# Patient Record
Sex: FEMALE | Race: WHITE | NOT HISPANIC OR LATINO | Employment: FULL TIME | URBAN - METROPOLITAN AREA
[De-identification: names, ages, dates, MRNs, and addresses within clinical notes are randomized per-mention and may not be internally consistent; named-entity substitution may affect disease eponyms.]

---

## 2018-01-12 ENCOUNTER — OFFICE VISIT (OUTPATIENT)
Dept: FAMILY MEDICINE | Facility: CLINIC | Age: 43
End: 2018-01-12
Payer: COMMERCIAL

## 2018-01-12 VITALS
OXYGEN SATURATION: 98 % | WEIGHT: 198.88 LBS | HEART RATE: 83 BPM | TEMPERATURE: 99 F | DIASTOLIC BLOOD PRESSURE: 96 MMHG | SYSTOLIC BLOOD PRESSURE: 142 MMHG

## 2018-01-12 DIAGNOSIS — N92.0 MENORRHAGIA WITH REGULAR CYCLE: ICD-10-CM

## 2018-01-12 DIAGNOSIS — I10 ESSENTIAL (PRIMARY) HYPERTENSION: ICD-10-CM

## 2018-01-12 DIAGNOSIS — F17.200 TOBACCO DEPENDENCY: ICD-10-CM

## 2018-01-12 DIAGNOSIS — Z00.00 ROUTINE MEDICAL EXAM: Primary | ICD-10-CM

## 2018-01-12 DIAGNOSIS — F43.9 STRESS AT HOME: ICD-10-CM

## 2018-01-12 PROCEDURE — 99999 PR PBB SHADOW E&M-NEW PATIENT-LVL IV: CPT | Mod: PBBFAC,,, | Performed by: INTERNAL MEDICINE

## 2018-01-12 PROCEDURE — 99386 PREV VISIT NEW AGE 40-64: CPT | Mod: S$GLB,,, | Performed by: INTERNAL MEDICINE

## 2018-01-12 RX ORDER — AMLODIPINE BESYLATE 10 MG/1
10 TABLET ORAL DAILY
Qty: 90 TABLET | Refills: 3 | Status: SHIPPED | OUTPATIENT
Start: 2018-01-12 | End: 2019-02-25 | Stop reason: SDUPTHER

## 2018-01-12 NOTE — ASSESSMENT & PLAN NOTE
HCTZ caused too much urination.  Will start with amlodipine 5mg until she can see me back.  Patient is asked to monitor BP at home or work, several times per month and return with written values at next office visit.

## 2018-01-12 NOTE — ASSESSMENT & PLAN NOTE
I counseled the patient on smoking cessation, risks associated with smoking, having a quit date, nicotine replacement, medications that can aid in cessation, and having a plan for future cravings.  The patient stated that she is interested in smoking cessation clinic.

## 2018-01-12 NOTE — PROGRESS NOTES
Assessment & Plan (all problems are new to me)  Problem List Items Addressed This Visit        Cardiac/Vascular    Essential (primary) hypertension (Chronic)    Current Assessment & Plan     HCTZ caused too much urination.  Will start with amlodipine 5mg until she can see me back.  Patient is asked to monitor BP at home or work, several times per month and return with written values at next office visit.             Other    Tobacco dependency (Chronic)    Current Assessment & Plan     I counseled the patient on smoking cessation, risks associated with smoking, having a quit date, nicotine replacement, medications that can aid in cessation, and having a plan for future cravings.  The patient stated that she is interested in smoking cessation clinic.          Relevant Orders    Ambulatory referral to Smoking Cessation Program      Other Visit Diagnoses     Routine medical exam    -  Primary  -    Discussed healthy diet, regular exercise, necessary labs, age appropriate cancer screening, and routine vaccinations.       Relevant Orders    CBC auto differential    Comprehensive metabolic panel    Lipid panel    Hemoglobin A1c    TSH    Menorrhagia with regular cycle    -  Referred to GYN.      Relevant Orders    Ambulatory referral to Gynecology    Stress at home  -    Counseled on coping mechanisms, need to practice them, and recommended choosing a couple to try until she can see me back.                 Health Maintenance reviewed, declined flu vaccine today.    Follow-up: Follow-up in about 1 week (around 1/19/2018) for BP check, elbow and foot pain.  ______________________________________________________________________    Chief Complaint  Chief Complaint   Patient presents with    [facility]       HPI  Sherrill Ennis is a 42 y.o. female with multiple medical diagnoses as listed in the medical history and problem list that presents for establish care.  Pt is new to me and to this clinic.  Last followed by  [facility] Dr. Johnson and last seen about 2 years ago.  She is aware that we may not be able to get through everything in a single visit.     Reports that she was taking ACE-I and HCTZ in the past but this was stopped ~ 2 years ago since her pressure was looking good.  She has been having HA that is typically bilateral and wraps from the front around to the occiput.  No photophobia/phonophobia/auras/N/V.  Started checking BP 2 days ago and mostly 130s-150s/90s.  No CP, SOB, palpitations.  She is a smoker and is interested in quitting.  She has recently been under a lot of stress.  Some work.  Some her own health concerns.  Mostly due to stress at home.  Recently have children and their kids move back home.  Currently 11 people in her house.  No specific coping mechanisms for the stress currently.  She would like to see an OBGYN.  Now having heavier periods with increased cramping.  Passing clots.        PAST MEDICAL HISTORY:  Past Medical History:   Diagnosis Date    Hypertension        PAST SURGICAL HISTORY:  Past Surgical History:   Procedure Laterality Date     SECTION      x4    CHOLECYSTECTOMY  1995    TUBAL LIGATION         SOCIAL HISTORY:  Social History     Social History    Marital status:      Spouse name: N/A    Number of children: N/A    Years of education: N/A     Occupational History    Not on file.     Social History Main Topics    Smoking status: Current Every Day Smoker     Packs/day: 0.50     Years: 17.00    Smokeless tobacco: Never Used    Alcohol use Yes      Comment: once weekly    Drug use: No    Sexual activity: No      Comment:      Other Topics Concern    Not on file     Social History Narrative    No narrative on file       FAMILY HISTORY:  Family History   Problem Relation Age of Onset    Hypertension Mother     Stroke Mother     Aneurysm Mother     Hypertension Father     Hypertension Brother     Hypertension Maternal Grandfather      Heart disease Maternal Grandfather      MI    Cancer Maternal Grandfather     Cancer Paternal Grandmother        ALLERGIES AND MEDICATIONS: updated and reviewed.  Review of patient's allergies indicates:  Allergies not on file  Current Outpatient Prescriptions   Medication Sig Dispense Refill    amLODIPine (NORVASC) 10 MG tablet Take 1 tablet (10 mg total) by mouth once daily. 90 tablet 3     No current facility-administered medications for this visit.          ROS  Review of Systems   Constitutional: Negative for activity change, chills, diaphoresis, fever and unexpected weight change.   HENT: Negative for congestion, dental problem, ear discharge, ear pain, hearing loss, postnasal drip, rhinorrhea, sinus pressure, sore throat and trouble swallowing.    Eyes: Negative for pain, discharge, redness, itching and visual disturbance.   Respiratory: Negative for cough, chest tightness, shortness of breath and wheezing.    Cardiovascular: Negative for chest pain, palpitations and leg swelling.   Gastrointestinal: Negative for abdominal distention, abdominal pain, blood in stool, constipation, diarrhea, nausea and vomiting.        No melena   Endocrine: Negative for cold intolerance, heat intolerance, polydipsia, polyphagia and polyuria.        No nocturia   Genitourinary: Positive for menstrual problem. Negative for decreased urine volume, difficulty urinating, dysuria, flank pain, frequency, genital sores, hematuria, pelvic pain, urgency, vaginal bleeding, vaginal discharge and vaginal pain.   Musculoskeletal: Positive for arthralgias, joint swelling and neck pain. Negative for myalgias and neck stiffness.   Skin: Negative for color change, pallor and rash.   Neurological: Positive for headaches. Negative for dizziness, tremors, seizures, syncope, weakness and light-headedness.   Hematological: Negative for adenopathy. Does not bruise/bleed easily.   Psychiatric/Behavioral: Negative for confusion, decreased  concentration, dysphoric mood, sleep disturbance and suicidal ideas. The patient is not nervous/anxious.         No depression         Physical Exam  Vitals:    01/12/18 1259 01/12/18 1347   BP: (!) 176/110 (!) 142/96   BP Location: Right arm    Patient Position: Sitting    BP Method: Medium (Manual)    Pulse: 83    Temp: 98.8 °F (37.1 °C)    TempSrc: Oral    SpO2: 98%    Weight: 90.2 kg (198 lb 13.7 oz)     There is no height or weight on file to calculate BMI.  Weight: 90.2 kg (198 lb 13.7 oz)       Physical Exam   Constitutional: She is oriented to person, place, and time. She appears well-developed and well-nourished. No distress.   HENT:   Head: Normocephalic and atraumatic.   Right Ear: Tympanic membrane, external ear and ear canal normal.   Left Ear: Tympanic membrane, external ear and ear canal normal.   Nose: Nose normal. Right sinus exhibits no maxillary sinus tenderness and no frontal sinus tenderness. Left sinus exhibits no maxillary sinus tenderness and no frontal sinus tenderness.   Mouth/Throat: Uvula is midline, oropharynx is clear and moist and mucous membranes are normal. No tonsillar exudate.   Eyes: Conjunctivae, EOM and lids are normal. Pupils are equal, round, and reactive to light. No scleral icterus.   Neck: Full passive range of motion without pain. Neck supple. No JVD present. No spinous process tenderness and no muscular tenderness present. Carotid bruit is not present. No thyromegaly present.   Cardiovascular: Normal rate, regular rhythm, S1 normal, S2 normal and intact distal pulses.  Exam reveals no S3, no S4 and no friction rub.    No murmur heard.  Pulmonary/Chest: Effort normal and breath sounds normal. She has no wheezes. She has no rhonchi. She has no rales.   Abdominal: Soft. Bowel sounds are normal. She exhibits no distension. There is no hepatosplenomegaly. There is no tenderness. There is no rebound and no CVA tenderness.   Musculoskeletal: Normal range of motion. She exhibits  no edema.   Lymphadenopathy:        Head (right side): No submental and no submandibular adenopathy present.        Head (left side): No submental and no submandibular adenopathy present.     She has no cervical adenopathy.   Neurological: She is alert and oriented to person, place, and time. Coordination normal.   Motor grossly intact.  Sensory grossly intact.  Symmetric facial movements palate elevated symmetrically tongue midline     Skin: Skin is warm and dry. No rash noted. No cyanosis. Nails show no clubbing.   Psychiatric: She has a normal mood and affect. Her speech is normal and behavior is normal. Thought content normal. Cognition and memory are normal.           Health Maintenance    Patient has no pending health maintenance at this time

## 2018-01-16 ENCOUNTER — LAB VISIT (OUTPATIENT)
Dept: LAB | Facility: HOSPITAL | Age: 43
End: 2018-01-16
Attending: INTERNAL MEDICINE
Payer: COMMERCIAL

## 2018-01-16 DIAGNOSIS — Z00.00 ROUTINE MEDICAL EXAM: ICD-10-CM

## 2018-01-16 LAB
ALBUMIN SERPL BCP-MCNC: 3.7 G/DL
ALP SERPL-CCNC: 80 U/L
ALT SERPL W/O P-5'-P-CCNC: 27 U/L
ANION GAP SERPL CALC-SCNC: 7 MMOL/L
AST SERPL-CCNC: 18 U/L
BASOPHILS # BLD AUTO: 0.05 K/UL
BASOPHILS NFR BLD: 0.6 %
BILIRUB SERPL-MCNC: 0.3 MG/DL
BUN SERPL-MCNC: 16 MG/DL
CALCIUM SERPL-MCNC: 8.9 MG/DL
CHLORIDE SERPL-SCNC: 109 MMOL/L
CHOLEST SERPL-MCNC: 225 MG/DL
CHOLEST/HDLC SERPL: 6.4 {RATIO}
CO2 SERPL-SCNC: 22 MMOL/L
CREAT SERPL-MCNC: 0.7 MG/DL
DIFFERENTIAL METHOD: ABNORMAL
EOSINOPHIL # BLD AUTO: 0.3 K/UL
EOSINOPHIL NFR BLD: 3.4 %
ERYTHROCYTE [DISTWIDTH] IN BLOOD BY AUTOMATED COUNT: 16.3 %
EST. GFR  (AFRICAN AMERICAN): >60 ML/MIN/1.73 M^2
EST. GFR  (NON AFRICAN AMERICAN): >60 ML/MIN/1.73 M^2
ESTIMATED AVG GLUCOSE: 114 MG/DL
GLUCOSE SERPL-MCNC: 110 MG/DL
HBA1C MFR BLD HPLC: 5.6 %
HCT VFR BLD AUTO: 34.1 %
HDLC SERPL-MCNC: 35 MG/DL
HDLC SERPL: 15.6 %
HGB BLD-MCNC: 10.6 G/DL
IMM GRANULOCYTES # BLD AUTO: 0.03 K/UL
IMM GRANULOCYTES NFR BLD AUTO: 0.4 %
LDLC SERPL CALC-MCNC: 155.4 MG/DL
LYMPHOCYTES # BLD AUTO: 2.3 K/UL
LYMPHOCYTES NFR BLD: 27.9 %
MCH RBC QN AUTO: 22.6 PG
MCHC RBC AUTO-ENTMCNC: 31.1 G/DL
MCV RBC AUTO: 73 FL
MONOCYTES # BLD AUTO: 0.7 K/UL
MONOCYTES NFR BLD: 8.8 %
NEUTROPHILS # BLD AUTO: 4.9 K/UL
NEUTROPHILS NFR BLD: 58.9 %
NONHDLC SERPL-MCNC: 190 MG/DL
NRBC BLD-RTO: 0 /100 WBC
PLATELET # BLD AUTO: 352 K/UL
PMV BLD AUTO: 10.5 FL
POTASSIUM SERPL-SCNC: 3.8 MMOL/L
PROT SERPL-MCNC: 7.3 G/DL
RBC # BLD AUTO: 4.7 M/UL
SODIUM SERPL-SCNC: 138 MMOL/L
TRIGL SERPL-MCNC: 173 MG/DL
TSH SERPL DL<=0.005 MIU/L-ACNC: 1.86 UIU/ML
WBC # BLD AUTO: 8.29 K/UL

## 2018-01-16 PROCEDURE — 36415 COLL VENOUS BLD VENIPUNCTURE: CPT | Mod: PO

## 2018-01-16 PROCEDURE — 85025 COMPLETE CBC W/AUTO DIFF WBC: CPT

## 2018-01-16 PROCEDURE — 80053 COMPREHEN METABOLIC PANEL: CPT

## 2018-01-16 PROCEDURE — 84443 ASSAY THYROID STIM HORMONE: CPT

## 2018-01-16 PROCEDURE — 80061 LIPID PANEL: CPT

## 2018-01-16 PROCEDURE — 83036 HEMOGLOBIN GLYCOSYLATED A1C: CPT

## 2018-01-19 ENCOUNTER — LAB VISIT (OUTPATIENT)
Dept: LAB | Facility: HOSPITAL | Age: 43
End: 2018-01-19
Attending: INTERNAL MEDICINE
Payer: COMMERCIAL

## 2018-01-19 ENCOUNTER — OFFICE VISIT (OUTPATIENT)
Dept: FAMILY MEDICINE | Facility: CLINIC | Age: 43
End: 2018-01-19
Payer: COMMERCIAL

## 2018-01-19 VITALS
OXYGEN SATURATION: 98 % | HEIGHT: 64 IN | DIASTOLIC BLOOD PRESSURE: 84 MMHG | WEIGHT: 199.19 LBS | BODY MASS INDEX: 34.01 KG/M2 | HEART RATE: 96 BPM | TEMPERATURE: 98 F | SYSTOLIC BLOOD PRESSURE: 130 MMHG

## 2018-01-19 DIAGNOSIS — B96.89 BV (BACTERIAL VAGINOSIS): ICD-10-CM

## 2018-01-19 DIAGNOSIS — A60.04 HERPES SIMPLEX VULVOVAGINITIS: ICD-10-CM

## 2018-01-19 DIAGNOSIS — G25.81 RLS (RESTLESS LEGS SYNDROME): ICD-10-CM

## 2018-01-19 DIAGNOSIS — D50.9 MICROCYTIC ANEMIA: ICD-10-CM

## 2018-01-19 DIAGNOSIS — Z12.31 ENCOUNTER FOR SCREENING MAMMOGRAM FOR BREAST CANCER: ICD-10-CM

## 2018-01-19 DIAGNOSIS — F17.200 TOBACCO DEPENDENCY: Chronic | ICD-10-CM

## 2018-01-19 DIAGNOSIS — M77.11 LATERAL EPICONDYLITIS OF RIGHT ELBOW: ICD-10-CM

## 2018-01-19 DIAGNOSIS — N76.0 BV (BACTERIAL VAGINOSIS): ICD-10-CM

## 2018-01-19 DIAGNOSIS — I10 ESSENTIAL (PRIMARY) HYPERTENSION: Primary | Chronic | ICD-10-CM

## 2018-01-19 LAB
FERRITIN SERPL-MCNC: 14 NG/ML
IRON SERPL-MCNC: 30 UG/DL
SATURATED IRON: 5 %
TOTAL IRON BINDING CAPACITY: 549 UG/DL
TRANSFERRIN SERPL-MCNC: 371 MG/DL
VIT B12 SERPL-MCNC: 628 PG/ML

## 2018-01-19 PROCEDURE — 36415 COLL VENOUS BLD VENIPUNCTURE: CPT | Mod: PO

## 2018-01-19 PROCEDURE — 82728 ASSAY OF FERRITIN: CPT

## 2018-01-19 PROCEDURE — 82607 VITAMIN B-12: CPT

## 2018-01-19 PROCEDURE — 99214 OFFICE O/P EST MOD 30 MIN: CPT | Mod: S$GLB,,, | Performed by: INTERNAL MEDICINE

## 2018-01-19 PROCEDURE — 83540 ASSAY OF IRON: CPT

## 2018-01-19 PROCEDURE — 99999 PR PBB SHADOW E&M-EST. PATIENT-LVL III: CPT | Mod: PBBFAC,,, | Performed by: INTERNAL MEDICINE

## 2018-01-19 RX ORDER — METRONIDAZOLE 500 MG/1
500 TABLET ORAL EVERY 12 HOURS
Qty: 14 TABLET | Refills: 0 | Status: SHIPPED | OUTPATIENT
Start: 2018-01-19 | End: 2018-01-26

## 2018-01-19 RX ORDER — NAPROXEN 500 MG/1
500 TABLET ORAL 2 TIMES DAILY PRN
Qty: 60 TABLET | Refills: 1 | Status: SHIPPED | OUTPATIENT
Start: 2018-01-19 | End: 2018-05-02 | Stop reason: SDUPTHER

## 2018-01-19 RX ORDER — VALACYCLOVIR HYDROCHLORIDE 1 G/1
1000 TABLET, FILM COATED ORAL DAILY PRN
Qty: 30 TABLET | Refills: 1 | Status: SHIPPED | OUTPATIENT
Start: 2018-01-19 | End: 2020-07-07 | Stop reason: SDUPTHER

## 2018-01-19 NOTE — ASSESSMENT & PLAN NOTE
Taking amlodipine 5mg.  The current medical regimen is effective;  continue present plan and medications.

## 2018-01-19 NOTE — PROGRESS NOTES
Assessment & Plan  Problem List Items Addressed This Visit        Cardiac/Vascular    Essential (primary) hypertension - Primary (Chronic)    Current Assessment & Plan     Taking amlodipine 5mg.  The current medical regimen is effective;  continue present plan and medications.             ID    Herpes simplex vulvovaginitis    Current Assessment & Plan     Valtrex provided for PRN use.  Typically only 1 outbreak a year.          Relevant Medications    valACYclovir (VALTREX) 1000 MG tablet       Other    Tobacco dependency (Chronic)    Current Assessment & Plan     Has been 4 days without cigarette!  Keep up the great work!            Other Visit Diagnoses     Lateral epicondylitis of right elbow    -  RICE therapy, NSAIDs.  Counseled on physician directed home PT.  May use tennis elbow strap as well    Relevant Medications    naproxen (EC NAPROSYN) 500 MG EC tablet    BV (bacterial vaginosis)    -  Start flagyl.      Relevant Medications    metroNIDAZOLE (FLAGYL) 500 MG tablet    Encounter for screening mammogram for breast cancer    -  Screening MMG ordered    Relevant Orders    Mammo Digital Screening Bilat with CAD    RLS (restless legs syndrome)    -  Check iron and B12.  Suspect this is due to iron def given microcytic anemia and heavy vaginal bleeding.     Relevant Orders    Vitamin B12    Ferritin    Microcytic anemia    -  As above.  Likely from menstrual bleedings.  Referred to OBGYN    Relevant Orders    Iron and TIBC    Ferritin            Health Maintenance reviewed, declined flu shot.    Follow-up: Follow-up in about 6 months (around 7/19/2018) for follow up for chronic conditions.  ______________________________________________________________________    Chief Complaint  Chief Complaint   Patient presents with    Hypertension    Joint Pain    Follow-up       HPI  Sherrill Ennis is a 42 y.o. female with medical diagnoses as listed in the medical history and problem list that presents for HTN  follow up, heel pain and foot pain for weeks.  Pt is known to me with her last appointment 2018.  She had labs after her last visit that showed microcytic anemia and elevated cholesterol.      Her Bp has been doing better.  No complications from the amlodipine.  She has hasn't smoked a cigarette in 4 days!    She has been having bilateral foot pain for about a year.  It can be intermittent.  Feels like her feet are swollen and having some painful paresthesia.  Occurs mostly at night but can also be when she is sitting for a prolonged period of time. Gets better when she stands up and walks.  At night it doesn't wake her up but she noticed it up in the AM.  Feet aren't actually swollen.  The left foot is TTP over the lateral foot.  When trying to fall asleep she feels like she can't get her legs comfortable and will  her legs until she is able to fall asleep.      Right elbow pain is just over the lateral epicondyle for a couple of months.  10 days ago was really bad.  She is RHD.  She tried ibuprofen without any relief. No hand weakness or numbness.  Pain also notable with writing repetitively and taking things down from an elevated shelf such as her laundry detergent.        PAST MEDICAL HISTORY:  Past Medical History:   Diagnosis Date    Hypertension        PAST SURGICAL HISTORY:  Past Surgical History:   Procedure Laterality Date     SECTION      x4    CHOLECYSTECTOMY      TUBAL LIGATION         SOCIAL HISTORY:  Social History     Social History    Marital status:      Spouse name: N/A    Number of children: N/A    Years of education: N/A     Occupational History    Not on file.     Social History Main Topics    Smoking status: Current Every Day Smoker     Packs/day: 0.50     Years: 17.00    Smokeless tobacco: Never Used    Alcohol use Yes      Comment: once weekly    Drug use: No    Sexual activity: No      Comment:      Other Topics Concern    Not on file      Social History Narrative    No narrative on file       FAMILY HISTORY:  Family History   Problem Relation Age of Onset    Hypertension Mother     Stroke Mother     Aneurysm Mother     Hypertension Father     Hypertension Brother     Hypertension Maternal Grandfather     Heart disease Maternal Grandfather      MI    Cancer Maternal Grandfather     Cancer Paternal Grandmother        ALLERGIES AND MEDICATIONS: updated and reviewed.  Review of patient's allergies indicates:   Allergen Reactions    Ace inhibitors Other (See Comments)     cough     Current Outpatient Prescriptions   Medication Sig Dispense Refill    amLODIPine (NORVASC) 10 MG tablet Take 1 tablet (10 mg total) by mouth once daily. 90 tablet 3    metroNIDAZOLE (FLAGYL) 500 MG tablet Take 1 tablet (500 mg total) by mouth every 12 (twelve) hours. 14 tablet 0    naproxen (EC NAPROSYN) 500 MG EC tablet Take 1 tablet (500 mg total) by mouth 2 (two) times daily as needed (elbow pain). 60 tablet 1    valACYclovir (VALTREX) 1000 MG tablet Take 1 tablet (1,000 mg total) by mouth daily as needed (herpes outbreak). For 5 days 30 tablet 1     No current facility-administered medications for this visit.          ROS  Review of Systems   Constitutional: Negative for activity change and unexpected weight change.   HENT: Negative for hearing loss, rhinorrhea and trouble swallowing.    Eyes: Negative for discharge and visual disturbance.   Respiratory: Negative for chest tightness and wheezing.    Cardiovascular: Negative for chest pain and palpitations.   Gastrointestinal: Negative for blood in stool, constipation, diarrhea and vomiting.   Endocrine: Negative for polyuria.   Genitourinary: Positive for genital sores (none currently but has h/o HSV), vaginal bleeding, vaginal discharge (malodorous) and vaginal pain (with intercourse). Negative for difficulty urinating, dysuria and hematuria.   Musculoskeletal: Positive for arthralgias and neck pain.  "Negative for joint swelling.   Neurological: Negative for weakness and headaches.   Psychiatric/Behavioral: Negative for confusion and dysphoric mood.           Physical Exam  Vitals:    01/19/18 1124   BP: 130/84   Pulse: 96   Temp: 98.4 °F (36.9 °C)   SpO2: 98%   Weight: 90.4 kg (199 lb 3 oz)   Height: 5' 4" (1.626 m)    Body mass index is 34.19 kg/m².  Weight: 90.4 kg (199 lb 3 oz)   Height: 5' 4" (162.6 cm)   Physical Exam   Constitutional: She is oriented to person, place, and time. She appears well-developed and well-nourished. No distress.   HENT:   Head: Normocephalic and atraumatic.   Eyes: Conjunctivae, EOM and lids are normal. Pupils are equal, round, and reactive to light. No scleral icterus.   Neck: Full passive range of motion without pain. Neck supple. No JVD present. Carotid bruit is not present. No thyromegaly present.   Cardiovascular: Normal rate, regular rhythm, normal heart sounds, intact distal pulses and normal pulses.  Exam reveals no S3, no S4 and no friction rub.    No murmur heard.  Pulmonary/Chest: Effort normal and breath sounds normal. She has no wheezes. She has no rhonchi. She has no rales.   Abdominal: Soft. Bowel sounds are normal. There is no tenderness.   Musculoskeletal: She exhibits no edema.        Right elbow: She exhibits normal range of motion, no swelling, no effusion and no deformity. Tenderness found. Lateral epicondyle (TTP and pain with resistance to suppination) tenderness noted. No radial head, no medial epicondyle and no olecranon process tenderness noted.        Left elbow: Normal.        Right ankle: Normal.        Left ankle: Normal.        Right foot: Normal.        Left foot: Normal.   Lymphadenopathy:        Head (right side): No submental and no submandibular adenopathy present.        Head (left side): No submental and no submandibular adenopathy present.     She has no cervical adenopathy.   Neurological: She is alert and oriented to person, place, and time. "   Motor grossly intact.  Sensory grossly intact.  Symmetric facial movements palate elevated symmetrically tongue midline   Skin: Skin is warm and dry. No rash noted.   Psychiatric: She has a normal mood and affect. Her speech is normal and behavior is normal. Thought content normal.           Health Maintenance       Date Due Completion Date    TETANUS VACCINE 04/20/1995 ---    Pneumococcal PPSV23 (Medium Risk) (1) 04/20/1995 ---    Pap Smear with HPV Cotest 04/20/1998 ---    Mammogram 04/20/2017 ---    Influenza Vaccine 08/01/2017 ---

## 2018-01-19 NOTE — PATIENT INSTRUCTIONS
Below is a list of free Apps that you may find helpful (some of them may not apply to you since this is a general list of helpful Apps):    Android & Apple users:  Fooducate - Helps with healthy diet and weight loss  Shop Well - Scan barcodes to foods to see if it is healthy or not.  It will also suggest healthier alternatives  Lose It - Calorie tracking and goal setting for weight loss.  Peer support is also available  Calorie Counter and Diet Tracker by MyFitnessPal - Helps count calories for food intake and calories burned during exercise  Popsugar Active - has pictures and videos of preloaded workout routines  [facility] Diabetes Pal - log for home sugars, diet, weight, and blood pressure  Headspace - Guides your through meditation.  The first 10 programs are free.

## 2018-01-20 ENCOUNTER — PATIENT MESSAGE (OUTPATIENT)
Dept: FAMILY MEDICINE | Facility: CLINIC | Age: 43
End: 2018-01-20

## 2018-01-20 DIAGNOSIS — G25.81 RLS (RESTLESS LEGS SYNDROME): ICD-10-CM

## 2018-01-20 DIAGNOSIS — D50.0 IRON DEFICIENCY ANEMIA DUE TO CHRONIC BLOOD LOSS: ICD-10-CM

## 2018-01-20 RX ORDER — DOCUSATE SODIUM 100 MG/1
100 CAPSULE, LIQUID FILLED ORAL 2 TIMES DAILY PRN
Qty: 60 CAPSULE | Refills: 2 | Status: SHIPPED | OUTPATIENT
Start: 2018-01-20 | End: 2018-08-27

## 2018-01-20 RX ORDER — FERROUS SULFATE 325(65) MG
325 TABLET, DELAYED RELEASE (ENTERIC COATED) ORAL
Qty: 90 TABLET | Refills: 2 | Status: SHIPPED | OUTPATIENT
Start: 2018-01-20 | End: 2018-04-20

## 2018-01-22 ENCOUNTER — PATIENT MESSAGE (OUTPATIENT)
Dept: FAMILY MEDICINE | Facility: CLINIC | Age: 43
End: 2018-01-22

## 2018-03-08 ENCOUNTER — HOSPITAL ENCOUNTER (OUTPATIENT)
Dept: RADIOLOGY | Facility: HOSPITAL | Age: 43
Discharge: HOME | End: 2018-03-08
Attending: INTERNAL MEDICINE
Payer: COMMERCIAL

## 2018-03-08 DIAGNOSIS — Z12.31 ENCOUNTER FOR SCREENING MAMMOGRAM FOR BREAST CANCER: ICD-10-CM

## 2018-03-08 PROCEDURE — 77067 SCR MAMMO BI INCL CAD: CPT | Mod: 26,,, | Performed by: RADIOLOGY

## 2018-03-08 PROCEDURE — 77063 BREAST TOMOSYNTHESIS BI: CPT | Mod: 26,,, | Performed by: RADIOLOGY

## 2018-03-08 PROCEDURE — 77067 SCR MAMMO BI INCL CAD: CPT | Mod: TC,PO

## 2018-03-21 ENCOUNTER — OFFICE VISIT (OUTPATIENT)
Dept: OBSTETRICS AND GYNECOLOGY | Facility: CLINIC | Age: 43
End: 2018-03-21
Payer: COMMERCIAL

## 2018-03-21 VITALS
DIASTOLIC BLOOD PRESSURE: 78 MMHG | WEIGHT: 200.81 LBS | HEIGHT: 64 IN | BODY MASS INDEX: 34.28 KG/M2 | SYSTOLIC BLOOD PRESSURE: 125 MMHG

## 2018-03-21 DIAGNOSIS — N94.6 DYSMENORRHEA: ICD-10-CM

## 2018-03-21 DIAGNOSIS — Z00.00 ANNUAL PHYSICAL EXAM: Primary | ICD-10-CM

## 2018-03-21 DIAGNOSIS — Z01.419 ENCOUNTER FOR GYNECOLOGICAL EXAMINATION WITHOUT ABNORMAL FINDING: ICD-10-CM

## 2018-03-21 DIAGNOSIS — N80.03 ADENOMYOSIS: ICD-10-CM

## 2018-03-21 DIAGNOSIS — N92.0 MENORRHAGIA WITH REGULAR CYCLE: ICD-10-CM

## 2018-03-21 PROCEDURE — 3074F SYST BP LT 130 MM HG: CPT | Mod: CPTII,S$GLB,, | Performed by: OBSTETRICS & GYNECOLOGY

## 2018-03-21 PROCEDURE — 99999 PR PBB SHADOW E&M-EST. PATIENT-LVL III: CPT | Mod: PBBFAC,,, | Performed by: OBSTETRICS & GYNECOLOGY

## 2018-03-21 PROCEDURE — 99386 PREV VISIT NEW AGE 40-64: CPT | Mod: S$GLB,,, | Performed by: OBSTETRICS & GYNECOLOGY

## 2018-03-21 PROCEDURE — 3078F DIAST BP <80 MM HG: CPT | Mod: CPTII,S$GLB,, | Performed by: OBSTETRICS & GYNECOLOGY

## 2018-03-21 PROCEDURE — 88175 CYTOPATH C/V AUTO FLUID REDO: CPT

## 2018-03-21 RX ORDER — DIAZEPAM 10 MG/1
TABLET ORAL
COMMUNITY
Start: 2018-01-26 | End: 2018-04-30

## 2018-03-21 NOTE — PATIENT INSTRUCTIONS

## 2018-03-21 NOTE — PROGRESS NOTES
Subjective:      Chief Complaint:    Chief Complaint   Patient presents with    Gynecologic Exam       Menstrual History:    OB History      Para Term  AB Living    4 4 4          SAB TAB Ectopic Multiple Live Births                       Menarche age: 15     Patient's last menstrual period was 2018.                Objective:        History of Present Illness AND  Examination detailed DICTATE:    PRESENT ILLNESS AND  NOTE:  The patient is 42 years old.  She is for annual   yearly exam, new patient to [facility], new patient to me.  The patient's   mammogram earlier this year was negative.  The patient's last menstrual cycle,   2018.  The patient's medical problems, increased blood pressure and   elevated cholesterol.  Surgery, , cholecystectomy, tubal ligation.  The   patient's complaint is abnormal uterine bleeding, heavy bleeding, cycling but   heavy, passing clots, severe dysmenorrhea, pain and cramping.    PHYSICAL EXAMINATION:  VITAL SIGNS:  Blood pressure is 125/78, weight 200.  BREASTS:  No lumps, masses, discharge, skin changes, retraction, nipple changes.    Axilla negative.  PELVIC:  External normal.  Vulva normal.  [place]olin, urethral and [place] glands   are negative.  Vagina is clear.  Cervix is very high since the patient had four    sections.  Uterus is slightly enlarged, tender, painful.  Good pelvic   support.  Good motility noted.  RECTAL:  Negative.    PLAN:  Pap smear.  We will do a pelvic ultrasound.  Reviewing the patient's   records, mild anemia noted and a mild increase in cholesterol.  Recommendations,   multivitamins one a day for Women and start calcium, vitamin D, Citracal plus.    We will evaluate the patient's ultrasound, however, my diagnosis is most likely   adenomyosis.  Discussed with the patient the supracervical laparoscopic   hysterectomy with salpingectomy to alleviate her problem and she is agreeable.    So we will decide on  followup on treatment after we receive the report on   ultrasound.      JIV/HN  dd: 03/21/2018 09:21:49 (CDT)  td: 03/22/2018 05:40:12 (CDT)  Doc ID   #[id number]  Job ID #[id number]    CC:         Physical Exam   Constitutional: She is oriented to person, place, and time. She appears well-developed and well-nourished. No distress.   HENT:   Head: Normocephalic.   Mouth/Throat: Oropharynx is clear.  Eyes: Pupils are equal, round, and reactive to light.   Neck: Neck supple. No tracheal deviation present.   Cardiovascular: Normal rate, regular rhythm and normal heart sounds. No murmur heard.  Pulmonary/Chest: Effort normal and breath sounds normal. No respiratory distress. She has no wheezes. She has no r  Abdominal: Bowel sounds are normal. She exhibits no distension and no mass. There is no tenderness. There is no rebound and no guarding.   Musculoskeletal: Normal range of motion.   Lymphadenopathy:        Right: No inguinal adenopathy present.        Left: No inguinal adenopathy present.   Neurological: She is alert and oriented.  Skin: Skin is warm. No rash noted.        Review of Systems  Review of Systems   Normal ROS:   Constitutional: Negative for fever, chills, activity change fatigue and unexpected weight change.   HENT: Negative for nosebleeds, congestion.  Eyes: Negative for visual disturbance.   Respiratory: Negative for shortness of breath and wheezing.    Cardiovascular: Negative for chest pain, palpitations.   Gastrointestinal: Negative for abdominal pain, diarrhea, constipation, blood in stool and abdominal distention.   Musculoskeletal: Negative for back pain.   Allergic/Immunologic: Negative    Neurological: Negative f  Hematological: Negative for adenopathy.   Psychiatric/Behavioral: Negative     Assessment:      Diagnosis: GYN   EXAM    DYSMENORRHEA    MENORRHAGIA    ADENOMYOSIS       Plan:      Return in 2  weeks

## 2018-04-03 ENCOUNTER — HOSPITAL ENCOUNTER (OUTPATIENT)
Dept: RADIOLOGY | Facility: HOSPITAL | Age: 43
Discharge: HOME | End: 2018-04-03
Attending: OBSTETRICS & GYNECOLOGY
Payer: COMMERCIAL

## 2018-04-03 DIAGNOSIS — N80.03 ADENOMYOSIS: ICD-10-CM

## 2018-04-03 DIAGNOSIS — N92.0 MENORRHAGIA WITH REGULAR CYCLE: ICD-10-CM

## 2018-04-03 DIAGNOSIS — N94.6 DYSMENORRHEA: ICD-10-CM

## 2018-04-03 PROCEDURE — 76830 TRANSVAGINAL US NON-OB: CPT | Mod: 26,,, | Performed by: RADIOLOGY

## 2018-04-03 PROCEDURE — 76830 TRANSVAGINAL US NON-OB: CPT | Mod: TC

## 2018-04-03 PROCEDURE — 76856 US EXAM PELVIC COMPLETE: CPT | Mod: 26,,, | Performed by: RADIOLOGY

## 2018-04-04 ENCOUNTER — TELEPHONE (OUTPATIENT)
Dept: OBSTETRICS AND GYNECOLOGY | Facility: CLINIC | Age: 43
End: 2018-04-04

## 2018-04-04 ENCOUNTER — OFFICE VISIT (OUTPATIENT)
Dept: OBSTETRICS AND GYNECOLOGY | Facility: CLINIC | Age: 43
End: 2018-04-04
Payer: COMMERCIAL

## 2018-04-04 VITALS
WEIGHT: 200.19 LBS | SYSTOLIC BLOOD PRESSURE: 120 MMHG | BODY MASS INDEX: 34.18 KG/M2 | HEIGHT: 64 IN | DIASTOLIC BLOOD PRESSURE: 78 MMHG

## 2018-04-04 DIAGNOSIS — D25.9 UTERINE LEIOMYOMA, UNSPECIFIED LOCATION: ICD-10-CM

## 2018-04-04 DIAGNOSIS — N93.9 ABNORMAL UTERINE BLEEDING: Primary | ICD-10-CM

## 2018-04-04 DIAGNOSIS — N80.03 ADENOMYOSIS: ICD-10-CM

## 2018-04-04 DIAGNOSIS — N94.6 DYSMENORRHEA: ICD-10-CM

## 2018-04-04 PROCEDURE — 3074F SYST BP LT 130 MM HG: CPT | Mod: CPTII,S$GLB,, | Performed by: OBSTETRICS & GYNECOLOGY

## 2018-04-04 PROCEDURE — 99212 OFFICE O/P EST SF 10 MIN: CPT | Mod: S$GLB,,, | Performed by: OBSTETRICS & GYNECOLOGY

## 2018-04-04 PROCEDURE — 99999 PR PBB SHADOW E&M-EST. PATIENT-LVL III: CPT | Mod: PBBFAC,,, | Performed by: OBSTETRICS & GYNECOLOGY

## 2018-04-04 PROCEDURE — 3078F DIAST BP <80 MM HG: CPT | Mod: CPTII,S$GLB,, | Performed by: OBSTETRICS & GYNECOLOGY

## 2018-04-04 NOTE — PROGRESS NOTES
Subjective:      Chief Complaint:    Chief Complaint   Patient presents with    Results       Menstrual History:    OB History      Para Term  AB Living    4 4 4          SAB TAB Ectopic Multiple Live Births                       Menarche age: 15     Patient's last menstrual period was 2018.            Objective:        History of Present Illness AND  Examination detailed DICTATE:     The patient is 42 years of age,  4, para 4.  The patient was seen   recently because of abnormal uterine bleeding, menorrhagia, dysmenorrhea,   passing clots, and painful periods.  The patient received the benefit of pelvic   ultrasonography, which indicated anterior fibroid_, diagnosed most likely   fibroid and adenomyosis.  The patient's CBC indicated mild anemia.  The patient   ANEMIA iron deficiency.  The patient had in the past tubal ligation, .  I   discussed the problem with the patient.    Recommendation, laparoscopic supracervical hysterectomy, removal of the   fallopian tubes.  We will refer the patient to Dr. Vince Garcia for the procedure.          / [id number] blank(s)        LARISA  dd: 2018 08:38:41 (CDT)  td: 2018 16:03:31 (CDT)  Doc ID   #[id number]  Job ID #[id number]    CC:             Assessment:      Diagnosis: abn   Bleeding   Fibroids    adenomyosis       Plan:      Return in 4  weeks

## 2018-04-04 NOTE — TELEPHONE ENCOUNTER
----- Message from Wilber Houston sent at 4/4/2018  9:41 AM CDT -----  Contact: CAROLINA [phone]  Pt is requesting a call back in regards to results. Please call at your earliest convenience.

## 2018-04-30 ENCOUNTER — OFFICE VISIT (OUTPATIENT)
Dept: OBSTETRICS AND GYNECOLOGY | Facility: CLINIC | Age: 43
End: 2018-04-30
Payer: COMMERCIAL

## 2018-04-30 VITALS
SYSTOLIC BLOOD PRESSURE: 130 MMHG | HEIGHT: 64 IN | BODY MASS INDEX: 34.33 KG/M2 | WEIGHT: 201.06 LBS | DIASTOLIC BLOOD PRESSURE: 82 MMHG

## 2018-04-30 DIAGNOSIS — N92.0 MENORRHAGIA WITH REGULAR CYCLE: ICD-10-CM

## 2018-04-30 DIAGNOSIS — D25.9 UTERINE LEIOMYOMA, UNSPECIFIED LOCATION: Primary | ICD-10-CM

## 2018-04-30 DIAGNOSIS — N94.6 DYSMENORRHEA: ICD-10-CM

## 2018-04-30 DIAGNOSIS — R87.615 ENCOUNTER FOR REPEAT PAP SMEAR DUE TO PREVIOUS INSUFF CERVICAL CELLS: ICD-10-CM

## 2018-04-30 PROCEDURE — 3079F DIAST BP 80-89 MM HG: CPT | Mod: CPTII,S$GLB,, | Performed by: OBSTETRICS & GYNECOLOGY

## 2018-04-30 PROCEDURE — 99213 OFFICE O/P EST LOW 20 MIN: CPT | Mod: S$GLB,,, | Performed by: OBSTETRICS & GYNECOLOGY

## 2018-04-30 PROCEDURE — 99999 PR PBB SHADOW E&M-EST. PATIENT-LVL IV: CPT | Mod: PBBFAC,,, | Performed by: OBSTETRICS & GYNECOLOGY

## 2018-04-30 PROCEDURE — 3075F SYST BP GE 130 - 139MM HG: CPT | Mod: CPTII,S$GLB,, | Performed by: OBSTETRICS & GYNECOLOGY

## 2018-04-30 PROCEDURE — 88175 CYTOPATH C/V AUTO FLUID REDO: CPT

## 2018-04-30 NOTE — PATIENT INSTRUCTIONS
You are scheduled for laparoscopic supracervical hysterectomy with bilateral salpingectomy on 6/20/2018  Please come back on 6/13/2018 for preop

## 2018-04-30 NOTE — PROGRESS NOTES
Subjective:       Patient ID: Sherrill Ennis is a 43 y.o. female.    Chief Complaint:  Consult (Surgery consult)      History of Present Illness  HPI  Patient sent by Dr Sutherland  History of uterine fibroids with menorrhagia, dysmenorrhea and pelvic pain  Would like to get surgery soon.    Pap on 3/27/2018 without any endocervical cells.    GYN & OB History  Patient's last menstrual period was 2018 (exact date).   Date of Last Pap: 3/27/2018    OB History    Para Term  AB Living   4 4 4     4   SAB TAB Ectopic Multiple Live Births           4      # Outcome Date GA Lbr Wei/2nd Weight Sex Delivery Anes PTL Lv   4 Term 2001 38w0d  3.118 kg (6 lb 14 oz) F CS-LTranv  N DESTINI   3 Term 1997 39w0d  3.997 kg (8 lb 13 oz) F CS-LTranv  N DESTINI   2 Term 1994 39w0d  3.742 kg (8 lb 4 oz) F CS-LTranv  N DESTINI   1 Term  40w0d  3.402 kg (7 lb 8 oz) F CS-LTranv  N DESTINI      Complications: Fetal Intolerance,Cephalopelvic Disproportion        Past Medical History:   Diagnosis Date    Hypertension        Past Surgical History:   Procedure Laterality Date     SECTION      x4    CHOLECYSTECTOMY      TUBAL LIGATION         Family History   Problem Relation Age of Onset    Hypertension Mother     Stroke Mother     Aneurysm Mother     Hypertension Father     Hypertension Brother     Hypertension Maternal Grandfather     Heart disease Maternal Grandfather      MI    Cancer Maternal Grandfather     Cancer Paternal Grandmother        Social History     Social History    Marital status:      Spouse name: N/A    Number of children: N/A    Years of education: N/A     Social History Main Topics    Smoking status: Current Every Day Smoker     Packs/day: 0.50     Years: 17.00    Smokeless tobacco: Never Used    Alcohol use Yes      Comment: Occasional    Drug use: No    Sexual activity: Yes     Partners: Male      Comment:      Other Topics Concern    None     Social  History Narrative     since 2016    Together since 2013    He drives 18-wheelers    She is the  for a dental office    Previously  and     Lives with her  and youngest daughter.     with three daughters from previous marriage also       Current Outpatient Prescriptions   Medication Sig Dispense Refill    amLODIPine (NORVASC) 10 MG tablet Take 1 tablet (10 mg total) by mouth once daily. (Patient taking differently: Take 5 mg by mouth once daily. ) 90 tablet 3    docusate sodium (COLACE) 100 MG capsule Take 1 capsule (100 mg total) by mouth 2 (two) times daily as needed for Constipation. 60 capsule 2    multivit,calc,mins/iron/folic (ONE-A-DAY WOMENS FORMULA ORAL) Take by mouth.      naproxen (EC NAPROSYN) 500 MG EC tablet Take 1 tablet (500 mg total) by mouth 2 (two) times daily as needed (elbow pain). 60 tablet 1    valACYclovir (VALTREX) 1000 MG tablet Take 1 tablet (1,000 mg total) by mouth daily as needed (herpes outbreak). For 5 days 30 tablet 1     No current facility-administered medications for this visit.        Review of patient's allergies indicates:   Allergen Reactions    Ace inhibitors Other (See Comments)     cough       Review of Systems  Review of Systems   Constitutional: Negative for activity change, appetite change, chills, fatigue, fever and unexpected weight change.   HENT: Negative for mouth sores.    Respiratory: Negative for cough, shortness of breath and wheezing.    Cardiovascular: Negative for chest pain and palpitations.   Gastrointestinal: Positive for abdominal pain. Negative for bloating, blood in stool, constipation, nausea and vomiting.   Endocrine: Negative for diabetes and hot flashes.   Genitourinary: Positive for dyspareunia, menorrhagia, menstrual problem and dysmenorrhea. Negative for dysuria, frequency, hematuria, pelvic pain, urgency, vaginal bleeding, vaginal discharge, vaginal pain, urinary incontinence, postcoital  bleeding and vaginal odor.   Musculoskeletal: Negative for back pain and myalgias.   Skin:  Negative for rash.   Neurological: Negative for seizures and headaches.   Psychiatric/Behavioral: Negative for depression and sleep disturbance. The patient is not nervous/anxious.    Breast: Negative for breast mass, breast pain and nipple discharge          Objective:    Physical Exam:   Constitutional: She appears well-developed and well-nourished. No distress.   Obese      HENT:   Head: Normocephalic and atraumatic.    Eyes: EOM are normal.    Neck: Normal range of motion.     Pulmonary/Chest: Effort normal. No respiratory distress.        Abdominal: Soft. She exhibits no distension. There is no tenderness. There is no rebound and no guarding.     Genitourinary: Vagina normal. No vaginal discharge found.   Genitourinary Comments: Vulva without any obvious lesions.  Vaginal vault with good support.  Minimal white discharge noted.  No obvious lesion.  Cervix is without any cervical motion tenderness.  No obvious lesion.  Uterus is about 10 weeks, tender, normal contour.  Adnexa is without any masses or tenderness.    ?Suprapubic tenderness?           Musculoskeletal: Normal range of motion.       Neurological: She is alert.    Skin: Skin is warm and dry.    Psychiatric: She has a normal mood and affect.          Assessment:        1. Uterine leiomyoma, unspecified location    2. Encounter for repeat Pap smear due to previous insuff cervical cells    3. Dysmenorrhea    4. Menorrhagia with regular cycle    5.   Status post  sections x 4        Plan:      I have extensively discussed with the patient regarding her condition  We agreed with Dr Sutherland regarding choice of procedure, laparoscopic supracervical hysterectomy with bilateral salpingectomy  She is status post  sections x 4; she could potentially has much adhesions intra-abdominally.  We discussed possible laparotomy, total vs supracervical abdominal  hysterectomy secondary to pelvic adhesions.  She is anxious but eager to proceed    LSHBSx with morcellation scheduled for 6/20/2018 at 0730 per request   She will be back on 6/13/2018 for preop    Repeat Pap done.

## 2018-05-02 DIAGNOSIS — M77.11 LATERAL EPICONDYLITIS OF RIGHT ELBOW: ICD-10-CM

## 2018-05-03 ENCOUNTER — TELEPHONE (OUTPATIENT)
Dept: OBSTETRICS AND GYNECOLOGY | Facility: CLINIC | Age: 43
End: 2018-05-03

## 2018-05-03 RX ORDER — NAPROXEN 500 MG/1
500 TABLET ORAL 2 TIMES DAILY PRN
Qty: 60 TABLET | Refills: 3 | Status: SHIPPED | OUTPATIENT
Start: 2018-05-03 | End: 2018-06-13 | Stop reason: RX

## 2018-05-03 NOTE — TELEPHONE ENCOUNTER
----- Message from [place] Baig sent at 5/3/2018  3:43 PM CDT -----  Contact: self  Pt is requesting additional advice on upcoming procedure regarding  pain she's experiencing. Contact [phone].      Attempted to contact patient in regards to surgery questions and pain. LM/ sal

## 2018-05-04 DIAGNOSIS — M77.11 LATERAL EPICONDYLITIS OF RIGHT ELBOW: ICD-10-CM

## 2018-05-04 RX ORDER — NAPROXEN 500 MG/1
500 TABLET ORAL 2 TIMES DAILY PRN
Qty: 60 TABLET | Refills: 3 | Status: CANCELLED | OUTPATIENT
Start: 2018-05-04

## 2018-05-07 ENCOUNTER — PATIENT MESSAGE (OUTPATIENT)
Dept: FAMILY MEDICINE | Facility: CLINIC | Age: 43
End: 2018-05-07

## 2018-05-10 ENCOUNTER — OFFICE VISIT (OUTPATIENT)
Dept: FAMILY MEDICINE | Facility: CLINIC | Age: 43
End: 2018-05-10
Payer: COMMERCIAL

## 2018-05-10 ENCOUNTER — LAB VISIT (OUTPATIENT)
Dept: LAB | Facility: HOSPITAL | Age: 43
End: 2018-05-10
Attending: INTERNAL MEDICINE
Payer: COMMERCIAL

## 2018-05-10 VITALS
DIASTOLIC BLOOD PRESSURE: 88 MMHG | HEIGHT: 64 IN | SYSTOLIC BLOOD PRESSURE: 134 MMHG | TEMPERATURE: 98 F | BODY MASS INDEX: 33.97 KG/M2 | WEIGHT: 199 LBS | HEART RATE: 79 BPM | OXYGEN SATURATION: 99 %

## 2018-05-10 DIAGNOSIS — I10 ESSENTIAL (PRIMARY) HYPERTENSION: Primary | Chronic | ICD-10-CM

## 2018-05-10 DIAGNOSIS — E66.9 OBESITY, CLASS I, BMI 30-34.9: ICD-10-CM

## 2018-05-10 DIAGNOSIS — M79.89 BILATERAL SWELLING OF FEET: ICD-10-CM

## 2018-05-10 PROBLEM — E66.811 OBESITY, CLASS I, BMI 30-34.9: Status: ACTIVE | Noted: 2018-05-10

## 2018-05-10 PROBLEM — E66.811 OBESITY, CLASS I, BMI 30-34.9: Chronic | Status: ACTIVE | Noted: 2018-05-10

## 2018-05-10 PROBLEM — Z01.419 ENCOUNTER FOR GYNECOLOGICAL EXAMINATION WITHOUT ABNORMAL FINDING: Status: RESOLVED | Noted: 2018-03-21 | Resolved: 2018-05-10

## 2018-05-10 LAB
BACTERIA #/AREA URNS AUTO: ABNORMAL /HPF
BILIRUB UR QL STRIP: NEGATIVE
CLARITY UR REFRACT.AUTO: ABNORMAL
COLOR UR AUTO: YELLOW
CREAT UR-MCNC: 121 MG/DL
GLUCOSE UR QL STRIP: NEGATIVE
HGB UR QL STRIP: ABNORMAL
KETONES UR QL STRIP: NEGATIVE
LEUKOCYTE ESTERASE UR QL STRIP: NEGATIVE
MICROSCOPIC COMMENT: ABNORMAL
NITRITE UR QL STRIP: NEGATIVE
PH UR STRIP: 5 [PH] (ref 5–8)
PROT UR QL STRIP: NEGATIVE
PROT UR-MCNC: 9 MG/DL
PROT/CREAT RATIO, UR: 0.07
RBC #/AREA URNS AUTO: 5 /HPF (ref 0–4)
SP GR UR STRIP: 1.02 (ref 1–1.03)
SQUAMOUS #/AREA URNS AUTO: 3 /HPF
URN SPEC COLLECT METH UR: ABNORMAL
UROBILINOGEN UR STRIP-ACNC: NEGATIVE EU/DL
WBC #/AREA URNS AUTO: 3 /HPF (ref 0–5)

## 2018-05-10 PROCEDURE — 99999 PR PBB SHADOW E&M-EST. PATIENT-LVL III: CPT | Mod: PBBFAC,,, | Performed by: INTERNAL MEDICINE

## 2018-05-10 PROCEDURE — 81001 URINALYSIS AUTO W/SCOPE: CPT

## 2018-05-10 PROCEDURE — 3075F SYST BP GE 130 - 139MM HG: CPT | Mod: CPTII,S$GLB,, | Performed by: INTERNAL MEDICINE

## 2018-05-10 PROCEDURE — 82570 ASSAY OF URINE CREATININE: CPT

## 2018-05-10 PROCEDURE — 3079F DIAST BP 80-89 MM HG: CPT | Mod: CPTII,S$GLB,, | Performed by: INTERNAL MEDICINE

## 2018-05-10 PROCEDURE — 99214 OFFICE O/P EST MOD 30 MIN: CPT | Mod: S$GLB,,, | Performed by: INTERNAL MEDICINE

## 2018-05-10 PROCEDURE — 3008F BODY MASS INDEX DOCD: CPT | Mod: CPTII,S$GLB,, | Performed by: INTERNAL MEDICINE

## 2018-05-10 RX ORDER — HYDROCHLOROTHIAZIDE 12.5 MG/1
12.5 TABLET ORAL DAILY PRN
Qty: 90 TABLET | Refills: 0 | Status: SHIPPED | OUTPATIENT
Start: 2018-05-10 | End: 2018-09-12 | Stop reason: SDUPTHER

## 2018-05-10 NOTE — ASSESSMENT & PLAN NOTE
The patient is asked to make an attempt to improve diet and exercise patterns to aid in medical management of this problem. We specifically discussed cutting calorie intake by 500-1000 calories per day for a goal of a 1-2 pound weight loss and recommendations for a mostly plant based diet with limited red meats/refined grains/processed foods/added sugars.

## 2018-05-10 NOTE — PROGRESS NOTES
"Assessment & Plan  Problem List Items Addressed This Visit        Cardiac/Vascular    Essential (primary) hypertension - Primary (Chronic)    Current Assessment & Plan     The current medical regimen is effective;  continue present plan and medications. Monitor closely with starting low dose HCTZ            Endocrine    Obesity, Class I, BMI 30-34.9 (Chronic)    Current Assessment & Plan     The patient is asked to make an attempt to improve diet and exercise patterns to aid in medical management of this problem. We specifically discussed cutting calorie intake by 500-1000 calories per day for a goal of a 1-2 pound weight loss and recommendations for a mostly plant based diet with limited red meats/refined grains/processed foods/added sugars.           Other Visit Diagnoses     Bilateral swelling of feet   -  Check for proteinuria.  Start PRN HCTZ.  If needing this daily, will need to recheck BMP and monitor BP for need to adjust amlodipine.      Relevant Medications    hydroCHLOROthiazide (HYDRODIURIL) 12.5 MG Tab    Other Relevant Orders    Urinalysis    Protein / creatinine ratio, urine            Health Maintenance reviewed, deferred.    Follow-up: Follow-up in about 3 months (around 8/10/2018) for BP check, cholesterol check, weight loss.    ______________________________________________________________________    Chief Complaint  Chief Complaint   Patient presents with    Hypertension    Follow-up       HPI  Sherrill Ennis is a 43 y.o. female with multiple medical diagnoses as listed in the medical history and problem list that presents for HTN follow up.  Pt is known to me with his last appointment 1/19/2018.      She reports that her BP at home is running 120-130/80s at her recent OV.  She feels that she has been having some swelling of her feet and hands.  Toes will look swollen and her ring will get stuck on her finger.  Symptoms of her feet hurting feel like they are "full of fluid like after " "you give birth."        PAST MEDICAL HISTORY:  Past Medical History:   Diagnosis Date    Hypertension        PAST SURGICAL HISTORY:  Past Surgical History:   Procedure Laterality Date     SECTION      x4    CHOLECYSTECTOMY  1995    TUBAL LIGATION         SOCIAL HISTORY:  Social History     Social History    Marital status:      Spouse name: N/A    Number of children: N/A    Years of education: N/A     Occupational History    Not on file.     Social History Main Topics    Smoking status: Current Every Day Smoker     Packs/day: 0.50     Years: 17.00    Smokeless tobacco: Never Used    Alcohol use Yes      Comment: Occasional    Drug use: No    Sexual activity: Yes     Partners: Male      Comment:      Other Topics Concern    Not on file     Social History Narrative     since     Together since     He drives 18-wheelers    She is the  for a dental office    Previously  and     Lives with her  and youngest daughter.     with three daughters from previous marriage also       FAMILY HISTORY:  Family History   Problem Relation Age of Onset    Hypertension Mother     Stroke Mother     Aneurysm Mother     Hypertension Father     Hypertension Brother     Hypertension Maternal Grandfather     Heart disease Maternal Grandfather         MI    Cancer Maternal Grandfather     Cancer Paternal Grandmother        ALLERGIES AND MEDICATIONS: updated and reviewed.  Review of patient's allergies indicates:   Allergen Reactions    Ace inhibitors Other (See Comments)     cough     Current Outpatient Prescriptions   Medication Sig Dispense Refill    amLODIPine (NORVASC) 10 MG tablet Take 1 tablet (10 mg total) by mouth once daily. (Patient taking differently: Take 5 mg by mouth once daily. ) 90 tablet 3    multivit,calc,mins/iron/folic (ONE-A-DAY WOMENS FORMULA ORAL) Take by mouth.      naproxen (EC NAPROSYN) 500 MG EC tablet Take 1 " "tablet (500 mg total) by mouth 2 (two) times daily as needed (elbow pain). 60 tablet 3    valACYclovir (VALTREX) 1000 MG tablet Take 1 tablet (1,000 mg total) by mouth daily as needed (herpes outbreak). For 5 days 30 tablet 1    docusate sodium (COLACE) 100 MG capsule Take 1 capsule (100 mg total) by mouth 2 (two) times daily as needed for Constipation. 60 capsule 2    hydroCHLOROthiazide (HYDRODIURIL) 12.5 MG Tab Take 1 tablet (12.5 mg total) by mouth daily as needed. 90 tablet 0     No current facility-administered medications for this visit.          ROS  Review of Systems   Constitutional: Negative for activity change and unexpected weight change.   HENT: Negative for hearing loss, rhinorrhea and trouble swallowing.    Eyes: Negative for discharge and visual disturbance.   Respiratory: Negative for chest tightness and wheezing.    Cardiovascular: Negative for chest pain, palpitations and leg swelling.   Gastrointestinal: Negative for blood in stool, constipation, diarrhea and vomiting.   Endocrine: Negative for polydipsia and polyuria.   Genitourinary: Positive for menstrual problem. Negative for difficulty urinating, dysuria and hematuria.   Musculoskeletal: Positive for arthralgias. Negative for joint swelling and neck pain.   Neurological: Positive for headaches. Negative for dizziness, weakness and light-headedness.   Psychiatric/Behavioral: Negative for confusion and dysphoric mood.           Physical Exam  Vitals:    05/10/18 1253 05/10/18 1339   BP: (!) 160/100 134/88   Pulse: 79    Temp: 98.3 °F (36.8 °C)    SpO2: 99%    Weight: 90.3 kg (199 lb)    Height: 5' 4" (1.626 m)     Body mass index is 34.16 kg/m².  Weight: 90.3 kg (199 lb)   Height: 5' 4" (162.6 cm)   Physical Exam   Constitutional: She is oriented to person, place, and time. She appears well-developed and well-nourished. No distress.   Cardiovascular: Normal rate and regular rhythm.    No murmur heard.  Pulmonary/Chest: Effort normal and " breath sounds normal. No respiratory distress.   Abdominal: Soft. Bowel sounds are normal. She exhibits no distension.   Musculoskeletal: Normal range of motion. She exhibits no edema or deformity.   Neurological: She is alert and oriented to person, place, and time.   Symmetric facial movements palate elevated symmetrically tongue midline    Skin: Skin is warm and dry.         Health Maintenance       Date Due Completion Date    TETANUS VACCINE 04/20/1995 ---    Pneumococcal PPSV23 (Medium Risk) (1) 04/20/1995 ---    Influenza Vaccine 08/01/2018 1/19/2018 (Declined)    Override on 1/19/2018: Declined    Mammogram 03/08/2020 3/8/2018    Pap Smear with HPV Cotest 04/30/2021 4/30/2018

## 2018-05-10 NOTE — ASSESSMENT & PLAN NOTE
The current medical regimen is effective;  continue present plan and medications. Monitor closely with starting low dose HCTZ

## 2018-05-11 NOTE — PROGRESS NOTES
Your lab results are showing that the urine sample appeared to be a contaminated sample with regard to the bacteria that was seen.  This is ok though because I needed it to check the protein level, which was normal.  Please continue your current medications and doses.  Please feel free to contact me with any questions or concerns.    Sincerely,  Reagan Holland  [email or web address]

## 2018-05-15 ENCOUNTER — PATIENT MESSAGE (OUTPATIENT)
Dept: FAMILY MEDICINE | Facility: CLINIC | Age: 43
End: 2018-05-15

## 2018-05-15 DIAGNOSIS — M25.529 ELBOW PAIN, UNSPECIFIED LATERALITY: Primary | ICD-10-CM

## 2018-05-15 RX ORDER — DICLOFENAC SODIUM 50 MG/1
50 TABLET, DELAYED RELEASE ORAL 2 TIMES DAILY
Qty: 60 TABLET | Refills: 1 | Status: SHIPPED | OUTPATIENT
Start: 2018-05-15 | End: 2018-06-13

## 2018-05-17 ENCOUNTER — PATIENT MESSAGE (OUTPATIENT)
Dept: FAMILY MEDICINE | Facility: CLINIC | Age: 43
End: 2018-05-17

## 2018-05-17 NOTE — TELEPHONE ENCOUNTER
Spoke with patient to inform to take med discussed on yesterday as ordered. Provider will not be ordering flagyl but she can speak with her obgyn. Verbalized understanding.

## 2018-05-17 NOTE — TELEPHONE ENCOUNTER
I sent a new one in yesterday.  Saw a request about flagyl.  Would recommend that she discuss this with her OBGYn.     Thank you,  Johan

## 2018-06-13 ENCOUNTER — HOSPITAL ENCOUNTER (OUTPATIENT)
Dept: RADIOLOGY | Facility: HOSPITAL | Age: 43
Discharge: HOME | End: 2018-06-13
Attending: OBSTETRICS & GYNECOLOGY
Payer: COMMERCIAL

## 2018-06-13 ENCOUNTER — HOSPITAL ENCOUNTER (OUTPATIENT)
Dept: PREADMISSION TESTING | Facility: HOSPITAL | Age: 43
Discharge: HOME | End: 2018-06-13
Attending: OBSTETRICS & GYNECOLOGY
Payer: COMMERCIAL

## 2018-06-13 ENCOUNTER — OFFICE VISIT (OUTPATIENT)
Dept: OBSTETRICS AND GYNECOLOGY | Facility: CLINIC | Age: 43
End: 2018-06-13
Payer: COMMERCIAL

## 2018-06-13 VITALS
HEIGHT: 64 IN | BODY MASS INDEX: 33.63 KG/M2 | RESPIRATION RATE: 18 BRPM | WEIGHT: 197 LBS | OXYGEN SATURATION: 97 % | HEART RATE: 78 BPM

## 2018-06-13 VITALS
DIASTOLIC BLOOD PRESSURE: 78 MMHG | BODY MASS INDEX: 33.64 KG/M2 | TEMPERATURE: 99 F | HEIGHT: 64 IN | WEIGHT: 197.06 LBS | SYSTOLIC BLOOD PRESSURE: 130 MMHG

## 2018-06-13 DIAGNOSIS — N94.6 DYSMENORRHEA: ICD-10-CM

## 2018-06-13 DIAGNOSIS — N92.0 MENORRHAGIA WITH REGULAR CYCLE: ICD-10-CM

## 2018-06-13 DIAGNOSIS — D25.9 UTERINE LEIOMYOMA, UNSPECIFIED LOCATION: ICD-10-CM

## 2018-06-13 DIAGNOSIS — Z01.818 PRE-OP TESTING: Primary | ICD-10-CM

## 2018-06-13 DIAGNOSIS — D25.9 UTERINE LEIOMYOMA, UNSPECIFIED LOCATION: Primary | ICD-10-CM

## 2018-06-13 LAB
ALBUMIN SERPL BCP-MCNC: 4 G/DL
ALP SERPL-CCNC: 75 U/L
ALT SERPL W/O P-5'-P-CCNC: 44 U/L
ANION GAP SERPL CALC-SCNC: 11 MMOL/L
AST SERPL-CCNC: 26 U/L
BACTERIA #/AREA URNS HPF: ABNORMAL /HPF
BASOPHILS # BLD AUTO: 0.03 K/UL
BASOPHILS NFR BLD: 0.3 %
BILIRUB SERPL-MCNC: 0.2 MG/DL
BILIRUB UR QL STRIP: NEGATIVE
BUN SERPL-MCNC: 18 MG/DL
CALCIUM SERPL-MCNC: 9.7 MG/DL
CAOX CRY URNS QL MICRO: ABNORMAL
CHLORIDE SERPL-SCNC: 109 MMOL/L
CLARITY UR: ABNORMAL
CO2 SERPL-SCNC: 22 MMOL/L
COLOR UR: YELLOW
CREAT SERPL-MCNC: 0.8 MG/DL
DIFFERENTIAL METHOD: ABNORMAL
EOSINOPHIL # BLD AUTO: 0.4 K/UL
EOSINOPHIL NFR BLD: 4 %
ERYTHROCYTE [DISTWIDTH] IN BLOOD BY AUTOMATED COUNT: 13.6 %
EST. GFR  (AFRICAN AMERICAN): >60 ML/MIN/1.73 M^2
EST. GFR  (NON AFRICAN AMERICAN): >60 ML/MIN/1.73 M^2
GLUCOSE SERPL-MCNC: 112 MG/DL
GLUCOSE UR QL STRIP: NEGATIVE
HCT VFR BLD AUTO: 40.3 %
HGB BLD-MCNC: 13.8 G/DL
HGB UR QL STRIP: ABNORMAL
HYALINE CASTS #/AREA URNS LPF: 0 /LPF
KETONES UR QL STRIP: NEGATIVE
LEUKOCYTE ESTERASE UR QL STRIP: ABNORMAL
LYMPHOCYTES # BLD AUTO: 2.4 K/UL
LYMPHOCYTES NFR BLD: 27.4 %
MCH RBC QN AUTO: 27.7 PG
MCHC RBC AUTO-ENTMCNC: 34.2 G/DL
MCV RBC AUTO: 81 FL
MICROSCOPIC COMMENT: ABNORMAL
MONOCYTES # BLD AUTO: 0.7 K/UL
MONOCYTES NFR BLD: 8.1 %
NEUTROPHILS # BLD AUTO: 5.2 K/UL
NEUTROPHILS NFR BLD: 60.1 %
NITRITE UR QL STRIP: NEGATIVE
PH UR STRIP: 5 [PH] (ref 5–8)
PLATELET # BLD AUTO: 321 K/UL
PMV BLD AUTO: 9.4 FL
POTASSIUM SERPL-SCNC: 3.8 MMOL/L
PROT SERPL-MCNC: 7.5 G/DL
PROT UR QL STRIP: ABNORMAL
RBC # BLD AUTO: 4.98 M/UL
RBC #/AREA URNS HPF: 50 /HPF (ref 0–4)
SODIUM SERPL-SCNC: 142 MMOL/L
SP GR UR STRIP: 1.03 (ref 1–1.03)
URN SPEC COLLECT METH UR: ABNORMAL
UROBILINOGEN UR STRIP-ACNC: NEGATIVE EU/DL
WBC # BLD AUTO: 8.69 K/UL
WBC #/AREA URNS HPF: 1 /HPF (ref 0–5)

## 2018-06-13 PROCEDURE — 93010 ELECTROCARDIOGRAM REPORT: CPT | Mod: ,,, | Performed by: INTERNAL MEDICINE

## 2018-06-13 PROCEDURE — 93005 ELECTROCARDIOGRAM TRACING: CPT

## 2018-06-13 PROCEDURE — 80053 COMPREHEN METABOLIC PANEL: CPT

## 2018-06-13 PROCEDURE — 99499 UNLISTED E&M SERVICE: CPT | Mod: S$GLB,,, | Performed by: OBSTETRICS & GYNECOLOGY

## 2018-06-13 PROCEDURE — 71046 X-RAY EXAM CHEST 2 VIEWS: CPT | Mod: TC,FY

## 2018-06-13 PROCEDURE — 71046 X-RAY EXAM CHEST 2 VIEWS: CPT | Mod: 26,,, | Performed by: RADIOLOGY

## 2018-06-13 PROCEDURE — 36415 COLL VENOUS BLD VENIPUNCTURE: CPT

## 2018-06-13 PROCEDURE — 81000 URINALYSIS NONAUTO W/SCOPE: CPT

## 2018-06-13 PROCEDURE — 99999 PR PBB SHADOW E&M-EST. PATIENT-LVL III: CPT | Mod: PBBFAC,,, | Performed by: OBSTETRICS & GYNECOLOGY

## 2018-06-13 PROCEDURE — 85025 COMPLETE CBC W/AUTO DIFF WBC: CPT

## 2018-06-13 RX ORDER — SODIUM CHLORIDE, SODIUM LACTATE, POTASSIUM CHLORIDE, CALCIUM CHLORIDE 600; 310; 30; 20 MG/100ML; MG/100ML; MG/100ML; MG/100ML
INJECTION, SOLUTION INTRAVENOUS CONTINUOUS
Status: CANCELLED | OUTPATIENT
Start: 2018-06-13

## 2018-06-13 NOTE — DISCHARGE INSTRUCTIONS
Your surgery is scheduled for____6/20/2018_____________.    Call [phone] between 2 pm and 5 pm ___6/19/2018_________ to find out your arrival time for the day of surgery.    Report to SAME DAY SURGERY UNIT at _______am on the 2nd floor of the hospital.  Use the front entrance of the hospital before 6 am.  If you need wheelchair assistance, call [phone] from your cell phone,  or call 0 from the courtesy phone in the hospital lobby.    Important instructions:   Do not eat or drink after 12 midnight, including water.  It is okay to brush your teeth.  Do not have gum, candy or mints.     Take only these medications with a small swallow of water on the morning of your surgery.___amlodipine__________    Do not take any diabetic medication on the morning of surgery unless instructed to do so by your doctor or pre op nurse.    Stop taking Aspirin, Ibuprofen, Motrin and Aleve , Fish oil, and Vitamin E for at least 7 days before your surgery. You may use Tylenol unless otherwise instructed by your doctor.          Return to the hospital lab on __6/18/2018 or 6/19/2018________for additional blood test.       Please shower the night before and the morning of your surgery.        Use Hibiclens soap to your surgery site if instructed by your pre op nurse.   If your surgery is on your abdomen, be sure to wash your naval.  Be sure to rinse off Hibiclens after it is on your skin for several minutes.  Do not use Hibiclens on your face or genitals. Please place clean linens on your bed the night before surgery. Please wear fresh clean clothing after each shower.     No shaving of procedural area at least 4-5 days before surgery due to increased risk of skin irritation and/or possible infection.      You may be asked to take a third shower on arrival to Same Day Surgery depending on the type of surgery you are having.     Do not wear make- up, including mascara.     You may wear deodorant only.      Do not wear powder,  body lotion or cologne.     Do not wear any jewelry or have any metal on your body.     Please bring any documents given to you by your doctor.     If you are going home on the same day of surgery, you must have arrangements for a ride home.  You will not be able to drive home if you were given anesthesia or sedation.     Wear loose fitting clothes allowing for bandages.     Please leave money and valuables home.       You may bring your cell phone.     Call the doctor if fever or illness should occur before your surgery.    Call [phone] to contact us here at [facility] if needed.

## 2018-06-13 NOTE — PROGRESS NOTES
Subjective:       Patient ID: Sherrill Ennis is a 43 y.o. female.    Chief Complaint:  Pre-op Exam (Pre-op for LSH scheduled on 2018)      History of Present Illness  HPI  Patient sent by Dr Sutherland  History of uterine fibroids with menorrhagia, dysmenorrhea and pelvic pain  Pelvic ultrasound performed on 4/3/2018 shows  Uterus measures 10 x 5 x 5 cm.  Endometrium measures 16 mm.  There is anterior fibroid 2.1 cm.  Right ovary measures 2.9 by 2.7 by 2.5 cm.  Left ovary measures 2.2 x 1.7 x 1.9 cm.  There is normal flow to both ovaries.  There is no significant free fluid.  There are incidental nabothian cysts of the cervix.  There are 2 follicles right ovary.  Would like to get surgery soon.     Pap on 3/27/2018 without any endocervical cells.       GYN & OB History  Patient's last menstrual period was 2018 (exact date).   Date of Last Pap: 2018    OB History    Para Term  AB Living   4 4 4     4   SAB TAB Ectopic Multiple Live Births           4      # Outcome Date GA Lbr Wei/2nd Weight Sex Delivery Anes PTL Lv   4 Term 2001 38w0d  3.118 kg (6 lb 14 oz) F CS-LTranv  N DESTINI   3 Term 1997 39w0d  3.997 kg (8 lb 13 oz) F CS-LTranv  N DESTINI   2 Term 1994 39w0d  3.742 kg (8 lb 4 oz) F CS-LTranv  N DESTINI   1 Term  40w0d  3.402 kg (7 lb 8 oz) F CS-LTranv  N DESTINI      Complications: Fetal Intolerance,Cephalopelvic Disproportion         Past Medical History:   Diagnosis Date    Hypertension        Past Surgical History:   Procedure Laterality Date     SECTION      x4    CHOLECYSTECTOMY      TUBAL LIGATION         Family History   Problem Relation Age of Onset    Hypertension Mother     Stroke Mother     Aneurysm Mother     Hypertension Father     Hypertension Brother     Hypertension Maternal Grandfather     Heart disease Maternal Grandfather         MI    Cancer Maternal Grandfather     Cancer Paternal Grandmother        Social History     Social  History    Marital status:      Spouse name: N/A    Number of children: N/A    Years of education: N/A     Social History Main Topics    Smoking status: Current Every Day Smoker     Packs/day: 0.50     Years: 17.00    Smokeless tobacco: Never Used    Alcohol use Yes      Comment: Occasional    Drug use: No    Sexual activity: Yes     Partners: Male      Comment:      Other Topics Concern    None     Social History Narrative     since 2016    Together since 2013    He drives 18-wheelers    She is the  for a dental office    Previously  and     Lives with her  and youngest daughter.     with three daughters from previous marriage also       Current Outpatient Prescriptions   Medication Sig Dispense Refill    amLODIPine (NORVASC) 10 MG tablet Take 1 tablet (10 mg total) by mouth once daily. (Patient taking differently: Take 5 mg by mouth once daily. ) 90 tablet 3    docusate sodium (COLACE) 100 MG capsule Take 1 capsule (100 mg total) by mouth 2 (two) times daily as needed for Constipation. 60 capsule 2    hydroCHLOROthiazide (HYDRODIURIL) 12.5 MG Tab Take 1 tablet (12.5 mg total) by mouth daily as needed. 90 tablet 0    multivit,calc,mins/iron/folic (ONE-A-DAY WOMENS FORMULA ORAL) Take by mouth.      valACYclovir (VALTREX) 1000 MG tablet Take 1 tablet (1,000 mg total) by mouth daily as needed (herpes outbreak). For 5 days 30 tablet 1     No current facility-administered medications for this visit.        Review of patient's allergies indicates:   Allergen Reactions    Ace inhibitors Other (See Comments)     cough       Review of Systems  Review of Systems   Constitutional: Negative for activity change, appetite change, chills, fatigue, fever and unexpected weight change.   HENT: Negative for mouth sores.    Respiratory: Negative for cough, shortness of breath and wheezing.    Cardiovascular: Negative for chest pain and palpitations.    Gastrointestinal: Positive for abdominal pain. Negative for bloating, blood in stool, constipation, nausea and vomiting.   Endocrine: Negative for diabetes and hot flashes.   Genitourinary: Positive for dyspareunia, menorrhagia, menstrual problem and dysmenorrhea. Negative for dysuria, frequency, hematuria, pelvic pain, urgency, vaginal bleeding, vaginal discharge, vaginal pain, urinary incontinence, postcoital bleeding and vaginal odor.   Musculoskeletal: Negative for back pain and myalgias.   Skin:  Negative for rash.   Neurological: Negative for seizures and headaches.   Psychiatric/Behavioral: Negative for depression and sleep disturbance. The patient is not nervous/anxious.    Breast: Negative for breast mass, breast pain and nipple discharge          Objective:    Physical Exam:   Constitutional: She appears well-developed and well-nourished. No distress.   Obese      HENT:   Head: Normocephalic and atraumatic.    Eyes: EOM are normal.    Neck: Normal range of motion.    Cardiovascular: Normal rate, regular rhythm and normal heart sounds.     Pulmonary/Chest: Effort normal and breath sounds normal. No respiratory distress.        Abdominal: Soft. She exhibits no distension. There is no tenderness. There is no rebound and no guarding.     Genitourinary: Vagina normal. No vaginal discharge found.   Genitourinary Comments: Vulva without any obvious lesions.  Vaginal vault with good support.  Minimal white discharge noted.  No obvious lesion.  Cervix is without any cervical motion tenderness.  No obvious lesion.  Uterus is about 10 weeks, tender, normal contour.  Adnexa is without any masses or tenderness.    ?Suprapubic tenderness?           Musculoskeletal: Normal range of motion.       Neurological: She is alert.    Skin: Skin is warm and dry.    Psychiatric: She has a normal mood and affect.          Assessment:        1. Uterine leiomyoma, unspecified location    2. Dysmenorrhea    3. Menorrhagia with  regular cycle             Plan:      I have again extensively discussed with the patient her condition.  The proposed procedures of laparoscopic supracervical hysterectomy with bilateral salpingectomy, possible laparotomy, abdominal hysterectomy explained to and again extensively discussed with the patient.    Risks and benefits discussed including risks of bleeding, infection, pain, risks of bladder and bowel injuries...  Questions answered.  Consents signed.  Orders written.   Patient will go over to the hospital for preoperative care prior to the day of admission.  She will undergo surgery on 6/20/2018 at 0730.

## 2018-06-13 NOTE — PATIENT INSTRUCTIONS
Stop all non-steroidal anti-inflammatory medications.  Light dinner the night before surgery.  Nothing by mouth the morning of surgery.  Take a shower, washing the lower abdomen the morning of surgery.  No shaving around surgical site several days prior to surgery.

## 2018-06-14 ENCOUNTER — TELEPHONE (OUTPATIENT)
Dept: OBSTETRICS AND GYNECOLOGY | Facility: CLINIC | Age: 43
End: 2018-06-14

## 2018-06-14 DIAGNOSIS — N30.01 ACUTE CYSTITIS WITH HEMATURIA: Primary | ICD-10-CM

## 2018-06-14 RX ORDER — SULFAMETHOXAZOLE AND TRIMETHOPRIM 800; 160 MG/1; MG/1
1 TABLET ORAL 2 TIMES DAILY
Qty: 14 TABLET | Refills: 0 | Status: ON HOLD | OUTPATIENT
Start: 2018-06-14 | End: 2018-06-20 | Stop reason: HOSPADM

## 2018-06-14 NOTE — TELEPHONE ENCOUNTER
Called pt to inform her that a prescription for Bactrim for uti has been submitted to [facility]. Pt acknowledged. CW

## 2018-06-19 ENCOUNTER — ANESTHESIA EVENT (OUTPATIENT)
Dept: SURGERY | Facility: HOSPITAL | Age: 43
End: 2018-06-19
Payer: COMMERCIAL

## 2018-06-19 ENCOUNTER — LAB VISIT (OUTPATIENT)
Dept: LAB | Facility: HOSPITAL | Age: 43
End: 2018-06-19
Attending: OBSTETRICS & GYNECOLOGY
Payer: COMMERCIAL

## 2018-06-19 DIAGNOSIS — Z01.818 PRE-OP TESTING: ICD-10-CM

## 2018-06-19 LAB
ABO + RH BLD: NORMAL
BLD GP AB SCN CELLS X3 SERPL QL: NORMAL
RH BLD: NORMAL

## 2018-06-19 PROCEDURE — 86901 BLOOD TYPING SEROLOGIC RH(D): CPT | Mod: 91

## 2018-06-19 PROCEDURE — 36415 COLL VENOUS BLD VENIPUNCTURE: CPT

## 2018-06-19 PROCEDURE — 86901 BLOOD TYPING SEROLOGIC RH(D): CPT

## 2018-06-20 ENCOUNTER — HOSPITAL ENCOUNTER (OUTPATIENT)
Facility: HOSPITAL | Age: 43
Discharge: HOME | End: 2018-06-20
Attending: OBSTETRICS & GYNECOLOGY | Admitting: OBSTETRICS & GYNECOLOGY
Payer: COMMERCIAL

## 2018-06-20 ENCOUNTER — TELEPHONE (OUTPATIENT)
Dept: OBSTETRICS AND GYNECOLOGY | Facility: CLINIC | Age: 43
End: 2018-06-20

## 2018-06-20 ENCOUNTER — SURGERY (OUTPATIENT)
Age: 43
End: 2018-06-20

## 2018-06-20 ENCOUNTER — ANESTHESIA (OUTPATIENT)
Dept: SURGERY | Facility: HOSPITAL | Age: 43
End: 2018-06-20
Payer: COMMERCIAL

## 2018-06-20 VITALS
HEART RATE: 83 BPM | TEMPERATURE: 97 F | RESPIRATION RATE: 16 BRPM | DIASTOLIC BLOOD PRESSURE: 76 MMHG | OXYGEN SATURATION: 96 % | HEIGHT: 64 IN | WEIGHT: 196.88 LBS | BODY MASS INDEX: 33.61 KG/M2 | SYSTOLIC BLOOD PRESSURE: 156 MMHG

## 2018-06-20 DIAGNOSIS — D25.9 UTERINE LEIOMYOMA, UNSPECIFIED LOCATION: ICD-10-CM

## 2018-06-20 DIAGNOSIS — Z90.711 STATUS POST LAPAROSCOPIC SUPRACERVICAL HYSTERECTOMY: Primary | ICD-10-CM

## 2018-06-20 DIAGNOSIS — N94.6 DYSMENORRHEA: ICD-10-CM

## 2018-06-20 DIAGNOSIS — N92.0 MENORRHAGIA WITH REGULAR CYCLE: ICD-10-CM

## 2018-06-20 PROCEDURE — 71000015 HC POSTOP RECOV 1ST HR: Performed by: OBSTETRICS & GYNECOLOGY

## 2018-06-20 PROCEDURE — 27201423 OPTIME MED/SURG SUP & DEVICES STERILE SUPPLY: Performed by: OBSTETRICS & GYNECOLOGY

## 2018-06-20 PROCEDURE — 37000008 HC ANESTHESIA 1ST 15 MINUTES: Performed by: OBSTETRICS & GYNECOLOGY

## 2018-06-20 PROCEDURE — 25000003 PHARM REV CODE 250: Performed by: NURSE ANESTHETIST, CERTIFIED REGISTERED

## 2018-06-20 PROCEDURE — D9220A PRA ANESTHESIA: Mod: CRNA,,, | Performed by: NURSE ANESTHETIST, CERTIFIED REGISTERED

## 2018-06-20 PROCEDURE — 63600175 PHARM REV CODE 636 W HCPCS: Performed by: NURSE ANESTHETIST, CERTIFIED REGISTERED

## 2018-06-20 PROCEDURE — 71000033 HC RECOVERY, INTIAL HOUR: Performed by: OBSTETRICS & GYNECOLOGY

## 2018-06-20 PROCEDURE — D9220A PRA ANESTHESIA: Mod: ANES,,, | Performed by: ANESTHESIOLOGY

## 2018-06-20 PROCEDURE — 36000710: Performed by: OBSTETRICS & GYNECOLOGY

## 2018-06-20 PROCEDURE — 58542 LSH W/T/O UT 250 G OR LESS: CPT | Mod: 80,,, | Performed by: OBSTETRICS & GYNECOLOGY

## 2018-06-20 PROCEDURE — 71000016 HC POSTOP RECOV ADDL HR: Performed by: OBSTETRICS & GYNECOLOGY

## 2018-06-20 PROCEDURE — 25000003 PHARM REV CODE 250: Performed by: ANESTHESIOLOGY

## 2018-06-20 PROCEDURE — 88309 TISSUE EXAM BY PATHOLOGIST: CPT | Performed by: PATHOLOGY

## 2018-06-20 PROCEDURE — 88309 TISSUE EXAM BY PATHOLOGIST: CPT | Mod: 26,,, | Performed by: PATHOLOGY

## 2018-06-20 PROCEDURE — 71000039 HC RECOVERY, EACH ADD'L HOUR: Performed by: OBSTETRICS & GYNECOLOGY

## 2018-06-20 PROCEDURE — 58542 LSH W/T/O UT 250 G OR LESS: CPT | Mod: ,,, | Performed by: OBSTETRICS & GYNECOLOGY

## 2018-06-20 PROCEDURE — 63600175 PHARM REV CODE 636 W HCPCS: Performed by: ANESTHESIOLOGY

## 2018-06-20 PROCEDURE — 82962 GLUCOSE BLOOD TEST: CPT | Performed by: OBSTETRICS & GYNECOLOGY

## 2018-06-20 PROCEDURE — 37000009 HC ANESTHESIA EA ADD 15 MINS: Performed by: OBSTETRICS & GYNECOLOGY

## 2018-06-20 PROCEDURE — 88304 TISSUE EXAM BY PATHOLOGIST: CPT | Mod: 26,,, | Performed by: PATHOLOGY

## 2018-06-20 PROCEDURE — 27200708 HC INTUBATION/EXCHANGE WAND: Performed by: NURSE ANESTHETIST, CERTIFIED REGISTERED

## 2018-06-20 PROCEDURE — 36000711: Performed by: OBSTETRICS & GYNECOLOGY

## 2018-06-20 PROCEDURE — C1782 MORCELLATOR: HCPCS | Performed by: OBSTETRICS & GYNECOLOGY

## 2018-06-20 RX ORDER — SODIUM CHLORIDE, SODIUM LACTATE, POTASSIUM CHLORIDE, CALCIUM CHLORIDE 600; 310; 30; 20 MG/100ML; MG/100ML; MG/100ML; MG/100ML
INJECTION, SOLUTION INTRAVENOUS CONTINUOUS
Status: DISCONTINUED | OUTPATIENT
Start: 2018-06-20 | End: 2018-06-20 | Stop reason: HOSPADM

## 2018-06-20 RX ORDER — METOCLOPRAMIDE HYDROCHLORIDE 5 MG/ML
INJECTION INTRAMUSCULAR; INTRAVENOUS
Status: DISCONTINUED | OUTPATIENT
Start: 2018-06-20 | End: 2018-06-20

## 2018-06-20 RX ORDER — ACETAMINOPHEN 10 MG/ML
1000 INJECTION, SOLUTION INTRAVENOUS ONCE
Status: COMPLETED | OUTPATIENT
Start: 2018-06-20 | End: 2018-06-20

## 2018-06-20 RX ORDER — GLYCOPYRROLATE 0.2 MG/ML
INJECTION INTRAMUSCULAR; INTRAVENOUS
Status: DISCONTINUED | OUTPATIENT
Start: 2018-06-20 | End: 2018-06-20

## 2018-06-20 RX ORDER — OXYCODONE AND ACETAMINOPHEN 5; 325 MG/1; MG/1
1 TABLET ORAL EVERY 4 HOURS PRN
Qty: 15 TABLET | Refills: 0 | Status: SHIPPED | OUTPATIENT
Start: 2018-06-20 | End: 2018-06-20 | Stop reason: SDUPTHER

## 2018-06-20 RX ORDER — SODIUM CHLORIDE, SODIUM LACTATE, POTASSIUM CHLORIDE, CALCIUM CHLORIDE 600; 310; 30; 20 MG/100ML; MG/100ML; MG/100ML; MG/100ML
1000 INJECTION, SOLUTION INTRAVENOUS ONCE
Status: DISCONTINUED | OUTPATIENT
Start: 2018-06-20 | End: 2018-06-20 | Stop reason: HOSPADM

## 2018-06-20 RX ORDER — FENTANYL CITRATE 50 UG/ML
25 INJECTION, SOLUTION INTRAMUSCULAR; INTRAVENOUS EVERY 5 MIN PRN
Status: DISCONTINUED | OUTPATIENT
Start: 2018-06-20 | End: 2018-06-20 | Stop reason: HOSPADM

## 2018-06-20 RX ORDER — IBUPROFEN 600 MG/1
600 TABLET ORAL EVERY 6 HOURS PRN
Qty: 40 TABLET | Refills: 1 | Status: SHIPPED | OUTPATIENT
Start: 2018-06-20 | End: 2018-08-10

## 2018-06-20 RX ORDER — CEFAZOLIN SODIUM 2 G/50ML
2 SOLUTION INTRAVENOUS
Status: DISCONTINUED | OUTPATIENT
Start: 2018-06-20 | End: 2018-06-20 | Stop reason: HOSPADM

## 2018-06-20 RX ORDER — SODIUM CHLORIDE 0.9 % (FLUSH) 0.9 %
3 SYRINGE (ML) INJECTION
Status: DISCONTINUED | OUTPATIENT
Start: 2018-06-20 | End: 2018-06-20 | Stop reason: HOSPADM

## 2018-06-20 RX ORDER — FENTANYL CITRATE 50 UG/ML
INJECTION, SOLUTION INTRAMUSCULAR; INTRAVENOUS
Status: DISCONTINUED | OUTPATIENT
Start: 2018-06-20 | End: 2018-06-20

## 2018-06-20 RX ORDER — HYDROMORPHONE HYDROCHLORIDE 1 MG/ML
0.4 INJECTION, SOLUTION INTRAMUSCULAR; INTRAVENOUS; SUBCUTANEOUS EVERY 5 MIN PRN
Status: DISCONTINUED | OUTPATIENT
Start: 2018-06-20 | End: 2018-06-20 | Stop reason: HOSPADM

## 2018-06-20 RX ORDER — LIDOCAINE HCL/PF 100 MG/5ML
SYRINGE (ML) INTRAVENOUS
Status: DISCONTINUED | OUTPATIENT
Start: 2018-06-20 | End: 2018-06-20

## 2018-06-20 RX ORDER — IBUPROFEN 600 MG/1
600 TABLET ORAL EVERY 6 HOURS PRN
Qty: 40 TABLET | Refills: 1 | Status: SHIPPED | OUTPATIENT
Start: 2018-06-20 | End: 2018-06-20 | Stop reason: SDUPTHER

## 2018-06-20 RX ORDER — PROPOFOL 10 MG/ML
VIAL (ML) INTRAVENOUS
Status: DISCONTINUED | OUTPATIENT
Start: 2018-06-20 | End: 2018-06-20

## 2018-06-20 RX ORDER — MIDAZOLAM HYDROCHLORIDE 1 MG/ML
INJECTION, SOLUTION INTRAMUSCULAR; INTRAVENOUS
Status: DISCONTINUED | OUTPATIENT
Start: 2018-06-20 | End: 2018-06-20

## 2018-06-20 RX ORDER — PHENYLEPHRINE HYDROCHLORIDE 10 MG/ML
INJECTION INTRAVENOUS
Status: DISCONTINUED | OUTPATIENT
Start: 2018-06-20 | End: 2018-06-20

## 2018-06-20 RX ORDER — ROCURONIUM BROMIDE 10 MG/ML
INJECTION, SOLUTION INTRAVENOUS
Status: DISCONTINUED | OUTPATIENT
Start: 2018-06-20 | End: 2018-06-20

## 2018-06-20 RX ORDER — ONDANSETRON 2 MG/ML
INJECTION INTRAMUSCULAR; INTRAVENOUS
Status: DISCONTINUED | OUTPATIENT
Start: 2018-06-20 | End: 2018-06-20

## 2018-06-20 RX ORDER — OXYCODONE AND ACETAMINOPHEN 5; 325 MG/1; MG/1
1 TABLET ORAL EVERY 4 HOURS PRN
Qty: 15 TABLET | Refills: 0 | Status: ON HOLD | OUTPATIENT
Start: 2018-06-20 | End: 2018-07-11

## 2018-06-20 RX ORDER — NEOSTIGMINE METHYLSULFATE 1 MG/ML
INJECTION, SOLUTION INTRAVENOUS
Status: DISCONTINUED | OUTPATIENT
Start: 2018-06-20 | End: 2018-06-20

## 2018-06-20 RX ORDER — LIDOCAINE HYDROCHLORIDE 10 MG/ML
1 INJECTION, SOLUTION EPIDURAL; INFILTRATION; INTRACAUDAL; PERINEURAL ONCE
Status: DISCONTINUED | OUTPATIENT
Start: 2018-06-20 | End: 2018-06-20 | Stop reason: HOSPADM

## 2018-06-20 RX ADMIN — PROPOFOL 200 MG: 10 INJECTION, EMULSION INTRAVENOUS at 07:06

## 2018-06-20 RX ADMIN — ONDANSETRON 4 MG: 2 INJECTION, SOLUTION INTRAMUSCULAR; INTRAVENOUS at 07:06

## 2018-06-20 RX ADMIN — LIDOCAINE HYDROCHLORIDE 100 MG: 20 INJECTION, SOLUTION INTRAVENOUS at 08:06

## 2018-06-20 RX ADMIN — PROPOFOL 30 MG: 10 INJECTION, EMULSION INTRAVENOUS at 07:06

## 2018-06-20 RX ADMIN — ROCURONIUM BROMIDE 15 MG: 10 INJECTION, SOLUTION INTRAVENOUS at 07:06

## 2018-06-20 RX ADMIN — METOCLOPRAMIDE 10 MG: 5 INJECTION, SOLUTION INTRAMUSCULAR; INTRAVENOUS at 07:06

## 2018-06-20 RX ADMIN — PROPOFOL 20 MG: 10 INJECTION, EMULSION INTRAVENOUS at 08:06

## 2018-06-20 RX ADMIN — Medication 0.4 MG: at 09:06

## 2018-06-20 RX ADMIN — PROPOFOL 30 MG: 10 INJECTION, EMULSION INTRAVENOUS at 08:06

## 2018-06-20 RX ADMIN — GLYCOPYRROLATE 0.2 MG: 0.2 INJECTION, SOLUTION INTRAMUSCULAR; INTRAVENOUS at 07:06

## 2018-06-20 RX ADMIN — MIDAZOLAM HYDROCHLORIDE 2 MG: 1 INJECTION, SOLUTION INTRAMUSCULAR; INTRAVENOUS at 07:06

## 2018-06-20 RX ADMIN — LIDOCAINE HYDROCHLORIDE 100 MG: 20 INJECTION, SOLUTION INTRAVENOUS at 07:06

## 2018-06-20 RX ADMIN — PROPOFOL 80 MG: 10 INJECTION, EMULSION INTRAVENOUS at 07:06

## 2018-06-20 RX ADMIN — PHENYLEPHRINE HYDROCHLORIDE 100 MCG: 10 INJECTION INTRAVENOUS at 07:06

## 2018-06-20 RX ADMIN — ACETAMINOPHEN 1000 MG: 10 INJECTION, SOLUTION INTRAVENOUS at 10:06

## 2018-06-20 RX ADMIN — NEOSTIGMINE METHYLSULFATE 4 MG: 1 INJECTION INTRAVENOUS at 08:06

## 2018-06-20 RX ADMIN — GLYCOPYRROLATE 0.6 MG: 0.2 INJECTION, SOLUTION INTRAMUSCULAR; INTRAVENOUS at 08:06

## 2018-06-20 RX ADMIN — ROCURONIUM BROMIDE 30 MG: 10 INJECTION, SOLUTION INTRAVENOUS at 07:06

## 2018-06-20 RX ADMIN — FENTANYL CITRATE 100 MCG: 50 INJECTION INTRAMUSCULAR; INTRAVENOUS at 07:06

## 2018-06-20 RX ADMIN — ROCURONIUM BROMIDE 5 MG: 10 INJECTION, SOLUTION INTRAVENOUS at 07:06

## 2018-06-20 RX ADMIN — PROPOFOL 10 MG: 10 INJECTION, EMULSION INTRAVENOUS at 08:06

## 2018-06-20 RX ADMIN — SODIUM CHLORIDE, SODIUM LACTATE, POTASSIUM CHLORIDE, AND CALCIUM CHLORIDE: .6; .31; .03; .02 INJECTION, SOLUTION INTRAVENOUS at 06:06

## 2018-06-20 NOTE — DISCHARGE INSTRUCTIONS
***  BATHING:                   You may shower after your dressing is removed, but no tub baths, hot tubs, saunas or swimming until you see the doctor.    DRESSING:  ? Remove your bandage ________________. If there are skin tapes over the incision, leave them in place. They will start to come off in 5-7 days.  ACTIVITY LEVEL: If you have received sedation or an anesthetic, you may feel sleepy for   several hours. Rest until you are more awake. Gradually resume your normal activities  ? No heavy lifting or straining, nothing over 10 lbs., like a gallon of milk.  ? Pelvic rest- no sex, tampons or douching until follow up or instructed by doctor.  DIET:  You may resume your home diet. If nausea is present, increase your diet gradually with fluids and bland foods.    Medications:  Pain medication should be taken only if needed and as directed. If antibiotics are prescribed, the medication should be taken until completed. You will be given an updated list of you medications.  ? No driving, alcoholic beverages or signing legal documents for next 24 hours or while taking pain medication    CALL THE DOCTOR:    For any obvious bleeding (some dried blood over the incision is normal).      Redness, swelling, foul smell around incision or fever over 101.   Shortness of breath, Coughing up Bloody Sputum or Pains or Swelling in your calves.   Persistent pain or nausea not relieved by medication.   If vaginal bleeding is in excess of a normal period.   Problems urinating    If any unusual problems or difficulties occur contact your doctor. If you cannot contact your doctor but feel your signs and symptoms warrant a physicians attention return to the emergency room.  Fall Prevention  Millions of people fall every year and injure themselves. You may have had anesthesia or sedation which may increase your risk of falling. You may have health issues that put you at an increased risk of falling.     Here are ways to reduce your  risk of falling.  ·   · Make your home safe by keeping walkways clear of objects you may trip over.  · Use non-slip pads under rugs. Do not use area rugs or small throw rugs.  · Use non-slip mats in bathtubs and showers.  · Install handrails and lights on staircases.  · Do not walk in poorly lit areas.  · Do not stand on chairs or wobbly ladders.  · Use caution when reaching overhead or looking upward. This position can cause a loss of balance.  · Be sure your shoes fit properly, have non-slip bottoms and are in good condition.   · Wear shoes both inside and out. Avoid going barefoot or wearing slippers.  · Be cautious when going up and down stairs, curbs, and when walking on uneven sidewalks.  · If your balance is poor, consider using a cane or walker.  · If your fall was related to alcohol use, stop or limit alcohol intake.   · If your fall was related to use of sleeping medicines, talk to your doctor about this. You may need to reduce your dosage at bedtime if you awaken during the night to go to the bathroom.    · To reduce the need for nighttime bathroom trips:  ¨ Avoid drinking fluids for several hours before going to bed  ¨ Empty your bladder before going to bed  ¨ Men can keep a urinal at the bedside  · Stay as active as you can. Balance, flexibility, strength, and endurance all come from exercise. They all play a role in preventing falls. Ask your healthcare provider which types of activity are right for you.  · Get your vision checked on a regular basis.  · If you have pets, know where they are before you stand up or walk so you don't trip over them.  · Use night lights.

## 2018-06-20 NOTE — ANESTHESIA PREPROCEDURE EVALUATION
06/20/2018  Sherrill Ennis is a 43 y.o., female.    Anesthesia Evaluation    I have reviewed the Patient Summary Reports.    I have reviewed the Nursing Notes.   I have reviewed the Medications.     Review of Systems  Anesthesia Hx:  No problems with previous Anesthesia Denies Hx of Anesthetic complications  Neg history of prior surgery. Denies Family Hx of Anesthesia complications.   Denies Personal Hx of Anesthesia complications.   Social:  No Alcohol Use, Smoker    Hematology/Oncology:  Hematology Normal   Oncology Normal     EENT/Dental:EENT/Dental Normal   Cardiovascular:   Exercise tolerance: good Hypertension    Pulmonary:  Pulmonary Normal    Renal/:  Renal/ Normal     Hepatic/GI:  Hepatic/GI Normal    Musculoskeletal:  Musculoskeletal Normal    Neurological:  Neurology Normal    Endocrine:  Endocrine Normal    Dermatological:  Skin Normal    Psych:  Psychiatric Normal           Physical Exam  General:  Well nourished    Airway/Jaw/Neck:  Airway Findings: Mouth Opening: Normal General Airway Assessment: Adult  Mallampati: II  TM Distance: Normal, at least 6 cm         Dental:  DENTAL FINDINGS: Normal   Chest/Lungs:  Chest/Lungs Clear    Heart/Vascular:  Heart Findings: Normal Heart murmur: negative       Mental Status:  Mental Status Findings:  Cooperative, Alert and Oriented         Anesthesia Plan  Type of Anesthesia, risks & benefits discussed:  Anesthesia Type:  general  Patient's Preference:   Intra-op Monitoring Plan:   Intra-op Monitoring Plan Comments:   Post Op Pain Control Plan:   Post Op Pain Control Plan Comments:   Induction:   IV  Beta Blocker:  Patient is not currently on a Beta-Blocker (No further documentation required).       Informed Consent: Patient understands risks and agrees with Anesthesia plan.  Questions answered. Anesthesia consent signed with patient.  ASA  Score: 2     Day of Surgery Review of History & Physical:            Ready For Surgery From Anesthesia Perspective.

## 2018-06-20 NOTE — TELEPHONE ENCOUNTER
----- Message from Hoa Bear sent at 6/20/2018  1:28 PM CDT -----  Contact: Daughter/ [phone]  Pt's daughter calling to notify office that [facility] on [address] does not have pain medications in stock. Says office needs to call and cancel RX then call into new pharmacy- [address] [place] [address] #[phone]. Thank you.

## 2018-06-20 NOTE — TELEPHONE ENCOUNTER
----- Message from Faraz Esquivel sent at 6/20/2018  2:29 PM CDT -----  Contact: [phone] or [phone]/daughter Padmini  Calling TO speak with nurse regarding pain med,that need to be transferred to [facility]

## 2018-06-20 NOTE — TRANSFER OF CARE
"Anesthesia Transfer of Care Note    Patient: Sherrill Ennis    Procedure(s) Performed: Procedure(s) (LRB):  [id number] (N/A)    Patient location: PACU    Anesthesia Type: general    Transport from OR: Transported from OR on room air with adequate spontaneous ventilation    Post pain: adequate analgesia    Post assessment: no apparent anesthetic complications and tolerated procedure well    Post vital signs: stable    Level of consciousness: awake and responds to stimulation    Nausea/Vomiting: no nausea/vomiting    Complications: none    Transfer of care protocol was followed      Last vitals:   Visit Vitals  /83 (BP Location: Right arm, Patient Position: Lying)   Pulse 81   Temp 36.2 °C (97.1 °F) (Oral)   Resp 20   Ht 5' 4" (1.626 m)   Wt 89.3 kg (196 lb 13.9 oz)   LMP 06/12/2018 (Exact Date)   SpO2 98%   Breastfeeding? No   BMI 33.79 kg/m²     "

## 2018-06-20 NOTE — DISCHARGE SUMMARY
DISCHARGE SUMMARY    Date of Admission: 2018  Date of Discharge: 2018    Diagnoses on Discharge:   Status post laparoscopic supracervical hysterectomy with bilateral salpingectomy and right ovarian removal with lysis of adhesions    History:   42 yo  with symptomatic uterine fibroids  Referred by Dr Sutherland  History of uterine fibroids with menorrhagia, dysmenorrhea and pelvic pain  Pelvic ultrasound performed on 4/3/2018 shows  Uterus measures 10 x 5 x 5 cm.  Endometrium measures 16 mm.  There is anterior fibroid 2.1 cm.  Right ovary measures 2.9 by 2.7 by 2.5 cm.  Left ovary measures 2.2 x 1.7 x 1.9 cm.  There is normal flow to both ovaries.  There is no significant free fluid.  There are incidental nabothian cysts of the cervix.  There are 2 follicles right ovary.  Would like to get surgery soon.  Previous  sections x 4  Scheduled for laparoscopic supracervical hysterectomy with bilateral salpingectomy, possible laparotomy and abdominal hysterectomy.  Procedures explained  Risks and benefits discussed including risks of bowel and bladder injuries, hemorrhage, and infection.  Questions answered   Consents signed  She is eager to proceed      OPERATIVE NOTES      Date of Procedure: 2018     Preoperative Diagnoses:  1.  Symptomatic uterine fibroids  2.  Pelvic pain.  3.  Status post  sections x 4 with tubal ligation     Postoperative Diagnoses:  1.  Symptomatic uterine fibroids  2.  Pelvic pain.  3.  Status post  sections x 4 with tubal ligation  4.  Pelvic adhesions  5.  Right ovarian cysts     Procedures:  1.  Laparoscopic Supracervical Hysterectomy  2.  Bilateral salpingectomy  3.  Removal of right ovarian cysts  4.  Lysis of adhesions     Surgeon: MD Jose  Assistant: MD Tara     Anesthesia: GETA   EBL: 50 cc     Findings:  1.  Extensive adhesions from the omentum to anterior abdominal wall, from left adnexa to bowel, from anterior uterus to bladder.  2.  Fibroid  uterus  3.  Status post tubal ligation  4.  Right ovarian cysts     Complications:  None     Specimen:  1.  Morcellated uterus  2.  Bilateral tubes  3.  Ovarian cysts x 2       Postoperative course was benign.  She is tolerating oral intake well.  Exam was benign with patient afebrile, vitals stable, and minimal bleeding.  Normal activities.   Regular diet  Patient discharged home on day of surgery, 6/20/2018  Discharge medications include Percocet, Motrin.  Follow-up with me in 2 weeks.    Vince Garcia MD.

## 2018-06-20 NOTE — BRIEF OP NOTE
OPERATIVE NOTES     Date of Procedure: 2018    Preoperative Diagnoses:  1.  Symptomatic uterine fibroids  2.  Pelvic pain.  3.  Status post  sections x 4 with tubal ligation    Postoperative Diagnoses:  1.  Symptomatic uterine fibroids  2.  Pelvic pain.  3.  Status post  sections x 4 with tubal ligation  4.  Pelvic adhesions  5.  Right ovarian cysts    Procedures:  1.  Laparoscopic Supracervical Hysterectomy  2.  Bilateral salpingectomy  3.  Removal of right ovarian cysts  4.  Lysis of adhesions    Surgeon: MD Jose  Assistant: MD Tara    Anesthesia: GETA   EBL: 50 cc    Findings:  1.  Extensive adhesions from the omentum to anterior abdominal wall, from left adnexa to bowel, from anterior uterus to bladder.  2.  Fibroid uterus  3.  Status post tubal ligation  4.  Right ovarian cysts    Complications:  None    Specimen:  1.  Morcellated uterus  2.  Bilateral tubes  3.  Ovarian cysts x 2    History:   44 yo  with symptomatic uterine fibroids  Referred by Dr Sutherland  History of uterine fibroids with menorrhagia, dysmenorrhea and pelvic pain  Pelvic ultrasound performed on 4/3/2018 shows  Uterus measures 10 x 5 x 5 cm.  Endometrium measures 16 mm.  There is anterior fibroid 2.1 cm.  Right ovary measures 2.9 by 2.7 by 2.5 cm.  Left ovary measures 2.2 x 1.7 x 1.9 cm.  There is normal flow to both ovaries.  There is no significant free fluid.  There are incidental nabothian cysts of the cervix.  There are 2 follicles right ovary.  Would like to get surgery soon.  Previous  sections x 4  Scheduled for laparoscopic supracervical hysterectomy with bilateral salpingectomy, possible laparotomy and abdominal hysterectomy.  Procedures explained  Risks and benefits discussed including risks of bowel and bladder injuries, hemorrhage, and infection.  Questions answered   Consents signed  She is eager to proceed    Vince Garcia MD

## 2018-06-20 NOTE — ANESTHESIA POSTPROCEDURE EVALUATION
"Anesthesia Post Evaluation    Patient: Sherrill Ennis    Procedure(s) Performed: Procedure(s) (LRB):  [id number] (N/A)    Final Anesthesia Type: general  Patient location during evaluation: GI PACU  Patient participation: Yes- Able to Participate  Level of consciousness: awake and alert, oriented and awake  Post-procedure vital signs: reviewed and stable  Airway patency: patent  PONV status at discharge: No PONV  Anesthetic complications: no      Cardiovascular status: blood pressure returned to baseline  Respiratory status: unassisted, spontaneous ventilation and room air  Hydration status: euvolemic  Follow-up not needed.        Visit Vitals  /74   Pulse 79   Temp 36.1 °C (97 °F) (Oral)   Resp 16   Ht 5' 4" (1.626 m)   Wt 89.3 kg (196 lb 13.9 oz)   LMP 06/12/2018 (Exact Date)   SpO2 96%   Breastfeeding? No   BMI 33.79 kg/m²       Pain/Franki Score: Pain Assessment Performed: Yes (6/20/2018  5:41 AM)  Presence of Pain: complains of pain/discomfort (6/20/2018 10:01 AM)  Pain Rating Prior to Med Admin: 4 (6/20/2018 10:01 AM)  Franki Score: 10 (6/20/2018 10:01 AM)      "

## 2018-06-20 NOTE — INTERVAL H&P NOTE
The patient has been examined and the H&P has been reviewed:    I concur with the findings and no changes have occurred since H&P was written.    Anesthesia/Surgery risks, benefits and alternative options discussed and understood by patient/family.          Active Hospital Problems    Diagnosis  POA    Uterine leiomyoma [D25.9]  Yes      Resolved Hospital Problems    Diagnosis Date Resolved POA   No resolved problems to display.     44 yo  with symptomatic uterine fibroids  Referred by Dr Sutherland  History of uterine fibroids with menorrhagia, dysmenorrhea and pelvic pain  Pelvic ultrasound performed on 4/3/2018 shows  Uterus measures 10 x 5 x 5 cm.  Endometrium measures 16 mm.  There is anterior fibroid 2.1 cm.  Right ovary measures 2.9 by 2.7 by 2.5 cm.  Left ovary measures 2.2 x 1.7 x 1.9 cm.  There is normal flow to both ovaries.  There is no significant free fluid.  There are incidental nabothian cysts of the cervix.  There are 2 follicles right ovary.  Would like to get surgery soon.  Previous  sections x 4  Scheduled for laparoscopic supracervical hysterectomy with bilateral salpingectomy, possible laparotomy and abdominal hysterectomy.  Procedures explained  Risks and benefits discussed including risks of bowel and bladder injuries, hemorrhage, and infection.  Questions answered   Consents signed  She is eager to proceed

## 2018-06-20 NOTE — OP NOTE
OPERATIVE NOTES      Date of Procedure: 2018     Preoperative Diagnoses:  1.  Symptomatic uterine fibroids  2.  Pelvic pain.  3.  Status post  sections x 4 with tubal ligation     Postoperative Diagnoses:  1.  Symptomatic uterine fibroids  2.  Pelvic pain.  3.  Status post  sections x 4 with tubal ligation  4.  Pelvic adhesions  5.  Right ovarian cysts     Procedures:  1.  Laparoscopic Supracervical Hysterectomy  2.  Bilateral salpingectomy  3.  Removal of right ovarian cysts  4.  Lysis of adhesions     Surgeon: MD Jose  Assistant: MD Tara     Anesthesia: GETA   EBL: 50 cc     Findings:  1.  Extensive adhesions from the omentum to anterior abdominal wall, from left adnexa to bowel, from anterior uterus to bladder.  2.  Fibroid uterus  3.  Status post tubal ligation  4.  Right ovarian cysts     Complications:  None     Specimen:  1.  Morcellated uterus  2.  Bilateral tubes  3.  Ovarian cysts x 2     History:   42 yo  with symptomatic uterine fibroids  Referred by Dr Sutherland  History of uterine fibroids with menorrhagia, dysmenorrhea and pelvic pain  Pelvic ultrasound performed on 4/3/2018 shows  Uterus measures 10 x 5 x 5 cm.  Endometrium measures 16 mm.  There is anterior fibroid 2.1 cm.  Right ovary measures 2.9 by 2.7 by 2.5 cm.  Left ovary measures 2.2 x 1.7 x 1.9 cm.  There is normal flow to both ovaries.  There is no significant free fluid.  There are incidental nabothian cysts of the cervix.  There are 2 follicles right ovary.  Would like to get surgery soon.  Previous  sections x 4  Scheduled for laparoscopic supracervical hysterectomy with bilateral salpingectomy, possible laparotomy and abdominal hysterectomy.  Procedures explained  Risks and benefits discussed including risks of bowel and bladder injuries, hemorrhage, and infection.  Questions answered   Consents signed  She is eager to proceed    PROCEDURES IN DETAILS    After confirming appropriate consents were  signed and in chart, patient was taking to the operating room where general anesthesia was administered and found to be adequate. She was prepped and draped in the dorsal lithotomy position.   A 5 mm supraumbilical skin incision was made with the scalpel.  A Veress needle was inserted into the umbilicus under tenting of the anterior abdominal wall. Placement into the peritoneal cavity was confirmed via saline drop test. The abdomen was insufflated to about 12-15mm Hg using Carbon dioxide. . A 5 mm trocar was advanced through this incision. Excellent hemostasis was noted. The patient was placed in deep Trendelenburg.     Extensive adhesions noted around umbilicus and anterior abdominal wall.  However, we were able to maneuver the camera around to the left lower quadrant.  A 10 mm left lateral skin incision was made with a scalpel and a 10 mm trocar was advanced through this incision under visualization of the camera.     A 5 mm left lateral skin incision was made with the scalpel. A 5 mm trocar was advanced through this incision under visualization of the camera. Excellent hemostasis was noted.      We then proceeded with lysis of adhesions since the umbilical trochar site did not permit visualization of the deep pelvis and both lower trochar.  The camera was then placed through the right lower quadrant.  The EnSeal device was then used to perform lysis of the adhesions around the umbilicus between the omentum and the anterior abdominal wall.  Better visualization noted after lysis of adhesions.    Attention was turned to the left round ligament. This was cauterized and transected and continued anteriorly to create the bladder flap to the midline using the EnSeal device. Attention was turned to the left utero-ovarian ligament. This was cauterized and transected and carried down to the level of the uterine vessels. The vessels were cauterized and transected. Attention was turned to the right round ligament. It was  cauterized and transected and continued anteriorly to finish creating the bladder flap. Attention was turned to the right utero-ovarian ligament. This was cauterized and transected and carried down to the level of the uterine vessels. The vessels were cauterized and transected. Attention was turned to the anterior portion of the cervix. The bladder was dissected off the cervix.     Once the uterine vessels were secured, Tracie loop was placed around the upper cervix.  The uterus was then amputated from the cervix without any difficulty.    The 10 mm trochar was then removed.  The morcellator was placed through this trochar after enlarging to permit 12 mm morcellator.   The uterus was morcellated without any difficulty. Care was taken to remove all morcellated tissue visible.    The pelvis was copiously irrigated and suctioned. Excellent hemostasis was noted.     Attention was then turned to the tubes.  Left tube was picked up then excised using the EnSeal device.  The same procedure repeated on the right tube.  Both tubes removed through the 10 mm trochar and sent to Pathology.     The right ovary noted to have two clear and simple cysts.  They were excised off of the ovary using the EnSeal device, then removed through the 10 mm trochar site.    Good hemostasis noted throughout.    The cervical stump was then electro-cauterized along with the endocervical canal.    Attention was turned to the anterior abdominal wall. Suture-Ease was used to close the 10 mm trochar without any difficulty.    The abdomen was deflated and all trocars were removed. The skin was closed with 3.0 Vicryl in a subcuticular fashion. The sponge and glove were removed. Sponge, lap, and needle counts were correct x 2. The patient was taken to the recovery room in stable condition with the hernandez draining urine.       Vince Garcia MD

## 2018-06-21 ENCOUNTER — TELEPHONE (OUTPATIENT)
Dept: OBSTETRICS AND GYNECOLOGY | Facility: CLINIC | Age: 43
End: 2018-06-21

## 2018-06-21 ENCOUNTER — HOSPITAL ENCOUNTER (EMERGENCY)
Facility: HOSPITAL | Age: 43
Discharge: HOME | End: 2018-06-21
Attending: EMERGENCY MEDICINE
Payer: COMMERCIAL

## 2018-06-21 VITALS
HEART RATE: 88 BPM | TEMPERATURE: 98 F | RESPIRATION RATE: 20 BRPM | OXYGEN SATURATION: 95 % | DIASTOLIC BLOOD PRESSURE: 86 MMHG | SYSTOLIC BLOOD PRESSURE: 151 MMHG

## 2018-06-21 DIAGNOSIS — N32.89 BLADDER SPASM: Primary | ICD-10-CM

## 2018-06-21 LAB
BILIRUB UR QL STRIP: NEGATIVE
CLARITY UR: CLEAR
COLOR UR: ABNORMAL
GLUCOSE UR QL STRIP: NEGATIVE
HGB UR QL STRIP: ABNORMAL
KETONES UR QL STRIP: NEGATIVE
LEUKOCYTE ESTERASE UR QL STRIP: NEGATIVE
MICROSCOPIC COMMENT: NORMAL
NITRITE UR QL STRIP: NEGATIVE
PH UR STRIP: 5 [PH] (ref 5–8)
POCT GLUCOSE: 94 MG/DL (ref 70–110)
PROT UR QL STRIP: NEGATIVE
RBC #/AREA URNS HPF: 1 /HPF (ref 0–4)
SP GR UR STRIP: 1.01 (ref 1–1.03)
URN SPEC COLLECT METH UR: ABNORMAL
UROBILINOGEN UR STRIP-ACNC: NEGATIVE EU/DL
WBC #/AREA URNS HPF: 1 /HPF (ref 0–5)

## 2018-06-21 PROCEDURE — 81000 URINALYSIS NONAUTO W/SCOPE: CPT

## 2018-06-21 PROCEDURE — 99284 EMERGENCY DEPT VISIT MOD MDM: CPT | Mod: 25

## 2018-06-21 PROCEDURE — 25000003 PHARM REV CODE 250: Performed by: EMERGENCY MEDICINE

## 2018-06-21 PROCEDURE — 51702 INSERT TEMP BLADDER CATH: CPT

## 2018-06-21 RX ORDER — ONDANSETRON 8 MG/1
8 TABLET, ORALLY DISINTEGRATING ORAL
Status: COMPLETED | OUTPATIENT
Start: 2018-06-21 | End: 2018-06-21

## 2018-06-21 RX ADMIN — ONDANSETRON 8 MG: 8 TABLET, ORALLY DISINTEGRATING ORAL at 11:06

## 2018-06-21 NOTE — ED TRIAGE NOTES
Pt arrived via personal transport with c/o abdominal pain and pressure as well as pressure in the rectum since yesterday; pt states she has also not been able to urinate since yesterday at 3; pt states she just had a hysterectomy

## 2018-06-21 NOTE — TELEPHONE ENCOUNTER
----- Message from Brenda Hogan sent at 6/21/2018  9:00 AM CDT -----  Contact: Self   Patient says she had partial Hysterotomy on yesterday and she has not been able to pass urine since 3 pm yesterday. She has stomach pain and a lot of pressure in her rectum area. Please call to advise [phone].   -------------------------------------------------------------------  6/21/18 @ 0973 ([facility])   SPOKE WITH MS CRONIN, SHE STATED SHE HAS A PARTIAL HYSTERECTOMY YESTERDAY AND URINATED BEFORE THEY LET HER GO HOME, BUT NOW SHE HAS NOT URINATED SINCE YESTERDAY AFTERNOON AT 3PM AND SHE HAS RECTAL PRESSURE, INFORMED HER THAT SEE NEEDS TO GO TO THE E.R, DR WOODS IS NOT HERE RIGHT NOW, SHE ASKED IF SHE CAN USE AN ENEMA FOR THE RECTAL PRESSURE , TOLD PT NO, SHE NEEDS TO GO TO E.R., SHE STATED SHE IS GOING NOW

## 2018-06-21 NOTE — ED PROVIDER NOTES
"Encounter Date: 2018  This is an initial triage evaluation of Sherrill Ennis, a 43 y.o., female  that presents to the Emergency Department with c/o difficulty urinating s/p partial hysterectomy yesterday.    Pertinent exam findings: none    Orders Pending : bladder scan     Destination: ac    I have evaluated and provided a medical screening exam with initial orders placed, if indicated, to expedite care. The patient is stable to return to the waiting area and will be placed in a treatment area when one is available. Care will be transferred to an alternate provider when patient is roomed from the lobby for full assessment including: history, physical exam, additional orders, and final disposition.      FRANK Garcia     SCRIBE #1 NOTE: I, Mony Sy, am scribing for, and in the presence of,  Mo Alfonso MD. I have scribed the following portions of the note - Other sections scribed: HPI, ROS, PE.       History     Chief Complaint   Patient presents with    Post-op Problem     Had a partial hysterectomy yesterday and has not been able to urinate.     CC: Urinary Retention    44 y/o female with HTN, x4 , cholecystectomy, and tubal ligation presents to the ED for emergent evaluation of urinary retention after having a partial hysterectomy yesterday by OBGYNDr. Garcia. The patient reports a "pressure" sensation to her rectum whenever she tries to urinate. The patient is also c/o SOB. The patient is allergic to ACE inhibitors. The patient smokes cigarettes but denies drinking EtOH or illicit drug abuse. The patient denies fever, chills, or cough. No other symptoms reported.      The history is provided by the patient. No  was used.     Review of patient's allergies indicates:   Allergen Reactions    Ace inhibitors Other (See Comments)     cough     Past Medical History:   Diagnosis Date    Hypertension      Past Surgical History:   Procedure Laterality Date "     SECTION      x4    CHOLECYSTECTOMY      LAPAROSCOPIC SALPINGECTOMY Bilateral 2018    Procedure: SALPINGECTOMY, LAPAROSCOPIC;  Surgeon: Vince Garcia MD;  Location: [facility] OR;  Service: OB/GYN;  Laterality: Bilateral;    LAPAROSCOPIC SUPRACERVICAL HYSTERECTOMY N/A 2018    Procedure: [id number];  Surgeon: Vince Garcia MD;  Location: [facility] OR;  Service: OB/GYN;  Laterality: N/A;  1ST CASE  RN PRE OP 2018    LAPAROSCOPIC SURGICAL REMOVAL OF CYST OF OVARY Right 2018    Procedure: EXCISION, CYST, OVARY, LAPAROSCOPIC;  Surgeon: Vince Garcia MD;  Location: [facility] OR;  Service: OB/GYN;  Laterality: Right;    TUBAL LIGATION       Family History   Problem Relation Age of Onset    Hypertension Mother     Stroke Mother     Aneurysm Mother     Hypertension Father     Hypertension Brother     Hypertension Maternal Grandfather     Heart disease Maternal Grandfather         MI    Cancer Maternal Grandfather     Cancer Paternal Grandmother      Social History   Substance Use Topics    Smoking status: Current Every Day Smoker     Packs/day: 0.25     Years: 17.00     Types: Cigarettes    Smokeless tobacco: Never Used    Alcohol use Yes      Comment: Occasional     Review of Systems   Constitutional: Negative for chills and fever.   HENT: Negative for congestion, ear pain, rhinorrhea and sore throat.    Eyes: Negative for redness.   Respiratory: Positive for shortness of breath. Negative for cough.    Cardiovascular: Negative for chest pain.   Gastrointestinal: Negative for abdominal pain, diarrhea, nausea and vomiting.   Genitourinary: Negative for decreased urine volume, difficulty urinating, dysuria, frequency, hematuria and urgency.        (+) urinary retention   Musculoskeletal: Negative for back pain and neck pain.   Skin: Negative for rash.   Neurological: Negative for headaches.       Physical Exam     Initial Vitals [18 1003]   BP Pulse Resp Temp  SpO2   (!) 147/95 100 (!) 22 97.8 °F (36.6 °C) 95 %      MAP       --         Physical Exam    Nursing note and vitals reviewed.  Constitutional: She appears well-developed and well-nourished.   HENT:   Right Ear: External ear normal.   Left Ear: External ear normal.   Nose: Nose normal.   Mouth/Throat: Oropharynx is clear and moist.   Eyes: Conjunctivae and EOM are normal. Pupils are equal, round, and reactive to light.   Neck: Normal range of motion. Neck supple.   Cardiovascular: Normal rate, regular rhythm and normal heart sounds.   Pulmonary/Chest: Breath sounds normal.   Abdominal: Soft. Bowel sounds are normal. There is tenderness (slightly) in the suprapubic area.   Musculoskeletal: Normal range of motion.   Neurological: She is alert and oriented to person, place, and time. She has normal strength.   Skin: Skin is warm and dry.   Psychiatric: She has a normal mood and affect. Her behavior is normal. Judgment and thought content normal.         ED Course   Procedures  Labs Reviewed   URINALYSIS - Abnormal; Notable for the following:        Result Value    Occult Blood UA 2+ (*)     All other components within normal limits   URINALYSIS MICROSCOPIC   URINALYSIS, REFLEX TO URINE CULTURE          Imaging Results    None          Medical Decision Making:   History:   Old Medical Records: I decided to obtain old medical records.  Clinical Tests:   Lab Tests: Ordered and Reviewed       <> Summary of Lab: Results for CAROLINA CRONIN (MRN [id number]) as of 6/21/2018 11:28    6/19/2018 13:09    6/21/2018 10:58  Specimen UA: Urine, Clean Catch  Color, UA: Straw  pH, UA: 5.0  Specific Gravity, UA: 1.010  Appearance, UA: Clear  Protein, UA: Negative  Glucose, UA: Negative  Ketones, UA: Negative  Occult Blood UA: 2+ (A)  Nitrite, UA: Negative  Urobilinogen, UA: Negative  Bilirubin (UA): Negative  Leukocytes, UA: Negative  RBC, UA: 1  WBC, UA: 1  Microscopic Comment: SEE COMMENT    ED Management:  1130H Dr. Garcia  he had to dissect off the bladder and likely has spasms. Have her force fluids and try to pee, 150 mL of urine dose not warrant in indwelling hernandez. She has an appt in 2 weeks but she can come sooner if she needs to. She has percocet for pain.  Patient apprised of catheter output interpretation and plan and agrees. She will return for worsening pain or inability to urinate            Scribe Attestation:   Scribe #1: I performed the above scribed service and the documentation accurately describes the services I performed. I attest to the accuracy of the note.    Attending Attestation:           Physician Attestation for Scribe:  Physician Attestation Statement for Scribe #1: I, Mo Alfonso MD, reviewed documentation, as scribed by Mony Sy in my presence, and it is both accurate and complete.                    Clinical Impression:   The encounter diagnosis was Bladder spasm.      Disposition:   Disposition: Discharged  Condition: Stable                        Mo Ramirez MD  06/21/18 0809

## 2018-06-22 ENCOUNTER — TELEPHONE (OUTPATIENT)
Dept: OBSTETRICS AND GYNECOLOGY | Facility: CLINIC | Age: 43
End: 2018-06-22

## 2018-06-22 DIAGNOSIS — R11.0 NAUSEA AFTER ANESTHESIA, INITIAL ENCOUNTER: Primary | ICD-10-CM

## 2018-06-22 DIAGNOSIS — T88.59XA NAUSEA AFTER ANESTHESIA, INITIAL ENCOUNTER: Primary | ICD-10-CM

## 2018-06-22 RX ORDER — PROMETHAZINE HYDROCHLORIDE 25 MG/1
25 TABLET ORAL EVERY 4 HOURS
Qty: 30 TABLET | Refills: 1 | Status: SHIPPED | OUTPATIENT
Start: 2018-06-22 | End: 2019-02-11 | Stop reason: SDUPTHER

## 2018-06-22 NOTE — TELEPHONE ENCOUNTER
Pt recently had surgery 2 days ago. Experiencing real bad nausea. Request medication. Will send message to Dr. Garcia. Will also walk over and hand deliver message. CW

## 2018-06-22 NOTE — TELEPHONE ENCOUNTER
Called pt to inform her that Dr. Garcia has sent a prescription to her pharmacy for nausea as she requested. No answer. Lm for call back to office if any further questions. CW

## 2018-06-22 NOTE — TELEPHONE ENCOUNTER
----- Message from Faraz Esquivel sent at 6/22/2018 11:49 AM CDT -----  Contact: daughter/Dahlia [phone]  Pt had partial hysto surg two days ago and been experiencing nausea real bad.Calling to get medication called in to expedite situation [facility] [place] /Tien Oropeza

## 2018-06-25 ENCOUNTER — HOSPITAL ENCOUNTER (INPATIENT)
Facility: HOSPITAL | Age: 43
LOS: 4 days | Discharge: HOME | DRG: 908 | End: 2018-06-29
Attending: EMERGENCY MEDICINE | Admitting: OBSTETRICS & GYNECOLOGY
Payer: COMMERCIAL

## 2018-06-25 ENCOUNTER — TELEPHONE (OUTPATIENT)
Dept: OBSTETRICS AND GYNECOLOGY | Facility: CLINIC | Age: 43
End: 2018-06-25

## 2018-06-25 ENCOUNTER — OFFICE VISIT (OUTPATIENT)
Dept: OBSTETRICS AND GYNECOLOGY | Facility: CLINIC | Age: 43
DRG: 908 | End: 2018-06-25
Payer: COMMERCIAL

## 2018-06-25 VITALS
SYSTOLIC BLOOD PRESSURE: 122 MMHG | HEIGHT: 64 IN | BODY MASS INDEX: 35.71 KG/M2 | WEIGHT: 209.19 LBS | DIASTOLIC BLOOD PRESSURE: 84 MMHG

## 2018-06-25 DIAGNOSIS — N19 RENAL FAILURE: ICD-10-CM

## 2018-06-25 DIAGNOSIS — N17.9 ACUTE RENAL FAILURE, UNSPECIFIED ACUTE RENAL FAILURE TYPE: ICD-10-CM

## 2018-06-25 DIAGNOSIS — R10.9 ABDOMINAL PAIN: ICD-10-CM

## 2018-06-25 DIAGNOSIS — K59.00 CONSTIPATION, UNSPECIFIED CONSTIPATION TYPE: ICD-10-CM

## 2018-06-25 DIAGNOSIS — Z98.890 STATUS POST EXPLORATORY LAPAROTOMY: Primary | ICD-10-CM

## 2018-06-25 DIAGNOSIS — R33.9 URINARY RETENTION: ICD-10-CM

## 2018-06-25 DIAGNOSIS — R14.0 ABDOMINAL DISTENSION: ICD-10-CM

## 2018-06-25 DIAGNOSIS — S37.20XA INJURY OF BLADDER, INITIAL ENCOUNTER: ICD-10-CM

## 2018-06-25 DIAGNOSIS — Z90.711 STATUS POST LAPAROSCOPIC SUPRACERVICAL HYSTERECTOMY: ICD-10-CM

## 2018-06-25 DIAGNOSIS — Z90.711 STATUS POST LAPAROSCOPIC SUPRACERVICAL HYSTERECTOMY: Primary | ICD-10-CM

## 2018-06-25 PROBLEM — D25.9 UTERINE LEIOMYOMA: Status: RESOLVED | Noted: 2018-04-04 | Resolved: 2018-06-25

## 2018-06-25 LAB
ALBUMIN SERPL BCP-MCNC: 3.4 G/DL
ALLENS TEST: ABNORMAL
ALP SERPL-CCNC: 76 U/L
ALT SERPL W/O P-5'-P-CCNC: 17 U/L
ANION GAP SERPL CALC-SCNC: 15 MMOL/L
ANION GAP SERPL CALC-SCNC: 20 MMOL/L
AST SERPL-CCNC: 14 U/L
BACTERIA #/AREA URNS HPF: ABNORMAL /HPF
BASOPHILS # BLD AUTO: 0.02 K/UL
BASOPHILS # BLD AUTO: 0.03 K/UL
BASOPHILS NFR BLD: 0.2 %
BASOPHILS NFR BLD: 0.2 %
BILIRUB SERPL-MCNC: 0.3 MG/DL
BILIRUB UR QL STRIP: NEGATIVE
BUN SERPL-MCNC: 50 MG/DL
BUN SERPL-MCNC: 65 MG/DL
CALCIUM SERPL-MCNC: 10.1 MG/DL
CALCIUM SERPL-MCNC: 9.3 MG/DL
CHLORIDE SERPL-SCNC: 100 MMOL/L
CHLORIDE SERPL-SCNC: 93 MMOL/L
CLARITY UR: CLEAR
CO2 SERPL-SCNC: 16 MMOL/L
CO2 SERPL-SCNC: 18 MMOL/L
COLOR UR: YELLOW
CREAT SERPL-MCNC: 10.6 MG/DL
CREAT SERPL-MCNC: 6.5 MG/DL
CREAT UR-MCNC: 147.5 MG/DL
DELSYS: ABNORMAL
DIFFERENTIAL METHOD: ABNORMAL
DIFFERENTIAL METHOD: ABNORMAL
EOSINOPHIL # BLD AUTO: 0.4 K/UL
EOSINOPHIL # BLD AUTO: 0.4 K/UL
EOSINOPHIL NFR BLD: 2.4 %
EOSINOPHIL NFR BLD: 3 %
ERYTHROCYTE [DISTWIDTH] IN BLOOD BY AUTOMATED COUNT: 13.6 %
ERYTHROCYTE [DISTWIDTH] IN BLOOD BY AUTOMATED COUNT: 13.7 %
EST. GFR  (AFRICAN AMERICAN): 5 ML/MIN/1.73 M^2
EST. GFR  (AFRICAN AMERICAN): 8 ML/MIN/1.73 M^2
EST. GFR  (NON AFRICAN AMERICAN): 4 ML/MIN/1.73 M^2
EST. GFR  (NON AFRICAN AMERICAN): 7 ML/MIN/1.73 M^2
GLUCOSE SERPL-MCNC: 113 MG/DL
GLUCOSE SERPL-MCNC: 117 MG/DL
GLUCOSE UR QL STRIP: NEGATIVE
HCO3 UR-SCNC: 17 MMOL/L (ref 24–28)
HCT VFR BLD AUTO: 34.6 %
HCT VFR BLD AUTO: 36.4 %
HGB BLD-MCNC: 11.9 G/DL
HGB BLD-MCNC: 12.6 G/DL
HGB UR QL STRIP: ABNORMAL
HYALINE CASTS #/AREA URNS LPF: 0 /LPF
KETONES UR QL STRIP: NEGATIVE
LACTATE SERPL-SCNC: 1.1 MMOL/L
LEUKOCYTE ESTERASE UR QL STRIP: NEGATIVE
LYMPHOCYTES # BLD AUTO: 0.9 K/UL
LYMPHOCYTES # BLD AUTO: 1.1 K/UL
LYMPHOCYTES NFR BLD: 6.3 %
LYMPHOCYTES NFR BLD: 6.6 %
MCH RBC QN AUTO: 27.5 PG
MCH RBC QN AUTO: 27.7 PG
MCHC RBC AUTO-ENTMCNC: 34.4 G/DL
MCHC RBC AUTO-ENTMCNC: 34.6 G/DL
MCV RBC AUTO: 80 FL
MCV RBC AUTO: 80 FL
MICROSCOPIC COMMENT: ABNORMAL
MODE: ABNORMAL
MONOCYTES # BLD AUTO: 0.6 K/UL
MONOCYTES # BLD AUTO: 0.9 K/UL
MONOCYTES NFR BLD: 4.8 %
MONOCYTES NFR BLD: 5.1 %
NEUTROPHILS # BLD AUTO: 11.1 K/UL
NEUTROPHILS # BLD AUTO: 14.5 K/UL
NEUTROPHILS NFR BLD: 85.2 %
NEUTROPHILS NFR BLD: 85.8 %
NITRITE UR QL STRIP: NEGATIVE
PCO2 BLDA: 28.2 MMHG (ref 35–45)
PH SMN: 7.39 [PH] (ref 7.35–7.45)
PH UR STRIP: 5 [PH] (ref 5–8)
PLATELET # BLD AUTO: 397 K/UL
PLATELET # BLD AUTO: 462 K/UL
PMV BLD AUTO: 8.7 FL
PMV BLD AUTO: 8.8 FL
PO2 BLDA: 73 MMHG (ref 80–100)
POC BE: -7 MMOL/L
POC SATURATED O2: 95 % (ref 95–100)
POC TCO2: 18 MMOL/L (ref 23–27)
POTASSIUM SERPL-SCNC: 4.2 MMOL/L
POTASSIUM SERPL-SCNC: 4.6 MMOL/L
PROT SERPL-MCNC: 8.1 G/DL
PROT UR QL STRIP: ABNORMAL
RBC # BLD AUTO: 4.32 M/UL
RBC # BLD AUTO: 4.55 M/UL
RBC #/AREA URNS HPF: 2 /HPF (ref 0–4)
SAMPLE: ABNORMAL
SITE: ABNORMAL
SODIUM SERPL-SCNC: 129 MMOL/L
SODIUM SERPL-SCNC: 133 MMOL/L
SODIUM UR-SCNC: 61 MMOL/L
SP GR UR STRIP: 1.02 (ref 1–1.03)
SQUAMOUS #/AREA URNS HPF: 15 /HPF
URN SPEC COLLECT METH UR: ABNORMAL
UROBILINOGEN UR STRIP-ACNC: NEGATIVE EU/DL
WBC # BLD AUTO: 13.07 K/UL
WBC # BLD AUTO: 16.92 K/UL
WBC #/AREA URNS HPF: 3 /HPF (ref 0–5)

## 2018-06-25 PROCEDURE — 84300 ASSAY OF URINE SODIUM: CPT

## 2018-06-25 PROCEDURE — 82570 ASSAY OF URINE CREATININE: CPT

## 2018-06-25 PROCEDURE — 25000003 PHARM REV CODE 250: Performed by: NURSE PRACTITIONER

## 2018-06-25 PROCEDURE — 96375 TX/PRO/DX INJ NEW DRUG ADDON: CPT

## 2018-06-25 PROCEDURE — 99024 POSTOP FOLLOW-UP VISIT: CPT | Mod: S$GLB,,, | Performed by: OBSTETRICS & GYNECOLOGY

## 2018-06-25 PROCEDURE — 81000 URINALYSIS NONAUTO W/SCOPE: CPT

## 2018-06-25 PROCEDURE — 87040 BLOOD CULTURE FOR BACTERIA: CPT | Mod: 59

## 2018-06-25 PROCEDURE — 80048 BASIC METABOLIC PNL TOTAL CA: CPT

## 2018-06-25 PROCEDURE — 99222 1ST HOSP IP/OBS MODERATE 55: CPT | Mod: 24,,, | Performed by: OBSTETRICS & GYNECOLOGY

## 2018-06-25 PROCEDURE — 25000003 PHARM REV CODE 250: Performed by: EMERGENCY MEDICINE

## 2018-06-25 PROCEDURE — 83605 ASSAY OF LACTIC ACID: CPT

## 2018-06-25 PROCEDURE — 63600175 PHARM REV CODE 636 W HCPCS: Performed by: NURSE PRACTITIONER

## 2018-06-25 PROCEDURE — 96374 THER/PROPH/DIAG INJ IV PUSH: CPT

## 2018-06-25 PROCEDURE — 99285 EMERGENCY DEPT VISIT HI MDM: CPT | Mod: 25

## 2018-06-25 PROCEDURE — 99999 PR PBB SHADOW E&M-EST. PATIENT-LVL III: CPT | Mod: PBBFAC,,, | Performed by: OBSTETRICS & GYNECOLOGY

## 2018-06-25 PROCEDURE — 25000003 PHARM REV CODE 250: Performed by: OBSTETRICS & GYNECOLOGY

## 2018-06-25 PROCEDURE — 12000002 HC ACUTE/MED SURGE SEMI-PRIVATE ROOM

## 2018-06-25 PROCEDURE — 36600 WITHDRAWAL OF ARTERIAL BLOOD: CPT

## 2018-06-25 PROCEDURE — 99900035 HC TECH TIME PER 15 MIN (STAT)

## 2018-06-25 PROCEDURE — 96361 HYDRATE IV INFUSION ADD-ON: CPT

## 2018-06-25 PROCEDURE — 63600175 PHARM REV CODE 636 W HCPCS: Performed by: EMERGENCY MEDICINE

## 2018-06-25 PROCEDURE — 36415 COLL VENOUS BLD VENIPUNCTURE: CPT

## 2018-06-25 PROCEDURE — 85025 COMPLETE CBC W/AUTO DIFF WBC: CPT | Mod: 91

## 2018-06-25 PROCEDURE — 80053 COMPREHEN METABOLIC PANEL: CPT

## 2018-06-25 PROCEDURE — 82803 BLOOD GASES ANY COMBINATION: CPT

## 2018-06-25 RX ORDER — ONDANSETRON 2 MG/ML
4 INJECTION INTRAMUSCULAR; INTRAVENOUS
Status: COMPLETED | OUTPATIENT
Start: 2018-06-25 | End: 2018-06-25

## 2018-06-25 RX ORDER — POLYETHYLENE GLYCOL 3350 17 G/17G
17 POWDER, FOR SOLUTION ORAL DAILY
Qty: 289 G | Refills: 0 | Status: SHIPPED | OUTPATIENT
Start: 2018-06-25 | End: 2018-07-06

## 2018-06-25 RX ORDER — BISACODYL 10 MG
10 SUPPOSITORY, RECTAL RECTAL DAILY PRN
Status: DISCONTINUED | OUTPATIENT
Start: 2018-06-25 | End: 2018-06-29 | Stop reason: HOSPADM

## 2018-06-25 RX ORDER — MORPHINE SULFATE 4 MG/ML
4 INJECTION, SOLUTION INTRAMUSCULAR; INTRAVENOUS EVERY 4 HOURS PRN
Status: DISCONTINUED | OUTPATIENT
Start: 2018-06-25 | End: 2018-06-29 | Stop reason: HOSPADM

## 2018-06-25 RX ORDER — MORPHINE SULFATE 4 MG/ML
6 INJECTION, SOLUTION INTRAMUSCULAR; INTRAVENOUS EVERY 4 HOURS PRN
Status: DISCONTINUED | OUTPATIENT
Start: 2018-06-25 | End: 2018-06-29 | Stop reason: HOSPADM

## 2018-06-25 RX ORDER — AMOXICILLIN 250 MG
1 CAPSULE ORAL 2 TIMES DAILY
Status: DISCONTINUED | OUTPATIENT
Start: 2018-06-25 | End: 2018-06-29 | Stop reason: HOSPADM

## 2018-06-25 RX ORDER — ACETAMINOPHEN 325 MG/1
650 TABLET ORAL EVERY 8 HOURS PRN
Status: DISCONTINUED | OUTPATIENT
Start: 2018-06-25 | End: 2018-06-29 | Stop reason: HOSPADM

## 2018-06-25 RX ORDER — SODIUM CHLORIDE 9 MG/ML
INJECTION, SOLUTION INTRAVENOUS CONTINUOUS
Status: DISCONTINUED | OUTPATIENT
Start: 2018-06-25 | End: 2018-06-26

## 2018-06-25 RX ORDER — DOCUSATE SODIUM 100 MG/1
200 CAPSULE, LIQUID FILLED ORAL 2 TIMES DAILY
Status: DISCONTINUED | OUTPATIENT
Start: 2018-06-25 | End: 2018-06-29 | Stop reason: HOSPADM

## 2018-06-25 RX ORDER — ADHESIVE BANDAGE
30 BANDAGE TOPICAL 2 TIMES DAILY PRN
Status: DISCONTINUED | OUTPATIENT
Start: 2018-06-25 | End: 2018-06-29 | Stop reason: HOSPADM

## 2018-06-25 RX ORDER — ONDANSETRON 2 MG/ML
4 INJECTION INTRAMUSCULAR; INTRAVENOUS EVERY 8 HOURS PRN
Status: DISCONTINUED | OUTPATIENT
Start: 2018-06-25 | End: 2018-06-29 | Stop reason: HOSPADM

## 2018-06-25 RX ORDER — PANTOPRAZOLE SODIUM 40 MG/10ML
40 INJECTION, POWDER, LYOPHILIZED, FOR SOLUTION INTRAVENOUS DAILY
Status: DISCONTINUED | OUTPATIENT
Start: 2018-06-26 | End: 2018-06-27 | Stop reason: CLARIF

## 2018-06-25 RX ORDER — MORPHINE SULFATE 4 MG/ML
4 INJECTION, SOLUTION INTRAMUSCULAR; INTRAVENOUS
Status: COMPLETED | OUTPATIENT
Start: 2018-06-25 | End: 2018-06-25

## 2018-06-25 RX ADMIN — SODIUM CHLORIDE: 0.9 INJECTION, SOLUTION INTRAVENOUS at 08:06

## 2018-06-25 RX ADMIN — DOCUSATE SODIUM 200 MG: 100 CAPSULE, LIQUID FILLED ORAL at 09:06

## 2018-06-25 RX ADMIN — SODIUM CHLORIDE 1000 ML: 0.9 INJECTION, SOLUTION INTRAVENOUS at 06:06

## 2018-06-25 RX ADMIN — VANCOMYCIN HYDROCHLORIDE 2000 MG: 1 INJECTION, POWDER, LYOPHILIZED, FOR SOLUTION INTRAVENOUS at 11:06

## 2018-06-25 RX ADMIN — LIDOCAINE HYDROCHLORIDE: 20 SOLUTION ORAL; TOPICAL at 05:06

## 2018-06-25 RX ADMIN — MORPHINE SULFATE 4 MG: 4 INJECTION INTRAVENOUS at 06:06

## 2018-06-25 RX ADMIN — ONDANSETRON 4 MG: 2 INJECTION INTRAMUSCULAR; INTRAVENOUS at 06:06

## 2018-06-25 NOTE — SUBJECTIVE & OBJECTIVE
Obstetric History       T4      L4     SAB0   TAB0   Ectopic0   Multiple0   Live Births4       # Outcome Date GA Lbr Wei/2nd Weight Sex Delivery Anes PTL Lv   4 Term 2001 38w0d  3.118 kg (6 lb 14 oz) F CS-LTranv  N DESTINI   3 Term 1997 39w0d  3.997 kg (8 lb 13 oz) F CS-LTranv  N DESTINI   2 Term 1994 39w0d  3.742 kg (8 lb 4 oz) F CS-LTranv  N DESTINI   1 Term  40w0d  3.402 kg (7 lb 8 oz) F CS-LTranv  N DESTINI      Complications: Fetal Intolerance,Cephalopelvic Disproportion        Past Medical History:   Diagnosis Date    Hypertension      Past Surgical History:   Procedure Laterality Date     SECTION      x4    CHOLECYSTECTOMY      LAPAROSCOPIC SALPINGECTOMY Bilateral 2018    Procedure: SALPINGECTOMY, LAPAROSCOPIC;  Surgeon: Vince Garcia MD;  Location: [facility] OR;  Service: OB/GYN;  Laterality: Bilateral;    LAPAROSCOPIC SUPRACERVICAL HYSTERECTOMY N/A 2018    Procedure: [id number];  Surgeon: Vince Garcia MD;  Location: [facility] OR;  Service: OB/GYN;  Laterality: N/A;  1ST CASE  RN PRE OP 2018    LAPAROSCOPIC SURGICAL REMOVAL OF CYST OF OVARY Right 2018    Procedure: EXCISION, CYST, OVARY, LAPAROSCOPIC;  Surgeon: Vince Garcia MD;  Location: [facility] OR;  Service: OB/GYN;  Laterality: Right;    TUBAL LIGATION           (Not in a hospital admission)    Review of patient's allergies indicates:   Allergen Reactions    Ace inhibitors Other (See Comments)     cough        Family History     Problem Relation (Age of Onset)    Aneurysm Mother    Cancer Maternal Grandfather, Paternal Grandmother    Heart disease Maternal Grandfather    Hypertension Mother, Father, Brother, Maternal Grandfather    Stroke Mother        Social History Main Topics    Smoking status: Current Every Day Smoker     Packs/day: 0.25     Years: 17.00     Types: Cigarettes    Smokeless tobacco: Never Used    Alcohol use No      Comment: Occasional    Drug use: No    Sexual  activity: Not Currently     Partners: Male      Comment:      Review of Systems   Constitutional: Positive for fatigue. Negative for activity change, appetite change, chills, fever and unexpected weight change.   HENT: Negative for mouth sores.    Respiratory: Negative for cough, shortness of breath and wheezing.    Cardiovascular: Negative for chest pain and palpitations.   Gastrointestinal: Positive for abdominal pain, bloating, constipation and nausea. Negative for blood in stool and vomiting.   Endocrine: Negative for diabetes and hot flashes.   Genitourinary: Positive for dysuria. Negative for dyspareunia, frequency, hematuria, menorrhagia, menstrual problem, pelvic pain, urgency, vaginal bleeding, vaginal discharge, vaginal pain, dysmenorrhea, urinary incontinence, postcoital bleeding and vaginal odor.   Musculoskeletal: Negative for back pain and myalgias.   Skin:  Negative for rash.   Neurological: Negative for seizures and headaches.   Psychiatric/Behavioral: Negative for depression and sleep disturbance. The patient is not nervous/anxious.    Breast: Negative for breast mass, breast pain and nipple discharge     Objective:     Vital Signs (Most Recent):  Temp: 98.2 °F (36.8 °C) (06/25/18 1831)  Pulse: 94 (06/25/18 1831)  Resp: 20 (06/25/18 1831)  BP: (!) 151/90 (06/25/18 1831)  SpO2: 96 % (06/25/18 1831) Vital Signs (24h Range):  Temp:  [98.2 °F (36.8 °C)-98.5 °F (36.9 °C)] 98.2 °F (36.8 °C)  Pulse:  [93-94] 94  Resp:  [20] 20  SpO2:  [96 %] 96 %  BP: (121-151)/(71-90) 151/90     Weight: 93.9 kg (207 lb)  Body mass index is 35.53 kg/m².    Patient's last menstrual period was 06/12/2018 (lmp unknown).    Physical Exam:   Constitutional: She appears well-developed and well-nourished. No distress.    HENT:   Head: Normocephalic and atraumatic.    Eyes: EOM are normal.    Neck: Normal range of motion.    Cardiovascular: Normal rate.     Pulmonary/Chest: Effort normal. No respiratory distress.         Abdominal: Soft. She exhibits distension. There is tenderness. There is guarding. There is no rebound.   Minimal tenderness  Trochar sites healed.  Ecchymotic areas in lower abdomen.             Musculoskeletal: Normal range of motion.       Neurological: She is alert.    Skin: Skin is warm and dry.    Psychiatric: She has a normal mood and affect.   Uncomfortable       Laboratory:  CBC:   Recent Labs  Lab 06/25/18  1708   WBC 16.92*   RBC 4.55   HGB 12.6   HCT 36.4*   *   MCV 80*   MCH 27.7   MCHC 34.6     CMP:   Recent Labs  Lab 06/25/18  1708   *   CALCIUM 10.1   ALBUMIN 3.4*   PROT 8.1   *   K 4.6   CO2 16*   CL 93*   BUN 65*   CREATININE 10.6*   ALKPHOS 76   ALT 17   AST 14   BILITOT 0.3     I have personallly reviewed all pertinent lab results from the last 24 hours.    Diagnostic Results:

## 2018-06-25 NOTE — TELEPHONE ENCOUNTER
----- Message from Blessing Gale sent at 6/25/2018 10:31 AM CDT -----  Contact: Daughter-Gretchen  States pt has been constipated since having surgery and not feeling well. States pt needs to be seen ASAP. Daughter can be reached @ [phone].

## 2018-06-25 NOTE — ASSESSMENT & PLAN NOTE
Admit to OBG  IVF  Robbins to gravity  KUB  CT scan   Consult Hospital Medicine and Nephrology  Accurate I&O

## 2018-06-25 NOTE — PROGRESS NOTES
"  Subjective:       Patient ID: Sherrill Ennis is a 43 y.o. female.    Chief Complaint:  Post-op Evaluation (haven't urinated since 3:15pm on Monday, Constipated since surgery)      History of Present Illness  HPI  Patient comes in today for postoperative evaluation  Status post laparoscopic supracervical hysterectomy with bilateral salpingectomy and lysis of omental adhesions on 2018.  Discharged home the same day.  Has had problems with urinary retention.  Went to our Emergency Department on 2018 with feeling of incomplete emptying.  Postvoid residual volume was 150 cc.  However, she states that she has significant pain with constipation.  She had not been voiding for the past three days!!!  Denies fever or chills.  Some "sweating at time".  Minimal nausea.  Has been trying to eat.  Tolerating oral intake without vomiting.        GYN & OB History  Patient's last menstrual period was 2018 (lmp unknown).   Date of Last Pap: 2018    OB History    Para Term  AB Living   4 4 4     4   SAB TAB Ectopic Multiple Live Births           4      # Outcome Date GA Lbr Wei/2nd Weight Sex Delivery Anes PTL Lv   4 Term 2001 38w0d  3.118 kg (6 lb 14 oz) F CS-LTranv  N DESTINI   3 Term 1997 39w0d  3.997 kg (8 lb 13 oz) F CS-LTranv  N DESTINI   2 Term 1994 39w0d  3.742 kg (8 lb 4 oz) F CS-LTranv  N DESTINI   1 Term  40w0d  3.402 kg (7 lb 8 oz) F CS-LTranv  N DESTINI      Complications: Fetal Intolerance,Cephalopelvic Disproportion        Past Medical History:   Diagnosis Date    Hypertension        Past Surgical History:   Procedure Laterality Date     SECTION      x4    CHOLECYSTECTOMY      LAPAROSCOPIC SALPINGECTOMY Bilateral 2018    Procedure: SALPINGECTOMY, LAPAROSCOPIC;  Surgeon: Vince Garcai MD;  Location: [facility];  Service: OB/GYN;  Laterality: Bilateral;    LAPAROSCOPIC SUPRACERVICAL HYSTERECTOMY N/A 2018    Procedure: [id number]; "  Surgeon: Vince Garcia MD;  Location: [facility] OR;  Service: OB/GYN;  Laterality: N/A;  1ST CASE  RN PRE OP 6/13/2018    LAPAROSCOPIC SURGICAL REMOVAL OF CYST OF OVARY Right 6/20/2018    Procedure: EXCISION, CYST, OVARY, LAPAROSCOPIC;  Surgeon: Vince Garcia MD;  Location: [facility] OR;  Service: OB/GYN;  Laterality: Right;    TUBAL LIGATION         Family History   Problem Relation Age of Onset    Hypertension Mother     Stroke Mother     Aneurysm Mother     Hypertension Father     Hypertension Brother     Hypertension Maternal Grandfather     Heart disease Maternal Grandfather         MI    Cancer Maternal Grandfather     Cancer Paternal Grandmother        Social History     Social History    Marital status:      Spouse name: N/A    Number of children: N/A    Years of education: N/A     Social History Main Topics    Smoking status: Current Every Day Smoker     Packs/day: 0.25     Years: 17.00     Types: Cigarettes    Smokeless tobacco: Never Used    Alcohol use No      Comment: Occasional    Drug use: No    Sexual activity: Not Currently     Partners: Male      Comment:      Other Topics Concern    None     Social History Narrative     since 2016    Together since 2013    He drives 18-wheelers    She is the  for a dental office    Previously  and     Lives with her  and youngest daughter.     with three daughters from previous marriage also       No current facility-administered medications for this visit.      Current Outpatient Prescriptions   Medication Sig Dispense Refill    amLODIPine (NORVASC) 10 MG tablet Take 1 tablet (10 mg total) by mouth once daily. (Patient taking differently: Take 5 mg by mouth once daily. ) 90 tablet 3    docusate sodium (COLACE) 100 MG capsule Take 1 capsule (100 mg total) by mouth 2 (two) times daily as needed for Constipation. 60 capsule 2    oxyCODONE-acetaminophen (PERCOCET) 5-325 mg per tablet Take 1  tablet by mouth every 4 (four) hours as needed for Pain. 15 tablet 0    promethazine (PHENERGAN) 25 MG tablet Take 1 tablet (25 mg total) by mouth every 4 (four) hours. 30 tablet 1    hydroCHLOROthiazide (HYDRODIURIL) 12.5 MG Tab Take 1 tablet (12.5 mg total) by mouth daily as needed. 90 tablet 0    ibuprofen (ADVIL,MOTRIN) 600 MG tablet Take 1 tablet (600 mg total) by mouth every 6 (six) hours as needed. 40 tablet 1    multivit,calc,mins/iron/folic (ONE-A-DAY WOMENS FORMULA ORAL) Take by mouth.      valACYclovir (VALTREX) 1000 MG tablet Take 1 tablet (1,000 mg total) by mouth daily as needed (herpes outbreak). For 5 days 30 tablet 1       Review of patient's allergies indicates:   Allergen Reactions    Ace inhibitors Other (See Comments)     cough       Review of Systems  Review of Systems   Constitutional: Positive for fatigue. Negative for activity change, appetite change, chills, fever and unexpected weight change.   HENT: Negative for mouth sores.    Respiratory: Negative for cough, shortness of breath and wheezing.    Cardiovascular: Negative for chest pain and palpitations.   Gastrointestinal: Positive for abdominal pain, bloating, constipation and nausea. Negative for blood in stool and vomiting.   Endocrine: Negative for diabetes and hot flashes.   Genitourinary: Negative for dyspareunia, dysuria, frequency, hematuria, menorrhagia, menstrual problem, pelvic pain, urgency, vaginal bleeding, vaginal discharge, vaginal pain, dysmenorrhea, urinary incontinence, postcoital bleeding and vaginal odor.        Problems voiding   Musculoskeletal: Negative for back pain and myalgias.   Skin:  Negative for rash.   Neurological: Negative for seizures and headaches.   Psychiatric/Behavioral: Negative for depression and sleep disturbance. The patient is not nervous/anxious.    Breast: Negative for breast mass, breast pain and nipple discharge          Objective:    Physical Exam:   Constitutional: She appears  well-developed and well-nourished. No distress.    HENT:   Head: Normocephalic and atraumatic.    Eyes: EOM are normal.    Neck: Normal range of motion.    Cardiovascular: Normal rate.     Pulmonary/Chest: Effort normal. No respiratory distress.        Abdominal: Soft. She exhibits distension. She exhibits no abdominal incision. There is no tenderness. There is guarding. There is no rebound.   Trochar sites healed.  Significant distention.             Musculoskeletal: Normal range of motion.       Neurological: She is alert.    Skin: Skin is warm and dry.    Psychiatric: She has a normal mood and affect.          Assessment:        1. Status post laparoscopic supracervical hysterectomy    2. Abdominal distension             Plan:      I have discussed with the patient regarding her condition  I think, most likely, her symptoms are secondary to postoperative ileus and constipation  We will send her over to the Emergency Department for possible IV fluid and work-up  Further plan pending.

## 2018-06-25 NOTE — ED TRIAGE NOTES
Constipation also had a hysterectomy and problem urinating for several days and had to come to ED for in and out catheter still having problem urinating

## 2018-06-25 NOTE — HOSPITAL COURSE
In our Emergency Department, work-up shows BUN/Creat at 65/10 with WBC of 17 and Hct 36  Patient was able to have several bowel movements and feeling a little better.  Also voided several times.  Still distended.  Admitted 6/25/2018  CT and KUB.  Possible bladder injury  No obvious abscess or bowel obstruction  Patient had several bowel movements on Colace. Then developed loose stool.  Robbins putting out clear, light yellow urine. Total urine output since admission 2150 cc in about 12 hours  BUN/Creat down to 31/2.7 (vs 65/10 on admission)    Cystoscopy, retrograde pyelogram and cystogram, exploratory laparotomy, repair of bladder injury secondary to cautery by Dr Le on 6/26/2018  Feeling better on the morning of 6/27/2018.  Wants to eat.  On clear liquid    Tolerating oral intake well on 6/28/2018.  Ambulating.  Just not eating much.  Still with occasional distension and with postoperative pain  Abdominal erythema improved on Vancomycin, which was started with better BUN/Creat at 30/0.7 on 6/27/2018.  Minimal drainage from YONNY with serosanguinous fluid.  Oxybutynin prescribed by Dr Le.    On day of discharge, 6/29/2018, patient has minimal pain.  Requesting discharge.  She is tolerating oral intake well.    Exam was benign with patient afebrile, vitals stable, and no bleeding.  Minimal drainage from YONNY  Robbins catheter draining light yellow urine  Normal activities.  Patient discharged home on 6/29/2018   Discharge medications include Percocet, Motrin, oxybutynin, and iron supplement.  Follow-up with Dr Le in 2 weeks.  Follow-up with me in 2 weeks.    Vince Garcia MD.

## 2018-06-25 NOTE — ED PROVIDER NOTES
Encounter Date: 2018  43 y.o. female, 5 days status post hysterectomy, complains of unable to urinate, generalized lower abdominal pain, and constipation.  Orders placed.  Patient will be seen by another provider for further evaluation when an exam room is available. Dayron TEJADA, 3:59 PM    SCRIBE #1 NOTE: Les PEREZ, am scribing for, and in the presence of,  Yaima Landry NP. I have scribed the following portions of the note - Other sections scribed: HPI, ROS.       History     Chief Complaint   Patient presents with    Dysuria     hysterectomy Wednesday; came Thursday due to anuria and got a catheterized in and out; anuria since Friday or Saturday; last BM last Monday    Constipation     CC: Dysuria; Constipation;    HPI: This 43 y.o. Female with HTN presents to the ED c/o dysuria and constipation. Pt reports having constipation x1 week and dysuria after having a hysterectomy x5 days ago. Her most recent BM before today was x1 week ago. Pt's most recent urination was x3 days ago before today but did not urinate much. She admits to making a large BM today in the ED.  Patient was seen in this emergency department 4 days ago for urinary retention had a urinary catheter placed with approximately 150 cc urine output.  Was then removed and patient was discharged.  Patient followed up with Dr. Garcia today in clinic and was sent to the ER for further evaluation.  Pt attempted percocet, ibuprofen, stool softener and milk of magnesium with slight relief. She denies fever, chills, abdominal pain or back pain.    OB: Dr. Garcia      The history is provided by the patient. No  was used.     Review of patient's allergies indicates:   Allergen Reactions    Ace inhibitors Other (See Comments)     cough     Past Medical History:   Diagnosis Date    Hypertension      Past Surgical History:   Procedure Laterality Date     SECTION      x4    CHOLECYSTECTOMY      HYSTERECTOMY   06/20/2018    LAPAROSCOPIC SALPINGECTOMY Bilateral 6/20/2018    Procedure: SALPINGECTOMY, LAPAROSCOPIC;  Surgeon: Vince Garcia MD;  Location: [facility] OR;  Service: OB/GYN;  Laterality: Bilateral;    LAPAROSCOPIC SUPRACERVICAL HYSTERECTOMY N/A 6/20/2018    Procedure: [id number];  Surgeon: Vince Garcia MD;  Location: [facility] OR;  Service: OB/GYN;  Laterality: N/A;  1ST CASE  RN PRE OP 6/13/2018    LAPAROSCOPIC SURGICAL REMOVAL OF CYST OF OVARY Right 6/20/2018    Procedure: EXCISION, CYST, OVARY, LAPAROSCOPIC;  Surgeon: Vince Garcia MD;  Location: [facility] OR;  Service: OB/GYN;  Laterality: Right;    TUBAL LIGATION       Family History   Problem Relation Age of Onset    Hypertension Mother     Stroke Mother     Aneurysm Mother     Hypertension Father     Hypertension Brother     Hypertension Maternal Grandfather     Heart disease Maternal Grandfather         MI    Cancer Maternal Grandfather     Cancer Paternal Grandmother      Social History   Substance Use Topics    Smoking status: Current Every Day Smoker     Packs/day: 0.25     Years: 17.00     Types: Cigarettes    Smokeless tobacco: Never Used    Alcohol use Yes      Comment: Occasional     Review of Systems   Constitutional: Negative for chills, diaphoresis, fatigue and fever.   HENT: Negative for ear pain, rhinorrhea and sore throat.    Eyes: Negative for redness.   Respiratory: Negative for chest tightness and shortness of breath.    Cardiovascular: Negative for chest pain.   Gastrointestinal: Positive for abdominal distention and constipation. Negative for abdominal pain, diarrhea, nausea and vomiting.   Genitourinary: Positive for decreased urine volume, difficulty urinating and dysuria. Negative for dyspareunia, flank pain, frequency, hematuria, pelvic pain, urgency, vaginal bleeding, vaginal discharge and vaginal pain.   Musculoskeletal: Negative for arthralgias, back pain, myalgias, neck pain and neck stiffness.   Skin:  Negative for rash.   Neurological: Negative for dizziness, weakness, light-headedness, numbness and headaches.   Hematological: Does not bruise/bleed easily.   Psychiatric/Behavioral: The patient is not nervous/anxious.        Physical Exam     Initial Vitals [06/25/18 1556]   BP Pulse Resp Temp SpO2   121/71 93 20 98.5 °F (36.9 °C) 96 %      MAP       --         Physical Exam    Nursing note and vitals reviewed.  Constitutional: Vital signs are normal. She appears well-developed and well-nourished. She is cooperative.  Non-toxic appearance. She does not have a sickly appearance. She appears ill. No distress.   HENT:   Head: Normocephalic and atraumatic.   Mouth/Throat: Mucous membranes are normal.   Eyes: Conjunctivae and EOM are normal. Pupils are equal, round, and reactive to light.   Neck: Normal range of motion and full passive range of motion without pain. Neck supple.   Cardiovascular: Normal rate, regular rhythm, normal heart sounds, intact distal pulses and normal pulses. Exam reveals no decreased pulses.    No murmur heard.  Pulmonary/Chest: Effort normal and breath sounds normal. No respiratory distress. She has no decreased breath sounds. She has no wheezes. She exhibits no tenderness.   Abdominal: Soft. She exhibits no distension and no mass. Bowel sounds are increased. There is no hepatosplenomegaly. There is generalized tenderness. There is no rigidity, no rebound, no guarding, no CVA tenderness, no tenderness at McBurney's point and negative Jalloh's sign. No hernia.       Abdomen appears distended, tympanic, bowel sounds are present all quadrants, diffusely tender in all quadrants, abdomen is soft, surgical incisions are without signs of infection, no drainage, no CVA tenderness. No masses, guarding, rebound or rigidity.  There is areas of ecchymosis surrounding the incision sites.   Musculoskeletal: Normal range of motion.   Neurological: She is alert and oriented to person, place, and time. She  has normal strength and normal reflexes. No cranial nerve deficit or sensory deficit. Coordination and gait normal.   Skin: Skin is warm and dry. Capillary refill takes less than 2 seconds. No rash noted. No pallor.   Surgical incisions abdomen closed with skin glue   Psychiatric: She has a normal mood and affect. Her behavior is normal. Judgment and thought content normal.         ED Course   Procedures  Labs Reviewed   URINALYSIS, REFLEX TO URINE CULTURE - Abnormal; Notable for the following:        Result Value    Protein, UA 1+ (*)     Occult Blood UA 1+ (*)     All other components within normal limits    Narrative:     Preferred Collection Type->Urine, Clean Catch   CBC W/ AUTO DIFFERENTIAL - Abnormal; Notable for the following:     WBC 16.92 (*)     Hematocrit 36.4 (*)     MCV 80 (*)     Platelets 462 (*)     MPV 8.8 (*)     Gran # (ANC) 14.5 (*)     Gran% 85.8 (*)     Lymph% 6.3 (*)     All other components within normal limits   COMPREHENSIVE METABOLIC PANEL - Abnormal; Notable for the following:     Sodium 129 (*)     Chloride 93 (*)     CO2 16 (*)     Glucose 117 (*)     BUN, Bld 65 (*)     Creatinine 10.6 (*)     Albumin 3.4 (*)     Anion Gap 20 (*)     eGFR if  5 (*)     eGFR if non  4 (*)     All other components within normal limits   URINALYSIS MICROSCOPIC - Abnormal; Notable for the following:     Bacteria, UA Moderate (*)     All other components within normal limits    Narrative:     Preferred Collection Type->Urine, Clean Catch   CREATININE, URINE, RANDOM   SODIUM, URINE, RANDOM          Imaging Results          CT Abdomen Pelvis  Without Contrast (Final result)  Result time 06/25/18 19:39:59    Final result by Reji Hannon MD (06/25/18 19:39:59)                 Impression:      1. Moderate volume disorganized intraperitoneal free fluid of uncertain etiology.  No definite focal fluid collections or abscess allowing for lack of intravenous contrast.  No free  air identified.  2. Postsurgical changes of recent supracervical hysterectomy.  3. Cholecystectomy.  4. Small left pleural effusion with bibasilar atelectatic change.  5. Colonic diverticulosis without convincing evidence of acute diverticulitis.  6. Additional findings as detailed above.      Electronically signed by: Reji Hannon MD  Date:    06/25/2018  Time:    19:39             Narrative:    EXAMINATION:  CT ABDOMEN PELVIS WITHOUT CONTRAST    CLINICAL HISTORY:  abdominal pain;    TECHNIQUE:  Low dose axial images, sagittal and coronal reformations were obtained from the lung bases to the pubic symphysis.  Oral contrast was not administered.    COMPARISON:  None    FINDINGS:  The visualized portion of the heart is unremarkable.  There is small left pleural effusion resulting in mild volume loss and compressive atelectasis within the left lower lobe.  Additional subsegmental atelectasis is seen within the right lower lobe.    Liver is diffusely hypodense in appearance suggestive for diffuse fatty infiltration.  There is no intra-or extrahepatic biliary ductal dilatation.  Gallbladder has been removed.  The stomach, pancreas, spleen, and adrenal glands are unremarkable.    Kidneys show no evidence of stones or hydronephrosis. Ureters are unremarkable along their courses.  Urinary bladder is unremarkable.  Postsurgical changes visualized consistent with recent supracervical hysterectomy.    Appendix is not definitely identified.  No evidence of bowel obstruction.  Moderate retained stool is visualized within the right colon and transverse colon.  There is diverticulosis of the descending and sigmoid colon.  There is moderate volume disorganized intraperitoneal fluid of uncertain etiology.  No free air identified.  Additional mild mesenteric edema.    Aorta tapers normally with mild atherosclerosis.    No acute osseous abnormality identified.    There is diffuse subcutaneous soft tissue stranding and edema  visualized involving the lower anterior abdominal wall.  No evidence of focal fluid collections or abscess.                               X-Ray Abdomen Flat And Erect (Final result)  Result time 06/25/18 19:04:14    Final result by Sandeep Kumar MD (06/25/18 19:04:14)                 Impression:      Suspected colonic air-fluid levels at the upper abdomen suggestive of a nonspecific diarrheal illness.  No convincing evidence of bowel obstruction at this time.      Electronically signed by: Sandeep Kumar MD  Date:    06/25/2018  Time:    19:04             Narrative:    EXAMINATION:  XR ABDOMEN FLAT AND ERECT    CLINICAL HISTORY:  Unspecified abdominal pain    TECHNIQUE:  Flat and erect AP views of the abdomen were performed.    COMPARISON:  Chest radiograph 06/13/2018    FINDINGS:  Large body habitus.  Suspected air-fluid levels within the colonic hepatic flexure and also transverse colon at the midline and right upper abdomen suggesting a nonspecific diarrheal illness.  No dilated small bowel loops or definite small bowel air-fluid levels to suggest obstruction.  No organomegaly or significant mass effect.  No large amount of free air.  Surgical clip adjacent to the right aspect of L5 vertebral body.  Pelvic phleboliths noted.  Linear opacities at the bilateral lung bases suggesting subsegmental scarring versus atelectasis.  Included osseous structures show mild degenerative change without acute or destructive process seen.                              X-Rays:   Independently Interpreted Readings:   Other Readings:  X-ray of the abdomen shows  Suspected colonic air-fluid levels at the upper abdomen suggestive of a nonspecific diarrheal illness.  No convincing evidence of bowel obstruction at this time, CT the abdomen pelvis shows moderate volume disorganized intraperitoneal free fluid of uncertain etiology.  No definite focal fluid collections or abscess allowing for lack of intravenous contrast.  No free air  identified.Colonic diverticulosis without convincing evidence of acute diverticulitis.Small left pleural effusion with bibasilar atelectatic change.          APC / Resident Notes:   Sherrill Ennis is a 43 y.o. female who presents to the Emergency Department for evaluation of  urinary retention and constipation x4 days.  Postsurgical abdomen from hysterectomy 5 days ago.    Physical Exam shows a non-toxic, afebrile, and well appearing female who l ooks as if she is uncomfortable. Pertinent exam findings include breath sounds are clear and equal in all fields, oxygen saturations 96%, the heart tones are normal, rate of 97, Abdomen appears distended, tympanic, bowel sounds are present all quadrants, diffusely tender in all quadrants, abdomen is soft, surgical incisions are without signs of infection, no drainage, no CVA tenderness. No masses, guarding, rebound or rigidity.  There are areas of ecchymosis surrounding the incision sites.     Vital Signs Are Reassuring. If available, previous records reviewed.     RESULTS:  CBC shows a white blood cell count of 16.92, H&H is slightly decreased from previous lab value, CMP shows sodium 129, chloride 93, CO2 16, BUN 65, creatinine 10.6, anion gap 20, GFR 4, urine shows 1+ occult blood otherwise unremarkable, x-ray of the abdomen shows  Suspected colonic air-fluid levels at the upper abdomen suggestive of a nonspecific diarrheal illness.  No convincing evidence of bowel obstruction at this time, CT the abdomen pelvis shows moderate volume disorganized intraperitoneal free fluid of uncertain etiology.  No definite focal fluid collections or abscess allowing for lack of intravenous contrast.  No free air identified.Colonic diverticulosis without convincing evidence of acute diverticulitis.Small left pleural effusion with bibasilar atelectatic change.    My overall impression is acute renal failure postoperative hysterectomy, constipation, abdominal pain. I considered  but do not suspect at this time bowel obstruction, colitis, abscess, UTI, pyelonephritis, diverticulitis, renal obstruction.       ED Course:  Patient had 3 large bowel movements in the emergency department and able to urinate normally after having a bowel movement.  She has urinated several times during her stay.  She has 245 mils residual after urination.  She is also complaining of having heartburn at this time which GI cocktail was ordered.  The patient states she feels much better after GI cocktail.  As patient was waiting for her lab results, patient started to have increase in abdominal pain.  She had 3 more large bowel movements which she states were a mixture of hard stool with watery diarrhea.  She also reports that she was able to urinate however did not get much return.  Patient was then placed in the room and IV fluids were initiated.  Morphine and Zofran were given for pain control nausea.  Dr.Vu Mauro was contacted in this case was discussed with him.  He saw the patient in the emergency department for acute kidney failure.  Orders were placed for admission.  Will beAdmitted to his service with a consult to Hospitalists and Nephrology.  Patient and family are amenable to this plan.  Robbins catheter was ordered and placed without difficulty per the nursing staff.  Patient seen is much more comfortable after receiving morphine.  Upon transfer to the for patient at 350 mils of urine output in the Robbins catheter.  Hospitalist Dr. Araiza this consult on this case made aware patient's situation.  Dr. Mauro was made aware of patient's CT scan result.   with Nephrology was made aware of patient's situation and has requested several orders be placed.  He will see patient 1st thing in the morning. SHIRLEY mauro was made aware of the situation.       This case was discussed with and was evaluated by Dr. Serrano  who is in agreement with my assessment and plan.          Scribe Attestation:   Scribe #1: I  performed the above scribed service and the documentation accurately describes the services I performed. I attest to the accuracy of the note.    Attending Attestation:           Physician Attestation for Scribe:  Physician Attestation Statement for Scribe #1: I, Yaima Landry NP, reviewed documentation, as scribed by Les Cortes in my presence, and it is both accurate and complete.                    Clinical Impression:   The primary encounter diagnosis was Constipation, unspecified constipation type. Diagnoses of Urinary retention, Abdominal pain, and Renal failure were also pertinent to this visit.      Disposition:   Disposition: Admitted  Condition: Stable                        Yaima Landry NP  06/25/18 9516       Yaima Landry NP  06/25/18 4889

## 2018-06-25 NOTE — H&P
"[facility]  Obstetrics & Gynecology  History & Physical    Patient Name: Sherrill Ennis  MRN: [id number]  Admission Date: 2018  Primary Care Provider: Reagan Holland MD    Subjective:     Chief Complaint/Reason for Admission: Urinary retention with constipation postoperatively    History of Present Illness:  44 yo   Status post laparoscopic supracervical hysterectomy with bilateral salpingectomy and lysis of omental adhesions on 2018.  Discharged home the same day.  Had problems with urinary retention.  Went to our Emergency Department on 2018 with feeling of incomplete emptying.  Postvoid residual volume was 150 cc.  Came to my office today, 2018 for care.  She states that she has significant pain with constipation and problems voiding.    She had not been voiding for the past three days!!!  Denies fever or chills.  Some "sweating at time".  Minimal nausea.  Has been trying to eat.  Tolerating oral intake without vomiting.             Obstetric History       T4      L4     SAB0   TAB0   Ectopic0   Multiple0   Live Births4       # Outcome Date GA Lbr Wei/2nd Weight Sex Delivery Anes PTL Lv   4 Term 2001 38w0d  3.118 kg (6 lb 14 oz) F CS-LTranv  N DESITNI   3 Term 1997 39w0d  3.997 kg (8 lb 13 oz) F CS-LTranv  N DESTINI   2 Term 1994 39w0d  3.742 kg (8 lb 4 oz) F CS-LTranv  N DESTINI   1 Term  40w0d  3.402 kg (7 lb 8 oz) F CS-LTranv  N DESTINI      Complications: Fetal Intolerance,Cephalopelvic Disproportion        Past Medical History:   Diagnosis Date    Hypertension      Past Surgical History:   Procedure Laterality Date     SECTION      x4    CHOLECYSTECTOMY      LAPAROSCOPIC SALPINGECTOMY Bilateral 2018    Procedure: SALPINGECTOMY, LAPAROSCOPIC;  Surgeon: Vince Garcia MD;  [facility]: [facility];  Service: OB/GYN;  Laterality: Bilateral;    LAPAROSCOPIC SUPRACERVICAL HYSTERECTOMY N/A 2018    Procedure: " [id number];  Surgeon: Vince Garcia MD;  Location: [facility] OR;  Service: OB/GYN;  Laterality: N/A;  1ST CASE  RN PRE OP 6/13/2018    LAPAROSCOPIC SURGICAL REMOVAL OF CYST OF OVARY Right 6/20/2018    Procedure: EXCISION, CYST, OVARY, LAPAROSCOPIC;  Surgeon: Vince Garcia MD;  Location: [facility] OR;  Service: OB/GYN;  Laterality: Right;    TUBAL LIGATION           (Not in a hospital admission)    Review of patient's allergies indicates:   Allergen Reactions    Ace inhibitors Other (See Comments)     cough        Family History     Problem Relation (Age of Onset)    Aneurysm Mother    Cancer Maternal Grandfather, Paternal Grandmother    Heart disease Maternal Grandfather    Hypertension Mother, Father, Brother, Maternal Grandfather    Stroke Mother        Social History Main Topics    Smoking status: Current Every Day Smoker     Packs/day: 0.25     Years: 17.00     Types: Cigarettes    Smokeless tobacco: Never Used    Alcohol use No      Comment: Occasional    Drug use: No    Sexual activity: Not Currently     Partners: Male      Comment:      Review of Systems   Constitutional: Positive for fatigue. Negative for activity change, appetite change, chills, fever and unexpected weight change.   HENT: Negative for mouth sores.    Respiratory: Negative for cough, shortness of breath and wheezing.    Cardiovascular: Negative for chest pain and palpitations.   Gastrointestinal: Positive for abdominal pain, bloating, constipation and nausea. Negative for blood in stool and vomiting.   Endocrine: Negative for diabetes and hot flashes.   Genitourinary: Positive for dysuria. Negative for dyspareunia, frequency, hematuria, menorrhagia, menstrual problem, pelvic pain, urgency, vaginal bleeding, vaginal discharge, vaginal pain, dysmenorrhea, urinary incontinence, postcoital bleeding and vaginal odor.   Musculoskeletal: Negative for back pain and myalgias.   Skin:  Negative for rash.   Neurological:  Negative for seizures and headaches.   Psychiatric/Behavioral: Negative for depression and sleep disturbance. The patient is not nervous/anxious.    Breast: Negative for breast mass, breast pain and nipple discharge     Objective:     Vital Signs (Most Recent):  Temp: 98.2 °F (36.8 °C) (06/25/18 1831)  Pulse: 94 (06/25/18 1831)  Resp: 20 (06/25/18 1831)  BP: (!) 151/90 (06/25/18 1831)  SpO2: 96 % (06/25/18 1831) Vital Signs (24h Range):  Temp:  [98.2 °F (36.8 °C)-98.5 °F (36.9 °C)] 98.2 °F (36.8 °C)  Pulse:  [93-94] 94  Resp:  [20] 20  SpO2:  [96 %] 96 %  BP: (121-151)/(71-90) 151/90     Weight: 93.9 kg (207 lb)  Body mass index is 35.53 kg/m².    Patient's last menstrual period was 06/12/2018 (lmp unknown).    Physical Exam:   Constitutional: She appears well-developed and well-nourished. No distress.    HENT:   Head: Normocephalic and atraumatic.    Eyes: EOM are normal.    Neck: Normal range of motion.    Cardiovascular: Normal rate.     Pulmonary/Chest: Effort normal. No respiratory distress.        Abdominal: Soft. She exhibits distension. There is tenderness. There is guarding. There is no rebound.   Minimal tenderness  Trochar sites healed.  Ecchymotic areas in lower abdomen.             Musculoskeletal: Normal range of motion.       Neurological: She is alert.    Skin: Skin is warm and dry.    Psychiatric: She has a normal mood and affect.   Uncomfortable       Laboratory:  CBC:   Recent Labs  Lab 06/25/18  1708   WBC 16.92*   RBC 4.55   HGB 12.6   HCT 36.4*   *   MCV 80*   MCH 27.7   MCHC 34.6     CMP:   Recent Labs  Lab 06/25/18  1708   *   CALCIUM 10.1   ALBUMIN 3.4*   PROT 8.1   *   K 4.6   CO2 16*   CL 93*   BUN 65*   CREATININE 10.6*   ALKPHOS 76   ALT 17   AST 14   BILITOT 0.3     I have personallly reviewed all pertinent lab results from the last 24 hours.    Diagnostic Results:      Assessment/Plan:     Constipation    Stool softener        Acute renal failure    Admit to  OBG  IVF  Robbins to gravity  KUB  CT scan   Consult Hospital Medicine and Nephrology  Accurate I&O        Urinary retention    Robbins to gravity        Status post laparoscopic supracervical hysterectomy    Pain control            Vu Zahida Garcia MD  Obstetrics & Gynecology  [facility]

## 2018-06-25 NOTE — TELEPHONE ENCOUNTER
Pt requesting to be seen today by Dr. Garcia. Says that she has been having a lot of problems since she has had the Hysterectomy. Now she has been constipated every since she has had the surgery. Pt just wants to come in and  see Dr. Garcia today. Appt scheduled for pt to be seen today 6/25/18 @ 3pm. Pt acknowledged. CW

## 2018-06-26 ENCOUNTER — SURGERY (OUTPATIENT)
Age: 43
End: 2018-06-26

## 2018-06-26 ENCOUNTER — ANESTHESIA EVENT (OUTPATIENT)
Dept: SURGERY | Facility: HOSPITAL | Age: 43
DRG: 908 | End: 2018-06-26
Payer: COMMERCIAL

## 2018-06-26 ENCOUNTER — ANESTHESIA (OUTPATIENT)
Dept: SURGERY | Facility: HOSPITAL | Age: 43
DRG: 908 | End: 2018-06-26
Payer: COMMERCIAL

## 2018-06-26 PROBLEM — L03.311 CELLULITIS OF ABDOMINAL WALL: Status: ACTIVE | Noted: 2018-06-26

## 2018-06-26 PROBLEM — N13.9 ACUTE URINARY OBSTRUCTION: Status: ACTIVE | Noted: 2018-06-26

## 2018-06-26 LAB
ANION GAP SERPL CALC-SCNC: 10 MMOL/L
BASOPHILS # BLD AUTO: 0.02 K/UL
BASOPHILS NFR BLD: 0.2 %
BUN SERPL-MCNC: 31 MG/DL
CALCIUM SERPL-MCNC: 8.7 MG/DL
CHLORIDE SERPL-SCNC: 104 MMOL/L
CO2 SERPL-SCNC: 21 MMOL/L
CREAT SERPL-MCNC: 2.7 MG/DL
DIFFERENTIAL METHOD: ABNORMAL
EOSINOPHIL # BLD AUTO: 0.6 K/UL
EOSINOPHIL NFR BLD: 4.9 %
ERYTHROCYTE [DISTWIDTH] IN BLOOD BY AUTOMATED COUNT: 13.6 %
EST. GFR  (AFRICAN AMERICAN): 24 ML/MIN/1.73 M^2
EST. GFR  (NON AFRICAN AMERICAN): 21 ML/MIN/1.73 M^2
GLUCOSE SERPL-MCNC: 106 MG/DL
HCT VFR BLD AUTO: 30.5 %
HGB BLD-MCNC: 10.5 G/DL
LYMPHOCYTES # BLD AUTO: 0.7 K/UL
LYMPHOCYTES NFR BLD: 6.2 %
MCH RBC QN AUTO: 27.8 PG
MCHC RBC AUTO-ENTMCNC: 34.4 G/DL
MCV RBC AUTO: 81 FL
MONOCYTES # BLD AUTO: 0.6 K/UL
MONOCYTES NFR BLD: 5.3 %
NEUTROPHILS # BLD AUTO: 9.4 K/UL
NEUTROPHILS NFR BLD: 83.2 %
PLATELET # BLD AUTO: 347 K/UL
PMV BLD AUTO: 8.4 FL
POTASSIUM SERPL-SCNC: 3.9 MMOL/L
RBC # BLD AUTO: 3.78 M/UL
SODIUM SERPL-SCNC: 135 MMOL/L
WBC # BLD AUTO: 11.28 K/UL

## 2018-06-26 PROCEDURE — 25000003 PHARM REV CODE 250: Performed by: OBSTETRICS & GYNECOLOGY

## 2018-06-26 PROCEDURE — 63600175 PHARM REV CODE 636 W HCPCS: Performed by: ANESTHESIOLOGY

## 2018-06-26 PROCEDURE — 71000033 HC RECOVERY, INTIAL HOUR: Performed by: UROLOGY

## 2018-06-26 PROCEDURE — 0TQB8ZZ REPAIR BLADDER, VIA NATURAL OR ARTIFICIAL OPENING ENDOSCOPIC: ICD-10-PCS | Performed by: UROLOGY

## 2018-06-26 PROCEDURE — 37000009 HC ANESTHESIA EA ADD 15 MINS: Performed by: UROLOGY

## 2018-06-26 PROCEDURE — 25000003 PHARM REV CODE 250: Performed by: UROLOGY

## 2018-06-26 PROCEDURE — 51860 REPAIR OF BLADDER WOUND: CPT | Mod: 80,,, | Performed by: UROLOGY

## 2018-06-26 PROCEDURE — 94761 N-INVAS EAR/PLS OXIMETRY MLT: CPT

## 2018-06-26 PROCEDURE — 25500020 PHARM REV CODE 255: Performed by: UROLOGY

## 2018-06-26 PROCEDURE — 99223 1ST HOSP IP/OBS HIGH 75: CPT | Mod: 57,,, | Performed by: UROLOGY

## 2018-06-26 PROCEDURE — 52005 CYSTO W/URTRL CATHJ: CPT | Mod: 51,,, | Performed by: UROLOGY

## 2018-06-26 PROCEDURE — 63600175 PHARM REV CODE 636 W HCPCS: Performed by: UROLOGY

## 2018-06-26 PROCEDURE — 25000003 PHARM REV CODE 250: Performed by: NURSE ANESTHETIST, CERTIFIED REGISTERED

## 2018-06-26 PROCEDURE — D9220A PRA ANESTHESIA: Mod: ANES,,, | Performed by: ANESTHESIOLOGY

## 2018-06-26 PROCEDURE — D9220A PRA ANESTHESIA: Mod: CRNA,,, | Performed by: NURSE ANESTHETIST, CERTIFIED REGISTERED

## 2018-06-26 PROCEDURE — C1758 CATHETER, URETERAL: HCPCS | Performed by: UROLOGY

## 2018-06-26 PROCEDURE — 51860 REPAIR OF BLADDER WOUND: CPT | Mod: ,,, | Performed by: UROLOGY

## 2018-06-26 PROCEDURE — 71000039 HC RECOVERY, EACH ADD'L HOUR: Performed by: UROLOGY

## 2018-06-26 PROCEDURE — S0028 INJECTION, FAMOTIDINE, 20 MG: HCPCS | Performed by: NURSE ANESTHETIST, CERTIFIED REGISTERED

## 2018-06-26 PROCEDURE — 63600175 PHARM REV CODE 636 W HCPCS: Performed by: NURSE PRACTITIONER

## 2018-06-26 PROCEDURE — C9290 INJ, BUPIVACAINE LIPOSOME: HCPCS | Performed by: UROLOGY

## 2018-06-26 PROCEDURE — 63600175 PHARM REV CODE 636 W HCPCS: Performed by: NURSE ANESTHETIST, CERTIFIED REGISTERED

## 2018-06-26 PROCEDURE — V2790 AMNIOTIC MEMBRANE: HCPCS | Performed by: UROLOGY

## 2018-06-26 PROCEDURE — 80048 BASIC METABOLIC PNL TOTAL CA: CPT

## 2018-06-26 PROCEDURE — 25000003 PHARM REV CODE 250: Performed by: NURSE PRACTITIONER

## 2018-06-26 PROCEDURE — 37000008 HC ANESTHESIA 1ST 15 MINUTES: Performed by: UROLOGY

## 2018-06-26 PROCEDURE — 12000002 HC ACUTE/MED SURGE SEMI-PRIVATE ROOM

## 2018-06-26 PROCEDURE — 85025 COMPLETE CBC W/AUTO DIFF WBC: CPT

## 2018-06-26 PROCEDURE — C1769 GUIDE WIRE: HCPCS | Performed by: UROLOGY

## 2018-06-26 PROCEDURE — 63600175 PHARM REV CODE 636 W HCPCS: Performed by: EMERGENCY MEDICINE

## 2018-06-26 PROCEDURE — 36415 COLL VENOUS BLD VENIPUNCTURE: CPT

## 2018-06-26 PROCEDURE — 36000707: Performed by: UROLOGY

## 2018-06-26 PROCEDURE — 74420 UROGRAPHY RTRGR +-KUB: CPT | Mod: 26,,, | Performed by: UROLOGY

## 2018-06-26 PROCEDURE — 25000003 PHARM REV CODE 250: Performed by: EMERGENCY MEDICINE

## 2018-06-26 PROCEDURE — 99232 SBSQ HOSP IP/OBS MODERATE 35: CPT | Mod: 24,,, | Performed by: OBSTETRICS & GYNECOLOGY

## 2018-06-26 PROCEDURE — 36000706: Performed by: UROLOGY

## 2018-06-26 PROCEDURE — 76000 FLUOROSCOPY <1 HR PHYS/QHP: CPT | Mod: 26,59,, | Performed by: UROLOGY

## 2018-06-26 DEVICE — MEMBRANE AMNIOFIX 4X4CM: Type: IMPLANTABLE DEVICE | Site: ABDOMEN | Status: FUNCTIONAL

## 2018-06-26 RX ORDER — HYDROMORPHONE HYDROCHLORIDE 1 MG/ML
0.2 INJECTION, SOLUTION INTRAMUSCULAR; INTRAVENOUS; SUBCUTANEOUS EVERY 5 MIN PRN
Status: DISCONTINUED | OUTPATIENT
Start: 2018-06-26 | End: 2018-06-26 | Stop reason: HOSPADM

## 2018-06-26 RX ORDER — FAMOTIDINE 10 MG/ML
INJECTION INTRAVENOUS
Status: DISCONTINUED | OUTPATIENT
Start: 2018-06-26 | End: 2018-06-26

## 2018-06-26 RX ORDER — VANCOMYCIN HCL IN 5 % DEXTROSE 1G/250ML
1000 PLASTIC BAG, INJECTION (ML) INTRAVENOUS EVERY 8 HOURS
Status: DISCONTINUED | OUTPATIENT
Start: 2018-06-27 | End: 2018-06-27

## 2018-06-26 RX ORDER — VANCOMYCIN HCL IN 5 % DEXTROSE 1G/250ML
1000 PLASTIC BAG, INJECTION (ML) INTRAVENOUS ONCE
Status: COMPLETED | OUTPATIENT
Start: 2018-06-27 | End: 2018-06-27

## 2018-06-26 RX ORDER — OXYCODONE AND ACETAMINOPHEN 5; 325 MG/1; MG/1
1 TABLET ORAL
Status: DISCONTINUED | OUTPATIENT
Start: 2018-06-26 | End: 2018-06-26 | Stop reason: HOSPADM

## 2018-06-26 RX ORDER — FENTANYL CITRATE 50 UG/ML
INJECTION, SOLUTION INTRAMUSCULAR; INTRAVENOUS
Status: DISCONTINUED | OUTPATIENT
Start: 2018-06-26 | End: 2018-06-26

## 2018-06-26 RX ORDER — SUCCINYLCHOLINE CHLORIDE 20 MG/ML
INJECTION INTRAMUSCULAR; INTRAVENOUS
Status: DISCONTINUED | OUTPATIENT
Start: 2018-06-26 | End: 2018-06-26

## 2018-06-26 RX ORDER — NEOSTIGMINE METHYLSULFATE 1 MG/ML
INJECTION, SOLUTION INTRAVENOUS
Status: DISCONTINUED | OUTPATIENT
Start: 2018-06-26 | End: 2018-06-26

## 2018-06-26 RX ORDER — GLYCOPYRROLATE 0.2 MG/ML
INJECTION INTRAMUSCULAR; INTRAVENOUS
Status: DISCONTINUED | OUTPATIENT
Start: 2018-06-26 | End: 2018-06-26

## 2018-06-26 RX ORDER — SODIUM CHLORIDE, SODIUM LACTATE, POTASSIUM CHLORIDE, CALCIUM CHLORIDE 600; 310; 30; 20 MG/100ML; MG/100ML; MG/100ML; MG/100ML
INJECTION, SOLUTION INTRAVENOUS CONTINUOUS
Status: DISCONTINUED | OUTPATIENT
Start: 2018-06-26 | End: 2018-06-29 | Stop reason: HOSPADM

## 2018-06-26 RX ORDER — MIDAZOLAM HYDROCHLORIDE 1 MG/ML
INJECTION, SOLUTION INTRAMUSCULAR; INTRAVENOUS
Status: DISCONTINUED | OUTPATIENT
Start: 2018-06-26 | End: 2018-06-26

## 2018-06-26 RX ORDER — HYDROMORPHONE HYDROCHLORIDE 1 MG/ML
1 INJECTION, SOLUTION INTRAMUSCULAR; INTRAVENOUS; SUBCUTANEOUS EVERY 4 HOURS PRN
Status: DISCONTINUED | OUTPATIENT
Start: 2018-06-26 | End: 2018-06-26 | Stop reason: HOSPADM

## 2018-06-26 RX ORDER — SODIUM CHLORIDE 0.9 G/100ML
IRRIGANT IRRIGATION
Status: DISCONTINUED | OUTPATIENT
Start: 2018-06-26 | End: 2018-06-26 | Stop reason: HOSPADM

## 2018-06-26 RX ORDER — PROPOFOL 10 MG/ML
VIAL (ML) INTRAVENOUS
Status: DISCONTINUED | OUTPATIENT
Start: 2018-06-26 | End: 2018-06-26

## 2018-06-26 RX ORDER — LIDOCAINE HCL/PF 100 MG/5ML
SYRINGE (ML) INTRAVENOUS
Status: DISCONTINUED | OUTPATIENT
Start: 2018-06-26 | End: 2018-06-26

## 2018-06-26 RX ORDER — SODIUM CHLORIDE, SODIUM LACTATE, POTASSIUM CHLORIDE, CALCIUM CHLORIDE 600; 310; 30; 20 MG/100ML; MG/100ML; MG/100ML; MG/100ML
INJECTION, SOLUTION INTRAVENOUS CONTINUOUS PRN
Status: DISCONTINUED | OUTPATIENT
Start: 2018-06-26 | End: 2018-06-26

## 2018-06-26 RX ORDER — MORPHINE SULFATE 4 MG/ML
2 INJECTION, SOLUTION INTRAMUSCULAR; INTRAVENOUS EVERY 5 MIN PRN
Status: DISCONTINUED | OUTPATIENT
Start: 2018-06-26 | End: 2018-06-26 | Stop reason: HOSPADM

## 2018-06-26 RX ORDER — ATROPA BELLADONNA AND OPIUM 16.2; 6 MG/1; MG/1
60 SUPPOSITORY RECTAL DAILY PRN
Status: DISCONTINUED | OUTPATIENT
Start: 2018-06-26 | End: 2018-06-29 | Stop reason: HOSPADM

## 2018-06-26 RX ORDER — PHENYLEPHRINE HYDROCHLORIDE 10 MG/ML
INJECTION INTRAVENOUS
Status: DISCONTINUED | OUTPATIENT
Start: 2018-06-26 | End: 2018-06-26

## 2018-06-26 RX ORDER — MEPERIDINE HYDROCHLORIDE 50 MG/ML
12.5 INJECTION INTRAMUSCULAR; INTRAVENOUS; SUBCUTANEOUS ONCE AS NEEDED
Status: DISCONTINUED | OUTPATIENT
Start: 2018-06-26 | End: 2018-06-26 | Stop reason: HOSPADM

## 2018-06-26 RX ORDER — METOCLOPRAMIDE HYDROCHLORIDE 5 MG/ML
10 INJECTION INTRAMUSCULAR; INTRAVENOUS EVERY 10 MIN PRN
Status: COMPLETED | OUTPATIENT
Start: 2018-06-26 | End: 2018-06-26

## 2018-06-26 RX ORDER — HYDROCODONE BITARTRATE AND ACETAMINOPHEN 5; 325 MG/1; MG/1
1 TABLET ORAL EVERY 4 HOURS PRN
Status: DISCONTINUED | OUTPATIENT
Start: 2018-06-26 | End: 2018-06-29 | Stop reason: HOSPADM

## 2018-06-26 RX ORDER — OXYBUTYNIN CHLORIDE 5 MG/1
5 TABLET ORAL 3 TIMES DAILY
Status: DISCONTINUED | OUTPATIENT
Start: 2018-06-26 | End: 2018-06-29 | Stop reason: HOSPADM

## 2018-06-26 RX ORDER — SODIUM CHLORIDE 0.9 % (FLUSH) 0.9 %
3 SYRINGE (ML) INJECTION
Status: DISCONTINUED | OUTPATIENT
Start: 2018-06-26 | End: 2018-06-29 | Stop reason: HOSPADM

## 2018-06-26 RX ORDER — ONDANSETRON 8 MG/1
8 TABLET, ORALLY DISINTEGRATING ORAL EVERY 8 HOURS PRN
Status: DISCONTINUED | OUTPATIENT
Start: 2018-06-26 | End: 2018-06-29 | Stop reason: HOSPADM

## 2018-06-26 RX ORDER — ROCURONIUM BROMIDE 10 MG/ML
INJECTION, SOLUTION INTRAVENOUS
Status: DISCONTINUED | OUTPATIENT
Start: 2018-06-26 | End: 2018-06-26

## 2018-06-26 RX ADMIN — ROCURONIUM BROMIDE 20 MG: 10 INJECTION, SOLUTION INTRAVENOUS at 12:06

## 2018-06-26 RX ADMIN — PHENYLEPHRINE HYDROCHLORIDE 100 MCG: 10 INJECTION INTRAVENOUS at 12:06

## 2018-06-26 RX ADMIN — MORPHINE SULFATE 6 MG: 4 INJECTION INTRAVENOUS at 10:06

## 2018-06-26 RX ADMIN — ROCURONIUM BROMIDE 15 MG: 10 INJECTION, SOLUTION INTRAVENOUS at 01:06

## 2018-06-26 RX ADMIN — PHENYLEPHRINE HYDROCHLORIDE 200 MCG: 10 INJECTION INTRAVENOUS at 12:06

## 2018-06-26 RX ADMIN — HYDROCODONE BITARTRATE AND ACETAMINOPHEN 1 TABLET: 5; 325 TABLET ORAL at 11:06

## 2018-06-26 RX ADMIN — FENTANYL CITRATE 50 MCG: 50 INJECTION INTRAMUSCULAR; INTRAVENOUS at 12:06

## 2018-06-26 RX ADMIN — Medication 0.2 MG: at 03:06

## 2018-06-26 RX ADMIN — GLYCOPYRROLATE 0.2 MG: 0.2 INJECTION, SOLUTION INTRAMUSCULAR; INTRAVENOUS at 12:06

## 2018-06-26 RX ADMIN — ROCURONIUM BROMIDE 5 MG: 10 INJECTION, SOLUTION INTRAVENOUS at 01:06

## 2018-06-26 RX ADMIN — ROCURONIUM BROMIDE 5 MG: 10 INJECTION, SOLUTION INTRAVENOUS at 12:06

## 2018-06-26 RX ADMIN — MORPHINE SULFATE 6 MG: 4 INJECTION INTRAVENOUS at 12:06

## 2018-06-26 RX ADMIN — ROCURONIUM BROMIDE 10 MG: 10 INJECTION, SOLUTION INTRAVENOUS at 01:06

## 2018-06-26 RX ADMIN — MORPHINE SULFATE 6 MG: 4 INJECTION INTRAVENOUS at 08:06

## 2018-06-26 RX ADMIN — FENTANYL CITRATE 50 MCG: 50 INJECTION INTRAMUSCULAR; INTRAVENOUS at 02:06

## 2018-06-26 RX ADMIN — SODIUM CHLORIDE, SODIUM LACTATE, POTASSIUM CHLORIDE, AND CALCIUM CHLORIDE: .6; .31; .03; .02 INJECTION, SOLUTION INTRAVENOUS at 01:06

## 2018-06-26 RX ADMIN — SODIUM CHLORIDE, SODIUM LACTATE, POTASSIUM CHLORIDE, AND CALCIUM CHLORIDE: .6; .31; .03; .02 INJECTION, SOLUTION INTRAVENOUS at 03:06

## 2018-06-26 RX ADMIN — BUPIVACAINE 20 ML: 13.3 INJECTION, SUSPENSION, LIPOSOMAL INFILTRATION at 02:06

## 2018-06-26 RX ADMIN — PIPERACILLIN AND TAZOBACTAM 4.5 G: 4; .5 INJECTION, POWDER, LYOPHILIZED, FOR SOLUTION INTRAVENOUS; PARENTERAL at 01:06

## 2018-06-26 RX ADMIN — SODIUM CHLORIDE: 0.9 INJECTION, SOLUTION INTRAVENOUS at 09:06

## 2018-06-26 RX ADMIN — PIPERACILLIN AND TAZOBACTAM 4.5 G: 4; .5 INJECTION, POWDER, LYOPHILIZED, FOR SOLUTION INTRAVENOUS; PARENTERAL at 09:06

## 2018-06-26 RX ADMIN — PROPOFOL 160 MG: 10 INJECTION, EMULSION INTRAVENOUS at 12:06

## 2018-06-26 RX ADMIN — SUCCINYLCHOLINE CHLORIDE 120 MG: 20 INJECTION, SOLUTION INTRAMUSCULAR; INTRAVENOUS at 12:06

## 2018-06-26 RX ADMIN — MIDAZOLAM HYDROCHLORIDE 1 MG: 1 INJECTION, SOLUTION INTRAMUSCULAR; INTRAVENOUS at 12:06

## 2018-06-26 RX ADMIN — DOCUSATE SODIUM 200 MG: 100 CAPSULE, LIQUID FILLED ORAL at 08:06

## 2018-06-26 RX ADMIN — IOHEXOL 200 ML: 300 INJECTION, SOLUTION INTRAVENOUS at 12:06

## 2018-06-26 RX ADMIN — OXYBUTYNIN CHLORIDE 5 MG: 5 TABLET ORAL at 08:06

## 2018-06-26 RX ADMIN — ROCURONIUM BROMIDE 5 MG: 10 INJECTION, SOLUTION INTRAVENOUS at 02:06

## 2018-06-26 RX ADMIN — GLYCOPYRROLATE 0.6 MG: 0.2 INJECTION, SOLUTION INTRAMUSCULAR; INTRAVENOUS at 02:06

## 2018-06-26 RX ADMIN — LIDOCAINE HYDROCHLORIDE 100 MG: 20 INJECTION, SOLUTION INTRAVENOUS at 12:06

## 2018-06-26 RX ADMIN — NEOSTIGMINE METHYLSULFATE 3 MG: 1 INJECTION INTRAVENOUS at 02:06

## 2018-06-26 RX ADMIN — MORPHINE SULFATE 4 MG: 4 INJECTION INTRAVENOUS at 06:06

## 2018-06-26 RX ADMIN — PIPERACILLIN AND TAZOBACTAM 4.5 G: 4; .5 INJECTION, POWDER, LYOPHILIZED, FOR SOLUTION INTRAVENOUS; PARENTERAL at 05:06

## 2018-06-26 RX ADMIN — ONDANSETRON 4 MG: 2 INJECTION INTRAMUSCULAR; INTRAVENOUS at 11:06

## 2018-06-26 RX ADMIN — FAMOTIDINE 20 MG: 10 INJECTION, SOLUTION INTRAVENOUS at 12:06

## 2018-06-26 RX ADMIN — ROCURONIUM BROMIDE 25 MG: 10 INJECTION, SOLUTION INTRAVENOUS at 12:06

## 2018-06-26 RX ADMIN — ONDANSETRON 4 MG: 2 INJECTION INTRAMUSCULAR; INTRAVENOUS at 03:06

## 2018-06-26 RX ADMIN — SODIUM CHLORIDE 3000 ML: 0.9 IRRIGANT IRRIGATION at 12:06

## 2018-06-26 RX ADMIN — METOCLOPRAMIDE 10 MG: 5 INJECTION, SOLUTION INTRAMUSCULAR; INTRAVENOUS at 04:06

## 2018-06-26 NOTE — ASSESSMENT & PLAN NOTE
Significant concern for injury to the urinary system.  Continued Azotemia precludes a CT urogram.    Plan to go to the OR today for a Cystoscopy, bilateral retrograde pyelogram, possible ureteral stent.  Possible exploratory laparotomy with cystotomy repair.  She may need percutaneous nephrostomy with plan for delayed ureteral repair.  She may need to go to IR for percutaneous drain if I do not need to perform Exlap today.  Consented and questions answered.    NPO  Stay on Zosyn

## 2018-06-26 NOTE — PLAN OF CARE
Problem: Patient Care Overview  Goal: Individualization & Mutuality  Outcome: Ongoing (interventions implemented as appropriate)  VSS. Afebrile. LAC IV patent and infusing with ordered continuous fluids. 2L nasal cannula. BL breath sounds clear and equal. BS faint x 4. Robbins catheter noted and draining copious amount of clear yellow fluid. Patient with loose, liquid light brown stools x 4 this shift. Ambulates to bathroom without issue. Red blanchable area noted around umbilicus and to left of umbilicus on abdomen extending to left flank and gluteal. Warm to touch, blanchable and dimpled near gluteal. Red area marked and noted. Red, bruised area across lower abdomen. No drainage at previous TLH incision sites. Intermittent pain relieved with PRN Morphine IVP. NPO. Blood cultures obtained. Labs obtained as ordered. POC discussed with patient and understanding voiced.

## 2018-06-26 NOTE — OP NOTE
DATE OF PROCEDURE:  06/26/2018.    PREOPERATIVE DIAGNOSES:  Urinary retention, abdominal pain, acute renal failure,   status post laparoscopic hysterectomy.    POSTOPERATIVE DIAGNOSES:  Urinary retention, abdominal pain, acute renal   failure, status post laparoscopic hysterectomy and bladder injury.    PROCEDURES PERFORMED:  Cystoscopy with bilateral retrograde pyelogram,   cystogram, fluoroscopy, exploratory laparotomy, repair of bladder injury.    PRIMARY SURGEON:  Silver Le M.D.    ASSISTANT SURGEON:  Balbina Ross M.D.    ANESTHESIA:  General.    ESTIMATED BLOOD LOSS:  50 mL.    DRAINS:  A 20-Filipino Robbins catheter and #19 round Mickey drain.    SPECIMENS REMOVED:  None.    COMPLICATIONS:  None.    INDICATIONS:  Elke Ennis is a 43-year-old woman who underwent a   laparoscopic hysterectomy six days ago for benign disease.  She had issues   postop with retention and anuria/retention.  She was sent to the Emergency   Department last night, was found to be azotemic with creatinine of 10.  CT scan   showed fluid in the belly.  Urology consult was requested for evaluation for   possible injury to the urinary system.    Elke Ennis was taken to the Operating Room where she was positively   identified by armband.  She was placed supine on the operating room table.    Following induction of adequate general anesthesia, she was placed in the dorsal   lithotomy position and her external genitalia were prepped and draped in the   usual sterile fashion.    A preoperative timeout was performed as well as confirmation of preoperative   antibiotics.    A  film was taken using fluoroscopy.    A 22-Filipino rigid cystoscope was then passed per urethra into the bladder under   direct vision.  In the midline posterior, there was an area of cauterized tissue   in the bladder.  It appeared that this was likely the location of the bladder   injury.  The bladder was then inspected with 30 and 70 degree  lenses.  The   remainder of the bladder appeared to be free of injury.  Both ureteral orifices   were identified.  They were seen to be in their orthotopic position and seen to   be well away from the area of injury.    I then used a 5-Tunisian open-ended catheter to intubate the left ureteral   orifice.  Retrograde pyelogram was performed.  Contrast was seen traveling up   the ureter, remained within the ureter and up into the pyelocalyceal system.    The pyelocalyceal system was without hydronephrosis and there was no   hydroureter, no filling defects.    The open-ended catheter was then used to intubate the right ureteral orifice.    Retrograde pyelogram was performed.  Contrast was seen traveling up the ureter,   it remained within the ureter.  There was no evidence of any hydronephrosis or   hydroureter.  There was no evidence of any filling defects.    The bladder was then drained.  Contrast drained promptly from both ureters.    There was no evidence of any injury to the ureter.    I then passed a 16-Tunisian Robbins catheter, inflated the balloon without   difficulty and I then filled the bladder with mixture of contrast and saline up   to 300 mL.  At approximately 180 mL of contrast, there was evidence of contrast   leaking out of the bladder and into the peritoneal cavity.  The bladder was then   drained.  Contrast was seen still within the peritoneal cavity consistent with   an intraperitoneal bladder injury.    The catheter was removed.      She was then transferred to an open Operating Room.    She was then placed in the frog-leg position and pressure points were padded.    She was secured to the bed.    Anesthesia was maintained throughout the entire transfer.    Her abdomen and external genitalia were then prepped and draped in the usual   sterile fashion.    A 20-Tunisian Robbins catheter was then placed under sterile conditions on the   field.    I then made a vertical suprapubic incision using a #10 blade  scalpel.    Dissection was then carried down to the subcutaneous fat, which was very   edematous.    The rectus fascia was then identified.    The rectus fascia was then opened with the Wasserman scissors.    I then identified the rectus bodies.  I divided the rectus bodies.  I was able   to identify the transversalis fascia.  This was carefully opened and the   peritoneum was also opened sharply with Metzenbaum scissors, taking care to not   injure any bowel.    Using my finger as a guide, I then opened the peritoneal cavity in the midline   with electrocautery superiorly and also inferiorly down to the pubic bone.  Once   the peritoneal cavity had been opened, copious amount of cloudy yellow fluid   consistent with urine was drained.  Approximately 400 mL was drained.    I then placed a Bookwalter retractor.    I then was able to pack the small intestines superiorly away from the bladder.    The bladder was easily identified.    A large cautery defect externally was identified.  There were some adhesions,   which were carefully taken down sharply.    I then filled the bladder with sterile water and contrast was seen exiting the   posterior bladder wall consistent with what was seen on cystoscopy.  Once I had   dissected the sigmoid colon away from the bladder bluntly, I then placed 2 stay   sutures of 2-0 chromic suture at the dome of the bladder.  I then used   electrocautery to open the dome of the bladder and I was able to identify the   cautery injury/bladder injury in the posterior bladder wall.    I then used a 2-0 Vicryl suture in a running fashion to close the anterior   portion of the injury.  I closed the detrusor muscle as well as the urothelium.    I then placed another 2-0 Vicryl suture through the detrusor muscle externally.    Watching the needle go through to ensure no further injury to the bladder.    I did place an EEA vaginal dilator in the vagina gently to ensure that there was   no evidence of any  injury to the vagina today.  That was then withdrawn.    I then closed the cystotomy at the dome of the bladder in 2 layers, closing the   urothelium and then closed the detrusor muscle in a running locking fashion with   2-0 Vicryl suture.    I then filled the bladder with 300 mL of sterile water.  There was no evidence   of any leak from the original cystotomy or from my cystotomy at the dome.  The   catheter was left to gravity drainage.    I then placed a 4 x 4 cm piece of AmnioFix over the repair.  I also removed the   packing and brought the omentum down between the bladder and vagina.    I then used electrocautery to open her most inferior port site from her previous   procedure and then I passed tonsil clamp through this incision and carefully   with my hand through the wound guided the tonsil clamp into the abdomen.  I then   brought a #19 round Mickey drain and grasped it with the tonsil clamp and   brought this out through the port site.  The drain was then secured to the skin   with 2-0 nylon suture.    The drain was then placed just anterior to the bladder.    I then reapproximated the rectus fascia with a looped #1 PDS suture in a running   fashion.  I then applied 20 mg of Exparel diluted with an additional 40 mL of   saline, a total of 60 mL injected into the rectus fascia.    The subcutaneous fat was then closed in a running fashion with 2-0 Vicryl   suture.    The skin was then finally closed with surgical simran.    Sponge counts, needle counts and instrument counts were reported as correct at   the end of the case.  Her anesthesia was then reversed.  She was taken to   Recovery Room in stable condition.      MARCIAL/IN  dd: 06/26/2018 15:15:33 (ANGELA)  td: 06/26/2018 18:39:56 (ANGELA)  Doc ID   #[id number]  Job ID #[id number]    CC:

## 2018-06-26 NOTE — PROGRESS NOTES
Vancomycin up and infusing to LAC. Patient denies any pain or needs at this time. NADN. Daughter at bedside.

## 2018-06-26 NOTE — CONSULTS
[facility]  Urology  Consult Note    Patient Name: Sherrill Ennis  MRN: [id number]  Admission Date: 2018  Hospital Length of Stay: 1   Code Status: Full Code   Attending Provider: Christiano Garcia MD   Consulting Provider: KEENA Le MD  Primary Care Physician: Reagan Holland MD  Principal Problem:Acute renal failure    Inpatient consult to Urology  Consult performed by: NELIA LE  Consult ordered by: CHRISTIANO GARCIA          Subjective:     HPI:  Urinary Retention  This is a 43 year old woman who is POD #6 from 1.  Laparoscopic Supracervical Hysterectomy 2.  Bilateral salpingectomy 3.  Removal of right ovarian cysts  4.  Lysis of adhesions by Dr. Pickard.  She was discharged home that day.  She reports voiding prior to discharge.  She did not void for almost 24 hours and presented to the hospital. A catheter was placed for 150 mL in bladder.  She represented to Dr. Pickard with pelvic pain, constipation, and anuria/retention for 3 days.  Dr. Pickard sent her to the ED for CT and further evaluation.  She currently has a catheter but still with abdominal pain.  Mild nausea.  Constipation resolved.      Past Medical History:   Diagnosis Date    Hypertension        Past Surgical History:   Procedure Laterality Date     SECTION      x4    CHOLECYSTECTOMY      HYSTERECTOMY  2018    LAPAROSCOPIC SALPINGECTOMY Bilateral 2018    Procedure: SALPINGECTOMY, LAPAROSCOPIC;  Surgeon: Christiano Garcia MD;  Location: [facility] OR;  Service: OB/GYN;  Laterality: Bilateral;    LAPAROSCOPIC SUPRACERVICAL HYSTERECTOMY N/A 2018    Procedure: [id number];  Surgeon: Christiano Garcia MD;  Location: [facility] OR;  Service: OB/GYN;  Laterality: N/A;  1ST CASE  RN PRE OP 2018    LAPAROSCOPIC SURGICAL REMOVAL OF CYST OF OVARY Right 2018    Procedure: EXCISION, CYST, OVARY, LAPAROSCOPIC;  Surgeon: Christiano Garcia MD;  Location: [facility] OR;  Service: OB/GYN;   Laterality: Right;    TUBAL LIGATION         Review of patient's allergies indicates:   Allergen Reactions    Ace inhibitors Other (See Comments)     cough     No current facility-administered medications on file prior to encounter.      Current Outpatient Prescriptions on File Prior to Encounter   Medication Sig Dispense Refill    amLODIPine (NORVASC) 10 MG tablet Take 1 tablet (10 mg total) by mouth once daily. (Patient taking differently: Take 5 mg by mouth once daily. ) 90 tablet 3    docusate sodium (COLACE) 100 MG capsule Take 1 capsule (100 mg total) by mouth 2 (two) times daily as needed for Constipation. 60 capsule 2    hydroCHLOROthiazide (HYDRODIURIL) 12.5 MG Tab Take 1 tablet (12.5 mg total) by mouth daily as needed. 90 tablet 0    ibuprofen (ADVIL,MOTRIN) 600 MG tablet Take 1 tablet (600 mg total) by mouth every 6 (six) hours as needed. 40 tablet 1    multivit,calc,mins/iron/folic (ONE-A-DAY WOMENS FORMULA ORAL) Take by mouth.      oxyCODONE-acetaminophen (PERCOCET) 5-325 mg per tablet Take 1 tablet by mouth every 4 (four) hours as needed for Pain. 15 tablet 0    promethazine (PHENERGAN) 25 MG tablet Take 1 tablet (25 mg total) by mouth every 4 (four) hours. 30 tablet 1    valACYclovir (VALTREX) 1000 MG tablet Take 1 tablet (1,000 mg total) by mouth daily as needed (herpes outbreak). For 5 days 30 tablet 1      Family History     Problem Relation (Age of Onset)    Aneurysm Mother    Cancer Maternal Grandfather, Paternal Grandmother    Heart disease Maternal Grandfather    Hypertension Mother, Father, Brother, Maternal Grandfather    Stroke Mother          Social History Main Topics    Smoking status: Current Every Day Smoker     Packs/day: 0.25     Years: 17.00     Types: Cigarettes    Smokeless tobacco: Never Used    Alcohol use Yes      Comment: Occasional    Drug use: No    Sexual activity: Not Currently     Partners: Male      Comment:        Review of Systems   Constitutional:  Negative.  Negative for fever.   HENT: Negative.    Eyes: Negative.    Respiratory: Negative for cough, chest tightness and shortness of breath.    Cardiovascular: Negative for chest pain.   Gastrointestinal: Positive for abdominal distention and abdominal pain. Negative for constipation, diarrhea and nausea.   Genitourinary: Positive for decreased urine volume, difficulty urinating, dysuria and pelvic pain. Negative for hematuria.   Musculoskeletal: Negative.    Neurological: Negative.    Psychiatric/Behavioral: Negative.        Objective:     Temp:  [97.6 °F (36.4 °C)-98.5 °F (36.9 °C)] 98.2 °F (36.8 °C)  Pulse:  [83-97] 83  Resp:  [20] 20  SpO2:  [96 %-98 %] 98 %  BP: (116-161)/(68-90) 116/68     Body mass index is 35.53 kg/m².       Post Void Cath Residual (mL): 350 mL (urine returnd with cath) (06/25/18 1940)    Drains     Drain                 Urethral Catheter 06/25/18 2032 Double-lumen less than 1 day                Physical Exam   Nursing note and vitals reviewed.  Constitutional: She is oriented to person, place, and time. She appears well-developed.   HENT:   Head: Normocephalic.   Eyes: Conjunctivae are normal.   Neck: Normal range of motion. No tracheal deviation present. No thyromegaly present.   Cardiovascular: Normal rate, normal heart sounds and normal pulses.    Pulmonary/Chest: Effort normal and breath sounds normal. No respiratory distress. She has no wheezes.   Abdominal: Soft. She exhibits no distension and no mass. There is no hepatosplenomegaly. There is no tenderness. There is no rebound, no guarding and no CVA tenderness. No hernia.   Laparoscopic incisions healing.  Abdomen with diffuse mild erythema.  Soft but tender   Musculoskeletal: Normal range of motion. She exhibits no edema or tenderness.   Lymphadenopathy:     She has no cervical adenopathy.   Neurological: She is alert and oriented to person, place, and time.   Skin: Skin is warm and dry. No rash noted. No erythema.      Psychiatric: She has a normal mood and affect. Her behavior is normal. Judgment and thought content normal.       Significant Labs:    BMP:    Recent Labs  Lab 06/25/18 1708 06/25/18 2248 06/26/18  0500   * 133* 135*   K 4.6 4.2 3.9   CL 93* 100 104   CO2 16* 18* 21*   BUN 65* 50* 31*   CREATININE 10.6* 6.5* 2.7*   CALCIUM 10.1 9.3 8.7       CBC:    Recent Labs  Lab 06/25/18 1708 06/25/18 2248 06/26/18  0500   WBC 16.92* 13.07* 11.28   HGB 12.6 11.9* 10.5*   HCT 36.4* 34.6* 30.5*   * 397* 347       Blood Culture:   Recent Labs  Lab 06/25/18 2240 06/25/18 2248   LABBLOO No Growth to date No Growth to date     Urine Culture: No results for input(s): LABURIN in the last 168 hours.    Significant Imaging:  CT: I have reviewed all results within the past 24 hours and my personal findings are:    CT Abdomen Pelvis  Without Contrast   Status: Final result   MyChart Results Release     MyChart Status: Active Results Release   PACS Images     Show images for CT Abdomen Pelvis Without Contrast   External Result Report     External Result Report   Narrative     EXAMINATION:  CT ABDOMEN PELVIS WITHOUT CONTRAST    CLINICAL HISTORY:  abdominal pain;    TECHNIQUE:  Low dose axial images, sagittal and coronal reformations were obtained from the lung bases to the pubic symphysis.  Oral contrast was not administered.    COMPARISON:  None    FINDINGS:  The visualized portion of the heart is unremarkable.  There is small left pleural effusion resulting in mild volume loss and compressive atelectasis within the left lower lobe.  Additional subsegmental atelectasis is seen within the right lower lobe.    Liver is diffusely hypodense in appearance suggestive for diffuse fatty infiltration.  There is no intra-or extrahepatic biliary ductal dilatation.  Gallbladder has been removed.  The stomach, pancreas, spleen, and adrenal glands are unremarkable.    Kidneys show no evidence of stones or hydronephrosis. Ureters are  unremarkable along their courses.  Urinary bladder is unremarkable.  Postsurgical changes visualized consistent with recent supracervical hysterectomy.    Appendix is not definitely identified.  No evidence of bowel obstruction.  Moderate retained stool is visualized within the right colon and transverse colon.  There is diverticulosis of the descending and sigmoid colon.  There is moderate volume disorganized intraperitoneal fluid of uncertain etiology.  No free air identified.  Additional mild mesenteric edema.    Aorta tapers normally with mild atherosclerosis.    No acute osseous abnormality identified.    There is diffuse subcutaneous soft tissue stranding and edema visualized involving the lower anterior abdominal wall.  No evidence of focal fluid collections or abscess.   Impression       1. Moderate volume disorganized intraperitoneal free fluid of uncertain etiology.  No definite focal fluid collections or abscess allowing for lack of intravenous contrast.  No free air identified.  2. Postsurgical changes of recent supracervical hysterectomy.  3. Cholecystectomy.  4. Small left pleural effusion with bibasilar atelectatic change.  5. Colonic diverticulosis without convincing evidence of acute diverticulitis.  6. Additional findings as detailed above.      Electronically signed by: Reji Hannon MD  Date: 06/25/2018  Time: 19:39    Encounter     View Encounter                               Assessment and Plan:     Urinary retention    Significant concern for injury to the urinary system.  Continued Azotemia precludes a CT urogram.    Plan to go to the OR today for a Cystoscopy, bilateral retrograde pyelogram, possible ureteral stent.  Possible exploratory laparotomy with cystotomy repair.  She may need percutaneous nephrostomy with plan for delayed ureteral repair.  She may need to go to IR for percutaneous drain if I do not need to perform Exlap today.  Consented and questions answered.    NPO  Stay on  Zosyn              VTE Risk Mitigation         Ordered     IP VTE HIGH RISK PATIENT  Once      06/25/18 2037     Place MARY hose  Until discontinued      06/25/18 2037     Place sequential compression device  Until discontinued      06/25/18 2037          Thank you for your consult. I will follow-up with patient. Please contact us if you have any additional questions.    KEENA Le MD  Urology  [facility]

## 2018-06-26 NOTE — BRIEF OP NOTE
[facility]  Brief Operative Note    SUMMARY     Surgery Date: 6/26/2018     Surgeon(s) and Role:  Panel 1:     * NELIA Le MD - Primary    Panel 2:     * NELIA Le MD - Primary     * Balbina Ross MD - Assisting        Pre-op Diagnosis:  S/P laparoscopic supracervical hysterectomy [Z90.711]  Acute urinary retention [R33.8]    Post-op Diagnosis:  Post-Op Diagnosis Codes:     * S/P laparoscopic supracervical hysterectomy [Z90.711]     * Acute urinary retention [R33.8]    Procedure(s) (LRB):  CYSTOSCOPY, WITH RETROGRADE pyelLOGRAM (Bilateral)  CYSTOGRAM  LAPAROTOMY,EXPLORATORY  REPAIR, BLADDER (N/A)    Anesthesia: General    Description of Procedure: Cautery injury with small cystotomy posterior bladder.  No injury to ureter.    Description of the findings of the procedure: Cystotomy repaired in two layers.  Amniofix applied exterior to bladder and omentum tucked behind bladder    Estimated Blood Loss: * No values recorded between 6/26/2018 12:27 PM and 6/26/2018  2:55 PM *         Specimens:   Specimen (12h ago through future)    None

## 2018-06-26 NOTE — TRANSFER OF CARE
"Anesthesia Transfer of Care Note    Patient: Sherrill Ennis    Procedure(s) Performed: Procedure(s) (LRB):  CYSTOSCOPY, WITH RETROGRADE pyelLOGRAM (Bilateral)  CYSTOGRAM  LAPAROTOMY,EXPLORATORY  REPAIR, BLADDER (N/A)    Patient location: PACU    Anesthesia Type: general    Transport from OR: Transported from OR on room air with adequate spontaneous ventilation    Post pain: adequate analgesia    Post assessment: no apparent anesthetic complications    Post vital signs: stable    Level of consciousness: awake, alert and agitated    Nausea/Vomiting: no nausea/vomiting    Complications: none    Transfer of care protocol was followed      Last vitals:   Visit Vitals  BP (!) 147/87 (BP Location: Right arm, Patient Position: Lying)   Pulse 90   Temp 36.7 °C (98.1 °F) (Oral)   Resp 15   Ht 5' 4" (1.626 m)   Wt 93.9 kg (207 lb)   LMP 06/12/2018 (LMP Unknown)   SpO2 95%   Breastfeeding? No   BMI 35.53 kg/m²     "

## 2018-06-26 NOTE — ANESTHESIA PREPROCEDURE EVALUATION
06/26/2018  Sherrill Ennis is a 43 y.o., female.    Anesthesia Evaluation    I have reviewed the Patient Summary Reports.    I have reviewed the Nursing Notes.   I have reviewed the Medications.     Review of Systems  Anesthesia Hx:  No problems with previous Anesthesia Denies Hx of Anesthetic complications  Neg history of prior surgery. Denies Family Hx of Anesthesia complications.   Denies Personal Hx of Anesthesia complications.   Social:  No Alcohol Use, Smoker    Hematology/Oncology:  Hematology Normal   Oncology Normal     EENT/Dental:EENT/Dental Normal   Cardiovascular:   Exercise tolerance: good Hypertension    Pulmonary:  Pulmonary Normal    Renal/:   Chronic Renal Disease, ARF    Hepatic/GI:  Hepatic/GI Normal    Musculoskeletal:  Musculoskeletal Normal    Neurological:  Neurology Normal    Endocrine:  Endocrine Normal    Dermatological:  Skin Normal    Psych:  Psychiatric Normal           Physical Exam  General:  Well nourished    Airway/Jaw/Neck:  Airway Findings: Mouth Opening: Normal General Airway Assessment: Adult  Mallampati: II  TM Distance: Normal, at least 6 cm         Dental:  DENTAL FINDINGS: Normal   Chest/Lungs:  Chest/Lungs Clear    Heart/Vascular:  Heart Findings: Normal Heart murmur: negative       Mental Status:  Mental Status Findings:  Cooperative, Alert and Oriented         Anesthesia Plan  Type of Anesthesia, risks & benefits discussed:  Anesthesia Type:  general  Patient's Preference:   Intra-op Monitoring Plan: standard ASA monitors  Intra-op Monitoring Plan Comments:   Post Op Pain Control Plan: per primary service following discharge from PACU, IV/PO Opioids PRN and multimodal analgesia  Post Op Pain Control Plan Comments:   Induction:   IV  Beta Blocker:  Patient is not currently on a Beta-Blocker (No further documentation required).       Informed Consent:  Patient understands risks and agrees with Anesthesia plan.  Questions answered. Anesthesia consent signed with patient.  ASA Score: 2     Day of Surgery Review of History & Physical:            Ready For Surgery From Anesthesia Perspective.

## 2018-06-26 NOTE — ANESTHESIA POSTPROCEDURE EVALUATION
"Anesthesia Post Evaluation    Patient: Sherrill Ennis    Procedure(s) Performed: Procedure(s) (LRB):  CYSTOSCOPY, WITH RETROGRADE pyelLOGRAM (Bilateral)  CYSTOGRAM  LAPAROTOMY,EXPLORATORY  REPAIR, BLADDER (N/A)    Final Anesthesia Type: general  Patient location during evaluation: PACU  Patient participation: Yes- Able to Participate  Level of consciousness: awake and alert and oriented  Post-procedure vital signs: reviewed and stable  Pain management: adequate  Airway patency: patent  PONV status at discharge: No PONV  Anesthetic complications: no      Cardiovascular status: blood pressure returned to baseline, hemodynamically stable and hypertensive  Respiratory status: unassisted, spontaneous ventilation and room air  Hydration status: euvolemic  Follow-up not needed.        Visit Vitals  BP (!) 168/97   Pulse 80   Temp 36.7 °C (98.1 °F) (Oral)   Resp 19   Ht 5' 4" (1.626 m)   Wt 93.9 kg (207 lb)   LMP 06/12/2018 (LMP Unknown)   SpO2 95%   Breastfeeding? No   BMI 35.53 kg/m²       Pain/Franki Score: Pain Assessment Performed: Yes (6/26/2018  3:02 PM)  Presence of Pain: denies (6/26/2018  3:30 PM)  Pain Rating Prior to Med Admin: 5 (6/26/2018  3:57 PM)  Pain Rating Post Med Admin: 1 (6/26/2018 10:49 AM)  Franki Score: 9 (6/26/2018  3:30 PM)      "

## 2018-06-26 NOTE — SUBJECTIVE & OBJECTIVE
Past Medical History:   Diagnosis Date    Hypertension        Past Surgical History:   Procedure Laterality Date     SECTION      x4    CHOLECYSTECTOMY      HYSTERECTOMY  2018    LAPAROSCOPIC SALPINGECTOMY Bilateral 2018    Procedure: SALPINGECTOMY, LAPAROSCOPIC;  Surgeon: Vince Garcia MD;  Location: [facility] OR;  Service: OB/GYN;  Laterality: Bilateral;    LAPAROSCOPIC SUPRACERVICAL HYSTERECTOMY N/A 2018    Procedure: [id number];  Surgeon: Vince Garcia MD;  Location: [facility] OR;  Service: OB/GYN;  Laterality: N/A;  1ST CASE  RN PRE OP 2018    LAPAROSCOPIC SURGICAL REMOVAL OF CYST OF OVARY Right 2018    Procedure: EXCISION, CYST, OVARY, LAPAROSCOPIC;  Surgeon: Vince Garcia MD;  Location: [facility] OR;  Service: OB/GYN;  Laterality: Right;    TUBAL LIGATION         Review of patient's allergies indicates:   Allergen Reactions    Ace inhibitors Other (See Comments)     cough     No current facility-administered medications on file prior to encounter.      Current Outpatient Prescriptions on File Prior to Encounter   Medication Sig Dispense Refill    amLODIPine (NORVASC) 10 MG tablet Take 1 tablet (10 mg total) by mouth once daily. (Patient taking differently: Take 5 mg by mouth once daily. ) 90 tablet 3    docusate sodium (COLACE) 100 MG capsule Take 1 capsule (100 mg total) by mouth 2 (two) times daily as needed for Constipation. 60 capsule 2    hydroCHLOROthiazide (HYDRODIURIL) 12.5 MG Tab Take 1 tablet (12.5 mg total) by mouth daily as needed. 90 tablet 0    ibuprofen (ADVIL,MOTRIN) 600 MG tablet Take 1 tablet (600 mg total) by mouth every 6 (six) hours as needed. 40 tablet 1    multivit,calc,mins/iron/folic (ONE-A-DAY WOMENS FORMULA ORAL) Take by mouth.      oxyCODONE-acetaminophen (PERCOCET) 5-325 mg per tablet Take 1 tablet by mouth every 4 (four) hours as needed for Pain. 15 tablet 0    promethazine (PHENERGAN) 25 MG tablet Take 1 tablet (25  mg total) by mouth every 4 (four) hours. 30 tablet 1    valACYclovir (VALTREX) 1000 MG tablet Take 1 tablet (1,000 mg total) by mouth daily as needed (herpes outbreak). For 5 days 30 tablet 1      Family History     Problem Relation (Age of Onset)    Aneurysm Mother    Cancer Maternal Grandfather, Paternal Grandmother    Heart disease Maternal Grandfather    Hypertension Mother, Father, Brother, Maternal Grandfather    Stroke Mother          Social History Main Topics    Smoking status: Current Every Day Smoker     Packs/day: 0.25     Years: 17.00     Types: Cigarettes    Smokeless tobacco: Never Used    Alcohol use Yes      Comment: Occasional    Drug use: No    Sexual activity: Not Currently     Partners: Male      Comment:        Review of Systems   Constitutional: Negative.  Negative for fever.   HENT: Negative.    Eyes: Negative.    Respiratory: Negative for cough, chest tightness and shortness of breath.    Cardiovascular: Negative for chest pain.   Gastrointestinal: Positive for abdominal distention and abdominal pain. Negative for constipation, diarrhea and nausea.   Genitourinary: Positive for decreased urine volume, difficulty urinating, dysuria and pelvic pain. Negative for hematuria.   Musculoskeletal: Negative.    Neurological: Negative.    Psychiatric/Behavioral: Negative.        Objective:     Temp:  [97.6 °F (36.4 °C)-98.5 °F (36.9 °C)] 98.2 °F (36.8 °C)  Pulse:  [83-97] 83  Resp:  [20] 20  SpO2:  [96 %-98 %] 98 %  BP: (116-161)/(68-90) 116/68     Body mass index is 35.53 kg/m².       Post Void Cath Residual (mL): 350 mL (urine returnd with cath) (06/25/18 1940)    Drains     Drain                 Urethral Catheter 06/25/18 2032 Double-lumen less than 1 day                Physical Exam   Nursing note and vitals reviewed.  Constitutional: She is oriented to person, place, and time. She appears well-developed.   HENT:   Head: Normocephalic.   Eyes: Conjunctivae are normal.   Neck: Normal  range of motion. No tracheal deviation present. No thyromegaly present.   Cardiovascular: Normal rate, normal heart sounds and normal pulses.    Pulmonary/Chest: Effort normal and breath sounds normal. No respiratory distress. She has no wheezes.   Abdominal: Soft. She exhibits no distension and no mass. There is no hepatosplenomegaly. There is no tenderness. There is no rebound, no guarding and no CVA tenderness. No hernia.   Laparoscopic incisions healing.  Abdomen with diffuse mild erythema.  Soft but tender   Musculoskeletal: Normal range of motion. She exhibits no edema or tenderness.   Lymphadenopathy:     She has no cervical adenopathy.   Neurological: She is alert and oriented to person, place, and time.   Skin: Skin is warm and dry. No rash noted. No erythema.     Psychiatric: She has a normal mood and affect. Her behavior is normal. Judgment and thought content normal.       Significant Labs:    BMP:    Recent Labs  Lab 06/25/18 1708 06/25/18 2248 06/26/18  0500   * 133* 135*   K 4.6 4.2 3.9   CL 93* 100 104   CO2 16* 18* 21*   BUN 65* 50* 31*   CREATININE 10.6* 6.5* 2.7*   CALCIUM 10.1 9.3 8.7       CBC:    Recent Labs  Lab 06/25/18 1708 06/25/18 2248 06/26/18  0500   WBC 16.92* 13.07* 11.28   HGB 12.6 11.9* 10.5*   HCT 36.4* 34.6* 30.5*   * 397* 347       Blood Culture:   Recent Labs  Lab 06/25/18 2240 06/25/18 2248   LABBLOO No Growth to date No Growth to date     Urine Culture: No results for input(s): LABURIN in the last 168 hours.    Significant Imaging:  CT: I have reviewed all results within the past 24 hours and my personal findings are:    CT Abdomen Pelvis  Without Contrast   Status: Final result   [facility] Results Release     Weave Status: Active Results Release   PACS Images     Show images for CT Abdomen Pelvis Without Contrast   External Result Report     External Result Report   Narrative     EXAMINATION:  CT ABDOMEN PELVIS WITHOUT CONTRAST    CLINICAL  HISTORY:  abdominal pain;    TECHNIQUE:  Low dose axial images, sagittal and coronal reformations were obtained from the lung bases to the pubic symphysis.  Oral contrast was not administered.    COMPARISON:  None    FINDINGS:  The visualized portion of the heart is unremarkable.  There is small left pleural effusion resulting in mild volume loss and compressive atelectasis within the left lower lobe.  Additional subsegmental atelectasis is seen within the right lower lobe.    Liver is diffusely hypodense in appearance suggestive for diffuse fatty infiltration.  There is no intra-or extrahepatic biliary ductal dilatation.  Gallbladder has been removed.  The stomach, pancreas, spleen, and adrenal glands are unremarkable.    Kidneys show no evidence of stones or hydronephrosis. Ureters are unremarkable along their courses.  Urinary bladder is unremarkable.  Postsurgical changes visualized consistent with recent supracervical hysterectomy.    Appendix is not definitely identified.  No evidence of bowel obstruction.  Moderate retained stool is visualized within the right colon and transverse colon.  There is diverticulosis of the descending and sigmoid colon.  There is moderate volume disorganized intraperitoneal fluid of uncertain etiology.  No free air identified.  Additional mild mesenteric edema.    Aorta tapers normally with mild atherosclerosis.    No acute osseous abnormality identified.    There is diffuse subcutaneous soft tissue stranding and edema visualized involving the lower anterior abdominal wall.  No evidence of focal fluid collections or abscess.   Impression       1. Moderate volume disorganized intraperitoneal free fluid of uncertain etiology.  No definite focal fluid collections or abscess allowing for lack of intravenous contrast.  No free air identified.  2. Postsurgical changes of recent supracervical hysterectomy.  3. Cholecystectomy.  4. Small left pleural effusion with bibasilar atelectatic  change.  5. Colonic diverticulosis without convincing evidence of acute diverticulitis.  6. Additional findings as detailed above.      Electronically signed by: Reji Hannon MD  Date: 06/25/2018  Time: 19:39    Encounter     View Encounter

## 2018-06-26 NOTE — CONSULTS
Awake alert oriented NAD    Denies CNS ENT CP GI  RHEUM OR DERM SX  Past Medical History:   Diagnosis Date    Hypertension      Review of patient's allergies indicates:   Allergen Reactions    Ace inhibitors Other (See Comments)     cough       Current Facility-Administered Medications   Medication    0.9%  NaCl infusion    acetaminophen tablet 650 mg    acetaminophen tablet 650 mg    bisacodyl suppository 10 mg    docusate sodium capsule 200 mg    magnesium hydroxide 400 mg/5 ml suspension 2,400 mg    morphine injection 4 mg    morphine injection 6 mg    ondansetron injection 4 mg    pantoprazole injection 40 mg    piperacillin-tazobactam 4.5 g in sodium chloride 0.9% 100 mL IVPB (ready to mix system)    promethazine (PHENERGAN) 6.25 mg in dextrose 5 % 50 mL IVPB    senna-docusate 8.6-50 mg per tablet 1 tablet    sodium chloride 0.9% bolus 1,000 mL    sodium phosphates 19-7 gram/118 mL enema 1 enema     Family History   Problem Relation Age of Onset    Hypertension Mother     Stroke Mother     Aneurysm Mother     Hypertension Father     Hypertension Brother     Hypertension Maternal Grandfather     Heart disease Maternal Grandfather         MI    Cancer Maternal Grandfather     Cancer Paternal Grandmother      Social History     Social History    Marital status:      Spouse name: N/A    Number of children: N/A    Years of education: N/A     Occupational History    Not on file.     Social History Main Topics    Smoking status: Current Every Day Smoker     Packs/day: 0.25     Years: 17.00     Types: Cigarettes    Smokeless tobacco: Never Used    Alcohol use Yes      Comment: Occasional    Drug use: No    Sexual activity: Not Currently     Partners: Male      Comment:      Other Topics Concern    Not on file     Social History Narrative     since 2016    Together since 2013    He drives 18-wheelers    She is the  for a dental office    Previously   and     Lives with her  and youngest daughter.     with three daughters from previous marriage also       LABS    Recent Results (from the past 24 hour(s))   Urinalysis, Reflex to Urine Culture Urine, Clean Catch    Collection Time: 06/25/18  4:14 PM   Result Value Ref Range    Specimen UA Urine, Clean Catch     Color, UA Yellow Yellow, Straw, Michelle    Appearance, UA Clear Clear    pH, UA 5.0 5.0 - 8.0    Specific Gravity, UA 1.020 1.005 - 1.030    Protein, UA 1+ (A) Negative    Glucose, UA Negative Negative    Ketones, UA Negative Negative    Bilirubin (UA) Negative Negative    Occult Blood UA 1+ (A) Negative    Nitrite, UA Negative Negative    Urobilinogen, UA Negative <2.0 EU/dL    Leukocytes, UA Negative Negative   Urinalysis Microscopic    Collection Time: 06/25/18  4:14 PM   Result Value Ref Range    RBC, UA 2 0 - 4 /hpf    WBC, UA 3 0 - 5 /hpf    Bacteria, UA Moderate (A) None-Occ /hpf    Squam Epithel, UA 15 /hpf    Hyaline Casts, UA 0 0-1/lpf /lpf    Microscopic Comment SEE COMMENT    CBC auto differential    Collection Time: 06/25/18  5:08 PM   Result Value Ref Range    WBC 16.92 (H) 3.90 - 12.70 K/uL    RBC 4.55 4.00 - 5.40 M/uL    Hemoglobin 12.6 12.0 - 16.0 g/dL    Hematocrit 36.4 (L) 37.0 - 48.5 %    MCV 80 (L) 82 - 98 fL    MCH 27.7 27.0 - 31.0 pg    MCHC 34.6 32.0 - 36.0 g/dL    RDW 13.7 11.5 - 14.5 %    Platelets 462 (H) 150 - 350 K/uL    MPV 8.8 (L) 9.2 - 12.9 fL    Gran # (ANC) 14.5 (H) 1.8 - 7.7 K/uL    Lymph # 1.1 1.0 - 4.8 K/uL    Mono # 0.9 0.3 - 1.0 K/uL    Eos # 0.4 0.0 - 0.5 K/uL    Baso # 0.03 0.00 - 0.20 K/uL    Gran% 85.8 (H) 38.0 - 73.0 %    Lymph% 6.3 (L) 18.0 - 48.0 %    Mono% 5.1 4.0 - 15.0 %    Eosinophil% 2.4 0.0 - 8.0 %    Basophil% 0.2 0.0 - 1.9 %    Differential Method Automated    Comprehensive metabolic panel    Collection Time: 06/25/18  5:08 PM   Result Value Ref Range    Sodium 129 (L) 136 - 145 mmol/L    Potassium 4.6 3.5 - 5.1 mmol/L     Chloride 93 (L) 95 - 110 mmol/L    CO2 16 (L) 23 - 29 mmol/L    Glucose 117 (H) 70 - 110 mg/dL    BUN, Bld 65 (H) 6 - 20 mg/dL    Creatinine 10.6 (H) 0.5 - 1.4 mg/dL    Calcium 10.1 8.7 - 10.5 mg/dL    Total Protein 8.1 6.0 - 8.4 g/dL    Albumin 3.4 (L) 3.5 - 5.2 g/dL    Total Bilirubin 0.3 0.1 - 1.0 mg/dL    Alkaline Phosphatase 76 55 - 135 U/L    AST 14 10 - 40 U/L    ALT 17 10 - 44 U/L    Anion Gap 20 (H) 8 - 16 mmol/L    eGFR if African American 5 (A) >60 mL/min/1.73 m^2    eGFR if non African American 4 (A) >60 mL/min/1.73 m^2   Creatinine, urine, random    Collection Time: 06/25/18  7:59 PM   Result Value Ref Range    Creatinine, Random Ur 147.5 15.0 - 325.0 mg/dL   Sodium, urine, random    Collection Time: 06/25/18  7:59 PM   Result Value Ref Range    Sodium Urine Random 61 20 - 250 mmol/L   Blood Culture #1 **CANNOT BE ORDERED STAT**    Collection Time: 06/25/18 10:40 PM   Result Value Ref Range    Blood Culture, Routine No Growth to date    CBC auto differential    Collection Time: 06/25/18 10:48 PM   Result Value Ref Range    WBC 13.07 (H) 3.90 - 12.70 K/uL    RBC 4.32 4.00 - 5.40 M/uL    Hemoglobin 11.9 (L) 12.0 - 16.0 g/dL    Hematocrit 34.6 (L) 37.0 - 48.5 %    MCV 80 (L) 82 - 98 fL    MCH 27.5 27.0 - 31.0 pg    MCHC 34.4 32.0 - 36.0 g/dL    RDW 13.6 11.5 - 14.5 %    Platelets 397 (H) 150 - 350 K/uL    MPV 8.7 (L) 9.2 - 12.9 fL    Gran # (ANC) 11.1 (H) 1.8 - 7.7 K/uL    Lymph # 0.9 (L) 1.0 - 4.8 K/uL    Mono # 0.6 0.3 - 1.0 K/uL    Eos # 0.4 0.0 - 0.5 K/uL    Baso # 0.02 0.00 - 0.20 K/uL    Gran% 85.2 (H) 38.0 - 73.0 %    Lymph% 6.6 (L) 18.0 - 48.0 %    Mono% 4.8 4.0 - 15.0 %    Eosinophil% 3.0 0.0 - 8.0 %    Basophil% 0.2 0.0 - 1.9 %    Differential Method Automated    Basic metabolic panel    Collection Time: 06/25/18 10:48 PM   Result Value Ref Range    Sodium 133 (L) 136 - 145 mmol/L    Potassium 4.2 3.5 - 5.1 mmol/L    Chloride 100 95 - 110 mmol/L    CO2 18 (L) 23 - 29 mmol/L    Glucose 113 (H) 70  - 110 mg/dL    BUN, Bld 50 (H) 6 - 20 mg/dL    Creatinine 6.5 (H) 0.5 - 1.4 mg/dL    Calcium 9.3 8.7 - 10.5 mg/dL    Anion Gap 15 8 - 16 mmol/L    eGFR if African American 8 (A) >60 mL/min/1.73 m^2    eGFR if non African American 7 (A) >60 mL/min/1.73 m^2   Blood Culture #2 **CANNOT BE ORDERED STAT**    Collection Time: 06/25/18 10:48 PM   Result Value Ref Range    Blood Culture, Routine No Growth to date    Lactic acid, plasma    Collection Time: 06/25/18 10:48 PM   Result Value Ref Range    Lactate (Lactic Acid) 1.1 0.5 - 2.2 mmol/L   ISTAT PROCEDURE    Collection Time: 06/25/18 11:35 PM   Result Value Ref Range    POC PH 7.389 7.35 - 7.45    POC PCO2 28.2 (LL) 35 - 45 mmHg    POC PO2 73 (L) 80 - 100 mmHg    POC HCO3 17.0 (L) 24 - 28 mmol/L    POC BE -7 -2 to 2 mmol/L    POC SATURATED O2 95 95 - 100 %    POC TCO2 18 (L) 23 - 27 mmol/L    Sample ARTERIAL     Site RB     Allens Test Pass     DelSys Room Air     Mode SPONT    CBC auto differential    Collection Time: 06/26/18  5:00 AM   Result Value Ref Range    WBC 11.28 3.90 - 12.70 K/uL    RBC 3.78 (L) 4.00 - 5.40 M/uL    Hemoglobin 10.5 (L) 12.0 - 16.0 g/dL    Hematocrit 30.5 (L) 37.0 - 48.5 %    MCV 81 (L) 82 - 98 fL    MCH 27.8 27.0 - 31.0 pg    MCHC 34.4 32.0 - 36.0 g/dL    RDW 13.6 11.5 - 14.5 %    Platelets 347 150 - 350 K/uL    MPV 8.4 (L) 9.2 - 12.9 fL    Gran # (ANC) 9.4 (H) 1.8 - 7.7 K/uL    Lymph # 0.7 (L) 1.0 - 4.8 K/uL    Mono # 0.6 0.3 - 1.0 K/uL    Eos # 0.6 (H) 0.0 - 0.5 K/uL    Baso # 0.02 0.00 - 0.20 K/uL    Gran% 83.2 (H) 38.0 - 73.0 %    Lymph% 6.2 (L) 18.0 - 48.0 %    Mono% 5.3 4.0 - 15.0 %    Eosinophil% 4.9 0.0 - 8.0 %    Basophil% 0.2 0.0 - 1.9 %    Differential Method Automated    Basic metabolic panel    Collection Time: 06/26/18  5:00 AM   Result Value Ref Range    Sodium 135 (L) 136 - 145 mmol/L    Potassium 3.9 3.5 - 5.1 mmol/L    Chloride 104 95 - 110 mmol/L    CO2 21 (L) 23 - 29 mmol/L    Glucose 106 70 - 110 mg/dL    BUN, Bld 31 (H)  6 - 20 mg/dL    Creatinine 2.7 (H) 0.5 - 1.4 mg/dL    Calcium 8.7 8.7 - 10.5 mg/dL    Anion Gap 10 8 - 16 mmol/L    eGFR if African American 24 (A) >60 mL/min/1.73 m^2    eGFR if non African American 21 (A) >60 mL/min/1.73 m^2   ]    I/O last 3 completed shifts:  In: 120 [P.O.:120]  Out: 4150 [Urine:4150]    Vitals:    06/25/18 2330 06/26/18 0443 06/26/18 0735 06/26/18 0808   BP: 128/75 116/68  120/60   Pulse: 94 83  79   Resp: 20 20  16   Temp: 98.2 °F (36.8 °C) 98.2 °F (36.8 °C)  97.1 °F (36.2 °C)   TempSrc:    Oral   SpO2:   98%    Weight:       Height:           No Jvd, Thyromegaly or Lymphadenopathy  Lungs: Fairly clear anteriorly and laterally  Cor: RRR no G or rubs  Abd: Soft benign good bowel sounds tender with diffuse and wall redness  Ext: No E C C    A)  LAURA 2nd to either obstructive uropathy, perforated bladder or sepsis. Was anuric x 3 days now with great uo.  Recent Hysterectomy  HTN  Wide anion gap met acidosis better    P)  Agressive hydration  Robbins cath for now  Close follow up bun/creat/k and co2  Uro eval ( was seen by Dr Le)

## 2018-06-26 NOTE — ASSESSMENT & PLAN NOTE
Appreciate Hospital Medicine and Nephrology's input  Accurate I&O with IVF and Robbins  Would need Urology consult.  Re.  Cystoscopy with Fluoro for possible bladder injury and urinoma, possible laparotomy and repair.  NPO

## 2018-06-26 NOTE — SUBJECTIVE & OBJECTIVE
Interval History:   See above    Scheduled Meds:   docusate sodium  200 mg Oral BID    pantoprazole  40 mg Intravenous Daily    piperacillin-tazobactam (ZOSYN) IVPB  4.5 g Intravenous Q8H    senna-docusate 8.6-50 mg  1 tablet Oral BID    sodium chloride 0.9%  1,000 mL Intravenous Once    sodium phosphates  1 enema Rectal Daily     Continuous Infusions:   sodium chloride 0.9% 125 mL/hr at 06/25/18 2057     PRN Meds:acetaminophen, acetaminophen, bisacodyl, magnesium hydroxide 400 mg/5 ml, morphine, morphine, ondansetron, promethazine (PHENERGAN) IVPB    Review of patient's allergies indicates:   Allergen Reactions    Ace inhibitors Other (See Comments)     cough       Objective:     Vital Signs (Most Recent):  Temp: 98.2 °F (36.8 °C) (06/26/18 0443)  Pulse: 83 (06/26/18 0443)  Resp: 20 (06/26/18 0443)  BP: 116/68 (06/26/18 0443)  SpO2: 96 % (06/25/18 1935) Vital Signs (24h Range):  Temp:  [97.6 °F (36.4 °C)-98.5 °F (36.9 °C)] 98.2 °F (36.8 °C)  Pulse:  [83-97] 83  Resp:  [20] 20  SpO2:  [96 %] 96 %  BP: (116-161)/(68-90) 116/68     Weight: 93.9 kg (207 lb)  Body mass index is 35.53 kg/m².  Patient's last menstrual period was 06/12/2018 (lmp unknown).    I&O (Last 24H):    Intake/Output Summary (Last 24 hours) at 06/26/18 0700  Last data filed at 06/26/18 0616   Gross per 24 hour   Intake              120 ml   Output             2150 ml   Net            -2030 ml       Physical Exam:   Constitutional: She appears well-developed and well-nourished. No distress.    HENT:   Head: Normocephalic and atraumatic.    Eyes: EOM are normal.    Neck: Normal range of motion.    Cardiovascular: Normal rate.     Pulmonary/Chest: Effort normal. No respiratory distress.        Abdominal: Soft. She exhibits distension. There is tenderness. There is guarding. There is no rebound.   Much less distended as compared to admission examination yesterday.  Obvious erythema on abdominal wall with tenderness, which was not noted on  admission.  Trochar sites healing.  Ecchymotic areas in lower abdomen.               Musculoskeletal: Normal range of motion.       Neurological: She is alert.    Skin: Skin is warm and dry.    Psychiatric: She has a normal mood and affect.   Feeling a little better       Laboratory:  ABGs:   Recent Labs  Lab 06/25/18  2335   PH 7.389   PCO2 28.2*   PO2 73*   HCO3 17.0*   POCSATURATED 95   BE -7     CBC:   Recent Labs  Lab 06/26/18  0500   WBC 11.28   RBC 3.78*   HGB 10.5*   HCT 30.5*      MCV 81*   MCH 27.8   MCHC 34.4     CMP:   Recent Labs  Lab 06/25/18  1708  06/26/18  0500   *  < > 106   CALCIUM 10.1  < > 8.7   ALBUMIN 3.4*  --   --    PROT 8.1  --   --    *  < > 135*   K 4.6  < > 3.9   CO2 16*  < > 21*   CL 93*  < > 104   BUN 65*  < > 31*   CREATININE 10.6*  < > 2.7*   ALKPHOS 76  --   --    ALT 17  --   --    AST 14  --   --    BILITOT 0.3  --   --    < > = values in this interval not displayed.  I have personallly reviewed all pertinent lab results from the last 24 hours.    Diagnostic Results:  Labs: Reviewed  CT: Reviewed  KUB reviewed     Possible bladder injury.  No obvious bowel obstruction

## 2018-06-26 NOTE — CONSULTS
[facility] Medicine  Consult Note    Patient Name: Sherrill Ennis  MRN: [id number]  Admission Date: 2018  Hospital Length of Stay: 1 days  Attending Physician: Vince Garcia MD   Primary Care Provider: Reagan Holland MD           Patient information was obtained from patient and ER records.     [facility] Medicine  History & Physical    Patient Name: Sherrill Ennis  MRN: [id number]  Admission Date: 2018  Attending Physician: Arnol Araiza MD, MPH      PCP:     Reagan Holland MD    CC:     Chief Complaint   Patient presents with    Dysuria     hysterectomy Wednesday; came Thursday due to anuria and got a catheterized in and out; anuria since Friday or Saturday; last BM last Monday    Constipation       HISTORY OF PRESENT ILLNESS:     Sherrill Ennis is a 43 y.o. female that (in part)  has a past medical history of Hypertension.  has a past surgical history that includes Tubal ligation; Cholecystectomy ();  section; laparoscopic supracervical hysterectomy (N/A, 2018); laparoscopic salpingectomy (Bilateral, 2018); laparoscopic surgical removal of cyst of ovary (Right, 2018); and Hysterectomy (2018). Presents to [facility] Emergency Department complaining of dysuria, urinary retention, constipation.  I was asked to see the patient in consultation who is 5 days status post laparoscopic hysterectomy.  She came directed by the OBGYN clinic for further evaluation of a urinary retention and constipation.  Upon arr to the emergency department as ival she was found to be septic with a white blood cell count of 17,000 and acute renal failure with a creatinine of 10.6 and a BUN of 65.  She has not had a bowel movement in several days.  Robbins catheter was placed after evidence of urinary retention.  A CT study showed free fluid in the pelvis.  There is concern that she  may have an intra-abdominal abscess or possible injury to her bladder causing the obstruction.  Nephrology and Urology were also consulted.  Empiric antibiotics were initiated after cultures were obtained.  The patient remained hemodynamically stable throughout the emergency department course and was felt to be stable for the floor.        REVIEW OF SYSTEMS:     -- Constitutional: No fever or chills.  -- Eyes: No visual changes, diplopia, pain, tearing, blind spots, or discharge.   -- Ears, nose, mouth, throat, and face: No congestion, sore throat, epistaxis, d/c, bleeding gums, neck stiffness masses, or dental issues.  -- Respiratory: No cough, shortness of breath, hemoptysis, stridor, wheezing, or night sweats.  -- Cardiovascular: No chest pain, BANEGAS, syncope, PND, edema, cyanosis, or palpitations.   -- Gastrointestinal:  As above in HPI  -- Genitourinary:  As above in the HPI  -- Integument/breast: No rash, pruritis, pigmentation changes, dryness, or changes in hair  -- Hematologic/lymphatic: No easy bruising or lymphadenopathy.   -- Musculoskeletal: No acute arthralgias, acute myalgias, joint swelling, acute limitations of ROM, or acute muscular weakness.  -- Neurological: No seizures, headaches, incoordination, paraesthesias, ataxia, vertigo, or tremors.  -- Behavioral/Psych: No auditory or visual hallucinations, depression, or suicidal/homicidal ideations.  -- Endocrine: No heat or cold intolerance, polydipsia, or unintentional weight gain / loss.  -- Allergy/Immunologic: No recurrent infections or adverse reaction to food, insects, or difficulty breathing.        PAST MEDICAL / SURGICAL HISTORY:     Past Medical History:   Diagnosis Date    Hypertension      Past Surgical History:   Procedure Laterality Date     SECTION      x4    CHOLECYSTECTOMY      HYSTERECTOMY  2018    LAPAROSCOPIC SALPINGECTOMY Bilateral 2018    Procedure: SALPINGECTOMY, LAPAROSCOPIC;  Surgeon: Vince Garcia MD;   Location: [facility] OR;  Service: OB/GYN;  Laterality: Bilateral;    LAPAROSCOPIC SUPRACERVICAL HYSTERECTOMY N/A 6/20/2018    Procedure: [id number];  Surgeon: Vince Garcia MD;  Location: [facility] OR;  Service: OB/GYN;  Laterality: N/A;  1ST CASE  RN PRE OP 6/13/2018    LAPAROSCOPIC SURGICAL REMOVAL OF CYST OF OVARY Right 6/20/2018    Procedure: EXCISION, CYST, OVARY, LAPAROSCOPIC;  Surgeon: Vince Garcia MD;  Location: [facility] OR;  Service: OB/GYN;  Laterality: Right;    TUBAL LIGATION           FAMILY HISTORY:     Family History   Problem Relation Age of Onset    Hypertension Mother     Stroke Mother     Aneurysm Mother     Hypertension Father     Hypertension Brother     Hypertension Maternal Grandfather     Heart disease Maternal Grandfather         MI    Cancer Maternal Grandfather     Cancer Paternal Grandmother          SOCIAL HISTORY:     Social History     Social History    Marital status:      Spouse name: N/A    Number of children: N/A    Years of education: N/A     Social History Main Topics    Smoking status: Current Every Day Smoker     Packs/day: 0.25     Years: 17.00     Types: Cigarettes    Smokeless tobacco: Never Used    Alcohol use Yes      Comment: Occasional    Drug use: No    Sexual activity: Not Currently     Partners: Male      Comment:      Other Topics Concern    None     Social History Narrative     since 2016    Together since 2013    He drives 18-wheelers    She is the  for a dental office    Previously  and     Lives with her  and youngest daughter.     with three daughters from previous marriage also         ALLERGIES:       Review of patient's allergies indicates:   Allergen Reactions    Ace inhibitors Other (See Comments)     cough           HOME MEDICATIONS:     Prior to Admission medications    Medication Sig Start Date End Date Taking? Authorizing Provider   amLODIPine (NORVASC)  10 MG tablet Take 1 tablet (10 mg total) by mouth once daily.  Patient taking differently: Take 5 mg by mouth once daily.  1/12/18   Reagan Holland MD   docusate sodium (COLACE) 100 MG capsule Take 1 capsule (100 mg total) by mouth 2 (two) times daily as needed for Constipation. 1/20/18   Reagan Holland MD   hydroCHLOROthiazide (HYDRODIURIL) 12.5 MG Tab Take 1 tablet (12.5 mg total) by mouth daily as needed. 5/10/18   Reagan Holland MD   ibuprofen (ADVIL,MOTRIN) 600 MG tablet Take 1 tablet (600 mg total) by mouth every 6 (six) hours as needed. 6/20/18 6/20/19  Vince Garcia MD   multivit,calc,mins/iron/folic (ONE-A-DAY WOMENS FORMULA ORAL) Take by mouth.    Tigre Noriega MD   oxyCODONE-acetaminophen (PERCOCET) 5-325 mg per tablet Take 1 tablet by mouth every 4 (four) hours as needed for Pain. 6/20/18   Vince Garcia MD   polyethylene glycol (GLYCOLAX) 17 gram/dose powder Take 17 g by mouth once daily. 6/25/18   Yaima Landry NP   promethazine (PHENERGAN) 25 MG tablet Take 1 tablet (25 mg total) by mouth every 4 (four) hours. 6/22/18   Vince Garcia MD   valACYclovir (VALTREX) 1000 MG tablet Take 1 tablet (1,000 mg total) by mouth daily as needed (herpes outbreak). For 5 days 1/19/18 1/19/19  Reagan Holland MD          HOSPITAL MEDICATIONS:     Scheduled Meds:    docusate sodium  200 mg Oral BID    pantoprazole  40 mg Intravenous Daily    piperacillin-tazobactam (ZOSYN) IVPB  4.5 g Intravenous Q8H    senna-docusate 8.6-50 mg  1 tablet Oral BID    sodium chloride 0.9%  1,000 mL Intravenous Once    sodium phosphates  1 enema Rectal Daily     Continuous Infusions:    sodium chloride 0.9% 125 mL/hr at 06/25/18 2057     PRN Meds: acetaminophen, acetaminophen, bisacodyl, magnesium hydroxide 400 mg/5 ml, morphine, morphine, ondansetron, promethazine (PHENERGAN) IVPB      PHYSICAL EXAM:     Wt Readings from Last 1 Encounters:   06/25/18 1935 93.9 kg (207 lb)   06/25/18 1556 93.9 kg (207 lb)  "    Body mass index is 35.53 kg/m².  Vitals:    06/25/18 1831 06/25/18 1935 06/25/18 2032 06/25/18 2330   BP: (!) 151/90 (!) 161/88 131/78 128/75   BP Location: Left arm Right arm Right arm    Patient Position: Lying Sitting Lying    Pulse: 94 97 92 94   Resp: 20 20 20 20   Temp: 98.2 °F (36.8 °C) 98 °F (36.7 °C) 97.6 °F (36.4 °C) 98.2 °F (36.8 °C)   TempSrc: Oral Oral Oral    SpO2: 96% 96%     Weight:  93.9 kg (207 lb)     Height:  5' 4" (1.626 m)        -- General appearance:  Middle-aged female who is lying in bed.  No apparent distress.   well developed. appears stated age   -- Head: normocephalic, atraumatic   -- Eyes: conjunctivae clear. Extraocular muscles intact  -- Nose: Nares normal. Septum midline.   -- Mouth/Throat: lips, mucosa, and tongue normal. no throat erythema.   -- Neck: supple, symmetrical, trachea midline, no JVD and thyroid not grossly enlarged, appears symmetric  -- Lungs: clear to auscultation bilaterally. normal respiratory effort. No use of accessory muscles.   -- Chest wall: no tenderness. equal bilateral chest rise   -- Heart:  Rapid rate and regular rhythm. S1, S2 normal.  no click, rub or gallop   -- Abdomen:  Multiple abdominal surgical scars consistent with recent laparoscopic hysterectomy.  Wounds appear clean dry and intact. Mild diffuse abdominal tenderness most significant over suprapubic region.   Obese.  soft, non-distended, non-tympanic; bowel sounds distant but normal otherwise; no masses  -- Extremities: no cyanosis, clubbing or edema.   -- Pulses: 2+ and symmetric   -- Skin:  Confluent erythematous region over left flank and abdomen. (I past that this be marked with an indelible marker with date and time). turgor normal.   -- Neurologic: Normal strength and tone. No focal numbness or weakness. CNII-XII intact. Tyshawn coma scale: eyes open spontaneously-4, oriented & converses-5, obeys commands-6.      LABORATORY STUDIES:     Recent Results (from the past 36 hour(s)) "   Urinalysis, Reflex to Urine Culture Urine, Clean Catch    Collection Time: 06/25/18  4:14 PM   Result Value Ref Range    Specimen UA Urine, Clean Catch     Color, UA Yellow Yellow, Straw, Michelle    Appearance, UA Clear Clear    pH, UA 5.0 5.0 - 8.0    Specific Gravity, UA 1.020 1.005 - 1.030    Protein, UA 1+ (A) Negative    Glucose, UA Negative Negative    Ketones, UA Negative Negative    Bilirubin (UA) Negative Negative    Occult Blood UA 1+ (A) Negative    Nitrite, UA Negative Negative    Urobilinogen, UA Negative <2.0 EU/dL    Leukocytes, UA Negative Negative   Urinalysis Microscopic    Collection Time: 06/25/18  4:14 PM   Result Value Ref Range    RBC, UA 2 0 - 4 /hpf    WBC, UA 3 0 - 5 /hpf    Bacteria, UA Moderate (A) None-Occ /hpf    Squam Epithel, UA 15 /hpf    Hyaline Casts, UA 0 0-1/lpf /lpf    Microscopic Comment SEE COMMENT    CBC auto differential    Collection Time: 06/25/18  5:08 PM   Result Value Ref Range    WBC 16.92 (H) 3.90 - 12.70 K/uL    RBC 4.55 4.00 - 5.40 M/uL    Hemoglobin 12.6 12.0 - 16.0 g/dL    Hematocrit 36.4 (L) 37.0 - 48.5 %    MCV 80 (L) 82 - 98 fL    MCH 27.7 27.0 - 31.0 pg    MCHC 34.6 32.0 - 36.0 g/dL    RDW 13.7 11.5 - 14.5 %    Platelets 462 (H) 150 - 350 K/uL    MPV 8.8 (L) 9.2 - 12.9 fL    Gran # (ANC) 14.5 (H) 1.8 - 7.7 K/uL    Lymph # 1.1 1.0 - 4.8 K/uL    Mono # 0.9 0.3 - 1.0 K/uL    Eos # 0.4 0.0 - 0.5 K/uL    Baso # 0.03 0.00 - 0.20 K/uL    Gran% 85.8 (H) 38.0 - 73.0 %    Lymph% 6.3 (L) 18.0 - 48.0 %    Mono% 5.1 4.0 - 15.0 %    Eosinophil% 2.4 0.0 - 8.0 %    Basophil% 0.2 0.0 - 1.9 %    Differential Method Automated    Comprehensive metabolic panel    Collection Time: 06/25/18  5:08 PM   Result Value Ref Range    Sodium 129 (L) 136 - 145 mmol/L    Potassium 4.6 3.5 - 5.1 mmol/L    Chloride 93 (L) 95 - 110 mmol/L    CO2 16 (L) 23 - 29 mmol/L    Glucose 117 (H) 70 - 110 mg/dL    BUN, Bld 65 (H) 6 - 20 mg/dL    Creatinine 10.6 (H) 0.5 - 1.4 mg/dL    Calcium 10.1 8.7 -  10.5 mg/dL    Total Protein 8.1 6.0 - 8.4 g/dL    Albumin 3.4 (L) 3.5 - 5.2 g/dL    Total Bilirubin 0.3 0.1 - 1.0 mg/dL    Alkaline Phosphatase 76 55 - 135 U/L    AST 14 10 - 40 U/L    ALT 17 10 - 44 U/L    Anion Gap 20 (H) 8 - 16 mmol/L    eGFR if African American 5 (A) >60 mL/min/1.73 m^2    eGFR if non African American 4 (A) >60 mL/min/1.73 m^2   Creatinine, urine, random    Collection Time: 06/25/18  7:59 PM   Result Value Ref Range    Creatinine, Random Ur 147.5 15.0 - 325.0 mg/dL   Sodium, urine, random    Collection Time: 06/25/18  7:59 PM   Result Value Ref Range    Sodium Urine Random 61 20 - 250 mmol/L   CBC auto differential    Collection Time: 06/25/18 10:48 PM   Result Value Ref Range    WBC 13.07 (H) 3.90 - 12.70 K/uL    RBC 4.32 4.00 - 5.40 M/uL    Hemoglobin 11.9 (L) 12.0 - 16.0 g/dL    Hematocrit 34.6 (L) 37.0 - 48.5 %    MCV 80 (L) 82 - 98 fL    MCH 27.5 27.0 - 31.0 pg    MCHC 34.4 32.0 - 36.0 g/dL    RDW 13.6 11.5 - 14.5 %    Platelets 397 (H) 150 - 350 K/uL    MPV 8.7 (L) 9.2 - 12.9 fL    Gran # (ANC) 11.1 (H) 1.8 - 7.7 K/uL    Lymph # 0.9 (L) 1.0 - 4.8 K/uL    Mono # 0.6 0.3 - 1.0 K/uL    Eos # 0.4 0.0 - 0.5 K/uL    Baso # 0.02 0.00 - 0.20 K/uL    Gran% 85.2 (H) 38.0 - 73.0 %    Lymph% 6.6 (L) 18.0 - 48.0 %    Mono% 4.8 4.0 - 15.0 %    Eosinophil% 3.0 0.0 - 8.0 %    Basophil% 0.2 0.0 - 1.9 %    Differential Method Automated    Basic metabolic panel    Collection Time: 06/25/18 10:48 PM   Result Value Ref Range    Sodium 133 (L) 136 - 145 mmol/L    Potassium 4.2 3.5 - 5.1 mmol/L    Chloride 100 95 - 110 mmol/L    CO2 18 (L) 23 - 29 mmol/L    Glucose 113 (H) 70 - 110 mg/dL    BUN, Bld 50 (H) 6 - 20 mg/dL    Creatinine 6.5 (H) 0.5 - 1.4 mg/dL    Calcium 9.3 8.7 - 10.5 mg/dL    Anion Gap 15 8 - 16 mmol/L    eGFR if African American 8 (A) >60 mL/min/1.73 m^2    eGFR if non African American 7 (A) >60 mL/min/1.73 m^2   Lactic acid, plasma    Collection Time: 06/25/18 10:48 PM   Result Value Ref Range     Lactate (Lactic Acid) 1.1 0.5 - 2.2 mmol/L   ISTAT PROCEDURE    Collection Time: 06/25/18 11:35 PM   Result Value Ref Range    POC PH 7.389 7.35 - 7.45    POC PCO2 28.2 (LL) 35 - 45 mmHg    POC PO2 73 (L) 80 - 100 mmHg    POC HCO3 17.0 (L) 24 - 28 mmol/L    POC BE -7 -2 to 2 mmol/L    POC SATURATED O2 95 95 - 100 %    POC TCO2 18 (L) 23 - 27 mmol/L    Sample ARTERIAL     Site RB     Allens Test Pass     DelSys Room Air     Mode SPONT        No results found for: INR, PROTIME  Lab Results   Component Value Date    HGBA1C 5.6 01/16/2018     No results for input(s): POCTGLUCOSE in the last 72 hours.        MICROBIOLOGY DATA:       Microbiology x 7d:   Microbiology Results (last 7 days)     Procedure Component Value Units Date/Time    Blood Culture #1 **CANNOT BE ORDERED STAT** [[id number]] Collected:  06/25/18 2240    Order Status:  Sent Specimen:  Blood Updated:  06/25/18 2253    Blood Culture #2 **CANNOT BE ORDERED STAT** [[id number]] Collected:  06/25/18 2248    Order Status:  Sent Specimen:  Blood from Antecubital, Right Updated:  06/25/18 2252            IMAGING:     Imaging Results          CT Abdomen Pelvis  Without Contrast (Final result)  Result time 06/25/18 19:39:59    Final result by Reji Hannon MD (06/25/18 19:39:59)                 Impression:      1. Moderate volume disorganized intraperitoneal free fluid of uncertain etiology.  No definite focal fluid collections or abscess allowing for lack of intravenous contrast.  No free air identified.  2. Postsurgical changes of recent supracervical hysterectomy.  3. Cholecystectomy.  4. Small left pleural effusion with bibasilar atelectatic change.  5. Colonic diverticulosis without convincing evidence of acute diverticulitis.  6. Additional findings as detailed above.      Electronically signed by: Reji Hannon MD  Date:    06/25/2018  Time:    19:39             Narrative:    EXAMINATION:  CT ABDOMEN PELVIS WITHOUT CONTRAST    CLINICAL  HISTORY:  abdominal pain;    TECHNIQUE:  Low dose axial images, sagittal and coronal reformations were obtained from the lung bases to the pubic symphysis.  Oral contrast was not administered.    COMPARISON:  None    FINDINGS:  The visualized portion of the heart is unremarkable.  There is small left pleural effusion resulting in mild volume loss and compressive atelectasis within the left lower lobe.  Additional subsegmental atelectasis is seen within the right lower lobe.    Liver is diffusely hypodense in appearance suggestive for diffuse fatty infiltration.  There is no intra-or extrahepatic biliary ductal dilatation.  Gallbladder has been removed.  The stomach, pancreas, spleen, and adrenal glands are unremarkable.    Kidneys show no evidence of stones or hydronephrosis. Ureters are unremarkable along their courses.  Urinary bladder is unremarkable.  Postsurgical changes visualized consistent with recent supracervical hysterectomy.    Appendix is not definitely identified.  No evidence of bowel obstruction.  Moderate retained stool is visualized within the right colon and transverse colon.  There is diverticulosis of the descending and sigmoid colon.  There is moderate volume disorganized intraperitoneal fluid of uncertain etiology.  No free air identified.  Additional mild mesenteric edema.    Aorta tapers normally with mild atherosclerosis.    No acute osseous abnormality identified.    There is diffuse subcutaneous soft tissue stranding and edema visualized involving the lower anterior abdominal wall.  No evidence of focal fluid collections or abscess.                               X-Ray Abdomen Flat And Erect (Final result)  Result time 06/25/18 19:04:14    Final result by Sandeep Kumar MD (06/25/18 19:04:14)                 Impression:      Suspected colonic air-fluid levels at the upper abdomen suggestive of a nonspecific diarrheal illness.  No convincing evidence of bowel obstruction at this  time.      Electronically signed by: Sandeep Kumar MD  Date:    06/25/2018  Time:    19:04             Narrative:    EXAMINATION:  XR ABDOMEN FLAT AND ERECT    CLINICAL HISTORY:  Unspecified abdominal pain    TECHNIQUE:  Flat and erect AP views of the abdomen were performed.    COMPARISON:  Chest radiograph 06/13/2018    FINDINGS:  Large body habitus.  Suspected air-fluid levels within the colonic hepatic flexure and also transverse colon at the midline and right upper abdomen suggesting a nonspecific diarrheal illness.  No dilated small bowel loops or definite small bowel air-fluid levels to suggest obstruction.  No organomegaly or significant mass effect.  No large amount of free air.  Surgical clip adjacent to the right aspect of L5 vertebral body.  Pelvic phleboliths noted.  Linear opacities at the bilateral lung bases suggesting subsegmental scarring versus atelectasis.  Included osseous structures show mild degenerative change without acute or destructive process seen.                                  CONSULTS:     IP CONSULT TO HOSPITAL MEDICINE  IP CONSULT TO NEPHROLOGY  IP CONSULT TO UROLOGY       ASSESSMENT & PLAN:     Primary Diagnosis:  Acute renal failure    Active Hospital Problems    Diagnosis  POA    *Acute renal failure [N17.9]  Yes     Priority: 1 - High    Acute urinary obstruction [N13.9]  Yes     Priority: 2     Urinary retention [R33.9]  Yes    Constipation [K59.00]  Yes    Renal failure [N19]  Yes    Status post laparoscopic supracervical hysterectomy [Z90.711]  Yes    Essential (primary) hypertension [I10]  Yes     Chronic      Resolved Hospital Problems    Diagnosis Date Resolved POA   No resolved problems to display.       Acute renal failure most likely secondary to postobstructive etiology  · As evidenced by precipitous decrease in GFR.    · Will evaluate for pre-renal, intrarenal, and post-obstructive etiology (the latter most likely).  · Obtain:  1.  protein/creatinine  "ratio  2. urine and serum osmolalities  3. urine electrolytes (Na, Cl, K)  4. LDH  5. Complement  6. Saldaña's stain for eosinophils  · Renal ultrasound  · Monitor with serial Cr / GFR levels closely with serial labs  · Avoid nephrotoxic medications such as NSAIDs, IV contrast, or RAAS blockade  · Nephrology consult  · Urology consult    Abnormal abdominal CT with possible infected fluid collection   · CT demonstrates: "Moderate volume disorganized intraperitoneal free fluid of uncertain etiology.  No definite focal fluid collections or abscess allowing for lack of intravenous contrast.  No free air identified."  · There is concern for possible bladder injury vs fluid collection causing obstruction of the bladder resulting in renal failure as noted above  · Further evaluation with cystoscopy by Urology    Status post laparoscopic supracervical hysterectomy  · Management per primary team  · Otherwise, as discussed above    Constipation  · Scheduled stool softeners; MiraLax or Senokot S  · Consider enema as needed    Essential Hypertension  · Goal while inpatient is a systolic blood pressure less than 160mmHg  · BP in acceptable range at this time  · holding current home antihypertensive regimen especially beta-blockers, in the setting of sepsis  · PRN antihypertensives available    Morbid obesity  · Body mass index is 35.53 kg/m².  · Order a cardiac diet  · Counseling given  · Nutrition consult as an outpatient            VTE Risk Mitigation         Ordered     IP VTE HIGH RISK PATIENT  Once      06/25/18 2037     Place MARY hose  Until discontinued      06/25/18 2037     Place sequential compression device  Until discontinued      06/25/18 2037            Adult PRN medications available   DVT prophylaxis given       DISPOSITION:      Hospital Medicine service will continue to follow for further evaluation and treatment.    Chart reviewed and updated where applicable.    High Risk Conditions:  Patient has a condition " that poses threat to life and bodily function:  Sepsis and acute renal failure      ===============================================================    Arnol Araiza MD, MPH  Department of Hospital Medicine   [facility]  [phone] pg  (7pm - 6am)        This note is dictated using Dragon Medical 360 voice recognition software.  There are word recognition mistakes that are occasionally missed on review.      Consults

## 2018-06-26 NOTE — PROGRESS NOTES
"[facility]  Obstetrics & Gynecology  Progress Note    Patient Name: Sherrill Ennis  MRN: [id number]  Admission Date: 2018  Primary Care Provider: Reagan Holland MD  Principal Problem: Acute renal failure    Subjective:     HPI:  42 yo   Status post laparoscopic supracervical hysterectomy with bilateral salpingectomy and lysis of omental adhesions on 2018.  Discharged home the same day.  Had problems with urinary retention.  Went to our Emergency Department on 2018 with feeling of incomplete emptying.  Postvoid residual volume was 150 cc.  Came to my office today, 2018 for care.  She states that she has significant pain with constipation and problems voiding.    She had not been voiding for the past three days!!!  Denies fever or chills.  Some "sweating at time".  Minimal nausea.  Has been trying to eat.  Tolerating oral intake without vomiting.         Interval History:   See above    Scheduled Meds:   docusate sodium  200 mg Oral BID    pantoprazole  40 mg Intravenous Daily    piperacillin-tazobactam (ZOSYN) IVPB  4.5 g Intravenous Q8H    senna-docusate 8.6-50 mg  1 tablet Oral BID    sodium chloride 0.9%  1,000 mL Intravenous Once    sodium phosphates  1 enema Rectal Daily     Continuous Infusions:   sodium chloride 0.9% 125 mL/hr at 18     PRN Meds:acetaminophen, acetaminophen, bisacodyl, magnesium hydroxide 400 mg/5 ml, morphine, morphine, ondansetron, promethazine (PHENERGAN) IVPB    Review of patient's allergies indicates:   Allergen Reactions    Ace inhibitors Other (See Comments)     cough       Objective:     Vital Signs (Most Recent):  Temp: 98.2 °F (36.8 °C) (18 0443)  Pulse: 83 (18)  Resp: 20 (18)  BP: 116/68 (18)  SpO2: 96 % (18) Vital Signs (24h Range):  Temp:  [97.6 °F (36.4 °C)-98.5 °F (36.9 °C)] 98.2 °F (36.8 °C)  Pulse:  [83-97] 83  Resp:  [20] 20  SpO2:  [96 %] 96 %  BP: " (116-161)/(68-90) 116/68     Weight: 93.9 kg (207 lb)  Body mass index is 35.53 kg/m².  Patient's last menstrual period was 06/12/2018 (lmp unknown).    I&O (Last 24H):    Intake/Output Summary (Last 24 hours) at 06/26/18 0700  Last data filed at 06/26/18 0616   Gross per 24 hour   Intake              120 ml   Output             2150 ml   Net            -2030 ml       Physical Exam:   Constitutional: She appears well-developed and well-nourished. No distress.    HENT:   Head: Normocephalic and atraumatic.    Eyes: EOM are normal.    Neck: Normal range of motion.    Cardiovascular: Normal rate.     Pulmonary/Chest: Effort normal. No respiratory distress.        Abdominal: Soft. She exhibits distension. There is tenderness. There is guarding. There is no rebound.   Much less distended as compared to admission examination yesterday.  Obvious erythema on abdominal wall with tenderness, which was not noted on admission.  Trochar sites healing.  Ecchymotic areas in lower abdomen.               Musculoskeletal: Normal range of motion.       Neurological: She is alert.    Skin: Skin is warm and dry.    Psychiatric: She has a normal mood and affect.   Feeling a little better       Laboratory:  ABGs:   Recent Labs  Lab 06/25/18  2335   PH 7.389   PCO2 28.2*   PO2 73*   HCO3 17.0*   POCSATURATED 95   BE -7     CBC:   Recent Labs  Lab 06/26/18  0500   WBC 11.28   RBC 3.78*   HGB 10.5*   HCT 30.5*      MCV 81*   MCH 27.8   MCHC 34.4     CMP:   Recent Labs  Lab 06/25/18  1708  06/26/18  0500   *  < > 106   CALCIUM 10.1  < > 8.7   ALBUMIN 3.4*  --   --    PROT 8.1  --   --    *  < > 135*   K 4.6  < > 3.9   CO2 16*  < > 21*   CL 93*  < > 104   BUN 65*  < > 31*   CREATININE 10.6*  < > 2.7*   ALKPHOS 76  --   --    ALT 17  --   --    AST 14  --   --    BILITOT 0.3  --   --    < > = values in this interval not displayed.  I have personallly reviewed all pertinent lab results from the last 24 hours.    Diagnostic  Results:  Labs: Reviewed  CT: Reviewed  KUB reviewed     Possible bladder injury.  No obvious bowel obstruction    Assessment/Plan:     * Acute renal failure    Appreciate Hospital Medicine and Nephrology's input  Accurate I&O with IVF and Robbins  Would need Urology consult.  Re.  Cystoscopy with Fluoro for possible bladder injury and urinoma, possible laparotomy and repair.  NPO        Cellulitis of abdominal wall    On Zosyn        Acute urinary obstruction    Improving        Renal failure    Improving        Constipation    Stool softener.  No longer with constipation        Urinary retention    Robbins to gravity.  Voiding with normalizing BUN/Creat        Status post laparoscopic supracervical hysterectomy    Pain control            Vu Zahida Garcia MD  Obstetrics & Gynecology  [facility] [place]

## 2018-06-27 PROBLEM — S37.20XA: Status: ACTIVE | Noted: 2018-06-27

## 2018-06-27 PROBLEM — K56.7 ILEUS, POSTOPERATIVE: Status: ACTIVE | Noted: 2018-06-27

## 2018-06-27 PROBLEM — K91.89 ILEUS, POSTOPERATIVE: Status: ACTIVE | Noted: 2018-06-27

## 2018-06-27 LAB
ALBUMIN SERPL BCP-MCNC: 2.2 G/DL
ALP SERPL-CCNC: 51 U/L
ALT SERPL W/O P-5'-P-CCNC: 15 U/L
ANION GAP SERPL CALC-SCNC: 7 MMOL/L
AST SERPL-CCNC: 13 U/L
BASOPHILS # BLD AUTO: 0.01 K/UL
BASOPHILS # BLD AUTO: 0.01 K/UL
BASOPHILS NFR BLD: 0.1 %
BASOPHILS NFR BLD: 0.1 %
BILIRUB SERPL-MCNC: 0.2 MG/DL
BUN SERPL-MCNC: 7 MG/DL
CALCIUM SERPL-MCNC: 8.4 MG/DL
CHLORIDE SERPL-SCNC: 105 MMOL/L
CO2 SERPL-SCNC: 26 MMOL/L
CREAT SERPL-MCNC: 0.7 MG/DL
DIFFERENTIAL METHOD: ABNORMAL
DIFFERENTIAL METHOD: ABNORMAL
EOSINOPHIL # BLD AUTO: 0.3 K/UL
EOSINOPHIL # BLD AUTO: 0.3 K/UL
EOSINOPHIL NFR BLD: 3.6 %
EOSINOPHIL NFR BLD: 3.6 %
ERYTHROCYTE [DISTWIDTH] IN BLOOD BY AUTOMATED COUNT: 13.7 %
ERYTHROCYTE [DISTWIDTH] IN BLOOD BY AUTOMATED COUNT: 13.7 %
EST. GFR  (AFRICAN AMERICAN): >60 ML/MIN/1.73 M^2
EST. GFR  (NON AFRICAN AMERICAN): >60 ML/MIN/1.73 M^2
GLUCOSE SERPL-MCNC: 102 MG/DL
HCT VFR BLD AUTO: 27.8 %
HCT VFR BLD AUTO: 27.8 %
HGB BLD-MCNC: 9.3 G/DL
HGB BLD-MCNC: 9.3 G/DL
LYMPHOCYTES # BLD AUTO: 1 K/UL
LYMPHOCYTES # BLD AUTO: 1 K/UL
LYMPHOCYTES NFR BLD: 12 %
LYMPHOCYTES NFR BLD: 12 %
MCH RBC QN AUTO: 27.9 PG
MCH RBC QN AUTO: 27.9 PG
MCHC RBC AUTO-ENTMCNC: 33.5 G/DL
MCHC RBC AUTO-ENTMCNC: 33.5 G/DL
MCV RBC AUTO: 84 FL
MCV RBC AUTO: 84 FL
MONOCYTES # BLD AUTO: 0.6 K/UL
MONOCYTES # BLD AUTO: 0.6 K/UL
MONOCYTES NFR BLD: 6.7 %
MONOCYTES NFR BLD: 6.7 %
NEUTROPHILS # BLD AUTO: 6.7 K/UL
NEUTROPHILS # BLD AUTO: 6.7 K/UL
NEUTROPHILS NFR BLD: 77.6 %
NEUTROPHILS NFR BLD: 77.6 %
PLATELET # BLD AUTO: 301 K/UL
PLATELET # BLD AUTO: 301 K/UL
PMV BLD AUTO: 8.3 FL
PMV BLD AUTO: 8.3 FL
POTASSIUM SERPL-SCNC: 3.7 MMOL/L
PROT SERPL-MCNC: 5.5 G/DL
RBC # BLD AUTO: 3.33 M/UL
RBC # BLD AUTO: 3.33 M/UL
SODIUM SERPL-SCNC: 138 MMOL/L
WBC # BLD AUTO: 8.61 K/UL
WBC # BLD AUTO: 8.61 K/UL

## 2018-06-27 PROCEDURE — 12000002 HC ACUTE/MED SURGE SEMI-PRIVATE ROOM

## 2018-06-27 PROCEDURE — 36415 COLL VENOUS BLD VENIPUNCTURE: CPT

## 2018-06-27 PROCEDURE — 99232 SBSQ HOSP IP/OBS MODERATE 35: CPT | Mod: 24,,, | Performed by: OBSTETRICS & GYNECOLOGY

## 2018-06-27 PROCEDURE — 25000003 PHARM REV CODE 250: Performed by: NURSE PRACTITIONER

## 2018-06-27 PROCEDURE — 63600175 PHARM REV CODE 636 W HCPCS: Performed by: EMERGENCY MEDICINE

## 2018-06-27 PROCEDURE — 25000003 PHARM REV CODE 250: Performed by: UROLOGY

## 2018-06-27 PROCEDURE — 99024 POSTOP FOLLOW-UP VISIT: CPT | Mod: ,,, | Performed by: UROLOGY

## 2018-06-27 PROCEDURE — 63600175 PHARM REV CODE 636 W HCPCS: Performed by: OBSTETRICS & GYNECOLOGY

## 2018-06-27 PROCEDURE — 80053 COMPREHEN METABOLIC PANEL: CPT

## 2018-06-27 PROCEDURE — 85025 COMPLETE CBC W/AUTO DIFF WBC: CPT

## 2018-06-27 PROCEDURE — 25000003 PHARM REV CODE 250: Performed by: EMERGENCY MEDICINE

## 2018-06-27 PROCEDURE — 25000003 PHARM REV CODE 250: Performed by: OBSTETRICS & GYNECOLOGY

## 2018-06-27 PROCEDURE — 63600175 PHARM REV CODE 636 W HCPCS: Performed by: NURSE PRACTITIONER

## 2018-06-27 PROCEDURE — 63600175 PHARM REV CODE 636 W HCPCS: Performed by: UROLOGY

## 2018-06-27 PROCEDURE — C9113 INJ PANTOPRAZOLE SODIUM, VIA: HCPCS | Performed by: OBSTETRICS & GYNECOLOGY

## 2018-06-27 RX ORDER — VANCOMYCIN HCL IN 5 % DEXTROSE 1G/250ML
1000 PLASTIC BAG, INJECTION (ML) INTRAVENOUS
Status: DISCONTINUED | OUTPATIENT
Start: 2018-06-27 | End: 2018-06-29 | Stop reason: HOSPADM

## 2018-06-27 RX ORDER — HYDROCODONE BITARTRATE AND ACETAMINOPHEN 10; 325 MG/1; MG/1
1 TABLET ORAL EVERY 6 HOURS PRN
Status: DISCONTINUED | OUTPATIENT
Start: 2018-06-27 | End: 2018-06-29 | Stop reason: HOSPADM

## 2018-06-27 RX ADMIN — MAGNESIUM HYDROXIDE 2400 MG: 400 SUSPENSION ORAL at 08:06

## 2018-06-27 RX ADMIN — DOCUSATE SODIUM AND SENNOSIDES 1 TABLET: 8.6; 5 TABLET, FILM COATED ORAL at 08:06

## 2018-06-27 RX ADMIN — VANCOMYCIN HYDROCHLORIDE 1000 MG: 1 INJECTION, POWDER, LYOPHILIZED, FOR SOLUTION INTRAVENOUS at 08:06

## 2018-06-27 RX ADMIN — DOCUSATE SODIUM 200 MG: 100 CAPSULE, LIQUID FILLED ORAL at 08:06

## 2018-06-27 RX ADMIN — SODIUM CHLORIDE, SODIUM LACTATE, POTASSIUM CHLORIDE, AND CALCIUM CHLORIDE: .6; .31; .03; .02 INJECTION, SOLUTION INTRAVENOUS at 12:06

## 2018-06-27 RX ADMIN — PIPERACILLIN AND TAZOBACTAM 4.5 G: 4; .5 INJECTION, POWDER, LYOPHILIZED, FOR SOLUTION INTRAVENOUS; PARENTERAL at 11:06

## 2018-06-27 RX ADMIN — HYDROCODONE BITARTRATE AND ACETAMINOPHEN 1 TABLET: 10; 325 TABLET ORAL at 08:06

## 2018-06-27 RX ADMIN — VANCOMYCIN HYDROCHLORIDE 1000 MG: 1 INJECTION, POWDER, LYOPHILIZED, FOR SOLUTION INTRAVENOUS at 05:06

## 2018-06-27 RX ADMIN — PIPERACILLIN AND TAZOBACTAM 4.5 G: 4; .5 INJECTION, POWDER, LYOPHILIZED, FOR SOLUTION INTRAVENOUS; PARENTERAL at 07:06

## 2018-06-27 RX ADMIN — OXYBUTYNIN CHLORIDE 5 MG: 5 TABLET ORAL at 08:06

## 2018-06-27 RX ADMIN — HYDROCODONE BITARTRATE AND ACETAMINOPHEN 1 TABLET: 5; 325 TABLET ORAL at 02:06

## 2018-06-27 RX ADMIN — PANTOPRAZOLE SODIUM 40 MG: 40 INJECTION, POWDER, FOR SOLUTION INTRAVENOUS at 10:06

## 2018-06-27 RX ADMIN — PIPERACILLIN AND TAZOBACTAM 4.5 G: 4; .5 INJECTION, POWDER, LYOPHILIZED, FOR SOLUTION INTRAVENOUS; PARENTERAL at 02:06

## 2018-06-27 RX ADMIN — HYDROCODONE BITARTRATE AND ACETAMINOPHEN 1 TABLET: 5; 325 TABLET ORAL at 08:06

## 2018-06-27 RX ADMIN — OXYBUTYNIN CHLORIDE 5 MG: 5 TABLET ORAL at 02:06

## 2018-06-27 RX ADMIN — ATROPA BELLADONNA AND OPIUM 60 MG: 16.2; 6 SUPPOSITORY RECTAL at 02:06

## 2018-06-27 RX ADMIN — VANCOMYCIN HYDROCHLORIDE 1000 MG: 1 INJECTION, POWDER, LYOPHILIZED, FOR SOLUTION INTRAVENOUS at 12:06

## 2018-06-27 RX ADMIN — MORPHINE SULFATE 4 MG: 4 INJECTION INTRAVENOUS at 03:06

## 2018-06-27 RX ADMIN — SODIUM PHOSPHATE, DIBASIC AND SODIUM PHOSPHATE, MONOBASIC 1 ENEMA: 7; 19 ENEMA RECTAL at 10:06

## 2018-06-27 NOTE — PROGRESS NOTES
Awake alert oriented NAD. CO post op pain    Findings showed bladder perforation repaired by Dr Le    Denalexa CNS ENT CP  RHEUM OR DERM SX  Past Medical History:   Diagnosis Date    Hypertension      Review of patient's allergies indicates:   Allergen Reactions    Ace inhibitors Other (See Comments)     cough       Current Facility-Administered Medications   Medication    acetaminophen tablet 650 mg    acetaminophen tablet 650 mg    bisacodyl suppository 10 mg    docusate sodium capsule 200 mg    HYDROcodone-acetaminophen  mg per tablet 1 tablet    HYDROcodone-acetaminophen 5-325 mg per tablet 1 tablet    lactated ringers infusion    magnesium hydroxide 400 mg/5 ml suspension 2,400 mg    morphine injection 4 mg    morphine injection 6 mg    ondansetron disintegrating tablet 8 mg    ondansetron injection 4 mg    opium-belladonna 16.2-60 mg suppository 60 mg    oxybutynin tablet 5 mg    pantoprazole 40 mg in dextrose 5 % 100 mL infusion (ready to mix system)    piperacillin-tazobactam 4.5 g in sodium chloride 0.9% 100 mL IVPB (ready to mix system)    promethazine (PHENERGAN) 6.25 mg in dextrose 5 % 50 mL IVPB    senna-docusate 8.6-50 mg per tablet 1 tablet    sodium chloride 0.9% bolus 1,000 mL    sodium chloride 0.9% flush 3 mL    sodium phosphates 19-7 gram/118 mL enema 1 enema    vancomycin in dextrose 5 % 1 gram/250 mL IVPB 1,000 mg       LABS    Recent Results (from the past 24 hour(s))   CBC auto differential    Collection Time: 06/27/18  3:33 AM   Result Value Ref Range    WBC 8.61 3.90 - 12.70 K/uL    RBC 3.33 (L) 4.00 - 5.40 M/uL    Hemoglobin 9.3 (L) 12.0 - 16.0 g/dL    Hematocrit 27.8 (L) 37.0 - 48.5 %    MCV 84 82 - 98 fL    MCH 27.9 27.0 - 31.0 pg    MCHC 33.5 32.0 - 36.0 g/dL    RDW 13.7 11.5 - 14.5 %    Platelets 301 150 - 350 K/uL    MPV 8.3 (L) 9.2 - 12.9 fL    Gran # (ANC) 6.7 1.8 - 7.7 K/uL    Lymph # 1.0 1.0 - 4.8 K/uL    Mono # 0.6 0.3 - 1.0 K/uL    Eos # 0.3 0.0  - 0.5 K/uL    Baso # 0.01 0.00 - 0.20 K/uL    Gran% 77.6 (H) 38.0 - 73.0 %    Lymph% 12.0 (L) 18.0 - 48.0 %    Mono% 6.7 4.0 - 15.0 %    Eosinophil% 3.6 0.0 - 8.0 %    Basophil% 0.1 0.0 - 1.9 %    Differential Method Automated    Basic metabolic panel    Collection Time: 06/27/18  3:33 AM   Result Value Ref Range    Sodium 138 136 - 145 mmol/L    Potassium 3.7 3.5 - 5.1 mmol/L    Chloride 105 95 - 110 mmol/L    CO2 26 23 - 29 mmol/L    Glucose 102 70 - 110 mg/dL    BUN, Bld 7 6 - 20 mg/dL    Creatinine 0.7 0.5 - 1.4 mg/dL    Calcium 8.4 (L) 8.7 - 10.5 mg/dL    Anion Gap 7 (L) 8 - 16 mmol/L    eGFR if African American >60 >60 mL/min/1.73 m^2    eGFR if non African American >60 >60 mL/min/1.73 m^2   Basic metabolic panel    Collection Time: 06/27/18  3:33 AM   Result Value Ref Range    Sodium 138 136 - 145 mmol/L    Potassium 3.7 3.5 - 5.1 mmol/L    Chloride 105 95 - 110 mmol/L    CO2 26 23 - 29 mmol/L    Glucose 102 70 - 110 mg/dL    BUN, Bld 7 6 - 20 mg/dL    Creatinine 0.7 0.5 - 1.4 mg/dL    Calcium 8.4 (L) 8.7 - 10.5 mg/dL    Anion Gap 7 (L) 8 - 16 mmol/L    eGFR if African American >60 >60 mL/min/1.73 m^2    eGFR if non African American >60 >60 mL/min/1.73 m^2   CBC with Auto Differential    Collection Time: 06/27/18  3:33 AM   Result Value Ref Range    WBC 8.61 3.90 - 12.70 K/uL    RBC 3.33 (L) 4.00 - 5.40 M/uL    Hemoglobin 9.3 (L) 12.0 - 16.0 g/dL    Hematocrit 27.8 (L) 37.0 - 48.5 %    MCV 84 82 - 98 fL    MCH 27.9 27.0 - 31.0 pg    MCHC 33.5 32.0 - 36.0 g/dL    RDW 13.7 11.5 - 14.5 %    Platelets 301 150 - 350 K/uL    MPV 8.3 (L) 9.2 - 12.9 fL    Gran # (ANC) 6.7 1.8 - 7.7 K/uL    Lymph # 1.0 1.0 - 4.8 K/uL    Mono # 0.6 0.3 - 1.0 K/uL    Eos # 0.3 0.0 - 0.5 K/uL    Baso # 0.01 0.00 - 0.20 K/uL    Gran% 77.6 (H) 38.0 - 73.0 %    Lymph% 12.0 (L) 18.0 - 48.0 %    Mono% 6.7 4.0 - 15.0 %    Eosinophil% 3.6 0.0 - 8.0 %    Basophil% 0.1 0.0 - 1.9 %    Differential Method Automated    Comprehensive metabolic panel     Collection Time: 06/27/18  3:33 AM   Result Value Ref Range    Sodium 138 136 - 145 mmol/L    Potassium 3.7 3.5 - 5.1 mmol/L    Chloride 105 95 - 110 mmol/L    CO2 26 23 - 29 mmol/L    Glucose 102 70 - 110 mg/dL    BUN, Bld 7 6 - 20 mg/dL    Creatinine 0.7 0.5 - 1.4 mg/dL    Calcium 8.4 (L) 8.7 - 10.5 mg/dL    Total Protein 5.5 (L) 6.0 - 8.4 g/dL    Albumin 2.2 (L) 3.5 - 5.2 g/dL    Total Bilirubin 0.2 0.1 - 1.0 mg/dL    Alkaline Phosphatase 51 (L) 55 - 135 U/L    AST 13 10 - 40 U/L    ALT 15 10 - 44 U/L    Anion Gap 7 (L) 8 - 16 mmol/L    eGFR if African American >60 >60 mL/min/1.73 m^2    eGFR if non African American >60 >60 mL/min/1.73 m^2   ]    I/O last 3 completed shifts:  In: 5199.2 [P.O.:1170; I.V.:3379.2; IV Piggyback:650]  Out: 7510 [Urine:7250; Drains:260]    Vitals:    06/27/18 0045 06/27/18 0209 06/27/18 0404 06/27/18 0701   BP:   125/62 (!) 108/58   Pulse:   83 75   Resp:   18 18   Temp: 99.7 °F (37.6 °C) 99 °F (37.2 °C) 97.7 °F (36.5 °C) 98.4 °F (36.9 °C)   TempSrc:    Oral   SpO2:       Weight:       Height:           No Jvd, Thyromegaly or Lymphadenopathy  Lungs: Fairly clear anteriorly and laterally  Cor: RRR no G or rubs  Abd: Soft benign good bowel sounds tender  Ext: No E C C    A)  LAURA 2nd to bladder perforation repaired. Great uo creat down to normal  Recent Hysterectomy  HTN  Wide anion gap met acidosis better      P)    Renal fx back to normal    Will sign off at this time     Thanks    Please reconsult if needed    GR

## 2018-06-27 NOTE — ASSESSMENT & PLAN NOTE
Now status post Cystoscopy, retrograde pyelogram and cystogram, exploratory laparotomy, repair of bladder injury secondary to cautery by Dr Le on 6/26/2018  Doing better this morning  Pain better.

## 2018-06-27 NOTE — CARE UPDATE
Now postop cystoscopy, retrograde pyelogram and cystogram  Status post laparotomy, repair of bladder injury secondary to cautery.  Feeling better.  Vitals normal  Abdomen: Minimally tender and distension.  Good bowel sound.  Erythema improves.  Trochar sites healing well.  Minimal guarding.  No rebound.  Laparotomy incision with dressing in place.  Robbins draining clear urine.    Discussed with patient and family again regarding findings at laparotomy.  All questions answered.    Patient voiced understanding.  Knew that these were expected complications of surgery.    Vince Garcia MD

## 2018-06-27 NOTE — PROGRESS NOTES
[facility]  Urology  Progress Note    Patient Name: Sherrill Ennis  MRN: [id number]  Admission Date: 6/25/2018  Hospital Length of Stay: 2 days  Code Status: Full Code   Attending Provider: Vince Garcia MD   Primary Care Physician: Reagan Holland MD    Subjective:     HPI:  Urinary Retention  This is a 43 year old woman who is POD #6 from 1.  Laparoscopic Supracervical Hysterectomy 2.  Bilateral salpingectomy 3.  Removal of right ovarian cysts  4.  Lysis of adhesions by Dr. Pickard.  She was discharged home that day.  She reports voiding prior to discharge.  She did not void for almost 24 hours and presented to the hospital. A catheter was placed for 150 mL in bladder.  She represented to Dr. Pickard with pelvic pain, constipation, and anuria/retention for 3 days.  Dr. Pickard sent her to the ED for CT and further evaluation.  She currently has a catheter but still with abdominal pain.  Mild nausea.  Constipation resolved.      Interval History: Cystotomy repair on 6/26/2018.  Significant pain last night.  Feeling better this morning.  No flatus.      Review of Systems   Constitutional: Negative.  Negative for fever.   HENT: Negative.    Eyes: Negative.    Respiratory: Negative for cough, chest tightness and shortness of breath.    Cardiovascular: Negative for chest pain.   Gastrointestinal: Positive for abdominal pain. Negative for constipation, diarrhea and nausea.   Genitourinary: Negative for hematuria.   Musculoskeletal: Negative.    Neurological: Negative.    Psychiatric/Behavioral: Negative.      Objective:     Temp:  [96.6 °F (35.9 °C)-99.8 °F (37.7 °C)] 97.7 °F (36.5 °C)  Pulse:  [79-96] 83  Resp:  [15-33] 18  SpO2:  [81 %-97 %] 93 %  BP: (120-173)/(60-97) 125/62     Body mass index is 33.81 kg/m².       Post Void Cath Residual (mL): 350 mL (urine returnd with cath) (06/25/18 1940)    Drains     Drain                 Closed/Suction Drain 06/26/18 1440 Right Abdomen Bulb 19 Fr. less  than 1 day         Urethral Catheter 06/26/18 1301 Double-lumen 20 Fr. less than 1 day                Physical Exam   Nursing note and vitals reviewed.  Constitutional: She is oriented to person, place, and time. She appears well-developed.   HENT:   Head: Normocephalic.   Eyes: Conjunctivae are normal.   Neck: Normal range of motion. No tracheal deviation present. No thyromegaly present.   Cardiovascular: Normal rate, normal heart sounds and normal pulses.    Pulmonary/Chest: Effort normal and breath sounds normal. No respiratory distress. She has no wheezes.   Abdominal: Soft. She exhibits no distension and no mass. There is no hepatosplenomegaly. There is no tenderness. There is no rebound, no guarding and no CVA tenderness. No hernia.   Dressing dry  Erythema of abdominal skin present but improved compared to preop      YONNY serous    Musculoskeletal: Normal range of motion. She exhibits no edema or tenderness.   Lymphadenopathy:     She has no cervical adenopathy.   Neurological: She is alert and oriented to person, place, and time.   Skin: Skin is warm and dry. No rash noted. No erythema.     Psychiatric: She has a normal mood and affect. Her behavior is normal. Judgment and thought content normal.       Significant Labs:    BMP:    Recent Labs  Lab 06/25/18 2248 06/26/18  0500 06/27/18  0333   * 135* 138  138  138   K 4.2 3.9 3.7  3.7  3.7    104 105  105  105   CO2 18* 21* 26  26  26   BUN 50* 31* 7  7  7   CREATININE 6.5* 2.7* 0.7  0.7  0.7   CALCIUM 9.3 8.7 8.4*  8.4*  8.4*       CBC:     Recent Labs  Lab 06/25/18 2248 06/26/18  0500 06/27/18  0333   WBC 13.07* 11.28 8.61  8.61   HGB 11.9* 10.5* 9.3*  9.3*   HCT 34.6* 30.5* 27.8*  27.8*   * 347 301  301       Blood Culture:   Recent Labs  Lab 06/25/18 2240 06/25/18 2248   LABBLOO No Growth to date  No Growth to date No Growth to date  No Growth to date     Urine Culture: No results for input(s): LABURIN in the last  168 hours.    Significant Imaging:  All pertinent imaging results/findings from the past 24 hours have been reviewed.Dilated loops of bowel                  Assessment/Plan:     Bladder injury, initial encounter    S/p Repar 6/26/2018  Robbins x 2 weeks  Monitor YONNY output, will remove when minimal output    Oxybutynin and B&O suppositories for bladder spasms    Increase Norco to 10 mg    Advance diet slowly, clears today     Ambulate        Ileus, postoperative    An expected sequelae of urine in the abdomen and two abdominal surgeries in one week  Expect a few days to resolve        Urinary retention    S/p repair  Keep Robbins x 2 weeks              VTE Risk Mitigation         Ordered     IP VTE HIGH RISK PATIENT  Once      06/26/18 1640     Place sequential compression device  Until discontinued      06/26/18 1640     Place MARY hose  Until discontinued      06/25/18 2037     Place sequential compression device  Until discontinued      06/25/18 2037          W Dayron Le MD  Urology  [facility] [place]

## 2018-06-27 NOTE — PROGRESS NOTES
Notified MD Mimi (urology) of patient's increase complaint of pain. Redness increased to lower abdominal region. Skin is taut and distended. Patient diaphoretic. Low grade temp of 99.8 oral. Other vitals continue to be wnl. Adequate output with urinary hernandez and YONNY drain. Morphine provides relief but very short relief and patient does not like to take morphine. Told MD will provide [place] now. Told MD she is receiving Zosyn. Requesting to order Vanc 1g with trough q8IV. Pharmacy states to just give a one time order. She will discuss with MD on frequency due to crt level. No further orders provided.     Discussed with pharmacist: no contraindication to give Norco during time frame with opium-belladonna supp.

## 2018-06-27 NOTE — PROGRESS NOTES
0244:Pt c/o increased pain, increased redness noted and warmth noted to lower abdomen. Patient remains with temp at 99 oral. Opium-belladonna suppository given. norco given 2 hours prior. Morphine given 6 hours prior. No relief. Pt crying.     Urology notified. Recommended to contact hospitalist.     Paged hospitalist at [phone]    0312: MD Teodora notified of symptoms ordered xray flat/erect abdomen with cbc.

## 2018-06-27 NOTE — SUBJECTIVE & OBJECTIVE
Interval History:   Cystoscopy, retrograde pyelogram and cystogram, exploratory laparotomy, repair of bladder injury secondary to cautery by Dr Le on 6/26/2018  Feeling better this morning.  Wants to eat.  On clear liquid    Scheduled Meds:   docusate sodium  200 mg Oral BID    oxybutynin  5 mg Oral TID    pantoprazole 40 mg in dextrose 5 % 100 mL infusion (ready to mix system)  40 mg Intravenous Daily    piperacillin-tazobactam (ZOSYN) IVPB  4.5 g Intravenous Q8H    senna-docusate 8.6-50 mg  1 tablet Oral BID    sodium chloride 0.9%  1,000 mL Intravenous Once    sodium phosphates  1 enema Rectal Daily    vancomycin (VANCOCIN) IVPB  1,000 mg Intravenous Q8H     Continuous Infusions:   lactated ringers 125 mL/hr at 06/27/18 0048     PRN Meds:acetaminophen, acetaminophen, bisacodyl, HYDROcodone-acetaminophen, HYDROcodone-acetaminophen, magnesium hydroxide 400 mg/5 ml, morphine, morphine, ondansetron, ondansetron, opium-belladonna 16.2-60 mg, promethazine (PHENERGAN) IVPB, sodium chloride 0.9%    Review of patient's allergies indicates:   Allergen Reactions    Ace inhibitors Other (See Comments)     cough       Objective:     Vital Signs (Most Recent):  Temp: 98.4 °F (36.9 °C) (06/27/18 0701)  Pulse: 75 (06/27/18 0701)  Resp: 18 (06/27/18 0701)  BP: (!) 108/58 (06/27/18 0701)  SpO2: (!) 93 % (06/26/18 1708) Vital Signs (24h Range):  Temp:  [96.6 °F (35.9 °C)-99.8 °F (37.7 °C)] 98.4 °F (36.9 °C)  Pulse:  [75-96] 75  Resp:  [15-33] 18  SpO2:  [81 %-97 %] 93 %  BP: (108-173)/(58-97) 108/58     Weight: 89.4 kg (197 lb)  Body mass index is 33.81 kg/m².  Patient's last menstrual period was 06/12/2018 (lmp unknown).    I&O (Last 24H):    Intake/Output Summary (Last 24 hours) at 06/27/18 0858  Last data filed at 06/27/18 0600   Gross per 24 hour   Intake          5079.17 ml   Output             3360 ml   Net          1719.17 ml       Physical Exam    Laboratory:  CMP:   Recent Labs  Lab 06/27/18  0333      102  102   CALCIUM 8.4*  8.4*  8.4*   ALBUMIN 2.2*   PROT 5.5*     138  138   K 3.7  3.7  3.7   CO2 26  26  26     105  105   BUN 7  7  7   CREATININE 0.7  0.7  0.7   ALKPHOS 51*   ALT 15   AST 13   BILITOT 0.2       Diagnostic Results:  X-Ray: Reviewed  CT: Reviewed  KUB

## 2018-06-27 NOTE — PROGRESS NOTES
"[facility]  Obstetrics & Gynecology  Progress Note    Patient Name: Sherrill Ennis  MRN: [id number]  Admission Date: 2018  Primary Care Provider: Reagan Holland MD  Principal Problem: Acute renal failure    Subjective:     HPI:  42 yo   Status post laparoscopic supracervical hysterectomy with bilateral salpingectomy and lysis of omental adhesions on 2018.  Discharged home the same day.  Had problems with urinary retention.  Went to our Emergency Department on 2018 with feeling of incomplete emptying.  Postvoid residual volume was 150 cc.  Came to my office today, 2018 for care.  She states that she has significant pain with constipation and problems voiding.    She had not been voiding for the past three days!!!  Denies fever or chills.  Some "sweating at time".  Minimal nausea.  Has been trying to eat.  Tolerating oral intake without vomiting.         Interval History:   Cystoscopy, retrograde pyelogram and cystogram, exploratory laparotomy, repair of bladder injury secondary to cautery by Dr Le on 2018  Feeling better this morning.  Wants to eat.  On clear liquid    Scheduled Meds:   docusate sodium  200 mg Oral BID    oxybutynin  5 mg Oral TID    pantoprazole 40 mg in dextrose 5 % 100 mL infusion (ready to mix system)  40 mg Intravenous Daily    piperacillin-tazobactam (ZOSYN) IVPB  4.5 g Intravenous Q8H    senna-docusate 8.6-50 mg  1 tablet Oral BID    sodium chloride 0.9%  1,000 mL Intravenous Once    sodium phosphates  1 enema Rectal Daily    vancomycin (VANCOCIN) IVPB  1,000 mg Intravenous Q8H     Continuous Infusions:   lactated ringers 125 mL/hr at 18 0048     PRN Meds:acetaminophen, acetaminophen, bisacodyl, HYDROcodone-acetaminophen, HYDROcodone-acetaminophen, magnesium hydroxide 400 mg/5 ml, morphine, morphine, ondansetron, ondansetron, opium-belladonna 16.2-60 mg, promethazine (PHENERGAN) IVPB, sodium chloride 0.9%    Review " of patient's allergies indicates:   Allergen Reactions    Ace inhibitors Other (See Comments)     cough       Objective:     Vital Signs (Most Recent):  Temp: 98.4 °F (36.9 °C) (06/27/18 0701)  Pulse: 75 (06/27/18 0701)  Resp: 18 (06/27/18 0701)  BP: (!) 108/58 (06/27/18 0701)  SpO2: (!) 93 % (06/26/18 1708) Vital Signs (24h Range):  Temp:  [96.6 °F (35.9 °C)-99.8 °F (37.7 °C)] 98.4 °F (36.9 °C)  Pulse:  [75-96] 75  Resp:  [15-33] 18  SpO2:  [81 %-97 %] 93 %  BP: (108-173)/(58-97) 108/58     Weight: 89.4 kg (197 lb)  Body mass index is 33.81 kg/m².  Patient's last menstrual period was 06/12/2018 (lmp unknown).    I&O (Last 24H):    Intake/Output Summary (Last 24 hours) at 06/27/18 0858  Last data filed at 06/27/18 0600   Gross per 24 hour   Intake          5079.17 ml   Output             3360 ml   Net          1719.17 ml       Physical Exam    Laboratory:  CMP:   Recent Labs  Lab 06/27/18  0333     102  102   CALCIUM 8.4*  8.4*  8.4*   ALBUMIN 2.2*   PROT 5.5*     138  138   K 3.7  3.7  3.7   CO2 26  26  26     105  105   BUN 7  7  7   CREATININE 0.7  0.7  0.7   ALKPHOS 51*   ALT 15   AST 13   BILITOT 0.2       Diagnostic Results:  X-Ray: Reviewed  CT: Reviewed  KUB    Assessment/Plan:     * Acute renal failure    Appreciate Hospital Medicine and Nephrology's input  Accurate I&O with IVF and Robbins  Would need Urology consult.  Re.  Cystoscopy with Fluoro for possible bladder injury and urinoma, possible laparotomy and repair.  NPO        Cellulitis of abdominal wall    On Zosyn        Acute urinary obstruction    Improving        Renal failure    Improving        Constipation    Stool softener.  No longer with constipation        Urinary retention    Robbins to gravity.  Voiding with normalizing BUN/Creat        Status post laparoscopic supracervical hysterectomy    Now status post Cystoscopy, retrograde pyelogram and cystogram, exploratory laparotomy, repair of bladder injury  secondary to cautery by Dr Le on 6/26/2018  Doing better this morning  Pain better.              Vince Garcia MD  Obstetrics & Gynecology  [facility]

## 2018-06-27 NOTE — SUBJECTIVE & OBJECTIVE
Interval History: Cystotomy repair on 6/26/2018.  Significant pain last night.  Feeling better this morning.  No flatus.      Review of Systems   Constitutional: Negative.  Negative for fever.   HENT: Negative.    Eyes: Negative.    Respiratory: Negative for cough, chest tightness and shortness of breath.    Cardiovascular: Negative for chest pain.   Gastrointestinal: Positive for abdominal pain. Negative for constipation, diarrhea and nausea.   Genitourinary: Negative for hematuria.   Musculoskeletal: Negative.    Neurological: Negative.    Psychiatric/Behavioral: Negative.      Objective:     Temp:  [96.6 °F (35.9 °C)-99.8 °F (37.7 °C)] 97.7 °F (36.5 °C)  Pulse:  [79-96] 83  Resp:  [15-33] 18  SpO2:  [81 %-97 %] 93 %  BP: (120-173)/(60-97) 125/62     Body mass index is 33.81 kg/m².       Post Void Cath Residual (mL): 350 mL (urine returnd with cath) (06/25/18 1940)    Drains     Drain                 Closed/Suction Drain 06/26/18 1440 Right Abdomen Bulb 19 Fr. less than 1 day         Urethral Catheter 06/26/18 1301 Double-lumen 20 Fr. less than 1 day                Physical Exam   Nursing note and vitals reviewed.  Constitutional: She is oriented to person, place, and time. She appears well-developed.   HENT:   Head: Normocephalic.   Eyes: Conjunctivae are normal.   Neck: Normal range of motion. No tracheal deviation present. No thyromegaly present.   Cardiovascular: Normal rate, normal heart sounds and normal pulses.    Pulmonary/Chest: Effort normal and breath sounds normal. No respiratory distress. She has no wheezes.   Abdominal: Soft. She exhibits no distension and no mass. There is no hepatosplenomegaly. There is no tenderness. There is no rebound, no guarding and no CVA tenderness. No hernia.   Dressing dry  Erythema of abdominal skin present but improved compared to preop      YONNY serous    Musculoskeletal: Normal range of motion. She exhibits no edema or tenderness.   Lymphadenopathy:     She has no  cervical adenopathy.   Neurological: She is alert and oriented to person, place, and time.   Skin: Skin is warm and dry. No rash noted. No erythema.     Psychiatric: She has a normal mood and affect. Her behavior is normal. Judgment and thought content normal.       Significant Labs:    BMP:    Recent Labs  Lab 06/25/18 2248 06/26/18  0500 06/27/18  0333   * 135* 138  138  138   K 4.2 3.9 3.7  3.7  3.7    104 105  105  105   CO2 18* 21* 26  26  26   BUN 50* 31* 7  7  7   CREATININE 6.5* 2.7* 0.7  0.7  0.7   CALCIUM 9.3 8.7 8.4*  8.4*  8.4*       CBC:     Recent Labs  Lab 06/25/18 2248 06/26/18  0500 06/27/18  0333   WBC 13.07* 11.28 8.61  8.61   HGB 11.9* 10.5* 9.3*  9.3*   HCT 34.6* 30.5* 27.8*  27.8*   * 347 301  301       Blood Culture:   Recent Labs  Lab 06/25/18 2240 06/25/18 2248   LABBLOO No Growth to date  No Growth to date No Growth to date  No Growth to date     Urine Culture: No results for input(s): LABURIN in the last 168 hours.    Significant Imaging:  All pertinent imaging results/findings from the past 24 hours have been reviewed.Dilated loops of bowel

## 2018-06-27 NOTE — ASSESSMENT & PLAN NOTE
S/p Repar 6/26/2018  Robbins x 2 weeks  Monitor OYNNY output, will remove when minimal output    Oxybutynin and B&O suppositories for bladder spasms    Increase Norco to 10 mg    Advance diet slowly, clears today     Ambulate

## 2018-06-27 NOTE — ASSESSMENT & PLAN NOTE
An expected sequelae of urine in the abdomen and two abdominal surgeries in one week  Expect a few days to resolve

## 2018-06-27 NOTE — PHYSICIAN QUERY
PT Name: Sherrill Ennis  MR #: [id number]    Physician Query Form - Relationship to Procedure Clarification     CDS/: WILTON Tate,RNC-[email or web address]           Contact information:[email or web address]     This form is a permanent document in the medical record.     Query Date: June 27, 2018      By submitting this query, we are merely seeking further clarification of documentation. Please utilize your independent clinical judgment when addressing the question(s) below.    The Medical record contains the following:  Supporting Clinical Findings Location in Medical Record   Elke Ennis is a 43-year-old woman who underwent a   laparoscopic hysterectomy six days ago for benign disease.  She had issues postop with retention and anuria/retention.  She was sent to the Emergency Department last night, was found to be azotemic with creatinine of 10.  CT scan showed fluid in the belly.  Urology consult was requested for evaluation for possible injury to the urinary system.    POSTOPERATIVE DIAGNOSES:  Urinary retention, abdominal pain, acute renal failure, status post laparoscopic hysterectomy and bladder injury.     PROCEDURES PERFORMED:  Cystoscopy with bilateral retrograde pyelogram, cystogram, fluoroscopy, exploratory laparotomy, repair of bladder injury. Op note 6/27       Please clarify if bladder injury is    [  ] Inherent/Integral to procedure  [  ] Routine outcome  [  ] Incidental finding  [ x ] Complication of procedure  [  ] Clinically insignificant  [  ] Clinically undetermined

## 2018-06-27 NOTE — ASSESSMENT & PLAN NOTE
S/p Repar 6/26/2018  Robbins x 2 weeks  Monitor YONNY output, will remove when minimal output    Oxybutynin and B&O suppositories for bladder spasms    Increase Norco to 10 mg    Advance diet slowly, clears today     Ambulate

## 2018-06-27 NOTE — PLAN OF CARE
Problem: Patient Care Overview  Goal: Plan of Care Review  Outcome: Ongoing (interventions implemented as appropriate)  Pt resting well in bed, states relief in pain after BM X 2. Pt ambulated in hallway twice with much coaxing and rationale. Wounds approximated and YONNY intact. Pt free of injury. No signs of infection.

## 2018-06-27 NOTE — PROGRESS NOTES
[facility]  Urology  Progress Note    Patient Name: Sherrill Ennis  MRN: [id number]  Admission Date: 6/25/2018  Hospital Length of Stay: 2 days  Code Status: Full Code   Attending Provider: Vince Garcia MD   Primary Care Physician: Reagan Holland MD    Subjective:     HPI:  Urinary Retention  This is a 43 year old woman who is POD #6 from 1.  Laparoscopic Supracervical Hysterectomy 2.  Bilateral salpingectomy 3.  Removal of right ovarian cysts  4.  Lysis of adhesions by Dr. Pickard.  She was discharged home that day.  She reports voiding prior to discharge.  She did not void for almost 24 hours and presented to the hospital. A catheter was placed for 150 mL in bladder.  She represented to Dr. Pickard with pelvic pain, constipation, and anuria/retention for 3 days.  Dr. Pickard sent her to the ED for CT and further evaluation.  She currently has a catheter but still with abdominal pain.  Mild nausea.  Constipation resolved.      No new subjective & objective note has been filed under this hospital service since the last note was generated.      Assessment/Plan:     Bladder injury, initial encounter    S/p Repar 6/26/2018  Robbins x 2 weeks  Monitor YONNY output, will remove when minimal output    Oxybutynin and B&O suppositories for bladder spasms    Increase Norco to 10 mg    Advance diet slowly, clears today     Ambulate        Ileus, postoperative    An expected sequelae of urine in the abdomen and two abdominal surgeries in one week  Expect a few days to resolve        Urinary retention    S/p repair  Keep Robbins x 2 weeks              VTE Risk Mitigation         Ordered     IP VTE HIGH RISK PATIENT  Once      06/26/18 1640     Place sequential compression device  Until discontinued      06/26/18 1640     Place MARY hose  Until discontinued      06/25/18 2037     Place sequential compression device  Until discontinued      06/25/18 2037          W Dayron Le MD  Urology  [facility] [place]

## 2018-06-27 NOTE — PROGRESS NOTES
Notified Dr. Garcia of patient's status from last night, and changes with symptoms and results from xray and lab.

## 2018-06-28 LAB
ANION GAP SERPL CALC-SCNC: 8 MMOL/L
BASOPHILS # BLD AUTO: 0.01 K/UL
BASOPHILS NFR BLD: 0.1 %
BUN SERPL-MCNC: 6 MG/DL
CALCIUM SERPL-MCNC: 8.2 MG/DL
CHLORIDE SERPL-SCNC: 106 MMOL/L
CO2 SERPL-SCNC: 27 MMOL/L
CREAT SERPL-MCNC: 0.8 MG/DL
DIFFERENTIAL METHOD: ABNORMAL
EOSINOPHIL # BLD AUTO: 0.5 K/UL
EOSINOPHIL NFR BLD: 5.2 %
ERYTHROCYTE [DISTWIDTH] IN BLOOD BY AUTOMATED COUNT: 13.7 %
EST. GFR  (AFRICAN AMERICAN): >60 ML/MIN/1.73 M^2
EST. GFR  (NON AFRICAN AMERICAN): >60 ML/MIN/1.73 M^2
GLUCOSE SERPL-MCNC: 99 MG/DL
HCT VFR BLD AUTO: 26.5 %
HGB BLD-MCNC: 8.8 G/DL
LYMPHOCYTES # BLD AUTO: 1 K/UL
LYMPHOCYTES NFR BLD: 11.6 %
MCH RBC QN AUTO: 27.5 PG
MCHC RBC AUTO-ENTMCNC: 33.2 G/DL
MCV RBC AUTO: 83 FL
MONOCYTES # BLD AUTO: 1 K/UL
MONOCYTES NFR BLD: 11 %
NEUTROPHILS # BLD AUTO: 6.3 K/UL
NEUTROPHILS NFR BLD: 71.9 %
PLATELET # BLD AUTO: 353 K/UL
PMV BLD AUTO: 8.3 FL
POTASSIUM SERPL-SCNC: 3.9 MMOL/L
RBC # BLD AUTO: 3.2 M/UL
SODIUM SERPL-SCNC: 141 MMOL/L
VANCOMYCIN TROUGH SERPL-MCNC: 11.4 UG/ML
WBC # BLD AUTO: 8.71 K/UL

## 2018-06-28 PROCEDURE — 63600175 PHARM REV CODE 636 W HCPCS: Performed by: OBSTETRICS & GYNECOLOGY

## 2018-06-28 PROCEDURE — 25000003 PHARM REV CODE 250: Performed by: OBSTETRICS & GYNECOLOGY

## 2018-06-28 PROCEDURE — 99232 SBSQ HOSP IP/OBS MODERATE 35: CPT | Mod: 24,,, | Performed by: OBSTETRICS & GYNECOLOGY

## 2018-06-28 PROCEDURE — 25000003 PHARM REV CODE 250: Performed by: UROLOGY

## 2018-06-28 PROCEDURE — 63600175 PHARM REV CODE 636 W HCPCS: Performed by: UROLOGY

## 2018-06-28 PROCEDURE — 63600175 PHARM REV CODE 636 W HCPCS: Performed by: EMERGENCY MEDICINE

## 2018-06-28 PROCEDURE — 25000003 PHARM REV CODE 250: Performed by: EMERGENCY MEDICINE

## 2018-06-28 PROCEDURE — 12000002 HC ACUTE/MED SURGE SEMI-PRIVATE ROOM

## 2018-06-28 PROCEDURE — 80048 BASIC METABOLIC PNL TOTAL CA: CPT

## 2018-06-28 PROCEDURE — 25000003 PHARM REV CODE 250: Performed by: NURSE PRACTITIONER

## 2018-06-28 PROCEDURE — 85025 COMPLETE CBC W/AUTO DIFF WBC: CPT

## 2018-06-28 PROCEDURE — 99024 POSTOP FOLLOW-UP VISIT: CPT | Mod: ,,, | Performed by: UROLOGY

## 2018-06-28 PROCEDURE — 36415 COLL VENOUS BLD VENIPUNCTURE: CPT

## 2018-06-28 PROCEDURE — 80202 ASSAY OF VANCOMYCIN: CPT

## 2018-06-28 PROCEDURE — C9113 INJ PANTOPRAZOLE SODIUM, VIA: HCPCS | Performed by: OBSTETRICS & GYNECOLOGY

## 2018-06-28 RX ADMIN — HYDROCODONE BITARTRATE AND ACETAMINOPHEN 1 TABLET: 10; 325 TABLET ORAL at 11:06

## 2018-06-28 RX ADMIN — HYDROCODONE BITARTRATE AND ACETAMINOPHEN 1 TABLET: 5; 325 TABLET ORAL at 04:06

## 2018-06-28 RX ADMIN — DOCUSATE SODIUM AND SENNOSIDES 1 TABLET: 8.6; 5 TABLET, FILM COATED ORAL at 10:06

## 2018-06-28 RX ADMIN — OXYBUTYNIN CHLORIDE 5 MG: 5 TABLET ORAL at 08:06

## 2018-06-28 RX ADMIN — DOCUSATE SODIUM 200 MG: 100 CAPSULE, LIQUID FILLED ORAL at 10:06

## 2018-06-28 RX ADMIN — HYDROCODONE BITARTRATE AND ACETAMINOPHEN 1 TABLET: 5; 325 TABLET ORAL at 03:06

## 2018-06-28 RX ADMIN — VANCOMYCIN HYDROCHLORIDE 1000 MG: 1 INJECTION, POWDER, LYOPHILIZED, FOR SOLUTION INTRAVENOUS at 05:06

## 2018-06-28 RX ADMIN — DOCUSATE SODIUM AND SENNOSIDES 1 TABLET: 8.6; 5 TABLET, FILM COATED ORAL at 08:06

## 2018-06-28 RX ADMIN — PIPERACILLIN AND TAZOBACTAM 4.5 G: 4; .5 INJECTION, POWDER, LYOPHILIZED, FOR SOLUTION INTRAVENOUS; PARENTERAL at 03:06

## 2018-06-28 RX ADMIN — OXYBUTYNIN CHLORIDE 5 MG: 5 TABLET ORAL at 03:06

## 2018-06-28 RX ADMIN — PANTOPRAZOLE SODIUM 40 MG: 40 INJECTION, POWDER, FOR SOLUTION INTRAVENOUS at 08:06

## 2018-06-28 RX ADMIN — MAGNESIUM HYDROXIDE 2400 MG: 400 SUSPENSION ORAL at 03:06

## 2018-06-28 RX ADMIN — PIPERACILLIN AND TAZOBACTAM 4.5 G: 4; .5 INJECTION, POWDER, LYOPHILIZED, FOR SOLUTION INTRAVENOUS; PARENTERAL at 02:06

## 2018-06-28 RX ADMIN — HYDROCODONE BITARTRATE AND ACETAMINOPHEN 1 TABLET: 5; 325 TABLET ORAL at 12:06

## 2018-06-28 RX ADMIN — PIPERACILLIN AND TAZOBACTAM 4.5 G: 4; .5 INJECTION, POWDER, LYOPHILIZED, FOR SOLUTION INTRAVENOUS; PARENTERAL at 10:06

## 2018-06-28 RX ADMIN — OXYBUTYNIN CHLORIDE 5 MG: 5 TABLET ORAL at 10:06

## 2018-06-28 RX ADMIN — HYDROCODONE BITARTRATE AND ACETAMINOPHEN 1 TABLET: 5; 325 TABLET ORAL at 10:06

## 2018-06-28 RX ADMIN — VANCOMYCIN HYDROCHLORIDE 1000 MG: 1 INJECTION, POWDER, LYOPHILIZED, FOR SOLUTION INTRAVENOUS at 08:06

## 2018-06-28 RX ADMIN — VANCOMYCIN HYDROCHLORIDE 1000 MG: 1 INJECTION, POWDER, LYOPHILIZED, FOR SOLUTION INTRAVENOUS at 01:06

## 2018-06-28 RX ADMIN — DOCUSATE SODIUM 200 MG: 100 CAPSULE, LIQUID FILLED ORAL at 08:06

## 2018-06-28 NOTE — PLAN OF CARE
Problem: Patient Care Overview  Goal: Plan of Care Review  Pt ambulated x2 in [place]. C/o increased pain during ambulation. Pain increased with burning sensation in back. Redness has increased to left upper thigh, but has lightened in the lower abdominal region. Continues with vanc and zosyn. Trough completed wnl. Afebrile. VSS. Intermittent diaphoretic. Encouraged PO fluids. Denies nausea.vomiting. Abdominal distention has decreased. Passing flatus. Lung sounds clear. Using IS. No edema noted. Hernandez catheter voids clear urine. Pt c/o vaginal white secretions. Discussed and educated on hernandez catheter care. Pt ambulated and up in chair x2. YONNY drain has serous fluid.

## 2018-06-28 NOTE — SUBJECTIVE & OBJECTIVE
Interval History:   Doing better.  Tolerating mostly clear liquid.  Ambulating.  Minimal pain. Afebrile.      Scheduled Meds:   docusate sodium  200 mg Oral BID    oxybutynin  5 mg Oral TID    pantoprazole 40 mg in dextrose 5 % 100 mL infusion (ready to mix system)  40 mg Intravenous Daily    piperacillin-tazobactam (ZOSYN) IVPB  4.5 g Intravenous Q8H    senna-docusate 8.6-50 mg  1 tablet Oral BID    sodium chloride 0.9%  1,000 mL Intravenous Once    sodium phosphates  1 enema Rectal Daily    vancomycin (VANCOCIN) IVPB  1,000 mg Intravenous Q8H     Continuous Infusions:   lactated ringers 75 mL/hr at 06/27/18 0806     PRN Meds:acetaminophen, acetaminophen, bisacodyl, HYDROcodone-acetaminophen, HYDROcodone-acetaminophen, magnesium hydroxide 400 mg/5 ml, morphine, morphine, ondansetron, ondansetron, opium-belladonna 16.2-60 mg, promethazine (PHENERGAN) IVPB, sodium chloride 0.9%    Review of patient's allergies indicates:   Allergen Reactions    Ace inhibitors Other (See Comments)     cough       Objective:     Vital Signs (Most Recent):  Temp: 98.5 °F (36.9 °C) (06/28/18 0400)  Pulse: 74 (06/28/18 0400)  Resp: 20 (06/28/18 0400)  BP: 118/60 (06/28/18 0400)  SpO2: (!) 93 % (06/26/18 1708) Vital Signs (24h Range):  Temp:  [98.3 °F (36.8 °C)-98.8 °F (37.1 °C)] 98.5 °F (36.9 °C)  Pulse:  [74-82] 74  Resp:  [18-20] 20  BP: (118-129)/(58-66) 118/60     Weight: 89.4 kg (197 lb)  Body mass index is 33.81 kg/m².  Patient's last menstrual period was 06/12/2018 (lmp unknown).    I&O (Last 24H):    Intake/Output Summary (Last 24 hours) at 06/28/18 0725  Last data filed at 06/28/18 0600   Gross per 24 hour   Intake             4860 ml   Output             2510 ml   Net             2350 ml       Physical Exam:   Constitutional: She appears well-developed and well-nourished. No distress.    HENT:   Head: Normocephalic and atraumatic.    Eyes: EOM are normal.    Neck: Normal range of motion.    Cardiovascular: Normal rate.      Pulmonary/Chest: Effort normal. No respiratory distress.        Abdominal: Soft. She exhibits no distension. There is no tenderness. There is no rebound and no guarding.   Incision in dressing.  Erythema better.  Minimal distension and guarding.  YONNY draining clear fluid             Musculoskeletal: Normal range of motion.       Neurological: She is alert.    Skin: Skin is warm and dry.    Psychiatric: She has a normal mood and affect.       Laboratory:  I have personallly reviewed all pertinent lab results from the last 24 hours.    Diagnostic Results:

## 2018-06-28 NOTE — PLAN OF CARE
Problem: Patient Care Overview  Goal: Plan of Care Review  Outcome: Ongoing (interventions implemented as appropriate)  Pt resting well in bed states relief in pain. Pt family members at bedside and pt awake and conversing. Pt ambulated hallways today without difficulty. Pt had mild nausea and Sprite encouraged and relieved symptoms. Pt wound edges approximated and dry. No signs of infection noted at this time.

## 2018-06-28 NOTE — ASSESSMENT & PLAN NOTE
S/p Repar 6/26/2018  Robbins x 2 weeks  Monitor YONNY output, will remove when minimal output (<100 mL)  220 mL in last 24 h    Oxybutynin and B&O suppositories for bladder spasms    Increase Norco to 10 mg    Advance diet    Ambulate    Okay to d/c home tomorrow.  Will look at drain output, may need to go home with drain.

## 2018-06-28 NOTE — PROGRESS NOTES
"[facility]  Obstetrics & Gynecology  Progress Note    Patient Name: Sherrill Ennis  MRN: [id number]  Admission Date: 2018  Primary Care Provider: Reagan Holland MD  Principal Problem: Acute renal failure    Subjective:     HPI:  44 yo   Status post laparoscopic supracervical hysterectomy with bilateral salpingectomy and lysis of omental adhesions on 2018.  Discharged home the same day.  Had problems with urinary retention.  Went to our Emergency Department on 2018 with feeling of incomplete emptying.  Postvoid residual volume was 150 cc.  Came to my office today, 2018 for care.  She states that she has significant pain with constipation and problems voiding.    She had not been voiding for the past three days!!!  Denies fever or chills.  Some "sweating at time".  Minimal nausea.  Has been trying to eat.  Tolerating oral intake without vomiting.         Interval History:   Doing better.  Tolerating mostly clear liquid.  Ambulating.  Minimal pain. Afebrile.      Scheduled Meds:   docusate sodium  200 mg Oral BID    oxybutynin  5 mg Oral TID    pantoprazole 40 mg in dextrose 5 % 100 mL infusion (ready to mix system)  40 mg Intravenous Daily    piperacillin-tazobactam (ZOSYN) IVPB  4.5 g Intravenous Q8H    senna-docusate 8.6-50 mg  1 tablet Oral BID    sodium chloride 0.9%  1,000 mL Intravenous Once    sodium phosphates  1 enema Rectal Daily    vancomycin (VANCOCIN) IVPB  1,000 mg Intravenous Q8H     Continuous Infusions:   lactated ringers 75 mL/hr at 18 0806     PRN Meds:acetaminophen, acetaminophen, bisacodyl, HYDROcodone-acetaminophen, HYDROcodone-acetaminophen, magnesium hydroxide 400 mg/5 ml, morphine, morphine, ondansetron, ondansetron, opium-belladonna 16.2-60 mg, promethazine (PHENERGAN) IVPB, sodium chloride 0.9%    Review of patient's allergies indicates:   Allergen Reactions    Ace inhibitors Other (See Comments)     cough       Objective: "     Vital Signs (Most Recent):  Temp: 98.5 °F (36.9 °C) (06/28/18 0400)  Pulse: 74 (06/28/18 0400)  Resp: 20 (06/28/18 0400)  BP: 118/60 (06/28/18 0400)  SpO2: (!) 93 % (06/26/18 1708) Vital Signs (24h Range):  Temp:  [98.3 °F (36.8 °C)-98.8 °F (37.1 °C)] 98.5 °F (36.9 °C)  Pulse:  [74-82] 74  Resp:  [18-20] 20  BP: (118-129)/(58-66) 118/60     Weight: 89.4 kg (197 lb)  Body mass index is 33.81 kg/m².  Patient's last menstrual period was 06/12/2018 (lmp unknown).    I&O (Last 24H):    Intake/Output Summary (Last 24 hours) at 06/28/18 0725  Last data filed at 06/28/18 0600   Gross per 24 hour   Intake             4860 ml   Output             2510 ml   Net             2350 ml       Physical Exam:   Constitutional: She appears well-developed and well-nourished. No distress.    HENT:   Head: Normocephalic and atraumatic.    Eyes: EOM are normal.    Neck: Normal range of motion.    Cardiovascular: Normal rate.     Pulmonary/Chest: Effort normal. No respiratory distress.        Abdominal: Soft. She exhibits no distension. There is no tenderness. There is no rebound and no guarding.   Incision in dressing.  Erythema better.  Minimal distension and guarding.  YONNY draining clear fluid             Musculoskeletal: Normal range of motion.       Neurological: She is alert.    Skin: Skin is warm and dry.    Psychiatric: She has a normal mood and affect.       Laboratory:  I have personallly reviewed all pertinent lab results from the last 24 hours.    Diagnostic Results:      Assessment/Plan:     * Acute renal failure    Resolved        Bladder injury, initial encounter    Now status post laparotomy, repair of bladder injury        Ileus, postoperative    Resolved        Cellulitis of abdominal wall    On Zosyn        Acute urinary obstruction    Improving        Renal failure    Resolved        Constipation    Resolved        Urinary retention    No longer with retention.  Robbins in place        Status post laparoscopic  supracervical hysterectomy    Now status post Cystoscopy, retrograde pyelogram and cystogram, exploratory laparotomy, repair of bladder injury secondary to cautery by Dr Le on 6/26/2018  Doing better this morning  Pain better.    Ambulating  Tolerating oral clear liquid        Essential (primary) hypertension    Good control            Vu Zahida Garcia MD  Obstetrics & Gynecology  [facility] [place]

## 2018-06-28 NOTE — ASSESSMENT & PLAN NOTE
Now status post Cystoscopy, retrograde pyelogram and cystogram, exploratory laparotomy, repair of bladder injury secondary to cautery by Dr Le on 6/26/2018  Doing better this morning  Pain better.    Ambulating  Tolerating oral clear liquid

## 2018-06-28 NOTE — PROGRESS NOTES
[facility]  Urology  Progress Note    Patient Name: Sherrill Ennis  MRN: [id number]  Admission Date: 6/25/2018  Hospital Length of Stay: 3 days  Code Status: Full Code   Attending Provider: Vince Garcia MD   Primary Care Physician: Reagan Holland MD    Subjective:     HPI:  Urinary Retention  This is a 43 year old woman who is POD #6 from 1.  Laparoscopic Supracervical Hysterectomy 2.  Bilateral salpingectomy 3.  Removal of right ovarian cysts  4.  Lysis of adhesions by Dr. Pickard.  She was discharged home that day.  She reports voiding prior to discharge.  She did not void for almost 24 hours and presented to the hospital. A catheter was placed for 150 mL in bladder.  She represented to Dr. Pickard with pelvic pain, constipation, and anuria/retention for 3 days.  Dr. Pickard sent her to the ED for CT and further evaluation.  She currently has a catheter but still with abdominal pain.  Mild nausea.  Constipation resolved.      Interval History: she is feeling better.  She is ambulating.  Passing flatus and tolerating clears.  Pain controlled    Review of Systems   Constitutional: Negative.    HENT: Negative.    Eyes: Negative.    Respiratory: Negative for cough, chest tightness and shortness of breath.    Cardiovascular: Negative for chest pain.   Gastrointestinal: Negative.  Negative for constipation, diarrhea and nausea.   Musculoskeletal: Negative.    Neurological: Negative.    Psychiatric/Behavioral: Negative.      Objective:     Temp:  [97.4 °F (36.3 °C)-98.8 °F (37.1 °C)] 97.4 °F (36.3 °C)  Pulse:  [74-82] 75  Resp:  [18-20] 20  BP: (118-135)/(58-66) 135/60     Body mass index is 33.81 kg/m².       Post Void Cath Residual (mL): 350 mL (urine returnd with cath) (06/25/18 1940)    Drains     Drain                 Closed/Suction Drain 06/26/18 1440 Right Abdomen Bulb 19 Fr. 1 day         Urethral Catheter 06/26/18 1301 Double-lumen 20 Fr. 1 day                Physical Exam   Nursing note  and vitals reviewed.  Constitutional: She is oriented to person, place, and time. She appears well-developed.   HENT:   Head: Normocephalic.   Eyes: Conjunctivae are normal.   Neck: Normal range of motion. No tracheal deviation present. No thyromegaly present.   Cardiovascular: Normal rate, normal heart sounds and normal pulses.    Pulmonary/Chest: Effort normal and breath sounds normal. No respiratory distress. She has no wheezes.   Abdominal: Soft. She exhibits no distension and no mass. There is no hepatosplenomegaly. There is no tenderness. There is no rebound, no guarding and no CVA tenderness. No hernia.   Erythema to abdomen much improved.  Incision intact with staples    YONNY serous     Robbins clear yellow   Musculoskeletal: Normal range of motion. She exhibits no edema or tenderness.   Lymphadenopathy:     She has no cervical adenopathy.   Neurological: She is alert and oriented to person, place, and time.   Skin: Skin is warm and dry. No rash noted. No erythema.     Psychiatric: She has a normal mood and affect. Her behavior is normal. Judgment and thought content normal.       Significant Labs:    BMP:    Recent Labs  Lab 06/26/18  0500 06/27/18  0333 06/28/18  0658   * 138  138  138 141   K 3.9 3.7  3.7  3.7 3.9    105  105  105 106   CO2 21* 26  26  26 27   BUN 31* 7  7  7 6   CREATININE 2.7* 0.7  0.7  0.7 0.8   CALCIUM 8.7 8.4*  8.4*  8.4* 8.2*       CBC:     Recent Labs  Lab 06/26/18  0500 06/27/18  0333 06/28/18  0658   WBC 11.28 8.61  8.61 8.71   HGB 10.5* 9.3*  9.3* 8.8*   HCT 30.5* 27.8*  27.8* 26.5*    301  301 353*       Blood Culture:   Recent Labs  Lab 06/25/18 2240 06/25/18 2248   LABBLOO No Growth to date  No Growth to date  No Growth to date No Growth to date  No Growth to date  No Growth to date     Urine Culture: No results for input(s): LABURIN in the last 168 hours.    Significant Imaging:                    Assessment/Plan:     Bladder injury,  initial encounter    S/p Repar 6/26/2018  Robbins x 2 weeks  Monitor YONNY output, will remove when minimal output (<100 mL)  220 mL in last 24 h    Oxybutynin and B&O suppositories for bladder spasms    Increase Norco to 10 mg    Advance diet    Ambulate    Okay to d/c home tomorrow.  Will look at drain output, may need to go home with drain.        Ileus, postoperative    Resolved  Okay to eat        Urinary retention    S/p repair  Keep Robbins x 2 weeks              VTE Risk Mitigation         Ordered     IP VTE HIGH RISK PATIENT  Once      06/26/18 1640     Place sequential compression device  Until discontinued      06/26/18 1640     Place MARY hose  Until discontinued      06/25/18 2037     Place sequential compression device  Until discontinued      06/25/18 2037          W Dayron Le MD  Urology  [facility]

## 2018-06-28 NOTE — SUBJECTIVE & OBJECTIVE
Interval History: she is feeling better.  She is ambulating.  Passing flatus and tolerating clears.  Pain controlled    Review of Systems   Constitutional: Negative.    HENT: Negative.    Eyes: Negative.    Respiratory: Negative for cough, chest tightness and shortness of breath.    Cardiovascular: Negative for chest pain.   Gastrointestinal: Negative.  Negative for constipation, diarrhea and nausea.   Musculoskeletal: Negative.    Neurological: Negative.    Psychiatric/Behavioral: Negative.      Objective:     Temp:  [97.4 °F (36.3 °C)-98.8 °F (37.1 °C)] 97.4 °F (36.3 °C)  Pulse:  [74-82] 75  Resp:  [18-20] 20  BP: (118-135)/(58-66) 135/60     Body mass index is 33.81 kg/m².       Post Void Cath Residual (mL): 350 mL (urine returnd with cath) (06/25/18 1940)    Drains     Drain                 Closed/Suction Drain 06/26/18 1440 Right Abdomen Bulb 19 Fr. 1 day         Urethral Catheter 06/26/18 1301 Double-lumen 20 Fr. 1 day                Physical Exam   Nursing note and vitals reviewed.  Constitutional: She is oriented to person, place, and time. She appears well-developed.   HENT:   Head: Normocephalic.   Eyes: Conjunctivae are normal.   Neck: Normal range of motion. No tracheal deviation present. No thyromegaly present.   Cardiovascular: Normal rate, normal heart sounds and normal pulses.    Pulmonary/Chest: Effort normal and breath sounds normal. No respiratory distress. She has no wheezes.   Abdominal: Soft. She exhibits no distension and no mass. There is no hepatosplenomegaly. There is no tenderness. There is no rebound, no guarding and no CVA tenderness. No hernia.   Erythema to abdomen much improved.  Incision intact with staples    YONNY serous     Robbins clear yellow   Musculoskeletal: Normal range of motion. She exhibits no edema or tenderness.   Lymphadenopathy:     She has no cervical adenopathy.   Neurological: She is alert and oriented to person, place, and time.   Skin: Skin is warm and dry. No rash  noted. No erythema.     Psychiatric: She has a normal mood and affect. Her behavior is normal. Judgment and thought content normal.       Significant Labs:    BMP:    Recent Labs  Lab 06/26/18  0500 06/27/18  0333 06/28/18  0658   * 138  138  138 141   K 3.9 3.7  3.7  3.7 3.9    105  105  105 106   CO2 21* 26  26  26 27   BUN 31* 7  7  7 6   CREATININE 2.7* 0.7  0.7  0.7 0.8   CALCIUM 8.7 8.4*  8.4*  8.4* 8.2*       CBC:     Recent Labs  Lab 06/26/18  0500 06/27/18  0333 06/28/18  0658   WBC 11.28 8.61  8.61 8.71   HGB 10.5* 9.3*  9.3* 8.8*   HCT 30.5* 27.8*  27.8* 26.5*    301  301 353*       Blood Culture:   Recent Labs  Lab 06/25/18 2240 06/25/18 2248   LABBLOO No Growth to date  No Growth to date  No Growth to date No Growth to date  No Growth to date  No Growth to date     Urine Culture: No results for input(s): LABURIN in the last 168 hours.    Significant Imaging:

## 2018-06-29 ENCOUNTER — TELEPHONE (OUTPATIENT)
Dept: UROLOGY | Facility: CLINIC | Age: 43
End: 2018-06-29

## 2018-06-29 VITALS
OXYGEN SATURATION: 93 % | HEIGHT: 64 IN | BODY MASS INDEX: 33.63 KG/M2 | TEMPERATURE: 98 F | WEIGHT: 197 LBS | RESPIRATION RATE: 19 BRPM | HEART RATE: 71 BPM | SYSTOLIC BLOOD PRESSURE: 136 MMHG | DIASTOLIC BLOOD PRESSURE: 76 MMHG

## 2018-06-29 PROBLEM — N17.9 ACUTE RENAL FAILURE: Status: RESOLVED | Noted: 2018-06-25 | Resolved: 2018-06-29

## 2018-06-29 PROBLEM — K56.7 ILEUS, POSTOPERATIVE: Status: RESOLVED | Noted: 2018-06-27 | Resolved: 2018-06-29

## 2018-06-29 PROBLEM — K59.00 CONSTIPATION: Status: RESOLVED | Noted: 2018-06-25 | Resolved: 2018-06-29

## 2018-06-29 PROBLEM — Z98.890 STATUS POST EXPLORATORY LAPAROTOMY: Status: ACTIVE | Noted: 2018-06-20

## 2018-06-29 PROBLEM — R33.9 URINARY RETENTION: Status: RESOLVED | Noted: 2018-06-25 | Resolved: 2018-06-29

## 2018-06-29 PROBLEM — L03.311 CELLULITIS OF ABDOMINAL WALL: Status: RESOLVED | Noted: 2018-06-26 | Resolved: 2018-06-29

## 2018-06-29 PROBLEM — N13.9 ACUTE URINARY OBSTRUCTION: Status: RESOLVED | Noted: 2018-06-26 | Resolved: 2018-06-29

## 2018-06-29 PROBLEM — N19 RENAL FAILURE: Status: RESOLVED | Noted: 2018-06-25 | Resolved: 2018-06-29

## 2018-06-29 PROBLEM — K91.89 ILEUS, POSTOPERATIVE: Status: RESOLVED | Noted: 2018-06-27 | Resolved: 2018-06-29

## 2018-06-29 LAB
ANION GAP SERPL CALC-SCNC: 8 MMOL/L
BASOPHILS # BLD AUTO: 0.02 K/UL
BASOPHILS NFR BLD: 0.3 %
BUN SERPL-MCNC: 7 MG/DL
CALCIUM SERPL-MCNC: 8.6 MG/DL
CHLORIDE SERPL-SCNC: 107 MMOL/L
CO2 SERPL-SCNC: 29 MMOL/L
CREAT SERPL-MCNC: 0.9 MG/DL
DIFFERENTIAL METHOD: ABNORMAL
EOSINOPHIL # BLD AUTO: 0.6 K/UL
EOSINOPHIL NFR BLD: 7.8 %
ERYTHROCYTE [DISTWIDTH] IN BLOOD BY AUTOMATED COUNT: 13.4 %
EST. GFR  (AFRICAN AMERICAN): >60 ML/MIN/1.73 M^2
EST. GFR  (NON AFRICAN AMERICAN): >60 ML/MIN/1.73 M^2
GLUCOSE SERPL-MCNC: 103 MG/DL
HCT VFR BLD AUTO: 27.9 %
HGB BLD-MCNC: 9 G/DL
LYMPHOCYTES # BLD AUTO: 1.3 K/UL
LYMPHOCYTES NFR BLD: 18.3 %
MCH RBC QN AUTO: 27.4 PG
MCHC RBC AUTO-ENTMCNC: 32.3 G/DL
MCV RBC AUTO: 85 FL
MONOCYTES # BLD AUTO: 0.9 K/UL
MONOCYTES NFR BLD: 12.3 %
NEUTROPHILS # BLD AUTO: 4.5 K/UL
NEUTROPHILS NFR BLD: 60.9 %
PLATELET # BLD AUTO: 387 K/UL
PMV BLD AUTO: 8.3 FL
POTASSIUM SERPL-SCNC: 3.7 MMOL/L
RBC # BLD AUTO: 3.28 M/UL
SODIUM SERPL-SCNC: 144 MMOL/L
WBC # BLD AUTO: 7.34 K/UL

## 2018-06-29 PROCEDURE — 63600175 PHARM REV CODE 636 W HCPCS: Performed by: OBSTETRICS & GYNECOLOGY

## 2018-06-29 PROCEDURE — 25000003 PHARM REV CODE 250: Performed by: UROLOGY

## 2018-06-29 PROCEDURE — 80048 BASIC METABOLIC PNL TOTAL CA: CPT

## 2018-06-29 PROCEDURE — 85025 COMPLETE CBC W/AUTO DIFF WBC: CPT

## 2018-06-29 PROCEDURE — 36415 COLL VENOUS BLD VENIPUNCTURE: CPT

## 2018-06-29 PROCEDURE — C9113 INJ PANTOPRAZOLE SODIUM, VIA: HCPCS | Performed by: OBSTETRICS & GYNECOLOGY

## 2018-06-29 PROCEDURE — 63600175 PHARM REV CODE 636 W HCPCS: Performed by: EMERGENCY MEDICINE

## 2018-06-29 PROCEDURE — 63600175 PHARM REV CODE 636 W HCPCS: Performed by: UROLOGY

## 2018-06-29 PROCEDURE — 99024 POSTOP FOLLOW-UP VISIT: CPT | Mod: ,,, | Performed by: UROLOGY

## 2018-06-29 PROCEDURE — 25000003 PHARM REV CODE 250: Performed by: NURSE PRACTITIONER

## 2018-06-29 PROCEDURE — 25000003 PHARM REV CODE 250: Performed by: OBSTETRICS & GYNECOLOGY

## 2018-06-29 PROCEDURE — 99238 HOSP IP/OBS DSCHRG MGMT 30/<: CPT | Mod: 24,,, | Performed by: OBSTETRICS & GYNECOLOGY

## 2018-06-29 PROCEDURE — 25000003 PHARM REV CODE 250: Performed by: EMERGENCY MEDICINE

## 2018-06-29 RX ORDER — IBUPROFEN 600 MG/1
600 TABLET ORAL EVERY 6 HOURS PRN
Qty: 40 TABLET | Refills: 1 | Status: ON HOLD | OUTPATIENT
Start: 2018-06-29 | End: 2018-07-11 | Stop reason: SDUPTHER

## 2018-06-29 RX ORDER — HYDROCODONE BITARTRATE AND ACETAMINOPHEN 10; 325 MG/1; MG/1
1 TABLET ORAL EVERY 6 HOURS PRN
Qty: 20 TABLET | Refills: 0 | Status: SHIPPED | OUTPATIENT
Start: 2018-06-29 | End: 2018-08-10

## 2018-06-29 RX ORDER — OXYBUTYNIN CHLORIDE 5 MG/1
5 TABLET ORAL 3 TIMES DAILY
Qty: 90 TABLET | Refills: 3 | Status: SHIPPED | OUTPATIENT
Start: 2018-06-29 | End: 2018-10-15

## 2018-06-29 RX ADMIN — OXYBUTYNIN CHLORIDE 5 MG: 5 TABLET ORAL at 09:06

## 2018-06-29 RX ADMIN — DOCUSATE SODIUM 200 MG: 100 CAPSULE, LIQUID FILLED ORAL at 09:06

## 2018-06-29 RX ADMIN — HYDROCODONE BITARTRATE AND ACETAMINOPHEN 1 TABLET: 5; 325 TABLET ORAL at 07:06

## 2018-06-29 RX ADMIN — PANTOPRAZOLE SODIUM 40 MG: 40 INJECTION, POWDER, FOR SOLUTION INTRAVENOUS at 09:06

## 2018-06-29 RX ADMIN — DOCUSATE SODIUM AND SENNOSIDES 1 TABLET: 8.6; 5 TABLET, FILM COATED ORAL at 09:06

## 2018-06-29 RX ADMIN — VANCOMYCIN HYDROCHLORIDE 1000 MG: 1 INJECTION, POWDER, LYOPHILIZED, FOR SOLUTION INTRAVENOUS at 09:06

## 2018-06-29 RX ADMIN — PIPERACILLIN AND TAZOBACTAM 4.5 G: 4; .5 INJECTION, POWDER, LYOPHILIZED, FOR SOLUTION INTRAVENOUS; PARENTERAL at 06:06

## 2018-06-29 RX ADMIN — VANCOMYCIN HYDROCHLORIDE 1000 MG: 1 INJECTION, POWDER, LYOPHILIZED, FOR SOLUTION INTRAVENOUS at 01:06

## 2018-06-29 NOTE — PROGRESS NOTES
[facility]  Urology  Progress Note    Patient Name: Sherrill Ennis  MRN: [id number]  Admission Date: 6/25/2018  Hospital Length of Stay: 4 days  Code Status: Full Code   Attending Provider: Vince Garcia MD   Primary Care Physician: Reagan Holland MD    Subjective:     HPI:  Urinary Retention  This is a 43 year old woman who is POD #6 from 1.  Laparoscopic Supracervical Hysterectomy 2.  Bilateral salpingectomy 3.  Removal of right ovarian cysts  4.  Lysis of adhesions by Dr. Pickard.  She was discharged home that day.  She reports voiding prior to discharge.  She did not void for almost 24 hours and presented to the hospital. A catheter was placed for 150 mL in bladder.  She represented to Dr. Pickard with pelvic pain, constipation, and anuria/retention for 3 days.  Dr. Pickard sent her to the ED for CT and further evaluation.  She currently has a catheter but still with abdominal pain.  Mild nausea.  Constipation resolved.      Interval History: Doing well.  Tolerating Diet.  Adequate bowel function.  Ambulating. Pain controlled. Ready to go home    Review of Systems   Constitutional: Negative.    HENT: Negative.    Eyes: Negative.    Respiratory: Negative for cough, chest tightness and shortness of breath.    Cardiovascular: Negative for chest pain.   Gastrointestinal: Negative.  Negative for constipation, diarrhea and nausea.   Genitourinary: Negative for flank pain and hematuria.   Musculoskeletal: Negative.    Neurological: Negative.    Psychiatric/Behavioral: Negative.      Objective:     Temp:  [97.2 °F (36.2 °C)-99.8 °F (37.7 °C)] 98.3 °F (36.8 °C)  Pulse:  [71-76] 71  Resp:  [19-20] 19  BP: (115-136)/(58-76) 136/76     Body mass index is 33.81 kg/m².       Post Void Cath Residual (mL): 350 mL (urine returnd with cath) (06/25/18 1940)    Drains     Drain                 Closed/Suction Drain 06/26/18 1440 Right Abdomen Bulb 19 Fr. 2 days         Urethral Catheter 06/26/18 1301  Double-lumen 20 Fr. 2 days                Physical Exam   Nursing note and vitals reviewed.  Constitutional: She is oriented to person, place, and time. She appears well-developed.   HENT:   Head: Normocephalic.   Eyes: Conjunctivae are normal.   Neck: Normal range of motion. No tracheal deviation present. No thyromegaly present.   Cardiovascular: Normal rate, normal heart sounds and normal pulses.    Pulmonary/Chest: Effort normal and breath sounds normal. No respiratory distress. She has no wheezes.   Abdominal: Soft. She exhibits no distension and no mass. There is no hepatosplenomegaly. There is no tenderness. There is no rebound, no guarding and no CVA tenderness. No hernia.   Erythema mostly resolved  Staples in place  YONNY serous  145 mL/24 hours   Musculoskeletal: Normal range of motion. She exhibits no edema or tenderness.   Lymphadenopathy:     She has no cervical adenopathy.   Neurological: She is alert and oriented to person, place, and time.   Skin: Skin is warm and dry. No rash noted. No erythema.     Psychiatric: She has a normal mood and affect. Her behavior is normal. Judgment and thought content normal.       Significant Labs:    BMP:    Recent Labs  Lab 06/27/18  0333 06/28/18  0658 06/29/18  0714     138  138 141 144   K 3.7  3.7  3.7 3.9 3.7     105  105 106 107   CO2 26  26  26 27 29   BUN 7  7  7 6 7   CREATININE 0.7  0.7  0.7 0.8 0.9   CALCIUM 8.4*  8.4*  8.4* 8.2* 8.6*       CBC:     Recent Labs  Lab 06/27/18  0333 06/28/18  0658 06/29/18  0714   WBC 8.61  8.61 8.71 7.34   HGB 9.3*  9.3* 8.8* 9.0*   HCT 27.8*  27.8* 26.5* 27.9*     301 353* 387*       Blood Culture:   Recent Labs  Lab 06/25/18 2240 06/25/18 2248   LABBLOO No Growth to date  No Growth to date  No Growth to date  No Growth to date No Growth to date  No Growth to date  No Growth to date  No Growth to date     Urine Culture: No results for input(s): LABURIN in the last 168  hours.    Significant Imaging:                    Assessment/Plan:     Bladder injury, initial encounter    S/p Repar 6/26/2018  Robbins x 2 weeks  Monitor YONNY output, will remove when minimal output (<100 mL).  Plan to remove next week in clinic.    Oxybutynin and B&O suppositories for bladder spasms    Okay to d/c home today.   go home with drain.    Follow next week            VTE Risk Mitigation         Ordered     IP VTE HIGH RISK PATIENT  Once      06/26/18 1640     Place sequential compression device  Until discontinued      06/26/18 1640     Place MARY hose  Until discontinued      06/25/18 2037     Place sequential compression device  Until discontinued      06/25/18 2037          W Dayron Le MD  Urology  [facility]

## 2018-06-29 NOTE — NURSING
Pt discharge instructions explained and medication follow up explained. Pt clear on instructions and questions answered with rationale.the patient transported to family car by wheelchair.

## 2018-06-29 NOTE — SUBJECTIVE & OBJECTIVE
Interval History: Doing well.  Tolerating Diet.  Adequate bowel function.  Ambulating. Pain controlled. Ready to go home    Review of Systems   Constitutional: Negative.    HENT: Negative.    Eyes: Negative.    Respiratory: Negative for cough, chest tightness and shortness of breath.    Cardiovascular: Negative for chest pain.   Gastrointestinal: Negative.  Negative for constipation, diarrhea and nausea.   Genitourinary: Negative for flank pain and hematuria.   Musculoskeletal: Negative.    Neurological: Negative.    Psychiatric/Behavioral: Negative.      Objective:     Temp:  [97.2 °F (36.2 °C)-99.8 °F (37.7 °C)] 98.3 °F (36.8 °C)  Pulse:  [71-76] 71  Resp:  [19-20] 19  BP: (115-136)/(58-76) 136/76     Body mass index is 33.81 kg/m².       Post Void Cath Residual (mL): 350 mL (urine returnd with cath) (06/25/18 1940)    Drains     Drain                 Closed/Suction Drain 06/26/18 1440 Right Abdomen Bulb 19 Fr. 2 days         Urethral Catheter 06/26/18 1301 Double-lumen 20 Fr. 2 days                Physical Exam   Nursing note and vitals reviewed.  Constitutional: She is oriented to person, place, and time. She appears well-developed.   HENT:   Head: Normocephalic.   Eyes: Conjunctivae are normal.   Neck: Normal range of motion. No tracheal deviation present. No thyromegaly present.   Cardiovascular: Normal rate, normal heart sounds and normal pulses.    Pulmonary/Chest: Effort normal and breath sounds normal. No respiratory distress. She has no wheezes.   Abdominal: Soft. She exhibits no distension and no mass. There is no hepatosplenomegaly. There is no tenderness. There is no rebound, no guarding and no CVA tenderness. No hernia.   Erythema mostly resolved  Staples in place  YONNY serous  145 mL/24 hours   Musculoskeletal: Normal range of motion. She exhibits no edema or tenderness.   Lymphadenopathy:     She has no cervical adenopathy.   Neurological: She is alert and oriented to person, place, and time.   Skin:  Skin is warm and dry. No rash noted. No erythema.     Psychiatric: She has a normal mood and affect. Her behavior is normal. Judgment and thought content normal.       Significant Labs:    BMP:    Recent Labs  Lab 06/27/18  0333 06/28/18 0658 06/29/18  0714     138  138 141 144   K 3.7  3.7  3.7 3.9 3.7     105  105 106 107   CO2 26  26  26 27 29   BUN 7  7  7 6 7   CREATININE 0.7  0.7  0.7 0.8 0.9   CALCIUM 8.4*  8.4*  8.4* 8.2* 8.6*       CBC:     Recent Labs  Lab 06/27/18  0333 06/28/18 0658 06/29/18  0714   WBC 8.61  8.61 8.71 7.34   HGB 9.3*  9.3* 8.8* 9.0*   HCT 27.8*  27.8* 26.5* 27.9*     301 353* 387*       Blood Culture:   Recent Labs  Lab 06/25/18 2240 06/25/18 2248   LABBLOO No Growth to date  No Growth to date  No Growth to date  No Growth to date No Growth to date  No Growth to date  No Growth to date  No Growth to date     Urine Culture: No results for input(s): LABURIN in the last 168 hours.    Significant Imaging:

## 2018-06-29 NOTE — ASSESSMENT & PLAN NOTE
Patient discharged home on 6/29/2018   Discharge medications include Percocet, Motrin, oxybutynin, and iron supplement.  Follow-up with Dr Le in 2 weeks.  Follow-up with me in 2 weeks.    Needs Robbins in for 2 weeks, then repeat Cystoscopy, cystogram with Dr Le to ensure bladder integrity.

## 2018-06-29 NOTE — ASSESSMENT & PLAN NOTE
S/p Repar 6/26/2018  Robbins x 2 weeks  Monitor YONNY output, will remove when minimal output (<100 mL).  Plan to remove next week in clinic.    Oxybutynin and B&O suppositories for bladder spasms    Okay to d/c home today.   go home with drain.    Follow next week

## 2018-06-29 NOTE — ASSESSMENT & PLAN NOTE
Now status post laparotomy, repair of bladder injury  Needs Robbins in for 2 weeks, then repeat Cystoscopy, cystogram with Dr Le to ensure bladder integrity.

## 2018-06-29 NOTE — DISCHARGE SUMMARY
"[facility]  Obstetrics & Gynecology  Discharge Summary    Patient Name: Sherrill Ennis  MRN: [id number]  Admission Date: 2018  Hospital Length of Stay: 4 days  Discharge Date and Time:  2018 7:53 AM  Attending Physician: Vince Garcia MD   Discharging Provider: Vince Garcia MD  Primary Care Provider: Reagan Holland MD    HPI:  44 yo   Status post laparoscopic supracervical hysterectomy with bilateral salpingectomy and lysis of omental adhesions on 2018.  Discharged home the same day.  Had problems with urinary retention.  Went to our Emergency Department on 2018 with feeling of incomplete emptying.  Postvoid residual volume was 150 cc.  Came to my office today, 2018 for care.  She states that she has significant pain with constipation and problems voiding.    She had not been voiding for the past three days!!!  Denies fever or chills.  Some "sweating at time".  Minimal nausea.  Has been trying to eat.  Tolerating oral intake without vomiting.         Hospital Course:  In our Emergency Department, work-up shows BUN/Creat at 65/10 with WBC of 17 and Hct 36  Patient was able to have several bowel movements and feeling a little better.  Also voided several times.  Still distended.  Admitted 2018  CT and KUB.  Possible bladder injury  No obvious abscess or bowel obstruction  Patient had several bowel movements on Colace. Then developed loose stool.  Robbins putting out clear, light yellow urine. Total urine output since admission 2150 cc in about 12 hours  BUN/Creat down to 31/2.7 (vs 65/10 on admission)    Cystoscopy, retrograde pyelogram and cystogram, exploratory laparotomy, repair of bladder injury secondary to cautery by Dr Le on 2018  Feeling better on the morning of 2018.  Wants to eat.  On clear liquid    Tolerating oral intake well on 2018.  Ambulating.  Just not eating much.  Still with occasional distension and with postoperative " pain  Abdominal erythema improved on Vancomycin, which was started with better BUN/Creat at 30/0.7 on 6/27/2018.  Minimal drainage from YONNY with serosanguinous fluid.  Oxybutynin prescribed by Dr Le.    On day of discharge, 6/29/2018, patient has minimal pain.  Requesting discharge.  She is tolerating oral intake well.    Exam was benign with patient afebrile, vitals stable, and no bleeding.  Minimal drainage from YONNY  Robbins catheter draining light yellow urine  Normal activities.  Patient discharged home on 6/29/2018   Discharge medications include Percocet, Motrin, oxybutynin, and iron supplement.  Follow-up with Dr Le in 2 weeks.  Follow-up with me in 2 weeks.    Christiano Garcia MD.      Procedure(s) (LRB):  CYSTOSCOPY, WITH RETROGRADE pyelLOGRAM (Bilateral)  CYSTOGRAM  LAPAROTOMY,EXPLORATORY  REPAIR, BLADDER (N/A)     Consults         Status Ordering Provider     Inpatient consult to Hospitalist  Once     Provider:  (Not yet assigned)    Acknowledged KYLE DEL VALLE     Inpatient consult to Nephrology  Once     Provider:  (Not yet assigned)    Completed KYLE DEL VALLE     Inpatient consult to Urology  Once     Provider:  NELIA Le MD    Completed CHRISTIANO GARCIA          Significant Diagnostic Studies: Labs:   CMP   Recent Labs  Lab 06/28/18  0658      K 3.9      CO2 27   GLU 99   BUN 6   CREATININE 0.8   CALCIUM 8.2*   ANIONGAP 8   ESTGFRAFRICA >60   EGFRNONAA >60   , CBC   Recent Labs  Lab 06/28/18 0658 06/29/18  0714   WBC 8.71 7.34   HGB 8.8* 9.0*   HCT 26.5* 27.9*   * 387*    and All labs within the past 24 hours have been reviewed    Pending Diagnostic Studies:     Procedure Component Value Units Date/Time    Basic metabolic panel [[id number]] Collected:  06/29/18 0714    Order Status:  Sent Lab Status:  In process Updated:  06/29/18 0746    Specimen:  Blood from Blood         Final Active Diagnoses:    Diagnosis Date Noted POA    PRINCIPAL PROBLEM:  Status post  exploratory laparotomy [Z98.890] 06/20/2018 Yes    Bladder injury, initial encounter [S37.20XA] 06/27/2018 Yes    Essential (primary) hypertension [I10] 01/12/2018 Yes     Chronic      Problems Resolved During this Admission:    Diagnosis Date Noted Date Resolved POA    Ileus, postoperative [K91.89, K56.7] 06/27/2018 06/29/2018 No    Acute urinary obstruction [N13.9] 06/26/2018 06/29/2018 Yes    Cellulitis of abdominal wall [L03.311] 06/26/2018 06/29/2018 No    Urinary retention [R33.9] 06/25/2018 06/29/2018 Yes    Acute renal failure [N17.9] 06/25/2018 06/29/2018 Yes    Constipation [K59.00] 06/25/2018 06/29/2018 Yes    Renal failure [N19] 06/25/2018 06/29/2018 Yes        Discharged Condition: good    Disposition: Home or Self Care    Follow Up:  Follow-up Information     W Dayron Le MD In 2 weeks.    Specialty:  Urology  Contact information:  [address]  [address]  [place] [address]  [phone]             Vince Garcia MD In 2 weeks.    Specialties:  Obstetrics and Gynecology, Obstetrics and Gynecology  Contact information:  [address]  [address]  [place] [id number]  [phone]                 Patient Instructions:     Call MD for:  temperature >100.4     Call MD for:  persistent nausea and vomiting or diarrhea     Call MD for:  severe uncontrolled pain     Call MD for:  redness, tenderness, or signs of infection (pain, swelling, redness, odor or green/yellow discharge around incision site)     Call MD for:  difficulty breathing or increased cough     Call MD for:  severe persistent headache     Call MD for:  worsening rash     Call MD for:  persistent dizziness, light-headedness, or visual disturbances     Call MD for:  increased confusion or weakness     No dressing needed       Medications:  Reconciled Home Medications:      Medication List      START taking these medications    HYDROcodone-acetaminophen  mg per tablet  Commonly known as:  NORCO  Take 1 tablet by mouth every  6 (six) hours as needed.     oxybutynin 5 MG Tab  Commonly known as:  DITROPAN  Take 1 tablet (5 mg total) by mouth 3 (three) times daily.     polyethylene glycol 17 gram/dose powder  Commonly known as:  GLYCOLAX  Take 17 g by mouth once daily.        CHANGE how you take these medications    amLODIPine 10 MG tablet  Commonly known as:  NORVASC  Take 1 tablet (10 mg total) by mouth once daily.  What changed:  how much to take     * ibuprofen 600 MG tablet  Commonly known as:  ADVIL,MOTRIN  Take 1 tablet (600 mg total) by mouth every 6 (six) hours as needed.  What changed:  Another medication with the same name was added. Make sure you understand how and when to take each.     * ibuprofen 600 MG tablet  Commonly known as:  ADVIL,MOTRIN  Take 1 tablet (600 mg total) by mouth every 6 (six) hours as needed.  What changed:  You were already taking a medication with the same name, and this prescription was added. Make sure you understand how and when to take each.        * This list has 2 medication(s) that are the same as other medications prescribed for you. Read the directions carefully, and ask your doctor or other care provider to review them with you.            CONTINUE taking these medications    docusate sodium 100 MG capsule  Commonly known as:  COLACE  Take 1 capsule (100 mg total) by mouth 2 (two) times daily as needed for Constipation.     hydroCHLOROthiazide 12.5 MG Tab  Commonly known as:  HYDRODIURIL  Take 1 tablet (12.5 mg total) by mouth daily as needed.     ONE-A-DAY WOMENS FORMULA ORAL  Take by mouth.     oxyCODONE-acetaminophen 5-325 mg per tablet  Commonly known as:  PERCOCET  Take 1 tablet by mouth every 4 (four) hours as needed for Pain.     promethazine 25 MG tablet  Commonly known as:  PHENERGAN  Take 1 tablet (25 mg total) by mouth every 4 (four) hours.     valACYclovir 1000 MG tablet  Commonly known as:  VALTREX  Take 1 tablet (1,000 mg total) by mouth daily as needed (herpes outbreak). For 5  days            Vince Garcia MD  Obstetrics & Gynecology  [facility]

## 2018-06-29 NOTE — TELEPHONE ENCOUNTER
----- Message from Wilber Houston sent at 6/29/2018 12:09 PM CDT -----  Contact: Sherrill [phone]  Patient is calling to notify the staff that the 2:00 appointment will be fine for her. Please call at your earliest convenience.

## 2018-07-01 LAB
BACTERIA BLD CULT: NORMAL
BACTERIA BLD CULT: NORMAL

## 2018-07-03 ENCOUNTER — TELEPHONE (OUTPATIENT)
Dept: OBSTETRICS AND GYNECOLOGY | Facility: CLINIC | Age: 43
End: 2018-07-03

## 2018-07-03 ENCOUNTER — OFFICE VISIT (OUTPATIENT)
Dept: UROLOGY | Facility: CLINIC | Age: 43
End: 2018-07-03
Payer: COMMERCIAL

## 2018-07-03 VITALS
BODY MASS INDEX: 33.46 KG/M2 | RESPIRATION RATE: 16 BRPM | SYSTOLIC BLOOD PRESSURE: 130 MMHG | WEIGHT: 196 LBS | HEIGHT: 64 IN | DIASTOLIC BLOOD PRESSURE: 82 MMHG

## 2018-07-03 DIAGNOSIS — S37.20XD: ICD-10-CM

## 2018-07-03 DIAGNOSIS — R33.9 URINARY RETENTION: ICD-10-CM

## 2018-07-03 DIAGNOSIS — S37.20XD INJURY OF BLADDER, SUBSEQUENT ENCOUNTER: Primary | ICD-10-CM

## 2018-07-03 PROCEDURE — 99024 POSTOP FOLLOW-UP VISIT: CPT | Mod: S$GLB,,, | Performed by: NURSE PRACTITIONER

## 2018-07-03 PROCEDURE — 99999 PR PBB SHADOW E&M-EST. PATIENT-LVL III: CPT | Mod: PBBFAC,,, | Performed by: NURSE PRACTITIONER

## 2018-07-03 RX ORDER — CIPROFLOXACIN 250 MG/1
500 TABLET, FILM COATED ORAL
Status: CANCELLED | OUTPATIENT
Start: 2018-07-03

## 2018-07-03 NOTE — TELEPHONE ENCOUNTER
----- Message from [place] Baig sent at 7/3/2018 12:16 PM CDT -----  Contact: Daughter  Pt is requesting medication for yeast infection. Claudia Oropeza/ Erica. Pt contact [phone].    Thanks-  ---------------------------------------------------------  7/3/18 @ 0011 ([facility])  CALL ATTEMPT TO PT UNSUCCESSFUL , MESSAGE LEFT FOR PT TO RETURN CALL TO OFFICE CONCERNING HER NEED TO MADE APPT TO SEE THE PHYSICIAN FOR A POSSIBLE YEAST  OR BV INFECTION

## 2018-07-03 NOTE — H&P
Subjective:       Patient ID: Sherrill Ennis is a 43 y.o. female who is an established patient of Dr. Le though new to me was seen for hospital follow up     Chief Complaint:   No chief complaint on file.     Patient s/p laparoscopic supracervical hysterectomy with bilateral salpingectomy and lysis of omental adhesions on 6/20/2018 by Dr. Garcia.  She had issues postoperatively with retention and anuria.  She was sent to the ER on 6/25/18 and was found to be azotemic with creatinine of 10.  CT scan showed fluid in the belly.  Urology consult was requested for evaluation for possible injury to the urinary system. She was noted to have cautery injury with small cystotomy posterior bladder. There was no injury noted to her ureter. Subsequently she underwent cystoscopy with bilateral RPG, cystogram, cystotomy repair on 6/26/18 by Dr. Le and Dr. Ross.      She presents today for follow up. She reports YONNY drainage ~25-50 ml/day. Minimal pain controlled with Ibuprofen and Norco. Denies dysuria, gross hematuria or complications with hernandez. She is feeling better. No new complaints today     ACTIVE MEDICAL ISSUES:      Patient Active Problem List   Diagnosis    Tobacco dependency    Essential (primary) hypertension    Herpes simplex vulvovaginitis    Iron deficiency anemia due to chronic blood loss    RLS (restless legs syndrome)    Dysmenorrhea    Adenomyosis    Menorrhagia with regular cycle    Abnormal uterine bleeding    Obesity, Class I, BMI 30-34.9    Status post exploratory laparotomy    Bladder injury, initial encounter         ALLERGIES AND MEDICATIONS: updated and reviewed.        Review of patient's allergies indicates:   Allergen Reactions    Ace inhibitors Other (See Comments)       cough           Current Outpatient Prescriptions   Medication Sig    amLODIPine (NORVASC) 10 MG tablet Take 1 tablet (10 mg total) by mouth once daily. (Patient taking differently: Take 5 mg by  mouth once daily. )    docusate sodium (COLACE) 100 MG capsule Take 1 capsule (100 mg total) by mouth 2 (two) times daily as needed for Constipation.    hydroCHLOROthiazide (HYDRODIURIL) 12.5 MG Tab Take 1 tablet (12.5 mg total) by mouth daily as needed.    HYDROcodone-acetaminophen (NORCO)  mg per tablet Take 1 tablet by mouth every 6 (six) hours as needed.    ibuprofen (ADVIL,MOTRIN) 600 MG tablet Take 1 tablet (600 mg total) by mouth every 6 (six) hours as needed.    ibuprofen (ADVIL,MOTRIN) 600 MG tablet Take 1 tablet (600 mg total) by mouth every 6 (six) hours as needed.    multivit,calc,mins/iron/folic (ONE-A-DAY WOMENS FORMULA ORAL) Take by mouth.    oxybutynin (DITROPAN) 5 MG Tab Take 1 tablet (5 mg total) by mouth 3 (three) times daily.    oxyCODONE-acetaminophen (PERCOCET) 5-325 mg per tablet Take 1 tablet by mouth every 4 (four) hours as needed for Pain.    polyethylene glycol (GLYCOLAX) 17 gram/dose powder Take 17 g by mouth once daily.    promethazine (PHENERGAN) 25 MG tablet Take 1 tablet (25 mg total) by mouth every 4 (four) hours.    valACYclovir (VALTREX) 1000 MG tablet Take 1 tablet (1,000 mg total) by mouth daily as needed (herpes outbreak). For 5 days      No current facility-administered medications for this visit.          Review of Systems   Constitutional: Negative for activity change, chills, fatigue, fever and unexpected weight change.   Eyes: Negative for discharge, redness and visual disturbance.   Respiratory: Negative for cough, shortness of breath and wheezing.    Cardiovascular: Negative for chest pain and leg swelling.   Gastrointestinal: Negative for abdominal distention, abdominal pain, constipation, diarrhea, nausea and vomiting.   Genitourinary: Negative for decreased urine volume, difficulty urinating, dysuria, flank pain, frequency, hematuria, urgency, vaginal bleeding and vaginal discharge.   Musculoskeletal: Negative for arthralgias, joint swelling and  "myalgias.   Skin: Negative for color change and rash.   Neurological: Negative for dizziness and light-headedness.   Psychiatric/Behavioral: Negative for behavioral problems and confusion. The patient is not nervous/anxious.        Objective:   Vitals       Vitals:     07/03/18 0908   BP: 130/82   Resp: 16   Weight: 88.9 kg (196 lb)   Height: 5' 4" (1.626 m)         Physical Exam   Constitutional: She is oriented to person, place, and time. She appears well-developed.   HENT:   Head: Normocephalic and atraumatic.   Nose: Nose normal.   Eyes: Conjunctivae are normal. Right eye exhibits no discharge. Left eye exhibits no discharge.   Neck: Normal range of motion. Neck supple. No tracheal deviation present. No thyromegaly present.   Cardiovascular: Normal rate and regular rhythm.    Pulmonary/Chest: Effort normal. No respiratory distress. She has no wheezes.   Abdominal: Soft. She exhibits no distension. There is no hepatosplenomegaly. There is no tenderness. There is no CVA tenderness. No hernia.   Incision intact with staples. No erythema or drainage noted    YONNY with minimal serous drainage   Genitourinary:   Genitourinary Comments: Hernandez draining clear yellow urine   Musculoskeletal: Normal range of motion. She exhibits no edema.   Neurological: She is alert and oriented to person, place, and time.   Skin: Skin is warm and dry. No rash noted. No erythema.     Psychiatric: She has a normal mood and affect. Her behavior is normal. Judgment normal.       Urine dipstick shows not done--hernandez in place     YONNY removed without difficulty by myself. 4x4 gauze dressing secured with tape applied to site. Patient tolerated well.      Assessment:       1. Injury of bladder, subsequent encounter    2. Urinary retention           Plan:       1. Injury of bladder, subsequent encounter  -s/p cystotomy repair on 6/26/18  -Keep hernandez at this time  -Plan for cystogram with Dr. Le on 7/11/18. Written informed consent obtained " today  -YONNY removed today without difficulty     2. Urinary retention  -s/p cystotomy repair on 6/26/18  -Keep hernandez               Follow-up for 2-3 weeks after cystogram.

## 2018-07-03 NOTE — PROGRESS NOTES
Subjective:       Patient ID: Sherrill Ennis is a 43 y.o. female who is an established patient of Dr. Le though new to me was seen for hospital follow up    Chief Complaint:   No chief complaint on file.    Patient s/p laparoscopic supracervical hysterectomy with bilateral salpingectomy and lysis of omental adhesions on 6/20/2018 by Dr. Garcia.  She had issues postoperatively with retention and anuria.  She was sent to the ER on 6/25/18 and was found to be azotemic with creatinine of 10.  CT scan showed fluid in the belly.  Urology consult was requested for evaluation for possible injury to the urinary system. She was noted to have cautery injury with small cystotomy posterior bladder. There was no injury noted to her ureter. Subsequently she underwent cystoscopy with bilateral RPG, cystogram, cystotomy repair on 6/26/18 by Dr. Le and Dr. Ross.     She presents today for follow up. She reports YONNY drainage ~25-50 ml/day. Minimal pain controlled with Ibuprofen and Norco. Denies dysuria, gross hematuria or complications with hernandez. She is feeling better. No new complaints today    ACTIVE MEDICAL ISSUES:  Patient Active Problem List   Diagnosis    Tobacco dependency    Essential (primary) hypertension    Herpes simplex vulvovaginitis    Iron deficiency anemia due to chronic blood loss    RLS (restless legs syndrome)    Dysmenorrhea    Adenomyosis    Menorrhagia with regular cycle    Abnormal uterine bleeding    Obesity, Class I, BMI 30-34.9    Status post exploratory laparotomy    Bladder injury, initial encounter       ALLERGIES AND MEDICATIONS: updated and reviewed.  Review of patient's allergies indicates:   Allergen Reactions    Ace inhibitors Other (See Comments)     cough     Current Outpatient Prescriptions   Medication Sig    amLODIPine (NORVASC) 10 MG tablet Take 1 tablet (10 mg total) by mouth once daily. (Patient taking differently: Take 5 mg by mouth once daily. )     docusate sodium (COLACE) 100 MG capsule Take 1 capsule (100 mg total) by mouth 2 (two) times daily as needed for Constipation.    hydroCHLOROthiazide (HYDRODIURIL) 12.5 MG Tab Take 1 tablet (12.5 mg total) by mouth daily as needed.    HYDROcodone-acetaminophen (NORCO)  mg per tablet Take 1 tablet by mouth every 6 (six) hours as needed.    ibuprofen (ADVIL,MOTRIN) 600 MG tablet Take 1 tablet (600 mg total) by mouth every 6 (six) hours as needed.    ibuprofen (ADVIL,MOTRIN) 600 MG tablet Take 1 tablet (600 mg total) by mouth every 6 (six) hours as needed.    multivit,calc,mins/iron/folic (ONE-A-DAY WOMENS FORMULA ORAL) Take by mouth.    oxybutynin (DITROPAN) 5 MG Tab Take 1 tablet (5 mg total) by mouth 3 (three) times daily.    oxyCODONE-acetaminophen (PERCOCET) 5-325 mg per tablet Take 1 tablet by mouth every 4 (four) hours as needed for Pain.    polyethylene glycol (GLYCOLAX) 17 gram/dose powder Take 17 g by mouth once daily.    promethazine (PHENERGAN) 25 MG tablet Take 1 tablet (25 mg total) by mouth every 4 (four) hours.    valACYclovir (VALTREX) 1000 MG tablet Take 1 tablet (1,000 mg total) by mouth daily as needed (herpes outbreak). For 5 days     No current facility-administered medications for this visit.        Review of Systems   Constitutional: Negative for activity change, chills, fatigue, fever and unexpected weight change.   Eyes: Negative for discharge, redness and visual disturbance.   Respiratory: Negative for cough, shortness of breath and wheezing.    Cardiovascular: Negative for chest pain and leg swelling.   Gastrointestinal: Negative for abdominal distention, abdominal pain, constipation, diarrhea, nausea and vomiting.   Genitourinary: Negative for decreased urine volume, difficulty urinating, dysuria, flank pain, frequency, hematuria, urgency, vaginal bleeding and vaginal discharge.   Musculoskeletal: Negative for arthralgias, joint swelling and myalgias.   Skin: Negative for  "color change and rash.   Neurological: Negative for dizziness and light-headedness.   Psychiatric/Behavioral: Negative for behavioral problems and confusion. The patient is not nervous/anxious.        Objective:      Vitals:    07/03/18 0908   BP: 130/82   Resp: 16   Weight: 88.9 kg (196 lb)   Height: 5' 4" (1.626 m)     Physical Exam   Constitutional: She is oriented to person, place, and time. She appears well-developed.   HENT:   Head: Normocephalic and atraumatic.   Nose: Nose normal.   Eyes: Conjunctivae are normal. Right eye exhibits no discharge. Left eye exhibits no discharge.   Neck: Normal range of motion. Neck supple. No tracheal deviation present. No thyromegaly present.   Cardiovascular: Normal rate and regular rhythm.    Pulmonary/Chest: Effort normal. No respiratory distress. She has no wheezes.   Abdominal: Soft. She exhibits no distension. There is no hepatosplenomegaly. There is no tenderness. There is no CVA tenderness. No hernia.   Incision intact with staples. No erythema or drainage noted    YONNY with minimal serous drainage   Genitourinary:   Genitourinary Comments: Hernandez draining clear yellow urine   Musculoskeletal: Normal range of motion. She exhibits no edema.   Neurological: She is alert and oriented to person, place, and time.   Skin: Skin is warm and dry. No rash noted. No erythema.     Psychiatric: She has a normal mood and affect. Her behavior is normal. Judgment normal.       Urine dipstick shows not done--hernandez in place    YONNY removed without difficulty by myself. 4x4 gauze dressing secured with tape applied to site. Patient tolerated well.     Assessment:       1. Injury of bladder, subsequent encounter    2. Urinary retention          Plan:       1. Injury of bladder, subsequent encounter  -s/p cystotomy repair on 6/26/18  -Keep hernandez at this time  -Plan for cystogram with Dr. Le on 7/11/18. Written informed consent obtained today  -YONNY removed today without difficulty    2. " Urinary retention  -s/p cystotomy repair on 6/26/18  -Keep hernandez            Follow-up for 2-3 weeks after cystogram.

## 2018-07-05 ENCOUNTER — OFFICE VISIT (OUTPATIENT)
Dept: OBSTETRICS AND GYNECOLOGY | Facility: CLINIC | Age: 43
End: 2018-07-05
Payer: COMMERCIAL

## 2018-07-05 VITALS
SYSTOLIC BLOOD PRESSURE: 167 MMHG | WEIGHT: 179.25 LBS | DIASTOLIC BLOOD PRESSURE: 87 MMHG | HEIGHT: 64 IN | BODY MASS INDEX: 30.6 KG/M2

## 2018-07-05 DIAGNOSIS — Z90.711 STATUS POST LAPAROSCOPIC SUPRACERVICAL HYSTERECTOMY: Primary | ICD-10-CM

## 2018-07-05 DIAGNOSIS — Z98.890 STATUS POST LAPAROTOMY: ICD-10-CM

## 2018-07-05 PROCEDURE — 99024 POSTOP FOLLOW-UP VISIT: CPT | Mod: S$GLB,,, | Performed by: OBSTETRICS & GYNECOLOGY

## 2018-07-05 PROCEDURE — 99999 PR PBB SHADOW E&M-EST. PATIENT-LVL III: CPT | Mod: PBBFAC,,, | Performed by: OBSTETRICS & GYNECOLOGY

## 2018-07-05 NOTE — PROGRESS NOTES
Subjective:       Patient ID: Sherrill Ennis is a 43 y.o. female.    Chief Complaint:  Post-op Evaluation      History of Present Illness  HPI  Ms Ennis is a 43 years old, status post laparoscopic supracervical hysterectomy with bilateral salpingectomy on 2018, followed by cystoscopy, cystogram, laparotomy, repair of bladder injury on 2018 by Dr Le.  She comes in today for an exam and followup.  No fever or chills.  No nausea or vomiting.  No diarrhea or constipation.  Minimal abdominal or pelvic pain.    She has NOT resumed normal intercourse.  Patient has begun some walking for exercise. She is having signs and symptoms of some depression.  She is tolerating oral intake well.  She thinks she has a yeast infection.    YONNY drained removed by Dr Le 2 days ago.      GYN & OB History  Patient's last menstrual period was 2018 (lmp unknown).   Date of Last Pap: 2018    OB History    Para Term  AB Living   4 4 4     4   SAB TAB Ectopic Multiple Live Births           4      # Outcome Date GA Lbr Wei/2nd Weight Sex Delivery Anes PTL Lv   4 Term 2001 38w0d  3.118 kg (6 lb 14 oz) F CS-LTranv  N DESTINI   3 Term 1997 39w0d  3.997 kg (8 lb 13 oz) F CS-LTranv  N DESTINI   2 Term 1994 39w0d  3.742 kg (8 lb 4 oz) F CS-LTranv  N DESTINI   1 Term  40w0d  3.402 kg (7 lb 8 oz) F CS-LTranv  N DESTINI      Complications: Fetal Intolerance,Cephalopelvic Disproportion        Past Medical History:   Diagnosis Date    Hypertension        Past Surgical History:   Procedure Laterality Date    BLADDER REPAIR N/A 2018    Procedure: REPAIR, BLADDER;  Surgeon: NELIA Le MD;  Location: [facility] OR;  Service: Urology;  Laterality: N/A;     SECTION      x4    CHOLECYSTECTOMY      CYSTOGRAM  2018    Procedure: CYSTOGRAM;  Surgeon: NELIA Le MD;  Location: [facility] OR;  Service: Urology;;    CYSTOSCOPY W/ RETROGRADES Bilateral 2018    Procedure: CYSTOSCOPY,  WITH RETROGRADE pyelLOGRAM;  Surgeon: NELIA Le MD;  Location: [facility] OR;  Service: Urology;  Laterality: Bilateral;    HYSTERECTOMY  06/20/2018    LAPAROSCOPIC SALPINGECTOMY Bilateral 6/20/2018    Procedure: SALPINGECTOMY, LAPAROSCOPIC;  Surgeon: Vince Garcia MD;  Location: [facility] OR;  Service: OB/GYN;  Laterality: Bilateral;    LAPAROSCOPIC SUPRACERVICAL HYSTERECTOMY N/A 6/20/2018    Procedure: [id number];  Surgeon: Vince Garcia MD;  Location: [facility] OR;  Service: OB/GYN;  Laterality: N/A;  1ST CASE  RN PRE OP 6/13/2018    LAPAROSCOPIC SURGICAL REMOVAL OF CYST OF OVARY Right 6/20/2018    Procedure: EXCISION, CYST, OVARY, LAPAROSCOPIC;  Surgeon: Vince Garcia MD;  Location: [facility] OR;  Service: OB/GYN;  Laterality: Right;    TUBAL LIGATION         Family History   Problem Relation Age of Onset    Hypertension Mother     Stroke Mother     Aneurysm Mother     Hypertension Father     Hypertension Brother     Hypertension Maternal Grandfather     Heart disease Maternal Grandfather         MI    Cancer Maternal Grandfather     Cancer Paternal Grandmother        Social History     Social History    Marital status:      Spouse name: N/A    Number of children: N/A    Years of education: N/A     Social History Main Topics    Smoking status: Current Every Day Smoker     Packs/day: 0.25     Years: 17.00     Types: Cigarettes    Smokeless tobacco: Never Used    Alcohol use Yes      Comment: Occasional    Drug use: No    Sexual activity: Not Currently     Partners: Male      Comment:      Other Topics Concern    None     Social History Narrative     since 2016    Together since 2013    He drives 18-wheelers    She is the  for a dental office    Previously  and     Lives with her  and youngest daughter.     with three daughters from previous marriage also       Current Outpatient Prescriptions   Medication Sig  Dispense Refill    amLODIPine (NORVASC) 10 MG tablet Take 1 tablet (10 mg total) by mouth once daily. (Patient taking differently: Take 5 mg by mouth once daily. ) 90 tablet 3    hydroCHLOROthiazide (HYDRODIURIL) 12.5 MG Tab Take 1 tablet (12.5 mg total) by mouth daily as needed. 90 tablet 0    HYDROcodone-acetaminophen (NORCO)  mg per tablet Take 1 tablet by mouth every 6 (six) hours as needed. 20 tablet 0    ibuprofen (ADVIL,MOTRIN) 600 MG tablet Take 1 tablet (600 mg total) by mouth every 6 (six) hours as needed. 40 tablet 1    multivit,calc,mins/iron/folic (ONE-A-DAY WOMENS FORMULA ORAL) Take by mouth.      docusate sodium (COLACE) 100 MG capsule Take 1 capsule (100 mg total) by mouth 2 (two) times daily as needed for Constipation. 60 capsule 2    ibuprofen (ADVIL,MOTRIN) 600 MG tablet Take 1 tablet (600 mg total) by mouth every 6 (six) hours as needed. 40 tablet 1    oxybutynin (DITROPAN) 5 MG Tab Take 1 tablet (5 mg total) by mouth 3 (three) times daily. 90 tablet 3    oxyCODONE-acetaminophen (PERCOCET) 5-325 mg per tablet Take 1 tablet by mouth every 4 (four) hours as needed for Pain. 15 tablet 0    polyethylene glycol (GLYCOLAX) 17 gram/dose powder Take 17 g by mouth once daily. 289 g 0    promethazine (PHENERGAN) 25 MG tablet Take 1 tablet (25 mg total) by mouth every 4 (four) hours. 30 tablet 1    valACYclovir (VALTREX) 1000 MG tablet Take 1 tablet (1,000 mg total) by mouth daily as needed (herpes outbreak). For 5 days 30 tablet 1     No current facility-administered medications for this visit.        Review of patient's allergies indicates:   Allergen Reactions    Ace inhibitors Other (See Comments)     cough       Review of Systems  Review of Systems   Constitutional: Negative for activity change, appetite change, chills, fatigue, fever and unexpected weight change.   HENT: Negative for mouth sores.    Respiratory: Negative for cough, shortness of breath and wheezing.     Cardiovascular: Negative for chest pain and palpitations.   Gastrointestinal: Positive for abdominal pain. Negative for bloating, blood in stool, constipation, nausea and vomiting.   Endocrine: Negative for diabetes and hot flashes.   Genitourinary: Negative for dyspareunia, dysuria, frequency, hematuria, menorrhagia, menstrual problem, pelvic pain, urgency, vaginal bleeding, vaginal discharge, vaginal pain, dysmenorrhea, urinary incontinence, postcoital bleeding and vaginal odor.        Vulva and vaginal irritation   Musculoskeletal: Negative for back pain and myalgias.   Skin:  Negative for rash.   Neurological: Negative for seizures and headaches.   Psychiatric/Behavioral: Positive for depression and sleep disturbance. The patient is nervous/anxious.    Breast: Negative for breast mass, breast pain and nipple discharge          Objective:    Physical Exam:   Constitutional: She appears well-developed and well-nourished. No distress.    HENT:   Head: Normocephalic and atraumatic.    Eyes: EOM are normal.    Neck: Normal range of motion.     Pulmonary/Chest: Effort normal. No respiratory distress.        Abdominal: Soft. She exhibits no distension. There is no tenderness. There is no rebound and no guarding.   Vertical incision intact and not bleeding.  Healing well  Staples in place     Genitourinary: Vagina normal. No vaginal discharge found.   Genitourinary Comments: Robbins draining clear urine.  Vulva without any obvious lesions.  Vaginal vault with good support.  Minimal white discharge noted.  No obvious lesion.             Musculoskeletal: Normal range of motion.       Neurological: She is alert.    Skin: Skin is warm and dry.    Psychiatric: She has a normal mood and affect.          Assessment:     1.  Postop care          Plan:      I have discussed with the patient regarding her condition  She is doing well physically  Getting better psychologically  Denies severe depression    Again, discussed  treatment plan.  She will be back with Dr Le for cystogram on 7/11/2018    She can take OTC Unisom to sleep.  No current need for antidepressant.    Back in 4 weeks.

## 2018-07-06 NOTE — PRE ADMISSION SCREENING
RN Phone Pre op done.  Patient instructed to remain NPO after midnight (id sedation to be used), to take Amlodipine with swallow of water.  Expressed understanding of instructions.  Will call for arrival time.

## 2018-07-11 ENCOUNTER — HOSPITAL ENCOUNTER (OUTPATIENT)
Facility: HOSPITAL | Age: 43
Discharge: HOME | End: 2018-07-11
Attending: UROLOGY | Admitting: UROLOGY
Payer: COMMERCIAL

## 2018-07-11 ENCOUNTER — ANESTHESIA EVENT (OUTPATIENT)
Dept: SURGERY | Facility: HOSPITAL | Age: 43
End: 2018-07-11
Payer: COMMERCIAL

## 2018-07-11 ENCOUNTER — SURGERY (OUTPATIENT)
Age: 43
End: 2018-07-11

## 2018-07-11 ENCOUNTER — ANESTHESIA (OUTPATIENT)
Dept: SURGERY | Facility: HOSPITAL | Age: 43
End: 2018-07-11
Payer: COMMERCIAL

## 2018-07-11 ENCOUNTER — TELEPHONE (OUTPATIENT)
Dept: UROLOGY | Facility: CLINIC | Age: 43
End: 2018-07-11

## 2018-07-11 VITALS
HEART RATE: 77 BPM | WEIGHT: 191 LBS | DIASTOLIC BLOOD PRESSURE: 78 MMHG | TEMPERATURE: 98 F | OXYGEN SATURATION: 98 % | HEIGHT: 64 IN | BODY MASS INDEX: 32.61 KG/M2 | RESPIRATION RATE: 16 BRPM | SYSTOLIC BLOOD PRESSURE: 132 MMHG

## 2018-07-11 DIAGNOSIS — S37.20XD: Primary | ICD-10-CM

## 2018-07-11 DIAGNOSIS — Z90.711 STATUS POST LAPAROSCOPIC SUPRACERVICAL HYSTERECTOMY: ICD-10-CM

## 2018-07-11 DIAGNOSIS — S37.20XD INJURY OF BLADDER, SUBSEQUENT ENCOUNTER: ICD-10-CM

## 2018-07-11 PROCEDURE — D9220A PRA ANESTHESIA: Mod: CRNA,,, | Performed by: NURSE ANESTHETIST, CERTIFIED REGISTERED

## 2018-07-11 PROCEDURE — 36000705 HC OR TIME LEV I EA ADD 15 MIN: Performed by: UROLOGY

## 2018-07-11 PROCEDURE — 36000704 HC OR TIME LEV I 1ST 15 MIN: Performed by: UROLOGY

## 2018-07-11 PROCEDURE — 25500020 PHARM REV CODE 255: Performed by: UROLOGY

## 2018-07-11 PROCEDURE — 37000009 HC ANESTHESIA EA ADD 15 MINS: Performed by: UROLOGY

## 2018-07-11 PROCEDURE — 71000015 HC POSTOP RECOV 1ST HR: Performed by: UROLOGY

## 2018-07-11 PROCEDURE — 74430 CONTRAST X-RAY BLADDER: CPT | Mod: 26,,, | Performed by: UROLOGY

## 2018-07-11 PROCEDURE — 71000016 HC POSTOP RECOV ADDL HR: Performed by: UROLOGY

## 2018-07-11 PROCEDURE — 25000003 PHARM REV CODE 250: Performed by: ANESTHESIOLOGY

## 2018-07-11 PROCEDURE — D9220A PRA ANESTHESIA: Mod: ANES,,, | Performed by: ANESTHESIOLOGY

## 2018-07-11 PROCEDURE — 51600 INJECTION FOR BLADDER X-RAY: CPT | Mod: 58,,, | Performed by: UROLOGY

## 2018-07-11 PROCEDURE — 25000003 PHARM REV CODE 250: Performed by: NURSE ANESTHETIST, CERTIFIED REGISTERED

## 2018-07-11 PROCEDURE — 25000003 PHARM REV CODE 250: Performed by: UROLOGY

## 2018-07-11 PROCEDURE — 63600175 PHARM REV CODE 636 W HCPCS: Performed by: NURSE ANESTHETIST, CERTIFIED REGISTERED

## 2018-07-11 PROCEDURE — 76000 FLUOROSCOPY <1 HR PHYS/QHP: CPT | Mod: 26,59,, | Performed by: UROLOGY

## 2018-07-11 PROCEDURE — 37000008 HC ANESTHESIA 1ST 15 MINUTES: Performed by: UROLOGY

## 2018-07-11 RX ORDER — MORPHINE SULFATE 4 MG/ML
2 INJECTION, SOLUTION INTRAMUSCULAR; INTRAVENOUS EVERY 5 MIN PRN
Status: DISCONTINUED | OUTPATIENT
Start: 2018-07-11 | End: 2018-07-11 | Stop reason: HOSPADM

## 2018-07-11 RX ORDER — SODIUM CHLORIDE, SODIUM LACTATE, POTASSIUM CHLORIDE, CALCIUM CHLORIDE 600; 310; 30; 20 MG/100ML; MG/100ML; MG/100ML; MG/100ML
INJECTION, SOLUTION INTRAVENOUS CONTINUOUS
Status: DISCONTINUED | OUTPATIENT
Start: 2018-07-11 | End: 2018-07-11 | Stop reason: HOSPADM

## 2018-07-11 RX ORDER — CIPROFLOXACIN 500 MG/1
500 TABLET ORAL 2 TIMES DAILY
Qty: 6 TABLET | Refills: 0 | Status: SHIPPED | OUTPATIENT
Start: 2018-07-11 | End: 2018-07-14

## 2018-07-11 RX ORDER — ONDANSETRON 2 MG/ML
INJECTION INTRAMUSCULAR; INTRAVENOUS
Status: DISCONTINUED | OUTPATIENT
Start: 2018-07-11 | End: 2018-07-11

## 2018-07-11 RX ORDER — OXYCODONE HYDROCHLORIDE 5 MG/1
10 TABLET ORAL EVERY 4 HOURS PRN
Status: CANCELLED | OUTPATIENT
Start: 2018-07-11

## 2018-07-11 RX ORDER — PHENAZOPYRIDINE HYDROCHLORIDE 100 MG/1
200 TABLET, FILM COATED ORAL ONCE
Status: DISCONTINUED | OUTPATIENT
Start: 2018-07-11 | End: 2018-07-11 | Stop reason: HOSPADM

## 2018-07-11 RX ORDER — SODIUM CHLORIDE 0.9 % (FLUSH) 0.9 %
3 SYRINGE (ML) INJECTION
Status: DISCONTINUED | OUTPATIENT
Start: 2018-07-11 | End: 2018-07-11 | Stop reason: HOSPADM

## 2018-07-11 RX ORDER — OXYCODONE AND ACETAMINOPHEN 5; 325 MG/1; MG/1
1 TABLET ORAL EVERY 4 HOURS PRN
Qty: 30 TABLET | Refills: 0 | Status: SHIPPED | OUTPATIENT
Start: 2018-07-11 | End: 2018-08-10

## 2018-07-11 RX ORDER — PROPOFOL 10 MG/ML
VIAL (ML) INTRAVENOUS
Status: DISCONTINUED | OUTPATIENT
Start: 2018-07-11 | End: 2018-07-11

## 2018-07-11 RX ORDER — OXYCODONE HYDROCHLORIDE 5 MG/1
5 TABLET ORAL EVERY 4 HOURS PRN
Status: CANCELLED | OUTPATIENT
Start: 2018-07-11

## 2018-07-11 RX ORDER — LIDOCAINE HYDROCHLORIDE 10 MG/ML
1 INJECTION, SOLUTION EPIDURAL; INFILTRATION; INTRACAUDAL; PERINEURAL ONCE
Status: DISCONTINUED | OUTPATIENT
Start: 2018-07-11 | End: 2018-07-11 | Stop reason: HOSPADM

## 2018-07-11 RX ORDER — GLYCOPYRROLATE 0.2 MG/ML
INJECTION INTRAMUSCULAR; INTRAVENOUS
Status: DISCONTINUED | OUTPATIENT
Start: 2018-07-11 | End: 2018-07-11

## 2018-07-11 RX ORDER — CIPROFLOXACIN 500 MG/1
500 TABLET ORAL
Status: COMPLETED | OUTPATIENT
Start: 2018-07-11 | End: 2018-07-11

## 2018-07-11 RX ORDER — SODIUM CHLORIDE, SODIUM LACTATE, POTASSIUM CHLORIDE, CALCIUM CHLORIDE 600; 310; 30; 20 MG/100ML; MG/100ML; MG/100ML; MG/100ML
INJECTION, SOLUTION INTRAVENOUS CONTINUOUS
Status: CANCELLED | OUTPATIENT
Start: 2018-07-11

## 2018-07-11 RX ORDER — FENTANYL CITRATE 50 UG/ML
INJECTION, SOLUTION INTRAMUSCULAR; INTRAVENOUS
Status: DISCONTINUED | OUTPATIENT
Start: 2018-07-11 | End: 2018-07-11

## 2018-07-11 RX ORDER — ACETAMINOPHEN 325 MG/1
975 TABLET ORAL EVERY 6 HOURS PRN
COMMUNITY
End: 2018-08-10

## 2018-07-11 RX ORDER — PHENAZOPYRIDINE HYDROCHLORIDE 200 MG/1
200 TABLET, FILM COATED ORAL 3 TIMES DAILY PRN
Qty: 21 TABLET | Refills: 0 | Status: SHIPPED | OUTPATIENT
Start: 2018-07-11 | End: 2018-08-10

## 2018-07-11 RX ORDER — MIDAZOLAM HYDROCHLORIDE 1 MG/ML
INJECTION, SOLUTION INTRAMUSCULAR; INTRAVENOUS
Status: DISCONTINUED | OUTPATIENT
Start: 2018-07-11 | End: 2018-07-11

## 2018-07-11 RX ORDER — LIDOCAINE HCL/PF 100 MG/5ML
SYRINGE (ML) INTRAVENOUS
Status: DISCONTINUED | OUTPATIENT
Start: 2018-07-11 | End: 2018-07-11

## 2018-07-11 RX ADMIN — CIPROFLOXACIN 500 MG: 500 TABLET, FILM COATED ORAL at 08:07

## 2018-07-11 RX ADMIN — LIDOCAINE HYDROCHLORIDE 85 MG: 20 INJECTION, SOLUTION INTRAVENOUS at 09:07

## 2018-07-11 RX ADMIN — FENTANYL CITRATE 100 MCG: 50 INJECTION INTRAMUSCULAR; INTRAVENOUS at 09:07

## 2018-07-11 RX ADMIN — GLYCOPYRROLATE 0.2 MG: 0.2 INJECTION, SOLUTION INTRAMUSCULAR; INTRAVENOUS at 09:07

## 2018-07-11 RX ADMIN — PROPOFOL 100 MG: 10 INJECTION, EMULSION INTRAVENOUS at 09:07

## 2018-07-11 RX ADMIN — ONDANSETRON 4 MG: 2 INJECTION, SOLUTION INTRAMUSCULAR; INTRAVENOUS at 09:07

## 2018-07-11 RX ADMIN — SODIUM CHLORIDE, SODIUM LACTATE, POTASSIUM CHLORIDE, AND CALCIUM CHLORIDE: .6; .31; .03; .02 INJECTION, SOLUTION INTRAVENOUS at 07:07

## 2018-07-11 RX ADMIN — IOTHALAMATE MEGLUMINE 250 ML: 172 INJECTION URETERAL at 10:07

## 2018-07-11 RX ADMIN — MIDAZOLAM HYDROCHLORIDE 2 MG: 1 INJECTION, SOLUTION INTRAMUSCULAR; INTRAVENOUS at 09:07

## 2018-07-11 RX ADMIN — SODIUM CHLORIDE, SODIUM LACTATE, POTASSIUM CHLORIDE, AND CALCIUM CHLORIDE: .6; .31; .03; .02 INJECTION, SOLUTION INTRAVENOUS at 08:07

## 2018-07-11 NOTE — ANESTHESIA POSTPROCEDURE EVALUATION
"Anesthesia Post Evaluation    Patient: Sherrill Ennis    Procedure(s) Performed: Procedure(s) (LRB):  CYSTOGRAM (N/A)    Final Anesthesia Type: general  Patient location during evaluation: PACU  Patient participation: Yes- Able to Participate  Level of consciousness: awake and alert, oriented and awake  Post-procedure vital signs: reviewed and stable  Pain management: adequate  Airway patency: patent  PONV status at discharge: No PONV  Anesthetic complications: no      Cardiovascular status: blood pressure returned to baseline, hemodynamically stable and stable  Respiratory status: unassisted and spontaneous ventilation  Hydration status: euvolemic  Follow-up not needed.        Visit Vitals  /78   Pulse 77   Temp 36.7 °C (98 °F)   Resp 16   Ht 5' 4" (1.626 m)   Wt 86.6 kg (191 lb)   LMP 06/12/2018 (LMP Unknown)   SpO2 98%   Breastfeeding? No   BMI 32.79 kg/m²       Pain/Franki Score: Pain Assessment Performed: Yes (7/11/2018 10:53 AM)  Presence of Pain: denies (7/11/2018 10:53 AM)  Franki Score: 10 (7/11/2018  9:42 AM)  Modified Franki Score: 20 (7/11/2018 10:53 AM)      "

## 2018-07-11 NOTE — TELEPHONE ENCOUNTER
Spoke to mesfin at [facility] just wanted to confirm direction on cipro 500mg 1 po bid #6 was advised to her./amalia

## 2018-07-11 NOTE — BRIEF OP NOTE
[facility]  Brief Operative Note     SUMMARY     Surgery Date: 7/11/2018     Surgeon(s) and Role:     * NELIA Le MD - Primary    Assisting Surgeon: None    Pre-op Diagnosis:  Injury of bladder, subsequent encounter [S37.20XD]    Post-op Diagnosis:  Post-Op Diagnosis Codes:     * Injury of bladder, subsequent encounter [S37.20XD]    Procedure(s) (LRB):  CYSTOGRAM (N/A)    Anesthesia: Monitor Anesthesia Care    Description of the findings of the procedure: Bladder Filled to 300 mL, no leak. Bladder drained of 300 mL, no evidence of leak.    Findings/Key Components: Staples removed, steri strips applied    Estimated Blood Loss: * No values recorded between 7/11/2018  9:27 AM and 7/11/2018  9:38 AM *         Specimens:   Specimen (12h ago through future)    None          Discharge Note    SUMMARY     Admit Date: 7/11/2018    Discharge Date and Time:  07/11/2018 9:39 AM    Hospital Course (synopsis of major diagnoses, care, treatment, and services provided during the course of the hospital stay): Patient was admitted for an outpatient procedure and tolerated the procedure well with no complications.      Final Diagnosis: Post-Op Diagnosis Codes:     * Injury of bladder, subsequent encounter [S37.20XD]    Disposition: Home or Self Care    Follow Up/Patient Instructions:     Medications:  Reconciled Home Medications:      Medication List      START taking these medications    ciprofloxacin HCl 500 MG tablet  Commonly known as:  CIPRO  Take 1 tablet (500 mg total) by mouth 2 (two) times daily. for 6 doses     phenazopyridine 200 MG tablet  Commonly known as:  PYRIDIUM  Take 1 tablet (200 mg total) by mouth 3 (three) times daily as needed for Pain (Burning).        CHANGE how you take these medications    amLODIPine 10 MG tablet  Commonly known as:  NORVASC  Take 1 tablet (10 mg total) by mouth once daily.  What changed:  how much to take        CONTINUE taking these medications    acetaminophen  325 MG tablet  Commonly known as:  TYLENOL  Take 975 mg by mouth every 6 (six) hours as needed for Pain.     docusate sodium 100 MG capsule  Commonly known as:  COLACE  Take 1 capsule (100 mg total) by mouth 2 (two) times daily as needed for Constipation.     hydroCHLOROthiazide 12.5 MG Tab  Commonly known as:  HYDRODIURIL  Take 1 tablet (12.5 mg total) by mouth daily as needed.     HYDROcodone-acetaminophen  mg per tablet  Commonly known as:  NORCO  Take 1 tablet by mouth every 6 (six) hours as needed.     ibuprofen 600 MG tablet  Commonly known as:  ADVIL,MOTRIN  Take 1 tablet (600 mg total) by mouth every 6 (six) hours as needed.     ONE-A-DAY WOMENS FORMULA ORAL  Take by mouth.     oxybutynin 5 MG Tab  Commonly known as:  DITROPAN  Take 1 tablet (5 mg total) by mouth 3 (three) times daily.     oxyCODONE-acetaminophen 5-325 mg per tablet  Commonly known as:  PERCOCET  Take 1 tablet by mouth every 4 (four) hours as needed for Pain.     promethazine 25 MG tablet  Commonly known as:  PHENERGAN  Take 1 tablet (25 mg total) by mouth every 4 (four) hours.     valACYclovir 1000 MG tablet  Commonly known as:  VALTREX  Take 1 tablet (1,000 mg total) by mouth daily as needed (herpes outbreak). For 5 days            Discharge Procedure Orders  Diet general     Call MD for:   Order Comments: Significant Hematuria       Follow-up Information     KEENA Le MD. Schedule an appointment as soon as possible for a visit in 2 weeks.    Specialty:  Urology  Why:  Post op Check  Contact information:  [address] [place] [id number] [phone]

## 2018-07-11 NOTE — ANESTHESIA PREPROCEDURE EVALUATION
07/11/2018  Sherrill Ennis is a 43 y.o., female.    Anesthesia Evaluation    I have reviewed the Patient Summary Reports.    I have reviewed the Nursing Notes.   I have reviewed the Medications.     Review of Systems  Anesthesia Hx:  No previous Anesthesia  Denies Family Hx of Anesthesia complications.   Denies Personal Hx of Anesthesia complications.   Social:  Non-Smoker    Hematology/Oncology:  Hematology Normal   Oncology Normal     EENT/Dental:EENT/Dental Normal   Cardiovascular:   Hypertension    Pulmonary:  Pulmonary Normal    Renal/:  Renal/ Normal     Hepatic/GI:  Hepatic/GI Normal    Musculoskeletal:  Musculoskeletal Normal    Neurological:  Neurology Normal    Endocrine:  Endocrine Normal    Dermatological:  Skin Normal    Psych:  Psychiatric Normal           Physical Exam  General:  Well nourished    Airway/Jaw/Neck:  Airway Findings: Mallampati: II TM Distance: 4 - 6 cm      Dental:  Dental Findings: In tact   Chest/Lungs:  Chest/Lungs Clear    Heart/Vascular:  Heart Findings: Normal       Mental Status:  Mental Status Findings:  Cooperative, Alert and Oriented         Anesthesia Plan  Type of Anesthesia, risks & benefits discussed:  Anesthesia Type:  general  Patient's Preference:   Intra-op Monitoring Plan: standard ASA monitors  Intra-op Monitoring Plan Comments:   Post Op Pain Control Plan: multimodal analgesia, IV/PO Opioids PRN and per primary service following discharge from PACU  Post Op Pain Control Plan Comments:   Induction:    Beta Blocker:  Patient is not currently on a Beta-Blocker (No further documentation required).       Informed Consent: Patient understands risks and agrees with Anesthesia plan.  Questions answered. Anesthesia consent signed with patient.  ASA Score: 2     Day of Surgery Review of History & Physical:    H&P update referred to the provider.  H&P  completed by Anesthesiologist.   Anesthesia Plan Notes: npo        Ready For Surgery From Anesthesia Perspective.

## 2018-07-11 NOTE — DISCHARGE SUMMARY
[facility]  Discharge Note  Short Stay    Admit Date: 7/11/2018    Discharge Date and Time: 07/11/2018 9:39 AM      Attending Physician: NELIA Le MD     Discharge Provider: KEENA Le    Diagnoses:  Active Hospital Problems    Diagnosis  POA    *Bladder injury, open, subsequent encounter [S37.20XD]  Not Applicable      Resolved Hospital Problems    Diagnosis Date Resolved POA   No resolved problems to display.       Discharged Condition: stable    Hospital Course: Patient was admitted for an outpatient procedure and tolerated the procedure well with no complications.    Final Diagnoses: Same as principal problem.    Disposition: Home or Self Care    Follow up/Patient Instructions:    Medications:  Reconciled Home Medications:      Medication List      START taking these medications    ciprofloxacin HCl 500 MG tablet  Commonly known as:  CIPRO  Take 1 tablet (500 mg total) by mouth 2 (two) times daily. for 6 doses     phenazopyridine 200 MG tablet  Commonly known as:  PYRIDIUM  Take 1 tablet (200 mg total) by mouth 3 (three) times daily as needed for Pain (Burning).        CHANGE how you take these medications    amLODIPine 10 MG tablet  Commonly known as:  NORVASC  Take 1 tablet (10 mg total) by mouth once daily.  What changed:  how much to take        CONTINUE taking these medications    acetaminophen 325 MG tablet  Commonly known as:  TYLENOL  Take 975 mg by mouth every 6 (six) hours as needed for Pain.     docusate sodium 100 MG capsule  Commonly known as:  COLACE  Take 1 capsule (100 mg total) by mouth 2 (two) times daily as needed for Constipation.     hydroCHLOROthiazide 12.5 MG Tab  Commonly known as:  HYDRODIURIL  Take 1 tablet (12.5 mg total) by mouth daily as needed.     HYDROcodone-acetaminophen  mg per tablet  Commonly known as:  NORCO  Take 1 tablet by mouth every 6 (six) hours as needed.     ibuprofen 600 MG tablet  Commonly known as:  ADVIL,MOTRIN  Take 1 tablet (600 mg  total) by mouth every 6 (six) hours as needed.     ONE-A-DAY WOMENS FORMULA ORAL  Take by mouth.     oxybutynin 5 MG Tab  Commonly known as:  DITROPAN  Take 1 tablet (5 mg total) by mouth 3 (three) times daily.     oxyCODONE-acetaminophen 5-325 mg per tablet  Commonly known as:  PERCOCET  Take 1 tablet by mouth every 4 (four) hours as needed for Pain.     promethazine 25 MG tablet  Commonly known as:  PHENERGAN  Take 1 tablet (25 mg total) by mouth every 4 (four) hours.     valACYclovir 1000 MG tablet  Commonly known as:  VALTREX  Take 1 tablet (1,000 mg total) by mouth daily as needed (herpes outbreak). For 5 days            Discharge Procedure Orders  Diet general     Call MD for:   Order Comments: Significant Hematuria       Follow-up Information     W Dayron Le MD. Schedule an appointment as soon as possible for a visit in 2 weeks.    Specialty:  Urology  Why:  Post op Check  Contact information:  [address] [place] [id number]  [phone]                   Discharge Procedure Orders (must include Diet, Follow-up, Activity):    Discharge Procedure Orders (must include Diet, Follow-up, Activity)  Diet general     Call MD for:   Order Comments: Significant Hematuria

## 2018-07-11 NOTE — OP NOTE
DATE OF PROCEDURE:  07/11/2018.    PREOPERATIVE DIAGNOSIS:  Bladder injury subsequent encounter.    POSTOPERATIVE DIAGNOSIS:  Bladder injury subsequent encounter.    PROCEDURE PERFORMED:  Cystogram, fluoroscopy.    PRIMARY SURGEON:  Silver Le M.D.    ANESTHESIA:  MAC.    ESTIMATED BLOOD LOSS:  None.    DRAINS:  None.    COMPLICATIONS:  None.    INDICATIONS:  Sherrill Ennis is a 43-year-old woman who underwent a   hysterectomy a few weeks ago.  She was subsequently discovered that she had a   bladder injury, which was repaired formally two weeks ago by myself.  She is   here today for cystogram for verification that her bladder injury has been   completely closed.    Sherrill Ennis was taken to the Operating Room where she was positively   identified by eddie.  She was placed supine on the operating room table.    Following induction of adequate MAC anesthesia, she was placed in the dorsal   lithotomy position and her external genitalia and catheter were then prepped and   draped in usual sterile fashion.    Her previously placed staples were removed and Steri-Strips were applied and   incision appeared intact except for one minor area of distraction, which was   closed with staples.    Preoperative timeout was performed as well as confirmation of preoperative   antibiotics.    I then performed  film using fluoroscopy.    I then filled the bladder with Cysto-Conray through the catheter.  The bladder   was filled to 300 mL.  Spot fluoroscopy was taken throughout the filling and   there was no evidence of any leakage throughout the filling phase.  At 300 mL,   there was no evidence of any leakage.  The bladder was then subsequently   drained, 300 mL were withdrawn and there was no evidence of any leak with the   bladder drained.    The catheter was then removed.    Her anesthesia was reversed.  She was taken to Recovery Room in stable   condition.      NEO  dd: 07/11/2018 09:42:38 (CDT)  td:  07/11/2018 11:14:56 (CDT)  Doc ID   #[id number]  Job ID #[id number]    CC:

## 2018-07-11 NOTE — TRANSFER OF CARE
"Anesthesia Transfer of Care Note    Patient: Sherrill Ennis    Procedure(s) Performed: Procedure(s) (LRB):  CYSTOGRAM (N/A)    Patient location: OPS    Anesthesia Type: MAC    Transport from OR: Transported from OR on room air with adequate spontaneous ventilation    Post pain: adequate analgesia    Post assessment: no apparent anesthetic complications    Post vital signs: stable    Level of consciousness: alert, oriented and awake    Nausea/Vomiting: no nausea/vomiting    Complications: none    Transfer of care protocol was followed      Last vitals:   Visit Vitals  BP (!) 130/95 (BP Location: Left arm, Patient Position: Lying)   Pulse 83   Temp 37 °C (98.6 °F) (Skin)   Resp 18   Ht 5' 4" (1.626 m)   Wt 86.6 kg (191 lb)   LMP 06/12/2018 (LMP Unknown)   SpO2 97%   Breastfeeding? No   BMI 32.79 kg/m²     "

## 2018-07-11 NOTE — DISCHARGE INSTRUCTIONS
ACTIVITY LEVEL: If you have received sedation or an anesthetic, you may feel sleepy for several hours. Rest until you are more awake. Gradually resume your normal activities.       DIET: You may resume your home diet. If nausea is present, increase your diet gradually with fluids and bland foods.      Medications: Pain medication should be taken only if needed and as directed. If antibiotics are prescribed, the medication should be taken until completed. You will be given an updated list of you medications.  ? No driving, alcoholic beverages or signing legal documents for next 24 hours or while taking pain medication        CALL THE DOCTOR:       · Fever over 101°F  · Severe pain that doesnt go away with medication.  · Upset stomach and vomiting that is persistent.  · Problems urinating-unable to urinate or heavy bleeding (with or without clots)             Fall Prevention  Millions of people fall every year and injure themselves. You may have had anesthesia or sedation which may increase your risk of falling. You may have health issues that put you at an increased risk of falling.     Here are ways to reduce your risk of falling.  ·   · Make your home safe by keeping walkways clear of objects you may trip over.  · Use non-slip pads under rugs. Do not use area rugs or small throw rugs.  · Use non-slip mats in bathtubs and showers.  · Install handrails and lights on staircases.  · Do not walk in poorly lit areas.  · Do not stand on chairs or wobbly ladders.  · Use caution when reaching overhead or looking upward. This position can cause a loss of balance.  · Be sure your shoes fit properly, have non-slip bottoms and are in good condition.   · Wear shoes both inside and out. Avoid going barefoot or wearing slippers.  · Be cautious when going up and down stairs, curbs, and when walking on uneven sidewalks.  · If your balance is poor, consider using a cane or walker.  · If your fall was related to alcohol use, stop  or limit alcohol intake.   · If your fall was related to use of sleeping medicines, talk to your doctor about this. You may need to reduce your dosage at bedtime if you awaken during the night to go to the bathroom.    · To reduce the need for nighttime bathroom trips:  ¨ Avoid drinking fluids for several hours before going to bed  ¨ Empty your bladder before going to bed  ¨ Men can keep a urinal at the bedside  · Stay as active as you can. Balance, flexibility, strength, and endurance all come from exercise. They all play a role in preventing falls. Ask your healthcare provider which types of activity are right for you.  · Get your vision checked on a regular basis.  · If you have pets, know where they are before you stand up or walk so you don't trip over them.  Use night lights.

## 2018-07-13 ENCOUNTER — TELEPHONE (OUTPATIENT)
Dept: UROLOGY | Facility: CLINIC | Age: 43
End: 2018-07-13

## 2018-07-13 DIAGNOSIS — S37.20XD INJURY OF BLADDER, SUBSEQUENT ENCOUNTER: Primary | ICD-10-CM

## 2018-07-13 NOTE — TELEPHONE ENCOUNTER
----- Message from Brenda Hogan sent at 7/13/2018  3:39 PM CDT -----  Contact: Self   Patient returned your call. Please call again at [phone].

## 2018-07-16 ENCOUNTER — TELEPHONE (OUTPATIENT)
Dept: UROLOGY | Facility: CLINIC | Age: 43
End: 2018-07-16

## 2018-07-16 NOTE — TELEPHONE ENCOUNTER
Spoke to pt advised okay to go back to work on 07/23/18 no heavy lifting nothing more than 10lbs until 08/06/18. Pt fully understands. Jennie

## 2018-07-16 NOTE — TELEPHONE ENCOUNTER
Spoke to pt she is asking when can she go back to work her follow up is 08/14/18 she wasn't sure if she had to wait until than. Pt states she is having some discomfort but nothing emergent. Please advise-amaila

## 2018-07-24 ENCOUNTER — PATIENT MESSAGE (OUTPATIENT)
Dept: UROLOGY | Facility: CLINIC | Age: 43
End: 2018-07-24

## 2018-08-10 ENCOUNTER — OFFICE VISIT (OUTPATIENT)
Dept: FAMILY MEDICINE | Facility: CLINIC | Age: 43
End: 2018-08-10
Payer: COMMERCIAL

## 2018-08-10 VITALS
BODY MASS INDEX: 32.61 KG/M2 | TEMPERATURE: 97 F | OXYGEN SATURATION: 97 % | DIASTOLIC BLOOD PRESSURE: 92 MMHG | HEIGHT: 64 IN | WEIGHT: 191 LBS | SYSTOLIC BLOOD PRESSURE: 142 MMHG | HEART RATE: 91 BPM

## 2018-08-10 DIAGNOSIS — F43.22 ADJUSTMENT DISORDER WITH ANXIOUS MOOD: ICD-10-CM

## 2018-08-10 DIAGNOSIS — I10 ESSENTIAL (PRIMARY) HYPERTENSION: Primary | Chronic | ICD-10-CM

## 2018-08-10 DIAGNOSIS — D50.0 IRON DEFICIENCY ANEMIA DUE TO CHRONIC BLOOD LOSS: ICD-10-CM

## 2018-08-10 DIAGNOSIS — M25.529 ELBOW PAIN, UNSPECIFIED LATERALITY: ICD-10-CM

## 2018-08-10 PROBLEM — S37.20XA: Status: RESOLVED | Noted: 2018-06-27 | Resolved: 2018-08-10

## 2018-08-10 PROBLEM — Z98.890 STATUS POST EXPLORATORY LAPAROTOMY: Status: RESOLVED | Noted: 2018-06-20 | Resolved: 2018-08-10

## 2018-08-10 PROCEDURE — 3008F BODY MASS INDEX DOCD: CPT | Mod: CPTII,S$GLB,, | Performed by: INTERNAL MEDICINE

## 2018-08-10 PROCEDURE — 3077F SYST BP >= 140 MM HG: CPT | Mod: CPTII,S$GLB,, | Performed by: INTERNAL MEDICINE

## 2018-08-10 PROCEDURE — 99214 OFFICE O/P EST MOD 30 MIN: CPT | Mod: S$GLB,,, | Performed by: INTERNAL MEDICINE

## 2018-08-10 PROCEDURE — 3080F DIAST BP >= 90 MM HG: CPT | Mod: CPTII,S$GLB,, | Performed by: INTERNAL MEDICINE

## 2018-08-10 PROCEDURE — 99999 PR PBB SHADOW E&M-EST. PATIENT-LVL III: CPT | Mod: PBBFAC,,, | Performed by: INTERNAL MEDICINE

## 2018-08-10 RX ORDER — NAPROXEN 500 MG/1
500 TABLET ORAL 2 TIMES DAILY PRN
Qty: 60 TABLET | Refills: 3 | Status: SHIPPED | OUTPATIENT
Start: 2018-08-10 | End: 2018-08-13 | Stop reason: ALTCHOICE

## 2018-08-10 RX ORDER — CLONAZEPAM 0.5 MG/1
0.5 TABLET ORAL 2 TIMES DAILY PRN
Qty: 10 TABLET | Refills: 0 | Status: SHIPPED | OUTPATIENT
Start: 2018-08-10 | End: 2018-10-03 | Stop reason: SDUPTHER

## 2018-08-10 RX ORDER — SERTRALINE HYDROCHLORIDE 25 MG/1
25 TABLET, FILM COATED ORAL DAILY
Qty: 30 TABLET | Refills: 1 | Status: SHIPPED | OUTPATIENT
Start: 2018-08-10 | End: 2018-10-15 | Stop reason: SDUPTHER

## 2018-08-10 NOTE — ASSESSMENT & PLAN NOTE
Start low dose sertraline.  Will also give a short bridging course of clonazepam.  I have discussed the common side effects of these medications and black box warnings (if applicable to this medication) with the patient and answered all of the questions they had at the time of this visit regarding these medications.   Instructed not to drive while on BZD.

## 2018-08-10 NOTE — ASSESSMENT & PLAN NOTE
Did not take HCTZ this AM.  Will keep her HTN regimen the same for now until she sees urology for her urinary frequency.  We did discuss that she can stop the HCTZ in favor of ARB if she would like but the HCTZ was helping some mild peripheral edema.

## 2018-08-10 NOTE — PROGRESS NOTES
Assessment & Plan  Problem List Items Addressed This Visit        Psychiatric    Adjustment disorder with anxious mood (Chronic)    Overview     GAD7 score of 14 at onset of therapy         Current Assessment & Plan     Start low dose sertraline.  Will also give a short bridging course of clonazepam.  I have discussed the common side effects of these medications and black box warnings (if applicable to this medication) with the patient and answered all of the questions they had at the time of this visit regarding these medications.   Instructed not to drive while on BZD.           Relevant Medications    sertraline (ZOLOFT) 25 MG tablet    clonazePAM (KLONOPIN) 0.5 MG tablet       Cardiac/Vascular    Essential (primary) hypertension - Primary (Chronic)    Current Assessment & Plan     Did not take HCTZ this AM.  Will keep her HTN regimen the same for now until she sees urology for her urinary frequency.  We did discuss that she can stop the HCTZ in favor of ARB if she would like but the HCTZ was helping some mild peripheral edema.          Relevant Orders    Basic metabolic panel       Oncology    Iron deficiency anemia due to chronic blood loss (Chronic)    Current Assessment & Plan     Recheck labs         Relevant Orders    CBC auto differential    Ferritin    Iron and TIBC      Other Visit Diagnoses     Elbow pain, unspecified laterality    -  Restart naproxen instead of ibuprofen.     Relevant Medications    naproxen (EC NAPROSYN) 500 MG EC tablet            Health Maintenance reviewed, deferred vaccines. .    Follow-up: Follow-up in about 4 weeks (around 9/7/2018) for follow up for chronic conditions.    ______________________________________________________________________    Chief Complaint  Chief Complaint   Patient presents with    Hypertension    Follow-up       HPI  Sherrill Ennis is a 43 y.o. female with multiple medical diagnoses as listed in the medical history and problem list that  presents for HTN follow up.  Pt is known to me with his last appointment 5/10/2018.      She has been having a lot of anxiety ever since her hysterectomy that was complicated by a bladder injury and sepsis.  She did have a reassuring cystogram about a month ago.  She is having lots of urinary frequency.  She is going to see urology early next week.     She has been crying a lot more.  She is having panic attacks.      ILDA-7 Anxiety Screening Tool    Over the last 2 weeks, how often have you been bothered by the following problems?   (Not at all = 0, Several Days = 1, More than half the days = 2, Nearly Every Day = 3)     1.  Feeling nervous, anxious, or on edge. 3   2.  Not being able to sleep or control worrying. 2   3.  Worrying too much about different things. 3   4.  Trouble relaxing. 2   5.  Being so restless that it is hard to sit still. 1   6.  Becoming easily annoyed or irritable. 3   7.  Feeling afraid as if something awful might happen. 0    Total score: 14      If you checked any problems, how difficult have they made it for you to do your work, take care of things at home, or get along with other people?    Not difficult at all  Somewhat difficult  Very Difficult  Extremely difficult    Scoring ILDA-7  0-4: Minimal anxiety  5-9: Mild anxiety  10-14: Moderate anxiety  15-21: Severe anxiety        PAST MEDICAL HISTORY:  Past Medical History:   Diagnosis Date    Hypertension        PAST SURGICAL HISTORY:  Past Surgical History:   Procedure Laterality Date    BLADDER REPAIR N/A 2018    Procedure: REPAIR, BLADDER;  Surgeon: NELIA Le MD;  Location: [facility] OR;  Service: Urology;  Laterality: N/A;     SECTION      x4    CHOLECYSTECTOMY      CYSTOGRAM  2018    Procedure: CYSTOGRAM;  Surgeon: NELIA Le MD;  Location: [facility] OR;  Service: Urology;;    CYSTOGRAM N/A 2018    Procedure: CYSTOGRAM;  Surgeon: NELIA Le MD;  Location: [facility] OR;  Service: Urology;   Laterality: N/A;  RN Phone Pre op 7-6-18.    CYSTOSCOPY W/ RETROGRADES Bilateral 6/26/2018    Procedure: CYSTOSCOPY, WITH RETROGRADE pyelLOGRAM;  Surgeon: NELIA Le MD;  Location: [facility] OR;  Service: Urology;  Laterality: Bilateral;    HYSTERECTOMY  06/20/2018    LAPAROSCOPIC SALPINGECTOMY Bilateral 6/20/2018    Procedure: SALPINGECTOMY, LAPAROSCOPIC;  Surgeon: Vince Garcia MD;  Location: [facility] OR;  Service: OB/GYN;  Laterality: Bilateral;    LAPAROSCOPIC SUPRACERVICAL HYSTERECTOMY N/A 6/20/2018    Procedure: [id number];  Surgeon: Vince Garcia MD;  Location: [facility] OR;  Service: OB/GYN;  Laterality: N/A;  1ST CASE  RN PRE OP 6/13/2018    LAPAROSCOPIC SURGICAL REMOVAL OF CYST OF OVARY Right 6/20/2018    Procedure: EXCISION, CYST, OVARY, LAPAROSCOPIC;  Surgeon: Vince Garcia MD;  Location: [facility] OR;  Service: OB/GYN;  Laterality: Right;    TUBAL LIGATION         SOCIAL HISTORY:  Social History     Social History    Marital status:      Spouse name: N/A    Number of children: N/A    Years of education: N/A     Occupational History    Not on file.     Social History Main Topics    Smoking status: Former Smoker     Packs/day: 0.00     Years: 17.00     Types: Cigarettes     Quit date: 6/20/2018    Smokeless tobacco: Never Used    Alcohol use Yes      Comment: Occasional    Drug use: No    Sexual activity: Not Currently     Partners: Male      Comment:      Other Topics Concern    Not on file     Social History Narrative     since 2016    Together since 2013    He drives 18-wheelers    She is the  for a dental office    Previously  and     Lives with her  and youngest daughter.     with three daughters from previous marriage also       FAMILY HISTORY:  Family History   Problem Relation Age of Onset    Hypertension Mother     Stroke Mother     Aneurysm Mother     Hypertension Father     Hypertension Brother      Hypertension Maternal Grandfather     Heart disease Maternal Grandfather         MI    Cancer Maternal Grandfather     Cancer Paternal Grandmother        ALLERGIES AND MEDICATIONS: updated and reviewed.  Review of patient's allergies indicates:   Allergen Reactions    Ace inhibitors Other (See Comments)     cough     Current Outpatient Prescriptions   Medication Sig Dispense Refill    amLODIPine (NORVASC) 10 MG tablet Take 1 tablet (10 mg total) by mouth once daily. (Patient taking differently: Take 5 mg by mouth once daily. ) 90 tablet 3    docusate sodium (COLACE) 100 MG capsule Take 1 capsule (100 mg total) by mouth 2 (two) times daily as needed for Constipation. 60 capsule 2    hydroCHLOROthiazide (HYDRODIURIL) 12.5 MG Tab Take 1 tablet (12.5 mg total) by mouth daily as needed. 90 tablet 0    oxybutynin (DITROPAN) 5 MG Tab Take 1 tablet (5 mg total) by mouth 3 (three) times daily. 90 tablet 3    promethazine (PHENERGAN) 25 MG tablet Take 1 tablet (25 mg total) by mouth every 4 (four) hours. 30 tablet 1    valACYclovir (VALTREX) 1000 MG tablet Take 1 tablet (1,000 mg total) by mouth daily as needed (herpes outbreak). For 5 days 30 tablet 1    clonazePAM (KLONOPIN) 0.5 MG tablet Take 1 tablet (0.5 mg total) by mouth 2 (two) times daily as needed for Anxiety. 10 tablet 0    naproxen (EC NAPROSYN) 500 MG EC tablet Take 1 tablet (500 mg total) by mouth 2 (two) times daily as needed (elbow pain). 60 tablet 3    sertraline (ZOLOFT) 25 MG tablet Take 1 tablet (25 mg total) by mouth once daily. Crack the 1st 3 tabs in half and take half tab daily for 6 days then increase to full tab 30 tablet 1     No current facility-administered medications for this visit.          ROS  Review of Systems   Constitutional: Negative for activity change and unexpected weight change.   HENT: Negative for hearing loss, rhinorrhea and trouble swallowing.    Eyes: Negative for discharge and visual disturbance.   Respiratory:  "Positive for chest tightness. Negative for wheezing.    Cardiovascular: Positive for chest pain. Negative for palpitations.   Gastrointestinal: Positive for constipation. Negative for blood in stool, diarrhea and vomiting.   Endocrine: Positive for polydipsia and polyuria.   Genitourinary: Positive for dysuria. Negative for difficulty urinating, hematuria and menstrual problem.   Musculoskeletal: Positive for arthralgias. Negative for joint swelling and neck pain.   Neurological: Negative for weakness and headaches.   Psychiatric/Behavioral: Negative for confusion and dysphoric mood.           Physical Exam  Vitals:    08/10/18 0755 08/10/18 0858   BP: (!) 160/90 (!) 142/92   Pulse: 91    Temp: 97.3 °F (36.3 °C)    SpO2: 97%    Weight: 86.6 kg (191 lb)    Height: 5' 4" (1.626 m)     Body mass index is 32.79 kg/m².  Weight: 86.6 kg (191 lb)   Height: 5' 4" (162.6 cm)   Physical Exam   Constitutional: She is oriented to person, place, and time. She appears well-developed and well-nourished. No distress.   HENT:   Head: Normocephalic and atraumatic.   Eyes: Conjunctivae, EOM and lids are normal. Pupils are equal, round, and reactive to light. No scleral icterus.   Neck: Full passive range of motion without pain. Neck supple. No JVD present. Carotid bruit is not present. No thyromegaly present.   Cardiovascular: Normal rate, regular rhythm, normal heart sounds, intact distal pulses and normal pulses.  Exam reveals no S3, no S4 and no friction rub.    No murmur heard.  Pulmonary/Chest: Effort normal and breath sounds normal. She has no wheezes. She has no rhonchi. She has no rales.   Abdominal: Soft. Bowel sounds are normal. There is no tenderness.   Musculoskeletal: She exhibits no edema or tenderness.   Lymphadenopathy:        Head (right side): No submental and no submandibular adenopathy present.        Head (left side): No submental and no submandibular adenopathy present.     She has no cervical adenopathy. "   Neurological: She is alert and oriented to person, place, and time.   Motor grossly intact.  Sensory grossly intact.  Symmetric facial movements palate elevated symmetrically tongue midline   Skin: Skin is warm and dry. No rash noted.   Psychiatric: Her speech is normal and behavior is normal. Judgment and thought content normal. Her mood appears anxious (crying). Cognition and memory are normal.         Health Maintenance       Date Due Completion Date    TETANUS VACCINE 04/20/1995 ---    Influenza Vaccine 08/01/2018 1/19/2018 (Declined)    Override on 1/19/2018: Declined    Mammogram 03/08/2020 3/8/2018

## 2018-08-13 DIAGNOSIS — M25.529 ELBOW PAIN, UNSPECIFIED LATERALITY: ICD-10-CM

## 2018-08-13 RX ORDER — NAPROXEN 500 MG/1
500 TABLET ORAL 2 TIMES DAILY PRN
Qty: 60 TABLET | Refills: 3 | Status: CANCELLED | OUTPATIENT
Start: 2018-08-13

## 2018-08-13 RX ORDER — NAPROXEN 500 MG/1
500 TABLET ORAL 2 TIMES DAILY WITH MEALS
Qty: 60 TABLET | Refills: 0 | Status: SHIPPED | OUTPATIENT
Start: 2018-08-13 | End: 2018-10-15 | Stop reason: SDUPTHER

## 2018-08-13 NOTE — TELEPHONE ENCOUNTER
----- Message from Brenda Hogan sent at 8/13/2018  2:58 PM CDT -----  Contact: Jocelynn Nielson with [facility] says patient's medication is on back order. She did say they have the regular Naproxen but not the one ordered for patient .       naproxen (EC NAPROSYN) 500 MG EC tablet      [id number] - [address]  [address] AT [facility] [address]

## 2018-08-14 ENCOUNTER — OFFICE VISIT (OUTPATIENT)
Dept: UROLOGY | Facility: CLINIC | Age: 43
End: 2018-08-14
Payer: COMMERCIAL

## 2018-08-14 VITALS
SYSTOLIC BLOOD PRESSURE: 118 MMHG | HEIGHT: 64 IN | BODY MASS INDEX: 32.97 KG/M2 | DIASTOLIC BLOOD PRESSURE: 78 MMHG | WEIGHT: 193.13 LBS | HEART RATE: 62 BPM

## 2018-08-14 DIAGNOSIS — S37.20XD: Primary | ICD-10-CM

## 2018-08-14 DIAGNOSIS — R39.15 URINARY URGENCY: ICD-10-CM

## 2018-08-14 DIAGNOSIS — R35.0 URINARY FREQUENCY: ICD-10-CM

## 2018-08-14 DIAGNOSIS — N39.42 URINARY INCONTINENCE WITHOUT SENSORY AWARENESS: ICD-10-CM

## 2018-08-14 PROCEDURE — 99024 POSTOP FOLLOW-UP VISIT: CPT | Mod: S$GLB,,, | Performed by: UROLOGY

## 2018-08-14 PROCEDURE — 3008F BODY MASS INDEX DOCD: CPT | Mod: CPTII,S$GLB,, | Performed by: UROLOGY

## 2018-08-14 PROCEDURE — 3074F SYST BP LT 130 MM HG: CPT | Mod: CPTII,S$GLB,, | Performed by: UROLOGY

## 2018-08-14 PROCEDURE — 3078F DIAST BP <80 MM HG: CPT | Mod: CPTII,S$GLB,, | Performed by: UROLOGY

## 2018-08-14 PROCEDURE — 99999 PR PBB SHADOW E&M-EST. PATIENT-LVL IV: CPT | Mod: PBBFAC,,, | Performed by: UROLOGY

## 2018-08-14 RX ORDER — CIPROFLOXACIN 2 MG/ML
400 INJECTION, SOLUTION INTRAVENOUS
Status: CANCELLED | OUTPATIENT
Start: 2018-08-14

## 2018-08-14 NOTE — PROGRESS NOTES
Subjective:       Patient ID: Sherrill Ennis is a 43 y.o. female who was last seen in this office 7/3/2018    Chief Complaint:   Chief Complaint   Patient presents with    Follow-up     follow up from procedure      Patient s/p laparoscopic supracervical hysterectomy with bilateral salpingectomy and lysis of omental adhesions on 6/20/2018 by Dr. Garcia.  She had issues postoperatively with retention and anuria.  She was sent to the ER on 6/25/18 and was found to be azotemic with creatinine of 10.  CT scan showed fluid in the belly.  Urology consult was requested for evaluation for possible injury to the urinary system. She was noted to have cautery injury with small cystotomy posterior bladder. There was no injury noted to her ureter. Subsequently she underwent cystoscopy with bilateral RPG, cystogram, cystotomy repair on 6/26/18 by Dr. Le and Dr. Ross.     YONNY was subsequently removed    She had a successful cystogram on 7/11/2018.  She is here in follow up.  She complains of a bulge in her mid-abdomen and leakage.  She denies nausea or vomiting or fever.  Her leakage seems to occur without any inciting event (stress or urge).    She is wearing about 7 pads per day.  Prior to her hysterectomy, she only wore about 3 panty liners.    ACTIVE MEDICAL ISSUES:  Patient Active Problem List   Diagnosis    Tobacco dependency    Essential (primary) hypertension    Herpes simplex vulvovaginitis    Iron deficiency anemia due to chronic blood loss    RLS (restless legs syndrome)    Dysmenorrhea    Adenomyosis    Menorrhagia with regular cycle    Abnormal uterine bleeding    Obesity, Class I, BMI 30-34.9    Bladder injury, open, subsequent encounter    Adjustment disorder with anxious mood       ALLERGIES AND MEDICATIONS: updated and reviewed.  Review of patient's allergies indicates:   Allergen Reactions    Ace inhibitors Other (See Comments)     cough     Current Outpatient Medications    Medication Sig    amLODIPine (NORVASC) 10 MG tablet Take 1 tablet (10 mg total) by mouth once daily. (Patient taking differently: Take 5 mg by mouth once daily. )    clonazePAM (KLONOPIN) 0.5 MG tablet Take 1 tablet (0.5 mg total) by mouth 2 (two) times daily as needed for Anxiety.    docusate sodium (COLACE) 100 MG capsule Take 1 capsule (100 mg total) by mouth 2 (two) times daily as needed for Constipation.    hydroCHLOROthiazide (HYDRODIURIL) 12.5 MG Tab Take 1 tablet (12.5 mg total) by mouth daily as needed.    naproxen (NAPROSYN) 500 MG tablet Take 1 tablet (500 mg total) by mouth 2 (two) times daily with meals.    oxybutynin (DITROPAN) 5 MG Tab Take 1 tablet (5 mg total) by mouth 3 (three) times daily.    promethazine (PHENERGAN) 25 MG tablet Take 1 tablet (25 mg total) by mouth every 4 (four) hours.    sertraline (ZOLOFT) 25 MG tablet Take 1 tablet (25 mg total) by mouth once daily. Crack the 1st 3 tabs in half and take half tab daily for 6 days then increase to full tab    valACYclovir (VALTREX) 1000 MG tablet Take 1 tablet (1,000 mg total) by mouth daily as needed (herpes outbreak). For 5 days     No current facility-administered medications for this visit.        Review of Systems   Constitutional: Negative for activity change, fatigue, fever and unexpected weight change.   Eyes: Negative for redness and visual disturbance.   Respiratory: Negative for chest tightness and shortness of breath.    Cardiovascular: Negative for chest pain and leg swelling.   Gastrointestinal: Negative for abdominal distention, abdominal pain, constipation, diarrhea, nausea and vomiting.   Genitourinary: Negative for difficulty urinating, dysuria, flank pain, frequency, hematuria, pelvic pain, urgency and vaginal bleeding.   Musculoskeletal: Negative for arthralgias and joint swelling.   Neurological: Negative for dizziness, weakness and headaches.   Psychiatric/Behavioral: Negative for confusion. The patient is not  "nervous/anxious.    All other systems reviewed and are negative.      Objective:      Vitals:    08/14/18 1003   BP: 118/78   Pulse: 62   Weight: 87.6 kg (193 lb 2 oz)   Height: 5' 4" (1.626 m)     Physical Exam   Nursing note and vitals reviewed.  Constitutional: She is oriented to person, place, and time. She appears well-developed.   HENT:   Head: Normocephalic.   Eyes: Conjunctivae are normal.   Neck: Normal range of motion. No tracheal deviation present. No thyromegaly present.   Cardiovascular: Normal rate, normal heart sounds and normal pulses.    Pulmonary/Chest: Effort normal and breath sounds normal. No respiratory distress. She has no wheezes.   Abdominal: Soft. She exhibits no distension and no mass. There is no hepatosplenomegaly. There is no tenderness. There is no rebound, no guarding and no CVA tenderness. No hernia.   Incision well healed.  Left of midline superior to incision is a bulge, but no palpable hernia.   Musculoskeletal: Normal range of motion. She exhibits no edema or tenderness.   Lymphadenopathy:     She has no cervical adenopathy.   Neurological: She is alert and oriented to person, place, and time.   Skin: Skin is warm and dry. No rash noted. No erythema.     Psychiatric: She has a normal mood and affect. Her behavior is normal. Judgment and thought content normal.       Urine dipstick shows negative for all components.  Micro exam: negative for WBC's or RBC's.    Assessment:       1. Bladder injury, open, subsequent encounter    2. Urinary frequency    3. Urinary urgency    4. Urinary incontinence without sensory awareness          Plan:       1. Bladder injury, open, subsequent encounter  I want to check for an incisional hernia.  - CT Urogram Abd Pelvis W WO; Future    2. Urinary frequency  Partly due to diuretics    3. Urinary urgency  stable    4. Urinary incontinence without sensory awareness  I want to go back to the OR on 8/29/2018 for cystoscopy with bilateral retrograde " pyelogram to rule out vesicovaginal fistula.            Follow-up in about 4 weeks (around 9/11/2018) for Follow up.

## 2018-08-16 ENCOUNTER — OFFICE VISIT (OUTPATIENT)
Dept: OBSTETRICS AND GYNECOLOGY | Facility: CLINIC | Age: 43
End: 2018-08-16
Payer: COMMERCIAL

## 2018-08-16 VITALS
TEMPERATURE: 99 F | HEIGHT: 64 IN | BODY MASS INDEX: 32.97 KG/M2 | WEIGHT: 193.13 LBS | SYSTOLIC BLOOD PRESSURE: 130 MMHG | DIASTOLIC BLOOD PRESSURE: 80 MMHG

## 2018-08-16 DIAGNOSIS — S37.20XD INJURY OF BLADDER, SUBSEQUENT ENCOUNTER: ICD-10-CM

## 2018-08-16 DIAGNOSIS — Z90.711 STATUS POST LAPAROSCOPIC SUPRACERVICAL HYSTERECTOMY: Primary | ICD-10-CM

## 2018-08-16 DIAGNOSIS — Z98.890 STATUS POST LAPAROTOMY: ICD-10-CM

## 2018-08-16 PROCEDURE — 99999 PR PBB SHADOW E&M-EST. PATIENT-LVL III: CPT | Mod: PBBFAC,,, | Performed by: OBSTETRICS & GYNECOLOGY

## 2018-08-16 PROCEDURE — 99024 POSTOP FOLLOW-UP VISIT: CPT | Mod: S$GLB,,, | Performed by: OBSTETRICS & GYNECOLOGY

## 2018-08-16 NOTE — PROGRESS NOTES
Subjective:       Patient ID: Sherrill Ennis is a 43 y.o. female.    Chief Complaint:  Post-op Evaluation (8 wks po SAH on 18)      History of Present Illness  HPI  Patient s/p laparoscopic supracervical hysterectomy with bilateral salpingectomy and lysis of omental adhesions on 2018 by Dr. Garcia.  She had issues postoperatively with retention and anuria.  She was sent to the ER on 18 and was found to be azotemic with creatinine of 10.  CT scan showed fluid in the belly.  Urology consult was requested for evaluation for possible injury to the urinary system. She was noted to have cautery injury with small cystotomy posterior bladder. There was no injury noted to her ureter. Subsequently she underwent cystoscopy with bilateral RPG, cystogram, cystotomy repair on 18 by Dr. Le and Dr. Ross.     Having some pain and possibly urinary leakage from vagina now.  Followed by Dr Le.      GYN & OB History  Patient's last menstrual period was 2018 (lmp unknown).   Date of Last Pap: 2018    OB History    Para Term  AB Living   4 4 4     4   SAB TAB Ectopic Multiple Live Births           4      # Outcome Date GA Lbr Wei/2nd Weight Sex Delivery Anes PTL Lv   4 Term 2001 38w0d  3.118 kg (6 lb 14 oz) F CS-LTranv  N DESTINI   3 Term 1997 39w0d  3.997 kg (8 lb 13 oz) F CS-LTranv  N DESTINI   2 Term 1994 39w0d  3.742 kg (8 lb 4 oz) F CS-LTranv  N DESTINI   1 Term  40w0d  3.402 kg (7 lb 8 oz) F CS-LTranv  N DESTINI      Complications: Fetal Intolerance,Cephalopelvic Disproportion        Past Medical History:   Diagnosis Date    Hypertension        Past Surgical History:   Procedure Laterality Date     SECTION      x4    CHOLECYSTECTOMY      HYSTERECTOMY  2018    TUBAL LIGATION         Family History   Problem Relation Age of Onset    Hypertension Mother     Stroke Mother     Aneurysm Mother     Hypertension Father     Hypertension Brother      Hypertension Maternal Grandfather     Heart disease Maternal Grandfather         MI    Cancer Maternal Grandfather     Cancer Paternal Grandmother        Social History     Socioeconomic History    Marital status:      Spouse name: None    Number of children: None    Years of education: None    Highest education level: None   Social Needs    Financial resource strain: None    Food insecurity - worry: None    Food insecurity - inability: None    Transportation needs - medical: None    Transportation needs - non-medical: None   Occupational History    None   Tobacco Use    Smoking status: Former Smoker     Packs/day: 0.00     Years: 17.00     Pack years: 0.00     Types: Cigarettes     Last attempt to quit: 2018     Years since quittin.1    Smokeless tobacco: Never Used   Substance and Sexual Activity    Alcohol use: Yes     Comment: Occasional    Drug use: No    Sexual activity: Not Currently     Partners: Male     Comment:    Other Topics Concern    None   Social History Narrative     since     Together since     He drives 18-wheelers    She is the  for a dental office    Previously  and     Lives with her  and youngest daughter.     with three daughters from previous marriage also       Current Outpatient Medications   Medication Sig Dispense Refill    amLODIPine (NORVASC) 10 MG tablet Take 1 tablet (10 mg total) by mouth once daily. (Patient taking differently: Take 5 mg by mouth once daily. ) 90 tablet 3    clonazePAM (KLONOPIN) 0.5 MG tablet Take 1 tablet (0.5 mg total) by mouth 2 (two) times daily as needed for Anxiety. 10 tablet 0    docusate sodium (COLACE) 100 MG capsule Take 1 capsule (100 mg total) by mouth 2 (two) times daily as needed for Constipation. 60 capsule 2    hydroCHLOROthiazide (HYDRODIURIL) 12.5 MG Tab Take 1 tablet (12.5 mg total) by mouth daily as needed. 90 tablet 0    naproxen (NAPROSYN)  500 MG tablet Take 1 tablet (500 mg total) by mouth 2 (two) times daily with meals. 60 tablet 0    oxybutynin (DITROPAN) 5 MG Tab Take 1 tablet (5 mg total) by mouth 3 (three) times daily. 90 tablet 3    promethazine (PHENERGAN) 25 MG tablet Take 1 tablet (25 mg total) by mouth every 4 (four) hours. 30 tablet 1    sertraline (ZOLOFT) 25 MG tablet Take 1 tablet (25 mg total) by mouth once daily. Crack the 1st 3 tabs in half and take half tab daily for 6 days then increase to full tab 30 tablet 1    valACYclovir (VALTREX) 1000 MG tablet Take 1 tablet (1,000 mg total) by mouth daily as needed (herpes outbreak). For 5 days 30 tablet 1     No current facility-administered medications for this visit.        Review of patient's allergies indicates:   Allergen Reactions    Ace inhibitors Other (See Comments)     cough       Review of Systems  Review of Systems   Constitutional: Positive for fatigue. Negative for activity change, appetite change, chills, fever and unexpected weight change.   HENT: Negative for mouth sores.    Respiratory: Negative for cough, shortness of breath and wheezing.    Cardiovascular: Negative for chest pain and palpitations.   Gastrointestinal: Positive for abdominal pain. Negative for bloating, blood in stool, constipation, nausea and vomiting.   Endocrine: Negative for diabetes and hot flashes.   Genitourinary: Negative for dyspareunia, dysuria, frequency, hematuria, menorrhagia, menstrual problem, pelvic pain, urgency, vaginal bleeding, vaginal discharge, vaginal pain, dysmenorrhea, urinary incontinence, postcoital bleeding and vaginal odor.   Musculoskeletal: Negative for back pain and myalgias.   Skin:  Negative for rash.   Neurological: Negative for seizures and headaches.   Psychiatric/Behavioral: Positive for depression. Negative for sleep disturbance. The patient is nervous/anxious.    Breast: Negative for breast mass, breast pain and nipple discharge          Objective:    Physical  Exam:   Constitutional: She appears well-developed and well-nourished. No distress.    HENT:   Head: Normocephalic and atraumatic.    Eyes: EOM are normal.    Neck: Normal range of motion.     Pulmonary/Chest: Effort normal. No respiratory distress.        Abdominal: Soft. She exhibits no distension. There is no tenderness. There is no rebound and no guarding.   Vertical scar     Genitourinary: Vagina normal. No vaginal discharge found.   Genitourinary Comments: Vulva without any obvious lesions.  Vaginal vault with good support.  Minimal white discharge noted.  No obvious lesion.  No obvious urinary leakage.  Cervix is without any cervical motion tenderness.  No obvious lesion.  Uterus is surgical absent.  Adnexa is without any masses or tenderness.           Musculoskeletal: Normal range of motion.       Neurological: She is alert.    Skin: Skin is warm and dry.    Psychiatric: She has a normal mood and affect.          Assessment:        1. Status post laparoscopic supracervical hysterectomy    2. Status post laparotomy    3. Injury of bladder, subsequent encounter              Plan:      I have discussed with the patient regarding her condition  She is physically doing well  No obvious leakage of urine with coughing in the office today  Will be followed by Dr Le.    She started sertraline a few days ago for depression and anxiety.  Doing a little better on that    Back in one year.  Sooner if she would need us.

## 2018-08-17 ENCOUNTER — HOSPITAL ENCOUNTER (OUTPATIENT)
Dept: RADIOLOGY | Facility: HOSPITAL | Age: 43
Discharge: HOME | End: 2018-08-17
Attending: UROLOGY
Payer: COMMERCIAL

## 2018-08-17 ENCOUNTER — PATIENT MESSAGE (OUTPATIENT)
Dept: SURGERY | Facility: HOSPITAL | Age: 43
End: 2018-08-17

## 2018-08-17 DIAGNOSIS — S37.20XD: ICD-10-CM

## 2018-08-17 PROCEDURE — 74178 CT ABD&PLV WO CNTR FLWD CNTR: CPT | Mod: 26,,, | Performed by: RADIOLOGY

## 2018-08-17 PROCEDURE — 74178 CT ABD&PLV WO CNTR FLWD CNTR: CPT | Mod: TC

## 2018-08-17 PROCEDURE — 25500020 PHARM REV CODE 255: Performed by: UROLOGY

## 2018-08-17 RX ADMIN — IOHEXOL 125 ML: 350 INJECTION, SOLUTION INTRAVENOUS at 07:08

## 2018-08-23 ENCOUNTER — PATIENT MESSAGE (OUTPATIENT)
Dept: FAMILY MEDICINE | Facility: CLINIC | Age: 43
End: 2018-08-23

## 2018-08-23 ENCOUNTER — OFFICE VISIT (OUTPATIENT)
Dept: FAMILY MEDICINE | Facility: CLINIC | Age: 43
End: 2018-08-23
Payer: COMMERCIAL

## 2018-08-23 VITALS
DIASTOLIC BLOOD PRESSURE: 92 MMHG | HEIGHT: 64 IN | TEMPERATURE: 98 F | SYSTOLIC BLOOD PRESSURE: 154 MMHG | HEART RATE: 79 BPM | OXYGEN SATURATION: 97 % | BODY MASS INDEX: 32.27 KG/M2 | WEIGHT: 189 LBS

## 2018-08-23 DIAGNOSIS — K29.00 ACUTE GASTRITIS WITHOUT HEMORRHAGE, UNSPECIFIED GASTRITIS TYPE: Primary | ICD-10-CM

## 2018-08-23 DIAGNOSIS — R42 LIGHTHEADED: ICD-10-CM

## 2018-08-23 DIAGNOSIS — F43.22 ADJUSTMENT DISORDER WITH ANXIOUS MOOD: Chronic | ICD-10-CM

## 2018-08-23 DIAGNOSIS — I10 ESSENTIAL (PRIMARY) HYPERTENSION: Chronic | ICD-10-CM

## 2018-08-23 DIAGNOSIS — R07.89 OTHER CHEST PAIN: ICD-10-CM

## 2018-08-23 LAB — GLUCOSE SERPL-MCNC: 100 MG/DL (ref 70–110)

## 2018-08-23 PROCEDURE — 82948 REAGENT STRIP/BLOOD GLUCOSE: CPT | Mod: S$GLB,,, | Performed by: INTERNAL MEDICINE

## 2018-08-23 PROCEDURE — 99999 PR PBB SHADOW E&M-EST. PATIENT-LVL III: CPT | Mod: PBBFAC,,, | Performed by: INTERNAL MEDICINE

## 2018-08-23 PROCEDURE — 3077F SYST BP >= 140 MM HG: CPT | Mod: CPTII,S$GLB,, | Performed by: INTERNAL MEDICINE

## 2018-08-23 PROCEDURE — 93010 ELECTROCARDIOGRAM REPORT: CPT | Mod: S$GLB,,, | Performed by: INTERNAL MEDICINE

## 2018-08-23 PROCEDURE — 93005 ELECTROCARDIOGRAM TRACING: CPT | Mod: S$GLB,,, | Performed by: INTERNAL MEDICINE

## 2018-08-23 PROCEDURE — 3008F BODY MASS INDEX DOCD: CPT | Mod: CPTII,S$GLB,, | Performed by: INTERNAL MEDICINE

## 2018-08-23 PROCEDURE — 99214 OFFICE O/P EST MOD 30 MIN: CPT | Mod: S$GLB,,, | Performed by: INTERNAL MEDICINE

## 2018-08-23 PROCEDURE — 3080F DIAST BP >= 90 MM HG: CPT | Mod: CPTII,S$GLB,, | Performed by: INTERNAL MEDICINE

## 2018-08-23 RX ORDER — IBUPROFEN 600 MG/1
TABLET ORAL
COMMUNITY
Start: 2018-08-16 | End: 2018-10-15 | Stop reason: ALTCHOICE

## 2018-08-23 RX ORDER — PANTOPRAZOLE SODIUM 40 MG/1
40 TABLET, DELAYED RELEASE ORAL DAILY
Qty: 90 TABLET | Refills: 0 | Status: SHIPPED | OUTPATIENT
Start: 2018-08-23 | End: 2018-11-09 | Stop reason: SDUPTHER

## 2018-08-23 NOTE — PROGRESS NOTES
Assessment & Plan  Problem List Items Addressed This Visit        Psychiatric    Adjustment disorder with anxious mood (Chronic)    Overview     GAD7 score of 14 at onset of therapy         Current Assessment & Plan     Improved on sertraline.  Will re-score [place]-7 at follow up in Sept            Cardiac/Vascular    Essential (primary) hypertension (Chronic)    Current Assessment & Plan     Suspect elevated due to below.  Keep meds the same and monitor at home.            Other Visit Diagnoses     Acute gastritis without hemorrhage, unspecified gastritis type    -  Primary  -    Stop H2.  Start PPI.  Counseled on fluid an PO intake.  Has promethazine at home for nausea.      Relevant Medications    pantoprazole (PROTONIX) 40 MG tablet    Other chest pain    -  EKG per my read shows NSR without AV block, prolonged QTc, Q wave, TWI or ST changes.       Relevant Orders    EKG 12-lead    Lightheaded    -  Normal accu check.     Relevant Orders    POCT glucose            Health Maintenance reviewed, deferred.    Follow-up: Follow-up for as previously scheduled.  ______________________________________________________________________    Chief Complaint  Chief Complaint   Patient presents with    Hypertension    Fatigue       HPI  Sherrill Ennis is a 43 y.o. female with medical diagnoses as listed in the medical history and problem list that presents for HTN follow up and fatigue.  Pt is known to me with her last appointment 8/10/2018.      She had been feeling much better since starting the sertraline.  Taking 25mg.  Hasn't been needing clonazepam except for 1 dose.  Took 2 Excedrin which helped a little.  Still having HA.  Had a loose BM.  BP was elevated at work.  BP was 160/100 then manual check 140/90 by her friend.  Her BP at home has been doing well on her amlodipine and HCTZ prior to this onset.  Had a episode of CP this AM that was right sided and radiated into the abdomen.  Lasted 3-4 minutes.  It was  sharp.  No associated diaphoresis, vision change, SOB, palpitations.      She has been having bad reflux symptoms.  Taking famotidine 40mg daily but still has a return of symptoms.  Lots of abdominal cramping.  + nausea without emesis.  No constipation.  This AM she woke up with a bad HA.      PAST MEDICAL HISTORY:  Past Medical History:   Diagnosis Date    Hypertension        PAST SURGICAL HISTORY:  Past Surgical History:   Procedure Laterality Date     SECTION      x4    CHOLECYSTECTOMY      HYSTERECTOMY  2018    TUBAL LIGATION         SOCIAL HISTORY:  Social History     Socioeconomic History    Marital status:      Spouse name: Not on file    Number of children: Not on file    Years of education: Not on file    Highest education level: Not on file   Social Needs    Financial resource strain: Not on file    Food insecurity - worry: Not on file    Food insecurity - inability: Not on file    Transportation needs - medical: Not on file    Transportation needs - non-medical: Not on file   Occupational History    Not on file   Tobacco Use    Smoking status: Former Smoker     Packs/day: 0.00     Years: 17.00     Pack years: 0.00     Types: Cigarettes     Last attempt to quit: 2018     Years since quittin.1    Smokeless tobacco: Never Used   Substance and Sexual Activity    Alcohol use: Yes     Comment: Occasional    Drug use: No    Sexual activity: Not Currently     Partners: Male     Comment:    Other Topics Concern    Not on file   Social History Narrative     since     Together since     He drives 18-wheelers    She is the  for a dental office    Previously  and     Lives with her  and youngest daughter.     with three daughters from previous marriage also       FAMILY HISTORY:  Family History   Problem Relation Age of Onset    Hypertension Mother     Stroke Mother     Aneurysm Mother      Hypertension Father     Hypertension Brother     Hypertension Maternal Grandfather     Heart disease Maternal Grandfather         MI    Cancer Maternal Grandfather     Cancer Paternal Grandmother        ALLERGIES AND MEDICATIONS: updated and reviewed.  Review of patient's allergies indicates:   Allergen Reactions    Ace inhibitors Other (See Comments)     cough     Current Outpatient Medications   Medication Sig Dispense Refill    amLODIPine (NORVASC) 10 MG tablet Take 1 tablet (10 mg total) by mouth once daily. (Patient taking differently: Take 5 mg by mouth once daily. ) 90 tablet 3    clonazePAM (KLONOPIN) 0.5 MG tablet Take 1 tablet (0.5 mg total) by mouth 2 (two) times daily as needed for Anxiety. 10 tablet 0    docusate sodium (COLACE) 100 MG capsule Take 1 capsule (100 mg total) by mouth 2 (two) times daily as needed for Constipation. 60 capsule 2    hydroCHLOROthiazide (HYDRODIURIL) 12.5 MG Tab Take 1 tablet (12.5 mg total) by mouth daily as needed. 90 tablet 0    ibuprofen (ADVIL,MOTRIN) 600 MG tablet       naproxen (NAPROSYN) 500 MG tablet Take 1 tablet (500 mg total) by mouth 2 (two) times daily with meals. 60 tablet 0    oxybutynin (DITROPAN) 5 MG Tab Take 1 tablet (5 mg total) by mouth 3 (three) times daily. 90 tablet 3    promethazine (PHENERGAN) 25 MG tablet Take 1 tablet (25 mg total) by mouth every 4 (four) hours. 30 tablet 1    sertraline (ZOLOFT) 25 MG tablet Take 1 tablet (25 mg total) by mouth once daily. Crack the 1st 3 tabs in half and take half tab daily for 6 days then increase to full tab 30 tablet 1    valACYclovir (VALTREX) 1000 MG tablet Take 1 tablet (1,000 mg total) by mouth daily as needed (herpes outbreak). For 5 days 30 tablet 1    pantoprazole (PROTONIX) 40 MG tablet Take 1 tablet (40 mg total) by mouth once daily. 90 tablet 0     No current facility-administered medications for this visit.          ROS  Review of Systems   Constitutional: Negative for chills,  "fever and unexpected weight change.   HENT: Negative for congestion, ear pain, hearing loss, rhinorrhea, sore throat and trouble swallowing.    Eyes: Negative for discharge, redness and visual disturbance.   Respiratory: Negative for cough, chest tightness, shortness of breath and wheezing.    Cardiovascular: Positive for chest pain. Negative for palpitations and leg swelling.   Gastrointestinal: Negative for abdominal pain, constipation, diarrhea, nausea and vomiting.   Endocrine: Negative for polydipsia, polyphagia and polyuria.   Genitourinary: Negative for decreased urine volume, dysuria and hematuria.   Musculoskeletal: Negative for arthralgias, joint swelling and myalgias.   Skin: Negative for color change and rash.   Neurological: Positive for headaches. Negative for dizziness, weakness and light-headedness.   Psychiatric/Behavioral: Negative for decreased concentration, dysphoric mood, sleep disturbance and suicidal ideas.           Physical Exam  Vitals:    08/23/18 1003   BP: (!) 160/100   Pulse: 79   Temp: 98.3 °F (36.8 °C)   SpO2: 97%   Weight: 85.7 kg (189 lb)   Height: 5' 4" (1.626 m)    Body mass index is 32.44 kg/m².  Weight: 85.7 kg (189 lb)   Height: 5' 4" (162.6 cm)   Physical Exam   Constitutional: She is oriented to person, place, and time. She appears well-developed and well-nourished. No distress.   Became lightheaded when she got up to get to the table and had to be placed in supine position   Cardiovascular: Normal rate and regular rhythm. Exam reveals no gallop and no friction rub.   No murmur heard.  Pulmonary/Chest: Effort normal and breath sounds normal. No stridor. No respiratory distress. She has no wheezes. She has no rales.   Abdominal: Soft. Bowel sounds are normal. She exhibits no distension and no mass. There is tenderness in the epigastric area and left lower quadrant. There is no rebound and no guarding.   Musculoskeletal: She exhibits no edema or deformity.   Neurological: She " is alert and oriented to person, place, and time.   Symmetric facial movements palate elevated symmetrically tongue midline    Skin: Capillary refill takes less than 2 seconds. No rash noted. No erythema. No pallor.           Health Maintenance       Date Due Completion Date    TETANUS VACCINE 04/20/1995 ---    Influenza Vaccine 08/01/2018 1/19/2018 (Declined)    Override on 1/19/2018: Declined    Mammogram 03/08/2020 3/8/2018

## 2018-08-27 ENCOUNTER — HOSPITAL ENCOUNTER (OUTPATIENT)
Dept: PREADMISSION TESTING | Facility: HOSPITAL | Age: 43
Discharge: HOME | End: 2018-08-27
Attending: UROLOGY
Payer: COMMERCIAL

## 2018-08-27 NOTE — DISCHARGE INSTRUCTIONS
Your surgery is scheduled for____8/29/2018_____________.    Call [phone] between 2 pm and 5 pm ___8/28/2018_________ to find out your arrival time for the day of surgery.    Report to SAME DAY SURGERY UNIT at _______am on the 2nd floor of the hospital.  Use the front entrance of the hospital.  The front doors of the hospital open promptly at 5:30 am.    If you need wheelchair assistance, call [phone] from your cell phone,  or call 0 from the courtesy phone in the hospital lobby.    Important instructions:   Do not eat or drink after 12 midnight, including water.  It is okay to brush your teeth.  Do not have gum, candy or mints.     Take only these medications with a small swallow of water on the morning of your surgery.__amlodipine, zoloft___________      Stop taking Aspirin, Ibuprofen, Motrin and Aleve , Fish oil, and Vitamin E for at least 7 days before your surgery. You may use Tylenol unless otherwise instructed by your doctor.         Please shower the night before and the morning of your surgery.         No shaving of procedural area at least 4-5 days before surgery due to increased risk of skin irritation and/or possible infection.     Do not wear make- up, including mascara.     You may wear deodorant only.      Do not wear powder, body lotion or cologne.     Do not wear any jewelry or have any metal on your body.     Please bring any documents given to you by your doctor.     If you are going home on the same day of surgery, you must arrange for a family member or a friend to drive you home.  Public transportation is prohibited.    You will not be able to drive home if you were given anesthesia or sedation.     Wear loose fitting clothes allowing for bandages.     Please leave money and valuables home.       You may bring your cell phone.     Call the doctor if fever or illness should occur before your surgery.    Call [phone] to contact us here at [facility] if needed.

## 2018-08-29 ENCOUNTER — ANESTHESIA EVENT (OUTPATIENT)
Dept: SURGERY | Facility: HOSPITAL | Age: 43
End: 2018-08-29
Payer: COMMERCIAL

## 2018-08-29 ENCOUNTER — HOSPITAL ENCOUNTER (OUTPATIENT)
Facility: HOSPITAL | Age: 43
Discharge: HOME | End: 2018-08-29
Attending: UROLOGY | Admitting: UROLOGY
Payer: COMMERCIAL

## 2018-08-29 ENCOUNTER — ANESTHESIA (OUTPATIENT)
Dept: SURGERY | Facility: HOSPITAL | Age: 43
End: 2018-08-29
Payer: COMMERCIAL

## 2018-08-29 VITALS
RESPIRATION RATE: 18 BRPM | OXYGEN SATURATION: 97 % | TEMPERATURE: 99 F | WEIGHT: 193.13 LBS | BODY MASS INDEX: 32.97 KG/M2 | HEART RATE: 62 BPM | DIASTOLIC BLOOD PRESSURE: 61 MMHG | SYSTOLIC BLOOD PRESSURE: 105 MMHG | HEIGHT: 64 IN

## 2018-08-29 DIAGNOSIS — S37.20XD: Primary | ICD-10-CM

## 2018-08-29 DIAGNOSIS — N39.42 URINARY INCONTINENCE WITHOUT SENSORY AWARENESS: ICD-10-CM

## 2018-08-29 PROCEDURE — 25500020 PHARM REV CODE 255: Performed by: UROLOGY

## 2018-08-29 PROCEDURE — 25000003 PHARM REV CODE 250: Performed by: ANESTHESIOLOGY

## 2018-08-29 PROCEDURE — 36000706: Performed by: UROLOGY

## 2018-08-29 PROCEDURE — 36000707: Performed by: UROLOGY

## 2018-08-29 PROCEDURE — 71000033 HC RECOVERY, INTIAL HOUR: Performed by: UROLOGY

## 2018-08-29 PROCEDURE — C1758 CATHETER, URETERAL: HCPCS | Performed by: UROLOGY

## 2018-08-29 PROCEDURE — 71000015 HC POSTOP RECOV 1ST HR: Performed by: UROLOGY

## 2018-08-29 PROCEDURE — 25000003 PHARM REV CODE 250: Performed by: UROLOGY

## 2018-08-29 PROCEDURE — 58999 UNLISTED PX FML GENITAL SYS: CPT | Mod: ,,, | Performed by: UROLOGY

## 2018-08-29 PROCEDURE — 76000 FLUOROSCOPY <1 HR PHYS/QHP: CPT | Mod: 26,59,, | Performed by: UROLOGY

## 2018-08-29 PROCEDURE — D9220A PRA ANESTHESIA: Mod: ANES,,, | Performed by: ANESTHESIOLOGY

## 2018-08-29 PROCEDURE — 71000039 HC RECOVERY, EACH ADD'L HOUR: Performed by: UROLOGY

## 2018-08-29 PROCEDURE — 63600175 PHARM REV CODE 636 W HCPCS: Performed by: UROLOGY

## 2018-08-29 PROCEDURE — D9220A PRA ANESTHESIA: Mod: CRNA,,, | Performed by: NURSE ANESTHETIST, CERTIFIED REGISTERED

## 2018-08-29 PROCEDURE — C1769 GUIDE WIRE: HCPCS | Performed by: UROLOGY

## 2018-08-29 PROCEDURE — 37000008 HC ANESTHESIA 1ST 15 MINUTES: Performed by: UROLOGY

## 2018-08-29 PROCEDURE — 27200651 HC AIRWAY, LMA: Performed by: NURSE ANESTHETIST, CERTIFIED REGISTERED

## 2018-08-29 PROCEDURE — 37000009 HC ANESTHESIA EA ADD 15 MINS: Performed by: UROLOGY

## 2018-08-29 PROCEDURE — 63600175 PHARM REV CODE 636 W HCPCS: Performed by: NURSE ANESTHETIST, CERTIFIED REGISTERED

## 2018-08-29 PROCEDURE — 74420 UROGRAPHY RTRGR +-KUB: CPT | Mod: 26,,, | Performed by: UROLOGY

## 2018-08-29 PROCEDURE — 52005 CYSTO W/URTRL CATHJ: CPT | Mod: 58,,, | Performed by: UROLOGY

## 2018-08-29 PROCEDURE — 25000003 PHARM REV CODE 250: Performed by: NURSE ANESTHETIST, CERTIFIED REGISTERED

## 2018-08-29 PROCEDURE — 71000016 HC POSTOP RECOV ADDL HR: Performed by: UROLOGY

## 2018-08-29 PROCEDURE — 63600175 PHARM REV CODE 636 W HCPCS: Performed by: ANESTHESIOLOGY

## 2018-08-29 RX ORDER — SODIUM CHLORIDE 0.9 % (FLUSH) 0.9 %
3 SYRINGE (ML) INJECTION
Status: DISCONTINUED | OUTPATIENT
Start: 2018-08-29 | End: 2018-08-29 | Stop reason: HOSPADM

## 2018-08-29 RX ORDER — FLUORESCEIN 500 MG/ML
INJECTION INTRAVENOUS
Status: DISCONTINUED | OUTPATIENT
Start: 2018-08-29 | End: 2018-08-29 | Stop reason: HOSPADM

## 2018-08-29 RX ORDER — SODIUM CHLORIDE, SODIUM LACTATE, POTASSIUM CHLORIDE, CALCIUM CHLORIDE 600; 310; 30; 20 MG/100ML; MG/100ML; MG/100ML; MG/100ML
INJECTION, SOLUTION INTRAVENOUS CONTINUOUS
Status: DISCONTINUED | OUTPATIENT
Start: 2018-08-29 | End: 2018-08-29 | Stop reason: HOSPADM

## 2018-08-29 RX ORDER — PHENAZOPYRIDINE HYDROCHLORIDE 100 MG/1
200 TABLET, FILM COATED ORAL ONCE
Status: DISCONTINUED | OUTPATIENT
Start: 2018-08-29 | End: 2018-08-29 | Stop reason: HOSPADM

## 2018-08-29 RX ORDER — CIPROFLOXACIN 2 MG/ML
400 INJECTION, SOLUTION INTRAVENOUS
Status: COMPLETED | OUTPATIENT
Start: 2018-08-29 | End: 2018-08-29

## 2018-08-29 RX ORDER — LIDOCAINE HCL/PF 100 MG/5ML
SYRINGE (ML) INTRAVENOUS
Status: DISCONTINUED | OUTPATIENT
Start: 2018-08-29 | End: 2018-08-29

## 2018-08-29 RX ORDER — PHENAZOPYRIDINE HYDROCHLORIDE 200 MG/1
200 TABLET, FILM COATED ORAL 3 TIMES DAILY PRN
Qty: 21 TABLET | Refills: 0 | Status: SHIPPED | OUTPATIENT
Start: 2018-08-29 | End: 2018-10-15

## 2018-08-29 RX ORDER — PROPOFOL 10 MG/ML
VIAL (ML) INTRAVENOUS
Status: DISCONTINUED | OUTPATIENT
Start: 2018-08-29 | End: 2018-08-29

## 2018-08-29 RX ORDER — HYDROMORPHONE HYDROCHLORIDE 2 MG/ML
0.2 INJECTION, SOLUTION INTRAMUSCULAR; INTRAVENOUS; SUBCUTANEOUS EVERY 5 MIN PRN
Status: DISCONTINUED | OUTPATIENT
Start: 2018-08-29 | End: 2018-08-29 | Stop reason: HOSPADM

## 2018-08-29 RX ORDER — SODIUM CHLORIDE, SODIUM LACTATE, POTASSIUM CHLORIDE, CALCIUM CHLORIDE 600; 310; 30; 20 MG/100ML; MG/100ML; MG/100ML; MG/100ML
INJECTION, SOLUTION INTRAVENOUS CONTINUOUS PRN
Status: DISCONTINUED | OUTPATIENT
Start: 2018-08-29 | End: 2018-08-29

## 2018-08-29 RX ORDER — ONDANSETRON 2 MG/ML
INJECTION INTRAMUSCULAR; INTRAVENOUS
Status: DISCONTINUED | OUTPATIENT
Start: 2018-08-29 | End: 2018-08-29

## 2018-08-29 RX ORDER — FENTANYL CITRATE 50 UG/ML
INJECTION, SOLUTION INTRAMUSCULAR; INTRAVENOUS
Status: DISCONTINUED | OUTPATIENT
Start: 2018-08-29 | End: 2018-08-29

## 2018-08-29 RX ORDER — GLYCOPYRROLATE 0.2 MG/ML
INJECTION INTRAMUSCULAR; INTRAVENOUS
Status: DISCONTINUED | OUTPATIENT
Start: 2018-08-29 | End: 2018-08-29

## 2018-08-29 RX ORDER — ONDANSETRON 2 MG/ML
4 INJECTION INTRAMUSCULAR; INTRAVENOUS ONCE AS NEEDED
Status: COMPLETED | OUTPATIENT
Start: 2018-08-29 | End: 2018-08-29

## 2018-08-29 RX ORDER — MIDAZOLAM HYDROCHLORIDE 1 MG/ML
INJECTION, SOLUTION INTRAMUSCULAR; INTRAVENOUS
Status: DISCONTINUED | OUTPATIENT
Start: 2018-08-29 | End: 2018-08-29

## 2018-08-29 RX ORDER — PHENYLEPHRINE HYDROCHLORIDE 10 MG/ML
INJECTION INTRAVENOUS
Status: DISCONTINUED | OUTPATIENT
Start: 2018-08-29 | End: 2018-08-29

## 2018-08-29 RX ADMIN — PROPOFOL 150 MG: 10 INJECTION, EMULSION INTRAVENOUS at 07:08

## 2018-08-29 RX ADMIN — PHENYLEPHRINE HYDROCHLORIDE 200 MCG: 10 INJECTION INTRAVENOUS at 07:08

## 2018-08-29 RX ADMIN — GLYCOPYRROLATE 0.2 MG: 0.2 INJECTION, SOLUTION INTRAMUSCULAR; INTRAVENOUS at 07:08

## 2018-08-29 RX ADMIN — MIDAZOLAM HYDROCHLORIDE 2 MG: 1 INJECTION, SOLUTION INTRAMUSCULAR; INTRAVENOUS at 06:08

## 2018-08-29 RX ADMIN — LIDOCAINE HYDROCHLORIDE 100 MG: 20 INJECTION, SOLUTION INTRAVENOUS at 07:08

## 2018-08-29 RX ADMIN — PHENYLEPHRINE HYDROCHLORIDE 100 MCG: 10 INJECTION INTRAVENOUS at 08:08

## 2018-08-29 RX ADMIN — PROMETHAZINE HYDROCHLORIDE 6.25 MG: 25 INJECTION INTRAMUSCULAR; INTRAVENOUS at 08:08

## 2018-08-29 RX ADMIN — PHENYLEPHRINE HYDROCHLORIDE 100 MCG: 10 INJECTION INTRAVENOUS at 07:08

## 2018-08-29 RX ADMIN — FENTANYL CITRATE 100 MCG: 50 INJECTION INTRAMUSCULAR; INTRAVENOUS at 07:08

## 2018-08-29 RX ADMIN — SODIUM CHLORIDE, SODIUM LACTATE, POTASSIUM CHLORIDE, AND CALCIUM CHLORIDE: .6; .31; .03; .02 INJECTION, SOLUTION INTRAVENOUS at 06:08

## 2018-08-29 RX ADMIN — CIPROFLOXACIN 400 MG: 2 INJECTION, SOLUTION INTRAVENOUS at 07:08

## 2018-08-29 RX ADMIN — ONDANSETRON 4 MG: 2 INJECTION INTRAMUSCULAR; INTRAVENOUS at 08:08

## 2018-08-29 RX ADMIN — ONDANSETRON 4 MG: 2 INJECTION, SOLUTION INTRAMUSCULAR; INTRAVENOUS at 06:08

## 2018-08-29 NOTE — ANESTHESIA PREPROCEDURE EVALUATION
08/29/2018  Sherrill Ennis is a 43 y.o., female   Pre-operative evaluation for Procedure(s) (LRB):  CYSTOSCOPY, WITH RETROGRADE PYELOGRAM (Bilateral)    Sherrill Ennis is a 43 y.o. female     Patient Active Problem List   Diagnosis    Tobacco dependency    Essential (primary) hypertension    Herpes simplex vulvovaginitis    Iron deficiency anemia due to chronic blood loss    RLS (restless legs syndrome)    Dysmenorrhea    Adenomyosis    Menorrhagia with regular cycle    Abnormal uterine bleeding    Obesity, Class I, BMI 30-34.9    Bladder injury, open, subsequent encounter    Adjustment disorder with anxious mood       Review of patient's allergies indicates:   Allergen Reactions    Ace inhibitors Other (See Comments)     cough       No current facility-administered medications on file prior to encounter.      Current Outpatient Medications on File Prior to Encounter   Medication Sig Dispense Refill    amLODIPine (NORVASC) 10 MG tablet Take 1 tablet (10 mg total) by mouth once daily. (Patient taking differently: Take 5 mg by mouth once daily. ) 90 tablet 3    hydroCHLOROthiazide (HYDRODIURIL) 12.5 MG Tab Take 1 tablet (12.5 mg total) by mouth daily as needed. 90 tablet 0    naproxen (NAPROSYN) 500 MG tablet Take 1 tablet (500 mg total) by mouth 2 (two) times daily with meals. 60 tablet 0    oxybutynin (DITROPAN) 5 MG Tab Take 1 tablet (5 mg total) by mouth 3 (three) times daily. 90 tablet 3    sertraline (ZOLOFT) 25 MG tablet Take 1 tablet (25 mg total) by mouth once daily. Crack the 1st 3 tabs in half and take half tab daily for 6 days then increase to full tab 30 tablet 1    clonazePAM (KLONOPIN) 0.5 MG tablet Take 1 tablet (0.5 mg total) by mouth 2 (two) times daily as needed for Anxiety. 10 tablet 0    promethazine (PHENERGAN) 25 MG tablet Take 1 tablet (25 mg  total) by mouth every 4 (four) hours. 30 tablet 1    valACYclovir (VALTREX) 1000 MG tablet Take 1 tablet (1,000 mg total) by mouth daily as needed (herpes outbreak). For 5 days 30 tablet 1       Past Surgical History:   Procedure Laterality Date     SECTION      x4    CHOLECYSTECTOMY  1995    HYSTERECTOMY  2018    TUBAL LIGATION         Social History     Socioeconomic History    Marital status:      Spouse name: Not on file    Number of children: Not on file    Years of education: Not on file    Highest education level: Not on file   Social Needs    Financial resource strain: Not on file    Food insecurity - worry: Not on file    Food insecurity - inability: Not on file    Transportation needs - medical: Not on file    Transportation needs - non-medical: Not on file   Occupational History    Not on file   Tobacco Use    Smoking status: Former Smoker     Packs/day: 0.00     Years: 17.00     Pack years: 0.00     Types: Cigarettes     Last attempt to quit: 2018     Years since quittin.1    Smokeless tobacco: Never Used   Substance and Sexual Activity    Alcohol use: Yes     Comment: Occasional    Drug use: No    Sexual activity: Not Currently     Partners: Male     Comment:    Other Topics Concern    Not on file   Social History Narrative     since     Together since     He drives 18-wheelers    She is the  for a dental office    Previously  and     Lives with her  and youngest daughter.     with three daughters from previous marriage also     Lab Results   Component Value Date    WBC 8.83 2018    HGB 12.9 2018    HCT 39.4 2018    MCV 83 2018     2018       Pre-op Assessment    I have reviewed the Patient Summary Reports.      I have reviewed the Medications.     Review of Systems  Anesthesia Hx:  No previous Anesthesia  History of prior surgery of interest to airway  management or planning: Denies Family Hx of Anesthesia complications.   Denies Personal Hx of Anesthesia complications.   Social:  Former Smoker, Social Alcohol Use    Hematology/Oncology:  Hematology Normal   Oncology Normal     EENT/Dental:EENT/Dental Normal   Cardiovascular:   Hypertension    Pulmonary:  Pulmonary Normal    Renal/:  Renal/ Normal  Bladder injury   Hepatic/GI:  Hepatic/GI Normal    Musculoskeletal:  Musculoskeletal Normal    Neurological:  Neurology Normal    Endocrine:  Endocrine Normal    Dermatological:  Skin Normal    Psych:   anxiety          Physical Exam  General:  Well nourished    Airway/Jaw/Neck:  Airway Findings: Mallampati: II TM Distance: 4 - 6 cm      Dental:  Dental Findings: In tact   Chest/Lungs:  Chest/Lungs Clear    Heart/Vascular:  Heart Findings: Normal       Mental Status:  Mental Status Findings:  Cooperative, Alert and Oriented         Anesthesia Plan  Type of Anesthesia, risks & benefits discussed:  Anesthesia Type:  general  Patient's Preference:   Intra-op Monitoring Plan: standard ASA monitors  Intra-op Monitoring Plan Comments:   Post Op Pain Control Plan: multimodal analgesia, IV/PO Opioids PRN and per primary service following discharge from PACU  Post Op Pain Control Plan Comments:   Induction:    Beta Blocker:  Patient is not currently on a Beta-Blocker (No further documentation required).       Informed Consent: Patient understands risks and agrees with Anesthesia plan.  Questions answered. Anesthesia consent signed with patient.  ASA Score: 2     Day of Surgery Review of History & Physical:    H&P update referred to the surgeon.  H&P completed by Anesthesiologist.   Anesthesia Plan Notes: npo        Ready For Surgery From Anesthesia Perspective.

## 2018-08-29 NOTE — DISCHARGE INSTRUCTIONS
BATHING/DRESSING:  ? Ok to shower tomorrow    ACTIVITY LEVEL: If you have received sedation or an anesthetic, you may feel sleepy for several hours. Rest until you are more awake. Gradually resume your normal activities.    No heavy lifting.      DIET: You may resume your home diet. If nausea is present, increase your diet gradually with fluids and bland foods.  Medications: Pain medication should be taken only if needed and as directed. If antibiotics are prescribed, the medication should be taken until completed. You will be given an updated list of you medications.  ? No driving, alcoholic beverages or signing legal documents for next 24 hours or while taking pain medication    CALL THE DOCTOR:    For any obvious bleeding (some dried blood over the incision is normal).    Some blood in your urine is normal.     Redness, swelling, foul smell around incision or fever over 101.   Shortness of breath, Coughing Up Bloody Sputum, or Pains or Swelling in your Calves..   Persistent pain or nausea not relieved by medication.   Problems urinating - unable to urinate or heavy bleeding (with our without clots) in urine.    If any unusual problems or difficulties occur contact your doctor. If you cannot contact your doctor but feel your signs and symptoms warrant a physicians attention return to the emergency room.      DIET: You may resume your home diet. If nausea is present, increase your diet gradually with fluids and bland foods.      CALL THE DOCTOR:   · Fever over 101°F  · Severe pain that doesnt go away with medication.  · Upset stomach and vomiting that is persistent.  · Problems urinating-unable to urinate or heavy bleeding (with or without clots)         Fall Prevention  Millions of people fall every year and injure themselves. You may have had anesthesia or sedation which may increase your risk of falling. You may have health issues that put you at an increased risk of falling.     Here are ways to  reduce your risk of falling.  ·   · Make your home safe by keeping walkways clear of objects you may trip over.  · Use non-slip pads under rugs. Do not use area rugs or small throw rugs.  · Use non-slip mats in bathtubs and showers.  · Install handrails and lights on staircases.  · Do not walk in poorly lit areas.  · Do not stand on chairs or wobbly ladders.  · Use caution when reaching overhead or looking upward. This position can cause a loss of balance.  · Be sure your shoes fit properly, have non-slip bottoms and are in good condition.   · Wear shoes both inside and out. Avoid going barefoot or wearing slippers.  · Be cautious when going up and down stairs, curbs, and when walking on uneven sidewalks.  · If your balance is poor, consider using a cane or walker.  · If your fall was related to alcohol use, stop or limit alcohol intake.   · If your fall was related to use of sleeping medicines, talk to your doctor about this. You may need to reduce your dosage at bedtime if you awaken during the night to go to the bathroom.    · To reduce the need for nighttime bathroom trips:  ¨ Avoid drinking fluids for several hours before going to bed  ¨ Empty your bladder before going to bed  ¨ Men can keep a urinal at the bedside  · Stay as active as you can. Balance, flexibility, strength, and endurance all come from exercise. They all play a role in preventing falls. Ask your healthcare provider which types of activity are right for you.  · Get your vision checked on a regular basis.  · If you have pets, know where they are before you stand up or walk so you don't trip over them.  Use night lights.

## 2018-08-29 NOTE — OP NOTE
DATE OF PROCEDURE:  08/29/2018.    PREOPERATIVE DIAGNOSIS:  Bladder injury, subsequent encounter.    POSTOPERATIVE DIAGNOSIS:  Bladder injury, subsequent encounter.    OPERATIVE PROCEDURE PERFORMED:  Cystoscopy with bilateral retrograde pyelogram,   fluoroscopy, vaginoscopy, cystogram.    PRIMARY SURGEON:  Silver Le M.D.    ANESTHESIA:  General.    ESTIMATED BLOOD LOSS:  Minimal.    DRAINS:  A 16-Singaporean Robbins catheter.    SPECIMENS REMOVED:  None.    COMPLICATIONS:  None.    INDICATIONS:  Sherrill Ennis is a 43-year-old woman who sustained a bladder   injury during a GYN procedure a few months ago.  She had an open repair a few   days after the injury.  Cystogram postprocedure showed no evidence of any   opening in the bladder.  However, she has recently come to the office   complaining of continuous urinary leakage, worrisome for a fistula.  She is here   today for cystoscopy, bilateral retrograde pyelogram, vaginoscopy, for further   evaluation for possibility of fistula.    Sherrill Ennis was taken to the Operating Room where she was positively   identified by eddie.  She was placed supine on the operating room table.    Following induction of adequate general anesthesia, she was placed in the dorsal   lithotomy position and her external genitalia were prepped and draped in usual   sterile fashion.    A preoperative timeout was performed as well as confirmation of preoperative   antibiotics.    A  film was taken using fluoroscopy.    A 22-Singaporean rigid cystoscope was then passed per urethra into the bladder under   direct vision.  The bladder was inspected with 30 and 70 degree lenses.  In the   posterior bladder, in the center, there was an abnormal area consistent with   what looked like a fistula opening.  The suture line was also seen extending   further towards the dome of the bladder, which appeared intact.    I then used an 8-Singaporean cone tip catheter to intubate the right ureteral    orifice.  Retrograde pyelogram was taken.  There were no filling defects seen   within the ureter or the pyelocalyceal system.  There was no hydronephrosis or   hydroureter.  There was no abnormal jet of contrast to suggest ureteral fistula.    The cone-tip catheter was then used to intubate the left ureteral orifice.    Retrograde pyelogram was taken.  There were no filling defects seen within the   left ureter or the pyelocalyceal system.  There was no hydronephrosis or   hydroureter.  There was no evidence of any ureteral fistula.    I then used a cone tip catheter to probe the abnormal area within the bladder.    There did appear to be 2 areas, which could be possible connections, I passed   the cone-tip catheter into these areas, performed a fistulogram.  The more   inferior opening did not appear to go to any location.  The more cephalad   opening did appear to allow contrast beyond the bladder.  It appeared to be   extravesical going into the retroperitoneum, did not appear to be going into the   vagina.    I then withdrew the scope, I then passed the scope into the vagina to inspect   the vagina.  There was no evidence of any abnormal fistulous areas.  The cervix   was seen in which the injury was patent.  There was no evidence of any fluid   leaking from that area.    I then turned my attention back into the bladder.  I used a 5-Israeli open-ended   catheter to direct a zip wire into this area, given the more cephalad opening I   did allow the wire to go into that space.  I then left the wire in that space,   put the scope into the vagina and again I did not see the wire, did not see any   abnormalities.  I then used the open-ended catheter to inject fluorescein dye   into this area starting with the posterior area.  I then inspected the vagina.    There was no evidence of any fluorescein dye within the vagina.  I then injected   into the more cephalad opening.  On inspection of the vagina, there was no    evidence of any fluorescein dye within the vagina.  I then turned my attention   back to the bladder, drained the bladder and then used a Bugbee electrode to   cauterize this area hopefully to allow this area to heal and feel.    I then withdrew the scope, I then placed a 16-[place] Islander Robbins catheter for   postoperative drainage.  She will go home with the catheter for 2 weeks to allow   the area to heal.      NEO  dd: 08/29/2018 08:16:52 (CDT)  td: 08/29/2018 10:45:08 (CDT)  Doc ID   #[id number]  Job ID #[id number]    CC:

## 2018-08-29 NOTE — BRIEF OP NOTE
[facility]  Brief Operative Note     SUMMARY     Surgery Date: 8/29/2018     Surgeon(s) and Role:     * NELIA Le MD - Primary    Assisting Surgeon: None    Pre-op Diagnosis:  Urinary incontinence without sensory awareness [N39.42]  Bladder injury, open, subsequent encounter [S37.20XD]    Post-op Diagnosis:  Post-Op Diagnosis Codes:     * Urinary incontinence without sensory awareness [N39.42]     * Bladder injury, open, subsequent encounter [S37.20XD]    Procedure(s) (LRB):  CYSTOSCOPY, WITH RETROGRADE PYELOGRAM (Bilateral)    Anesthesia: General    Description of the findings of the procedure: Abnormal area posterior bladder, likely fistula, could not demonstrate connection to vagina.  Cauterized, Robbins placed.  Ureters normal    Findings/Key Components: as above    Estimated Blood Loss: * No values recorded between 8/29/2018  7:42 AM and 8/29/2018  8:09 AM *         Specimens:   Specimen (12h ago, onward)    None          Discharge Note    SUMMARY     Admit Date: 8/29/2018    Discharge Date and Time:  08/29/2018 8:10 AM    Hospital Course (synopsis of major diagnoses, care, treatment, and services provided during the course of the hospital stay): Patient was admitted for an outpatient procedure and tolerated the procedure well with no complications.      Final Diagnosis: Post-Op Diagnosis Codes:     * Urinary incontinence without sensory awareness [N39.42]     * Bladder injury, open, subsequent encounter [S37.20XD]    Disposition: Home or Self Care    Follow Up/Patient Instructions:     Medications:  Reconciled Home Medications:      Medication List      START taking these medications    phenazopyridine 200 MG tablet  Commonly known as:  PYRIDIUM  Take 1 tablet (200 mg total) by mouth 3 (three) times daily as needed for Pain (Burning).        CHANGE how you take these medications    amLODIPine 10 MG tablet  Commonly known as:  NORVASC  Take 1 tablet (10 mg total) by mouth once  daily.  What changed:  how much to take        CONTINUE taking these medications    clonazePAM 0.5 MG tablet  Commonly known as:  KLONOPIN  Take 1 tablet (0.5 mg total) by mouth 2 (two) times daily as needed for Anxiety.     hydroCHLOROthiazide 12.5 MG Tab  Commonly known as:  HYDRODIURIL  Take 1 tablet (12.5 mg total) by mouth daily as needed.     ibuprofen 600 MG tablet  Commonly known as:  ADVIL,MOTRIN     naproxen 500 MG tablet  Commonly known as:  NAPROSYN  Take 1 tablet (500 mg total) by mouth 2 (two) times daily with meals.     oxybutynin 5 MG Tab  Commonly known as:  DITROPAN  Take 1 tablet (5 mg total) by mouth 3 (three) times daily.     pantoprazole 40 MG tablet  Commonly known as:  PROTONIX  Take 1 tablet (40 mg total) by mouth once daily.     promethazine 25 MG tablet  Commonly known as:  PHENERGAN  Take 1 tablet (25 mg total) by mouth every 4 (four) hours.     sertraline 25 MG tablet  Commonly known as:  ZOLOFT  Take 1 tablet (25 mg total) by mouth once daily. Crack the 1st 3 tabs in half and take half tab daily for 6 days then increase to full tab     valACYclovir 1000 MG tablet  Commonly known as:  VALTREX  Take 1 tablet (1,000 mg total) by mouth daily as needed (herpes outbreak). For 5 days          Discharge Procedure Orders   Diet general     Call MD for:   Order Comments: Significant Hematuria

## 2018-08-29 NOTE — INTERVAL H&P NOTE
The patient has been examined and the H&P has been reviewed:    I concur with the findings and no changes have occurred since H&P was written.    Anesthesia/Surgery risks, benefits and alternative options discussed and understood by patient/family.          Active Hospital Problems    Diagnosis  POA    Bladder injury, open, subsequent encounter [S37.20XD]  Not Applicable      Resolved Hospital Problems   No resolved problems to display.

## 2018-08-29 NOTE — TRANSFER OF CARE
"Anesthesia Transfer of Care Note    Patient: Sherrill Ennis    Procedure(s) Performed: Procedure(s) (LRB):  CYSTOSCOPY, WITH RETROGRADE PYELOGRAM (Bilateral)    Patient location: PACU    Anesthesia Type: general    Transport from OR: Transported from OR on room air with adequate spontaneous ventilation    Post pain: adequate analgesia    Post assessment: no apparent anesthetic complications and tolerated procedure well    Post vital signs: stable    Level of consciousness: awake, alert and oriented    Nausea/Vomiting: no nausea/vomiting    Complications: none    Transfer of care protocol was followed      Last vitals:   Visit Vitals  BP (!) 142/90   Pulse 86   Temp 36.7 °C (98.1 °F) (Oral)   Resp 16   Ht 5' 4" (1.626 m)   Wt 87.6 kg (193 lb 2 oz)   LMP 06/12/2018 (LMP Unknown)   SpO2 97%   Breastfeeding? No   BMI 33.15 kg/m²     "

## 2018-08-29 NOTE — DISCHARGE SUMMARY
[facility]  Discharge Note  Short Stay    Admit Date: 8/29/2018    Discharge Date and Time: 08/29/2018 8:10 AM      Attending Physician: NELIA Le MD     Discharge Provider: KEENA Le    Diagnoses:  Active Hospital Problems    Diagnosis  POA    *Bladder injury, open, subsequent encounter [S37.20XD]  Not Applicable      Resolved Hospital Problems   No resolved problems to display.       Discharged Condition: stable    Hospital Course: Patient was admitted for an outpatient procedure and tolerated the procedure well with no complications.    Final Diagnoses: Same as principal problem.    Disposition: Home or Self Care    Follow up/Patient Instructions:    Medications:  Reconciled Home Medications:      Medication List      START taking these medications    phenazopyridine 200 MG tablet  Commonly known as:  PYRIDIUM  Take 1 tablet (200 mg total) by mouth 3 (three) times daily as needed for Pain (Burning).        CHANGE how you take these medications    amLODIPine 10 MG tablet  Commonly known as:  NORVASC  Take 1 tablet (10 mg total) by mouth once daily.  What changed:  how much to take        CONTINUE taking these medications    clonazePAM 0.5 MG tablet  Commonly known as:  KLONOPIN  Take 1 tablet (0.5 mg total) by mouth 2 (two) times daily as needed for Anxiety.     hydroCHLOROthiazide 12.5 MG Tab  Commonly known as:  HYDRODIURIL  Take 1 tablet (12.5 mg total) by mouth daily as needed.     ibuprofen 600 MG tablet  Commonly known as:  ADVIL,MOTRIN     naproxen 500 MG tablet  Commonly known as:  NAPROSYN  Take 1 tablet (500 mg total) by mouth 2 (two) times daily with meals.     oxybutynin 5 MG Tab  Commonly known as:  DITROPAN  Take 1 tablet (5 mg total) by mouth 3 (three) times daily.     pantoprazole 40 MG tablet  Commonly known as:  PROTONIX  Take 1 tablet (40 mg total) by mouth once daily.     promethazine 25 MG tablet  Commonly known as:  PHENERGAN  Take 1 tablet (25 mg total) by mouth  every 4 (four) hours.     sertraline 25 MG tablet  Commonly known as:  ZOLOFT  Take 1 tablet (25 mg total) by mouth once daily. Crack the 1st 3 tabs in half and take half tab daily for 6 days then increase to full tab     valACYclovir 1000 MG tablet  Commonly known as:  VALTREX  Take 1 tablet (1,000 mg total) by mouth daily as needed (herpes outbreak). For 5 days          Discharge Procedure Orders   Diet general     Call MD for:   Order Comments: Significant Hematuria         Discharge Procedure Orders (must include Diet, Follow-up, Activity):   Discharge Procedure Orders (must include Diet, Follow-up, Activity)   Diet general     Call MD for:   Order Comments: Significant Hematuria

## 2018-08-30 ENCOUNTER — PATIENT MESSAGE (OUTPATIENT)
Dept: UROLOGY | Facility: CLINIC | Age: 43
End: 2018-08-30

## 2018-08-30 ENCOUNTER — TELEPHONE (OUTPATIENT)
Dept: UROLOGY | Facility: CLINIC | Age: 43
End: 2018-08-30

## 2018-08-30 RX ORDER — HYDROCODONE BITARTRATE AND ACETAMINOPHEN 5; 325 MG/1; MG/1
1 TABLET ORAL EVERY 6 HOURS PRN
Qty: 8 TABLET | Refills: 0 | Status: SHIPPED | OUTPATIENT
Start: 2018-08-30 | End: 2018-10-15

## 2018-08-30 NOTE — ANESTHESIA POSTPROCEDURE EVALUATION
"Anesthesia Post Evaluation    Patient: Sherrill Ennis    Procedure(s) Performed: Procedure(s) (LRB):  CYSTOSCOPY, WITH RETROGRADE PYELOGRAM (Bilateral)    Final Anesthesia Type: general  Patient location during evaluation: PACU  Patient participation: Yes- Able to Participate  Level of consciousness: awake and alert and oriented  Post-procedure vital signs: reviewed and stable  Pain management: adequate  Airway patency: patent  PONV status at discharge: No PONV  Anesthetic complications: no      Cardiovascular status: blood pressure returned to baseline and hemodynamically stable  Respiratory status: unassisted, spontaneous ventilation and room air  Hydration status: euvolemic  Follow-up not needed.        Visit Vitals  /61 (BP Location: Right arm, Patient Position: Lying)   Pulse 62   Temp 37.2 °C (98.9 °F) (Oral)   Resp 18   Ht 5' 4" (1.626 m)   Wt 87.6 kg (193 lb 2 oz)   LMP 06/12/2018 (LMP Unknown)   SpO2 97%   Breastfeeding? No   BMI 33.15 kg/m²       Pain/Franki Score: Pain Assessment Performed: Yes (8/29/2018  1:00 PM)  Presence of Pain: denies (8/29/2018  1:00 PM)  Pain Rating Prior to Med Admin: 0 (8/29/2018 10:05 AM)  Pain Rating Post Med Admin: 0 (8/29/2018 10:05 AM)  Franki Score: 10 (8/29/2018  1:00 PM)  Modified Franki Score: 20 (8/29/2018  1:14 PM)        "

## 2018-09-05 ENCOUNTER — PATIENT MESSAGE (OUTPATIENT)
Dept: UROLOGY | Facility: CLINIC | Age: 43
End: 2018-09-05

## 2018-09-07 ENCOUNTER — LAB VISIT (OUTPATIENT)
Dept: LAB | Facility: HOSPITAL | Age: 43
End: 2018-09-07
Attending: INTERNAL MEDICINE
Payer: COMMERCIAL

## 2018-09-07 DIAGNOSIS — D50.0 IRON DEFICIENCY ANEMIA DUE TO CHRONIC BLOOD LOSS: ICD-10-CM

## 2018-09-07 DIAGNOSIS — I10 ESSENTIAL (PRIMARY) HYPERTENSION: Chronic | ICD-10-CM

## 2018-09-07 LAB
ANION GAP SERPL CALC-SCNC: 9 MMOL/L
BASOPHILS # BLD AUTO: 0.05 K/UL
BASOPHILS NFR BLD: 0.5 %
BUN SERPL-MCNC: 12 MG/DL
CALCIUM SERPL-MCNC: 9.9 MG/DL
CHLORIDE SERPL-SCNC: 105 MMOL/L
CO2 SERPL-SCNC: 25 MMOL/L
CREAT SERPL-MCNC: 0.7 MG/DL
DIFFERENTIAL METHOD: ABNORMAL
EOSINOPHIL # BLD AUTO: 0.6 K/UL
EOSINOPHIL NFR BLD: 6.1 %
ERYTHROCYTE [DISTWIDTH] IN BLOOD BY AUTOMATED COUNT: 14.3 %
EST. GFR  (AFRICAN AMERICAN): >60 ML/MIN/1.73 M^2
EST. GFR  (NON AFRICAN AMERICAN): >60 ML/MIN/1.73 M^2
FERRITIN SERPL-MCNC: 109 NG/ML
GLUCOSE SERPL-MCNC: 103 MG/DL
HCT VFR BLD AUTO: 40 %
HGB BLD-MCNC: 12.7 G/DL
IMM GRANULOCYTES # BLD AUTO: 0.02 K/UL
IMM GRANULOCYTES NFR BLD AUTO: 0.2 %
IRON SERPL-MCNC: 66 UG/DL
LYMPHOCYTES # BLD AUTO: 2.3 K/UL
LYMPHOCYTES NFR BLD: 22.3 %
MCH RBC QN AUTO: 27 PG
MCHC RBC AUTO-ENTMCNC: 31.8 G/DL
MCV RBC AUTO: 85 FL
MONOCYTES # BLD AUTO: 0.8 K/UL
MONOCYTES NFR BLD: 7.8 %
NEUTROPHILS # BLD AUTO: 6.4 K/UL
NEUTROPHILS NFR BLD: 63.1 %
NRBC BLD-RTO: 0 /100 WBC
PLATELET # BLD AUTO: 311 K/UL
PMV BLD AUTO: 9.9 FL
POTASSIUM SERPL-SCNC: 3.6 MMOL/L
RBC # BLD AUTO: 4.71 M/UL
SATURATED IRON: 15 %
SODIUM SERPL-SCNC: 139 MMOL/L
TOTAL IRON BINDING CAPACITY: 428 UG/DL
TRANSFERRIN SERPL-MCNC: 289 MG/DL
WBC # BLD AUTO: 10.13 K/UL

## 2018-09-07 PROCEDURE — 85025 COMPLETE CBC W/AUTO DIFF WBC: CPT

## 2018-09-07 PROCEDURE — 83540 ASSAY OF IRON: CPT

## 2018-09-07 PROCEDURE — 82728 ASSAY OF FERRITIN: CPT

## 2018-09-07 PROCEDURE — 80048 BASIC METABOLIC PNL TOTAL CA: CPT

## 2018-09-07 PROCEDURE — 36415 COLL VENOUS BLD VENIPUNCTURE: CPT | Mod: PO

## 2018-09-07 NOTE — PROGRESS NOTES
Your lab results are looking much better.  Please continue your current medications and doses.  Please feel free to contact me with any questions or concerns.    Sincerely,  Reagan Holland  [email or web address]

## 2018-09-11 ENCOUNTER — OFFICE VISIT (OUTPATIENT)
Dept: UROLOGY | Facility: CLINIC | Age: 43
End: 2018-09-11
Payer: COMMERCIAL

## 2018-09-11 VITALS
HEIGHT: 64 IN | BODY MASS INDEX: 32.48 KG/M2 | DIASTOLIC BLOOD PRESSURE: 90 MMHG | WEIGHT: 190.25 LBS | HEART RATE: 72 BPM | SYSTOLIC BLOOD PRESSURE: 120 MMHG

## 2018-09-11 DIAGNOSIS — K43.2 INCISIONAL HERNIA, WITHOUT OBSTRUCTION OR GANGRENE: ICD-10-CM

## 2018-09-11 DIAGNOSIS — S37.20XD: Primary | ICD-10-CM

## 2018-09-11 PROCEDURE — 99999 PR PBB SHADOW E&M-EST. PATIENT-LVL III: CPT | Mod: PBBFAC,,, | Performed by: UROLOGY

## 2018-09-11 PROCEDURE — 99024 POSTOP FOLLOW-UP VISIT: CPT | Mod: S$GLB,,, | Performed by: UROLOGY

## 2018-09-11 NOTE — PROGRESS NOTES
Subjective:       Patient ID: Sherrill Ennis is a 43 y.o. female who was last seen in this office 8/14/2018    Chief Complaint:   Chief Complaint   Patient presents with    Follow-up     follow up from procedure pt does have a hernandez        Patient s/p laparoscopic supracervical hysterectomy with bilateral salpingectomy and lysis of omental adhesions on 6/20/2018 by Dr. Garcia.  She had issues postoperatively with retention and anuria.  She was sent to the ER on 6/25/18 and was found to be azotemic with creatinine of 10.  CT scan showed fluid in the belly.  Urology consult was requested for evaluation for possible injury to the urinary system. She was noted to have cautery injury with small cystotomy posterior bladder. There was no injury noted to her ureter. Subsequently she underwent cystoscopy with bilateral RPG, cystogram, cystotomy repair on 6/26/18 by Dr. Le and Dr. Ross.     YONNY was subsequently removed    She had a successful cystogram on 7/11/2018.  She is here in follow up.  She complains of a bulge in her mid-abdomen and leakage.  She denies nausea or vomiting or fever.  Her leakage seems to occur without any inciting event (stress or urge).    She was wearing about 7 pads per day.  Prior to her hysterectomy, she only wore about 3 panty liners.    On 8/29/2018 she had a cystoscopy with bilateral retrograde pyelogram, cystogram, and vaginoscopy.  There was a suspicious area in the posterior bladder.  It did not obviously connect to the vagina.  On fluoroscopy it appeared to connect to the retrovesical space.  Vaginoscopy did not show any abnormalities.  The suspicious spot in the bladder was fulgurated and a Hernandez has been in place.  She has worn a panty liner 1-2 with the Hernandez in.      She is interested in seeing someone for her incisional hernia.      ACTIVE MEDICAL ISSUES:  Patient Active Problem List   Diagnosis    Tobacco dependency    Essential (primary) hypertension    Herpes  simplex vulvovaginitis    Iron deficiency anemia due to chronic blood loss    RLS (restless legs syndrome)    Dysmenorrhea    Adenomyosis    Menorrhagia with regular cycle    Abnormal uterine bleeding    Obesity, Class I, BMI 30-34.9    Bladder injury, open, subsequent encounter    Adjustment disorder with anxious mood       ALLERGIES AND MEDICATIONS: updated and reviewed.  Review of patient's allergies indicates:   Allergen Reactions    Ace inhibitors Other (See Comments)     cough     Current Outpatient Medications   Medication Sig    amLODIPine (NORVASC) 10 MG tablet Take 1 tablet (10 mg total) by mouth once daily. (Patient taking differently: Take 5 mg by mouth once daily. )    clonazePAM (KLONOPIN) 0.5 MG tablet Take 1 tablet (0.5 mg total) by mouth 2 (two) times daily as needed for Anxiety.    hydroCHLOROthiazide (HYDRODIURIL) 12.5 MG Tab Take 1 tablet (12.5 mg total) by mouth daily as needed.    HYDROcodone-acetaminophen (NORCO) 5-325 mg per tablet Take 1 tablet by mouth every 6 (six) hours as needed for Pain.    ibuprofen (ADVIL,MOTRIN) 600 MG tablet     naproxen (NAPROSYN) 500 MG tablet Take 1 tablet (500 mg total) by mouth 2 (two) times daily with meals.    oxybutynin (DITROPAN) 5 MG Tab Take 1 tablet (5 mg total) by mouth 3 (three) times daily.    pantoprazole (PROTONIX) 40 MG tablet Take 1 tablet (40 mg total) by mouth once daily.    phenazopyridine (PYRIDIUM) 200 MG tablet Take 1 tablet (200 mg total) by mouth 3 (three) times daily as needed for Pain (Burning).    promethazine (PHENERGAN) 25 MG tablet Take 1 tablet (25 mg total) by mouth every 4 (four) hours.    sertraline (ZOLOFT) 25 MG tablet Take 1 tablet (25 mg total) by mouth once daily. Crack the 1st 3 tabs in half and take half tab daily for 6 days then increase to full tab    valACYclovir (VALTREX) 1000 MG tablet Take 1 tablet (1,000 mg total) by mouth daily as needed (herpes outbreak). For 5 days     No current  "facility-administered medications for this visit.        Review of Systems   Constitutional: Negative for activity change, fatigue, fever and unexpected weight change.   Eyes: Negative for redness and visual disturbance.   Respiratory: Negative for chest tightness and shortness of breath.    Cardiovascular: Negative for chest pain and leg swelling.   Gastrointestinal: Negative for abdominal distention, abdominal pain, constipation, diarrhea, nausea and vomiting.   Genitourinary: Negative for difficulty urinating, dysuria, flank pain, frequency, hematuria, pelvic pain, urgency and vaginal bleeding.   Musculoskeletal: Negative for arthralgias and joint swelling.   Neurological: Negative for dizziness, weakness and headaches.   Psychiatric/Behavioral: Negative for confusion. The patient is not nervous/anxious.    All other systems reviewed and are negative.      Objective:      Vitals:    09/11/18 1615   BP: (!) 120/90   Pulse: 72   Weight: 86.3 kg (190 lb 4.1 oz)   Height: 5' 4" (1.626 m)     Physical Exam   Nursing note and vitals reviewed.  Constitutional: She is oriented to person, place, and time. She appears well-developed.   HENT:   Head: Normocephalic.   Eyes: Conjunctivae are normal.   Neck: Normal range of motion. No tracheal deviation present. No thyromegaly present.   Cardiovascular: Normal rate, normal heart sounds and normal pulses.    Pulmonary/Chest: Effort normal and breath sounds normal. No respiratory distress. She has no wheezes.   Abdominal: Soft. She exhibits no distension and no mass. There is no hepatosplenomegaly. There is no tenderness. There is no rebound, no guarding and no CVA tenderness. No hernia.   Musculoskeletal: Normal range of motion. She exhibits no edema or tenderness.   Lymphadenopathy:     She has no cervical adenopathy.   Neurological: She is alert and oriented to person, place, and time.   Skin: Skin is warm and dry. No rash noted. No erythema.     Psychiatric: She has a normal " mood and affect. Her behavior is normal. Judgment and thought content normal.     Robbins removed      Assessment:       1. Bladder injury, open, subsequent encounter    2. Incisional hernia, without obstruction or gangrene          Plan:       1. Bladder injury, open, subsequent encounter  Robbins is out  I want to see how she does over the next several weeks with her pad use    2. Incisional hernia, without obstruction or gangrene    - Ambulatory Referral to General Surgery            Follow-up in about 6 weeks (around 10/23/2018) for Follow up.

## 2018-09-12 ENCOUNTER — OFFICE VISIT (OUTPATIENT)
Dept: FAMILY MEDICINE | Facility: CLINIC | Age: 43
End: 2018-09-12
Payer: COMMERCIAL

## 2018-09-12 VITALS
TEMPERATURE: 98 F | HEIGHT: 64 IN | SYSTOLIC BLOOD PRESSURE: 130 MMHG | BODY MASS INDEX: 32.27 KG/M2 | DIASTOLIC BLOOD PRESSURE: 80 MMHG | OXYGEN SATURATION: 96 % | WEIGHT: 189 LBS | HEART RATE: 83 BPM

## 2018-09-12 DIAGNOSIS — K29.00 ACUTE GASTRITIS WITHOUT HEMORRHAGE, UNSPECIFIED GASTRITIS TYPE: ICD-10-CM

## 2018-09-12 DIAGNOSIS — Z23 NEED FOR DIPHTHERIA, TETANUS, PERTUSSIS, AND HIB VACCINATION: Primary | ICD-10-CM

## 2018-09-12 DIAGNOSIS — D50.0 IRON DEFICIENCY ANEMIA DUE TO CHRONIC BLOOD LOSS: Chronic | ICD-10-CM

## 2018-09-12 DIAGNOSIS — I10 ESSENTIAL (PRIMARY) HYPERTENSION: Chronic | ICD-10-CM

## 2018-09-12 PROCEDURE — 99214 OFFICE O/P EST MOD 30 MIN: CPT | Mod: 25,S$GLB,, | Performed by: INTERNAL MEDICINE

## 2018-09-12 PROCEDURE — 90715 TDAP VACCINE 7 YRS/> IM: CPT | Mod: S$GLB,,, | Performed by: INTERNAL MEDICINE

## 2018-09-12 PROCEDURE — 3079F DIAST BP 80-89 MM HG: CPT | Mod: CPTII,S$GLB,, | Performed by: INTERNAL MEDICINE

## 2018-09-12 PROCEDURE — 99999 PR PBB SHADOW E&M-EST. PATIENT-LVL III: CPT | Mod: PBBFAC,,, | Performed by: INTERNAL MEDICINE

## 2018-09-12 PROCEDURE — 3008F BODY MASS INDEX DOCD: CPT | Mod: CPTII,S$GLB,, | Performed by: INTERNAL MEDICINE

## 2018-09-12 PROCEDURE — 90471 IMMUNIZATION ADMIN: CPT | Mod: S$GLB,,, | Performed by: INTERNAL MEDICINE

## 2018-09-12 PROCEDURE — 3075F SYST BP GE 130 - 139MM HG: CPT | Mod: CPTII,S$GLB,, | Performed by: INTERNAL MEDICINE

## 2018-09-12 RX ORDER — HYDROCHLOROTHIAZIDE 12.5 MG/1
12.5 TABLET ORAL DAILY
Qty: 90 TABLET | Refills: 3 | Status: SHIPPED | OUTPATIENT
Start: 2018-09-12 | End: 2019-03-13

## 2018-09-12 NOTE — PROGRESS NOTES
Assessment & Plan  Problem List Items Addressed This Visit        Cardiac/Vascular    Essential (primary) hypertension (Chronic)    Current Assessment & Plan     The current medical regimen is effective;  continue present plan and medications.          Relevant Medications    hydroCHLOROthiazide (HYDRODIURIL) 12.5 MG Tab    Other Relevant Orders    CBC auto differential    Comprehensive metabolic panel    Lipid panel       Oncology    Iron deficiency anemia due to chronic blood loss (Chronic)    Current Assessment & Plan     Labs much improved.  Monitor             GI    Acute gastritis without hemorrhage (Chronic)    Current Assessment & Plan     Continue PPI.  Will attempt dose wean in the future.            Other Visit Diagnoses     Need for diphtheria, tetanus, pertussis, and Hib vaccination    -  Primary  -    vaccinate    Relevant Orders    Tdap Vaccine Greater than or Eqaul to 7 y.o.            Health Maintenance reviewed, Flu in Oct.    Follow-up: Follow-up for Routine Physical Jan 2019.  ______________________________________________________________________    Chief Complaint  Chief Complaint   Patient presents with    Hypertension    gastritis    Follow-up       HPI  Sherrill Ennis is a 43 y.o. female with medical diagnoses as listed in the medical history and problem list that presents for HTN & gastritis follow up.  Pt is known to me with her last appointment 8/23/2018.  Had labs recently that showed normal BMP, CBC and improving iron panel.       protonix that was started last visit has fully controlled her gastritis.  She denies any CP, SOB, palpitations, leg swelling.        PAST MEDICAL HISTORY:  Past Medical History:   Diagnosis Date    Anxiety     Hypertension        PAST SURGICAL HISTORY:  Past Surgical History:   Procedure Laterality Date    BLADDER REPAIR N/A 6/26/2018    Procedure: REPAIR, BLADDER;  Surgeon: NELIA Le MD;  Location: [facility] OR;  Service: Urology;   Laterality: N/A;     SECTION      x4    CHOLECYSTECTOMY  1995    CYSTOGRAM  2018    Procedure: CYSTOGRAM;  Surgeon: NELIA Le MD;  Location: [facility] OR;  Service: Urology;;    CYSTOGRAM N/A 2018    Procedure: CYSTOGRAM;  Surgeon: NELIA Le MD;  Location: [facility] OR;  Service: Urology;  Laterality: N/A;  RN Phone Pre op 18.    CYSTOGRAM N/A 2018    Performed by [facility]ELIA Le MD at [facility] OR    CYSTOGRAM  2018    Performed by [facility]ELIA Le MD at [facility] OR    CYSTOSCOPY W/ RETROGRADES Bilateral 2018    Procedure: CYSTOSCOPY, WITH RETROGRADE pyelLOGRAM;  Surgeon: NELIA Le MD;  Location: [facility] OR;  Service: Urology;  Laterality: Bilateral;    CYSTOSCOPY W/ RETROGRADES Bilateral 2018    Procedure: CYSTOSCOPY, WITH RETROGRADE PYELOGRAM;  Surgeon: NELIA Le MD;  Location: [facility] OR;  Service: Urology;  Laterality: Bilateral;  RN PRE OP 2018    CYSTOSCOPY, WITH RETROGRADE pyelLOGRAM Bilateral 2018    Performed by [facility]ELIA Le MD at [facility] OR    CYSTOSCOPY, WITH RETROGRADE PYELOGRAM Bilateral 2018    Performed by [facility]IA Le MD at [facility] OR    EXCISION, CYST, OVARY, LAPAROSCOPIC Right 2018    Performed by Vince Garcia MD at [facility] OR    HYSTERECTOMY  2018    [id number] N/A 2018    Performed by [facility] at [facility] OR    LAPAROSCOPIC SALPINGECTOMY Bilateral 2018    Procedure: SALPINGECTOMY, LAPAROSCOPIC;  Surgeon: Vince Garcia MD;  Location: [facility] OR;  Service: OB/GYN;  Laterality: Bilateral;    LAPAROSCOPIC SUPRACERVICAL HYSTERECTOMY N/A 2018    Procedure: [id number];  Surgeon: Vince Garcia MD;  Location: [facility] OR;  Service: OB/GYN;  Laterality: N/A;  1ST CASE  RN PRE OP 2018    LAPAROSCOPIC SURGICAL REMOVAL OF CYST OF OVARY Right 2018    Procedure: EXCISION, CYST, OVARY, LAPAROSCOPIC;  Surgeon: Vince Garcia MD;  Location: [facility]  OR;  Service: OB/GYN;  Laterality: Right;    LAPAROTOMY,EXPLORATORY  2018    [facility] at [facility] OR    REPAIR, BLADDER N/A 2018    [facility]formed by [facility] at [facility] OR    SALPINGECTOMY, LAPAROSCOPIC Bilateral 2018    Performed by [facility] at [facility] OR    TUBAL LIGATION         SOCIAL HISTORY:  Social History     Socioeconomic History    Marital status:      Spouse name: Not on file    Number of children: Not on file    Years of education: Not on file    Highest education level: Not on file   Social Needs    Financial resource strain: Not on file    Food insecurity - worry: Not on file    Food insecurity - inability: Not on file    Transportation needs - medical: Not on file    Transportation needs - non-medical: Not on file   Occupational History    Not on file   Tobacco Use    Smoking status: Former Smoker     Packs/day: 0.00     Years: 17.00     Pack years: 0.00     Types: Cigarettes     Last attempt to quit: 2018     Years since quittin.2    Smokeless tobacco: Never Used   Substance and Sexual Activity    Alcohol use: Yes     Comment: Occasional    Drug use: No    Sexual activity: Not Currently     Partners: Male     Comment:    Other Topics Concern    Not on file   Social History Narrative     since     Together since     He drives 18-wheelers    She is the  for a dental office    Previously  and     Lives with her  and youngest daughter.     with three daughters from previous marriage also       FAMILY HISTORY:  Family History   Problem Relation Age of Onset    Hypertension Mother     Stroke Mother     Aneurysm Mother     Hypertension Father     Hypertension Brother     Hypertension Maternal Grandfather     Heart disease Maternal Grandfather         MI    Cancer Maternal Grandfather     Cancer Paternal Grandmother        ALLERGIES AND MEDICATIONS:  updated and reviewed.  Review of patient's allergies indicates:   Allergen Reactions    Ace inhibitors Other (See Comments)     cough     Current Outpatient Medications   Medication Sig Dispense Refill    amLODIPine (NORVASC) 10 MG tablet Take 1 tablet (10 mg total) by mouth once daily. (Patient taking differently: Take 5 mg by mouth once daily. ) 90 tablet 3    clonazePAM (KLONOPIN) 0.5 MG tablet Take 1 tablet (0.5 mg total) by mouth 2 (two) times daily as needed for Anxiety. 10 tablet 0    hydroCHLOROthiazide (HYDRODIURIL) 12.5 MG Tab Take 1 tablet (12.5 mg total) by mouth once daily. 90 tablet 3    ibuprofen (ADVIL,MOTRIN) 600 MG tablet       naproxen (NAPROSYN) 500 MG tablet Take 1 tablet (500 mg total) by mouth 2 (two) times daily with meals. 60 tablet 0    pantoprazole (PROTONIX) 40 MG tablet Take 1 tablet (40 mg total) by mouth once daily. 90 tablet 0    promethazine (PHENERGAN) 25 MG tablet Take 1 tablet (25 mg total) by mouth every 4 (four) hours. 30 tablet 1    sertraline (ZOLOFT) 25 MG tablet Take 1 tablet (25 mg total) by mouth once daily. Crack the 1st 3 tabs in half and take half tab daily for 6 days then increase to full tab 30 tablet 1    valACYclovir (VALTREX) 1000 MG tablet Take 1 tablet (1,000 mg total) by mouth daily as needed (herpes outbreak). For 5 days 30 tablet 1    HYDROcodone-acetaminophen (NORCO) 5-325 mg per tablet Take 1 tablet by mouth every 6 (six) hours as needed for Pain. 8 tablet 0    oxybutynin (DITROPAN) 5 MG Tab Take 1 tablet (5 mg total) by mouth 3 (three) times daily. 90 tablet 3    phenazopyridine (PYRIDIUM) 200 MG tablet Take 1 tablet (200 mg total) by mouth 3 (three) times daily as needed for Pain (Burning). 21 tablet 0     No current facility-administered medications for this visit.          ROS  Review of Systems   Constitutional: Negative for chills, fever and unexpected weight change.   HENT: Negative for congestion, ear pain, hearing loss, rhinorrhea, sore  "throat and trouble swallowing.    Eyes: Negative for discharge, redness and visual disturbance.   Respiratory: Negative for cough, chest tightness, shortness of breath and wheezing.    Cardiovascular: Negative for chest pain, palpitations and leg swelling.   Gastrointestinal: Negative for abdominal pain, constipation, diarrhea, nausea and vomiting.   Endocrine: Negative for polydipsia, polyphagia and polyuria.   Genitourinary: Negative for decreased urine volume, dysuria and hematuria.   Musculoskeletal: Negative for arthralgias, joint swelling and myalgias.   Skin: Negative for color change and rash.   Neurological: Negative for dizziness, weakness, light-headedness and headaches.   Psychiatric/Behavioral: Negative for decreased concentration, dysphoric mood, sleep disturbance and suicidal ideas.           Physical Exam  Vitals:    09/12/18 0744   BP: 130/80   Pulse: 83   Temp: 98.3 °F (36.8 °C)   SpO2: 96%   Weight: 85.7 kg (189 lb)   Height: 5' 4" (1.626 m)    Body mass index is 32.44 kg/m².  Weight: 85.7 kg (189 lb)   Height: 5' 4" (162.6 cm)   Physical Exam   Constitutional: She is oriented to person, place, and time. She appears well-developed and well-nourished. No distress.   HENT:   Head: Normocephalic and atraumatic.   Eyes: Conjunctivae and EOM are normal. Pupils are equal, round, and reactive to light.   Cardiovascular: Normal rate and regular rhythm. Exam reveals no gallop and no friction rub.   No murmur heard.  Pulmonary/Chest: Effort normal and breath sounds normal. No respiratory distress.   Musculoskeletal: Normal range of motion. She exhibits no edema or tenderness.   Neurological: She is alert and oriented to person, place, and time.   Symmetric facial movements palate elevated symmetrically tongue midline    Skin: Skin is warm and dry. No rash noted.           Health Maintenance       Date Due Completion Date    TETANUS VACCINE 04/20/1995 ---    Influenza Vaccine 08/01/2018 1/19/2018 (Declined) "    Override on 1/19/2018: Declined    Mammogram 03/08/2020 3/8/2018

## 2018-10-03 ENCOUNTER — PATIENT MESSAGE (OUTPATIENT)
Dept: FAMILY MEDICINE | Facility: CLINIC | Age: 43
End: 2018-10-03

## 2018-10-03 DIAGNOSIS — F43.22 ADJUSTMENT DISORDER WITH ANXIOUS MOOD: ICD-10-CM

## 2018-10-03 RX ORDER — CLONAZEPAM 0.5 MG/1
0.5 TABLET ORAL 2 TIMES DAILY PRN
Qty: 10 TABLET | Refills: 0 | Status: SHIPPED | OUTPATIENT
Start: 2018-10-03 | End: 2019-03-13 | Stop reason: SDUPTHER

## 2018-10-12 ENCOUNTER — CLINICAL SUPPORT (OUTPATIENT)
Dept: FAMILY MEDICINE | Facility: CLINIC | Age: 43
End: 2018-10-12
Payer: COMMERCIAL

## 2018-10-12 DIAGNOSIS — Z23 NEED FOR PROPHYLACTIC VACCINATION AND INOCULATION AGAINST INFLUENZA: Primary | ICD-10-CM

## 2018-10-12 PROCEDURE — 90686 IIV4 VACC NO PRSV 0.5 ML IM: CPT | Mod: S$GLB,,, | Performed by: INTERNAL MEDICINE

## 2018-10-12 PROCEDURE — 90471 IMMUNIZATION ADMIN: CPT | Mod: S$GLB,,, | Performed by: INTERNAL MEDICINE

## 2018-10-12 PROCEDURE — 99499 UNLISTED E&M SERVICE: CPT | Mod: S$GLB,,, | Performed by: INTERNAL MEDICINE

## 2018-10-15 DIAGNOSIS — F43.22 ADJUSTMENT DISORDER WITH ANXIOUS MOOD: ICD-10-CM

## 2018-10-15 DIAGNOSIS — M25.529 ELBOW PAIN, UNSPECIFIED LATERALITY: ICD-10-CM

## 2018-10-15 RX ORDER — SERTRALINE HYDROCHLORIDE 25 MG/1
25 TABLET, FILM COATED ORAL DAILY
Qty: 90 TABLET | Refills: 1 | Status: SHIPPED | OUTPATIENT
Start: 2018-10-15 | End: 2019-03-13

## 2018-10-15 RX ORDER — NAPROXEN 500 MG/1
500 TABLET ORAL 2 TIMES DAILY WITH MEALS
Qty: 60 TABLET | Refills: 3 | Status: SHIPPED | OUTPATIENT
Start: 2018-10-15 | End: 2018-12-31 | Stop reason: SDUPTHER

## 2018-10-16 ENCOUNTER — TELEPHONE (OUTPATIENT)
Dept: SURGERY | Facility: CLINIC | Age: 43
End: 2018-10-16

## 2018-10-17 ENCOUNTER — TELEPHONE (OUTPATIENT)
Dept: SURGERY | Facility: CLINIC | Age: 43
End: 2018-10-17

## 2018-11-09 DIAGNOSIS — K29.00 ACUTE GASTRITIS WITHOUT HEMORRHAGE, UNSPECIFIED GASTRITIS TYPE: ICD-10-CM

## 2018-11-09 RX ORDER — PANTOPRAZOLE SODIUM 40 MG/1
40 TABLET, DELAYED RELEASE ORAL DAILY
Qty: 90 TABLET | Refills: 0 | Status: SHIPPED | OUTPATIENT
Start: 2018-11-09 | End: 2019-02-06 | Stop reason: SDUPTHER

## 2018-12-31 DIAGNOSIS — M25.529 ELBOW PAIN, UNSPECIFIED LATERALITY: ICD-10-CM

## 2018-12-31 RX ORDER — NAPROXEN 500 MG/1
500 TABLET ORAL 2 TIMES DAILY WITH MEALS
Qty: 60 TABLET | Refills: 0 | Status: SHIPPED | OUTPATIENT
Start: 2018-12-31 | End: 2019-01-30

## 2019-01-02 ENCOUNTER — PATIENT MESSAGE (OUTPATIENT)
Dept: FAMILY MEDICINE | Facility: CLINIC | Age: 44
End: 2019-01-02

## 2019-01-07 ENCOUNTER — OFFICE VISIT (OUTPATIENT)
Dept: SURGERY | Facility: CLINIC | Age: 44
End: 2019-01-07
Payer: COMMERCIAL

## 2019-01-07 VITALS
HEART RATE: 97 BPM | BODY MASS INDEX: 32.27 KG/M2 | HEIGHT: 64 IN | DIASTOLIC BLOOD PRESSURE: 104 MMHG | WEIGHT: 189 LBS | SYSTOLIC BLOOD PRESSURE: 164 MMHG

## 2019-01-07 DIAGNOSIS — K43.2 INCISIONAL HERNIA WITHOUT OBSTRUCTION OR GANGRENE: Primary | ICD-10-CM

## 2019-01-07 PROCEDURE — 99203 PR OFFICE/OUTPT VISIT, NEW, LEVL III, 30-44 MIN: ICD-10-PCS | Mod: S$GLB,,, | Performed by: SURGERY

## 2019-01-07 PROCEDURE — 3080F DIAST BP >= 90 MM HG: CPT | Mod: CPTII,S$GLB,, | Performed by: SURGERY

## 2019-01-07 PROCEDURE — 3008F PR BODY MASS INDEX (BMI) DOCUMENTED: ICD-10-PCS | Mod: CPTII,S$GLB,, | Performed by: SURGERY

## 2019-01-07 PROCEDURE — 3008F BODY MASS INDEX DOCD: CPT | Mod: CPTII,S$GLB,, | Performed by: SURGERY

## 2019-01-07 PROCEDURE — 3077F PR MOST RECENT SYSTOLIC BLOOD PRESSURE >= 140 MM HG: ICD-10-PCS | Mod: CPTII,S$GLB,, | Performed by: SURGERY

## 2019-01-07 PROCEDURE — 3080F PR MOST RECENT DIASTOLIC BLOOD PRESSURE >= 90 MM HG: ICD-10-PCS | Mod: CPTII,S$GLB,, | Performed by: SURGERY

## 2019-01-07 PROCEDURE — 3077F SYST BP >= 140 MM HG: CPT | Mod: CPTII,S$GLB,, | Performed by: SURGERY

## 2019-01-07 PROCEDURE — 99203 OFFICE O/P NEW LOW 30 MIN: CPT | Mod: S$GLB,,, | Performed by: SURGERY

## 2019-01-07 RX ORDER — SODIUM CHLORIDE 9 MG/ML
INJECTION, SOLUTION INTRAVENOUS CONTINUOUS
Status: CANCELLED | OUTPATIENT
Start: 2019-01-07

## 2019-01-07 RX ORDER — LIDOCAINE HYDROCHLORIDE 10 MG/ML
1 INJECTION, SOLUTION EPIDURAL; INFILTRATION; INTRACAUDAL; PERINEURAL ONCE
Status: CANCELLED | OUTPATIENT
Start: 2019-01-07 | End: 2019-01-07

## 2019-01-07 NOTE — LETTER
January 7, 2019      NELIA Le MD  [address] [facility] [address]  [address]  [facility] [address]           [facility]  [address] [facility]  [facility] [address]  Phone: [phone]  Fax: [phone]          Patient: Sherrill Ennis   MR Number: [id number]   YOB: 1975   Date of Visit: 1/7/2019       Dear Dr. NELIA Le:    Thank you for referring Sherrill Ennis to me for evaluation. Attached you will find relevant portions of my assessment and plan of care.    If you have questions, please do not hesitate to call me. I look forward to following Sherrill Ennis along with you.    Sincerely,    Sandrine Maradiaga MD    Enclosure  CC:  No Recipients    If you would like to receive this communication electronically, please contact [email or web address] or [phone] to request more information on [employer] Link access.    For providers and/or their staff who would like to refer a patient to [facility], please contact us through our one-stop-shop provider referral line, [facility], at [phone].    If you feel you have received this communication in error or would no longer like to receive these types of communications, please e-mail [email or web address]

## 2019-01-07 NOTE — H&P (VIEW-ONLY)
[facility], [facility]  General Surgery  History & Physical         Subjective:     Chief Complaint/Reason for Admission: hernia    History of Present Illness:  Patient is a 43 y.o. female presents for evaluation of incisional hernia.  She had a laparoscopic hysterectomy which was complicated by a bladder injury which necessitated open repair.  She states over the last couple weeks she has had increasing pain. No changes in bowel habits, no vomiting, no abnormal nausea.     Denies any weight gain or weight loss.     Current Outpatient Medications on File Prior to Visit   Medication Sig    amLODIPine (NORVASC) 10 MG tablet Take 1 tablet (10 mg total) by mouth once daily. (Patient taking differently: Take 5 mg by mouth once daily. )    clonazePAM (KLONOPIN) 0.5 MG tablet Take 1 tablet (0.5 mg total) by mouth 2 (two) times daily as needed for Anxiety.    hydroCHLOROthiazide (HYDRODIURIL) 12.5 MG Tab Take 1 tablet (12.5 mg total) by mouth once daily.    naproxen (NAPROSYN) 500 MG tablet Take 1 tablet (500 mg total) by mouth 2 (two) times daily with meals.    pantoprazole (PROTONIX) 40 MG tablet Take 1 tablet (40 mg total) by mouth once daily.    promethazine (PHENERGAN) 25 MG tablet Take 1 tablet (25 mg total) by mouth every 4 (four) hours.    sertraline (ZOLOFT) 25 MG tablet Take 1 tablet (25 mg total) by mouth once daily.    valACYclovir (VALTREX) 1000 MG tablet Take 1 tablet (1,000 mg total) by mouth daily as needed (herpes outbreak). For 5 days     No current facility-administered medications on file prior to visit.        Review of patient's allergies indicates:   Allergen Reactions    Ace inhibitors Other (See Comments)     cough       Past Medical History:   Diagnosis Date    Anxiety     Hypertension      Past Surgical History:   Procedure Laterality Date     SECTION      x4    CHOLECYSTECTOMY      CYSTOGRAM N/A 2018    [facility]     CYSTOGRAM  2018    [facility]    CYSTOSCOPY, WITH RETROGRADE pyelLOGRAM Bilateral 2018    [facility] at [facility] OR    CYSTOSCOPY, WITH RETROGRADE PYELOGRAM Bilateral 2018    [facility] by [facility] at [facility] OR    EXCISION, CYST, OVARY, LAPAROSCOPIC Right 2018    [facility] by [facility] at [facility] OR    HYSTERECTOMY  2018    [id number] N/A 2018    [facility] by [facility] at [facility] OR    LAPAROTOMY,EXPLORATORY  2018    [facility] by [facility] at [facility] OR    REPAIR, BLADDER N/A 2018    [facility] by [facility] at [facility] OR    SALPINGECTOMY, LAPAROSCOPIC Bilateral 2018    [facility] by [facility] at [facility]    TUBAL LIGATION       Family History     Problem Relation (Age of Onset)    Aneurysm Mother    Cancer Maternal Grandfather, Paternal Grandmother    Heart disease Maternal Grandfather    Hypertension Mother, Father, Brother, Maternal Grandfather    Stroke Mother        Tobacco Use    Smoking status: Former Smoker     Packs/day: 0.00     Years: 17.00     Pack years: 0.00     Types: Cigarettes     Last attempt to quit: 2018     Years since quittin.5    Smokeless tobacco: Never Used   Substance and Sexual Activity    Alcohol use: Yes     Comment: Occasional    Drug use: No    Sexual activity: Not Currently     Partners: Male     Comment:      Review of Systems   Constitutional: Negative for activity change and unexpected weight change.   HENT: Negative for hearing loss, rhinorrhea and trouble swallowing.    Eyes: Negative for discharge and visual disturbance.   Respiratory: Negative for chest tightness and wheezing.    Cardiovascular: Negative for chest pain and palpitations.   Gastrointestinal: Negative for blood in stool, constipation, diarrhea and vomiting.   Endocrine: Negative for polydipsia and polyuria.   Genitourinary:  Negative for difficulty urinating, dysuria, hematuria and menstrual problem.   Musculoskeletal: Negative for arthralgias, joint swelling and neck pain.   Neurological: Negative for weakness and headaches.   Psychiatric/Behavioral: Negative for confusion and dysphoric mood.     Objective:     Vital Signs (Most Recent):  Pulse: 97 (01/07/19 1529)  BP: (!) 164/104 (01/07/19 1529) Vital Signs (24h Range):  [unfilled]     Weight: 85.7 kg (189 lb)  Body mass index is 32.44 kg/m².    Physical Exam   Constitutional: She is oriented to person, place, and time. She appears well-developed and well-nourished.   HENT:   Head: Normocephalic and atraumatic.   Eyes: Conjunctivae and EOM are normal.   Cardiovascular: Normal rate and regular rhythm.   Pulmonary/Chest: Effort normal and breath sounds normal.   Abdominal: Bowel sounds are normal. She exhibits no distension. There is tenderness. A hernia is present.   3 fingerbreadth fascial defect to left of umbilicus.  Incisions from previous surgery noted   Musculoskeletal: Normal range of motion.   Neurological: She is alert and oriented to person, place, and time.   Skin: Skin is warm and dry.   Psychiatric: She has a normal mood and affect. Her behavior is normal.       Significant Labs:  CBC and BMP from 9/2018 reviewed and wnl    Significant Diagnostics:  CT: I have reviewed all pertinent results/findings within the past 24 hours and my personal findings are:  <3cm hernia to left of umbilicus    Assessment/Plan:     There are no hospital problems to display for this patient.    VTE Risk Mitigation (From admission, onward)    None        Plan for incisional hernia repair with or without mesh    Sandrine Maradiaga MD  General Surgery  [facility], [facility]

## 2019-01-07 NOTE — PROGRESS NOTES
[facility], [facility]  General Surgery  History & Physical         Subjective:     Chief Complaint/Reason for Admission: hernia    History of Present Illness:  Patient is a 43 y.o. female presents for evaluation of incisional hernia.  She had a laparoscopic hysterectomy which was complicated by a bladder injury which necessitated open repair.  She states over the last couple weeks she has had increasing pain. No changes in bowel habits, no vomiting, no abnormal nausea.     Denies any weight gain or weight loss.     Current Outpatient Medications on File Prior to Visit   Medication Sig    amLODIPine (NORVASC) 10 MG tablet Take 1 tablet (10 mg total) by mouth once daily. (Patient taking differently: Take 5 mg by mouth once daily. )    clonazePAM (KLONOPIN) 0.5 MG tablet Take 1 tablet (0.5 mg total) by mouth 2 (two) times daily as needed for Anxiety.    hydroCHLOROthiazide (HYDRODIURIL) 12.5 MG Tab Take 1 tablet (12.5 mg total) by mouth once daily.    naproxen (NAPROSYN) 500 MG tablet Take 1 tablet (500 mg total) by mouth 2 (two) times daily with meals.    pantoprazole (PROTONIX) 40 MG tablet Take 1 tablet (40 mg total) by mouth once daily.    promethazine (PHENERGAN) 25 MG tablet Take 1 tablet (25 mg total) by mouth every 4 (four) hours.    sertraline (ZOLOFT) 25 MG tablet Take 1 tablet (25 mg total) by mouth once daily.    valACYclovir (VALTREX) 1000 MG tablet Take 1 tablet (1,000 mg total) by mouth daily as needed (herpes outbreak). For 5 days     No current facility-administered medications on file prior to visit.        Review of patient's allergies indicates:   Allergen Reactions    Ace inhibitors Other (See Comments)     cough       Past Medical History:   Diagnosis Date    Anxiety     Hypertension      Past Surgical History:   Procedure Laterality Date     SECTION      x4    CHOLECYSTECTOMY      CYSTOGRAM N/A 2018    [facility]     CYSTOGRAM  2018    [facility] at [facility]    CYSTOSCOPY, WITH RETROGRADE pyelLOGRAM Bilateral 2018    [facility] at [facility] OR    CYSTOSCOPY, WITH RETROGRADE PYELOGRAM Bilateral 2018    [facility] by [facility] at [facility] OR    EXCISION, CYST, OVARY, LAPAROSCOPIC Right 2018    [facility]formed by [facility] at [facility] OR    HYSTERECTOMY  2018    [id number] N/A 2018    Performed by [facility] at [facility] OR    LAPAROTOMY,EXPLORATORY  2018    [facility] by [facility] at [facility] OR    REPAIR, BLADDER N/A 2018    [facility] by [facility] at [facility]    SALPINGECTOMY, LAPAROSCOPIC Bilateral 2018    [facility]formed by [facility] at [facility]    TUBAL LIGATION       Family History     Problem Relation (Age of Onset)    Aneurysm Mother    Cancer Maternal Grandfather, Paternal Grandmother    Heart disease Maternal Grandfather    Hypertension Mother, Father, Brother, Maternal Grandfather    Stroke Mother        Tobacco Use    Smoking status: Former Smoker     Packs/day: 0.00     Years: 17.00     Pack years: 0.00     Types: Cigarettes     Last attempt to quit: 2018     Years since quittin.5    Smokeless tobacco: Never Used   Substance and Sexual Activity    Alcohol use: Yes     Comment: Occasional    Drug use: No    Sexual activity: Not Currently     Partners: Male     Comment:      Review of Systems   Constitutional: Negative for activity change and unexpected weight change.   HENT: Negative for hearing loss, rhinorrhea and trouble swallowing.    Eyes: Negative for discharge and visual disturbance.   Respiratory: Negative for chest tightness and wheezing.    Cardiovascular: Negative for chest pain and palpitations.   Gastrointestinal: Negative for blood in stool, constipation, diarrhea and vomiting.   Endocrine: Negative for polydipsia and polyuria.   Genitourinary:  Negative for difficulty urinating, dysuria, hematuria and menstrual problem.   Musculoskeletal: Negative for arthralgias, joint swelling and neck pain.   Neurological: Negative for weakness and headaches.   Psychiatric/Behavioral: Negative for confusion and dysphoric mood.     Objective:     Vital Signs (Most Recent):  Pulse: 97 (01/07/19 1529)  BP: (!) 164/104 (01/07/19 1529) Vital Signs (24h Range):  [unfilled]     Weight: 85.7 kg (189 lb)  Body mass index is 32.44 kg/m².    Physical Exam   Constitutional: She is oriented to person, place, and time. She appears well-developed and well-nourished.   HENT:   Head: Normocephalic and atraumatic.   Eyes: Conjunctivae and EOM are normal.   Cardiovascular: Normal rate and regular rhythm.   Pulmonary/Chest: Effort normal and breath sounds normal.   Abdominal: Bowel sounds are normal. She exhibits no distension. There is tenderness. A hernia is present.   3 fingerbreadth fascial defect to left of umbilicus.  Incisions from previous surgery noted   Musculoskeletal: Normal range of motion.   Neurological: She is alert and oriented to person, place, and time.   Skin: Skin is warm and dry.   Psychiatric: She has a normal mood and affect. Her behavior is normal.       Significant Labs:  CBC and BMP from 9/2018 reviewed and wnl    Significant Diagnostics:  CT: I have reviewed all pertinent results/findings within the past 24 hours and my personal findings are:  <3cm hernia to left of umbilicus    Assessment/Plan:     There are no hospital problems to display for this patient.    VTE Risk Mitigation (From admission, onward)    None        Plan for incisional hernia repair with or without mesh    Sandrine Maradiaga MD  General Surgery  [facility], [facility]

## 2019-01-15 ENCOUNTER — HOSPITAL ENCOUNTER (OUTPATIENT)
Dept: PREADMISSION TESTING | Facility: HOSPITAL | Age: 44
Discharge: HOME | End: 2019-01-15
Attending: SURGERY
Payer: COMMERCIAL

## 2019-01-15 VITALS
HEIGHT: 64 IN | OXYGEN SATURATION: 98 % | SYSTOLIC BLOOD PRESSURE: 146 MMHG | RESPIRATION RATE: 18 BRPM | BODY MASS INDEX: 33.97 KG/M2 | WEIGHT: 199 LBS | HEART RATE: 85 BPM | DIASTOLIC BLOOD PRESSURE: 101 MMHG | TEMPERATURE: 99 F

## 2019-01-15 DIAGNOSIS — Z01.818 PREOP TESTING: Primary | ICD-10-CM

## 2019-01-15 LAB
ANION GAP SERPL CALC-SCNC: 8 MMOL/L
BASOPHILS # BLD AUTO: 0.03 K/UL
BASOPHILS NFR BLD: 0.4 %
BUN SERPL-MCNC: 16 MG/DL
CALCIUM SERPL-MCNC: 9.4 MG/DL
CHLORIDE SERPL-SCNC: 106 MMOL/L
CO2 SERPL-SCNC: 26 MMOL/L
CREAT SERPL-MCNC: 0.8 MG/DL
DIFFERENTIAL METHOD: NORMAL
EOSINOPHIL # BLD AUTO: 0.3 K/UL
EOSINOPHIL NFR BLD: 3.6 %
ERYTHROCYTE [DISTWIDTH] IN BLOOD BY AUTOMATED COUNT: 13.8 %
EST. GFR  (AFRICAN AMERICAN): >60 ML/MIN/1.73 M^2
EST. GFR  (NON AFRICAN AMERICAN): >60 ML/MIN/1.73 M^2
GLUCOSE SERPL-MCNC: 108 MG/DL
HCT VFR BLD AUTO: 41.6 %
HGB BLD-MCNC: 14.1 G/DL
LYMPHOCYTES # BLD AUTO: 1.9 K/UL
LYMPHOCYTES NFR BLD: 26.1 %
MCH RBC QN AUTO: 28 PG
MCHC RBC AUTO-ENTMCNC: 33.9 G/DL
MCV RBC AUTO: 83 FL
MONOCYTES # BLD AUTO: 0.6 K/UL
MONOCYTES NFR BLD: 8.6 %
NEUTROPHILS # BLD AUTO: 4.6 K/UL
NEUTROPHILS NFR BLD: 61.3 %
PLATELET # BLD AUTO: 268 K/UL
PMV BLD AUTO: 9.4 FL
POTASSIUM SERPL-SCNC: 3.7 MMOL/L
RBC # BLD AUTO: 5.04 M/UL
SODIUM SERPL-SCNC: 140 MMOL/L
WBC # BLD AUTO: 7.43 K/UL

## 2019-01-15 PROCEDURE — 36415 COLL VENOUS BLD VENIPUNCTURE: CPT

## 2019-01-15 PROCEDURE — 85025 COMPLETE CBC W/AUTO DIFF WBC: CPT

## 2019-01-15 PROCEDURE — 80048 BASIC METABOLIC PNL TOTAL CA: CPT

## 2019-01-15 NOTE — DISCHARGE INSTRUCTIONS
"Your procedure  is scheduled for _1/21/2019_________.    Call [phone] between 2pm and 5pm on __1/18/2019_____to find out your arrival time for the day of surgery.    Report to Same Day Surgery Unit at ____ AM on the 2nd floor of the hospital.  Use the front entrance of the hospital.  The front doors of the hospital open promptly at 5:30am.  If you need wheelchair assistance, call [phone] from your cell phone, or call "0" from the courtesy phone in the lobby.    Important instructions:   Do not eat or drink after 12 midnight, including water.  It is okay to brush your teeth.  Do not have gum, candy or mints.     Take only these medications with a small swallow of water on the morning of your surgery __amlodipine, protonix____________    Stop taking Aspirin, Ibuprofen, Motrin and Aleve , Fish oil, and Vitamin E for at least 7 days before your surgery. You may use Tylenol unless otherwise instructed by your doctor.       Please shower the night before and the morning of your surgery.        Use Hibiclens soap to your surgery site if instructed by your pre op nurse.   If your surgery is on your abdomen, be sure to wash your naval.  Be sure to rinse off Hibiclens after it is on your skin for several minutes.  Do not use Hibiclens on your face or genitals. Please place clean linens on your bed the night before surgery. Please wear fresh clean clothing after each shower.     No shaving of procedural area at least 4-5 days before surgery due to increased risk of skin irritation and/or possible infection.     Do not wear make- up, including mascara.     You may wear deodorant only.      Do not wear powder, body lotion or perfume/cologne.     Do not wear any jewelry or have any metal on your body.     You will be asked to remove any dentures or partials for the procedure.     Please bring any documents given to you by your doctor.     If you are going home on the same day of surgery, you must arrange for a family " member or a friend to drive you home.  Public transportation is prohibited.  You will not be able to drive home if you were given anesthesia or sedation.     Wear loose fitting clothes allowing for bandages.     Please leave money and valuables home.       You may bring your cell phone.     Call the doctor if fever or illness should occur before your surgery.    Call [phone] to contact us here if needed.

## 2019-01-21 ENCOUNTER — ANESTHESIA EVENT (OUTPATIENT)
Dept: SURGERY | Facility: HOSPITAL | Age: 44
End: 2019-01-21
Payer: COMMERCIAL

## 2019-01-21 ENCOUNTER — HOSPITAL ENCOUNTER (OUTPATIENT)
Facility: HOSPITAL | Age: 44
Discharge: HOME | End: 2019-01-21
Attending: SURGERY | Admitting: SURGERY
Payer: COMMERCIAL

## 2019-01-21 ENCOUNTER — ANESTHESIA (OUTPATIENT)
Dept: SURGERY | Facility: HOSPITAL | Age: 44
End: 2019-01-21
Payer: COMMERCIAL

## 2019-01-21 VITALS
SYSTOLIC BLOOD PRESSURE: 143 MMHG | RESPIRATION RATE: 18 BRPM | HEART RATE: 67 BPM | DIASTOLIC BLOOD PRESSURE: 85 MMHG | OXYGEN SATURATION: 95 % | BODY MASS INDEX: 33.97 KG/M2 | WEIGHT: 199 LBS | HEIGHT: 64 IN | TEMPERATURE: 98 F

## 2019-01-21 DIAGNOSIS — K43.2 INCISIONAL HERNIA WITHOUT OBSTRUCTION OR GANGRENE: Primary | ICD-10-CM

## 2019-01-21 PROCEDURE — 49560 PR REPAIR INCISIONAL HERNIA,REDUCIBLE: ICD-10-PCS | Mod: ,,, | Performed by: SURGERY

## 2019-01-21 PROCEDURE — 71000039 HC RECOVERY, EACH ADD'L HOUR: Performed by: SURGERY

## 2019-01-21 PROCEDURE — D9220A PRA ANESTHESIA: ICD-10-PCS | Mod: ANES,,, | Performed by: ANESTHESIOLOGY

## 2019-01-21 PROCEDURE — 37000009 HC ANESTHESIA EA ADD 15 MINS: Performed by: SURGERY

## 2019-01-21 PROCEDURE — 71000033 HC RECOVERY, INTIAL HOUR: Performed by: SURGERY

## 2019-01-21 PROCEDURE — 88302 TISSUE SPECIMEN TO PATHOLOGY - SURGERY: ICD-10-PCS | Mod: 26,,, | Performed by: PATHOLOGY

## 2019-01-21 PROCEDURE — 37000008 HC ANESTHESIA 1ST 15 MINUTES: Performed by: SURGERY

## 2019-01-21 PROCEDURE — C9290 INJ, BUPIVACAINE LIPOSOME: HCPCS | Performed by: SURGERY

## 2019-01-21 PROCEDURE — 25000003 PHARM REV CODE 250: Performed by: NURSE ANESTHETIST, CERTIFIED REGISTERED

## 2019-01-21 PROCEDURE — 88302 TISSUE EXAM BY PATHOLOGIST: CPT | Mod: 26,,, | Performed by: PATHOLOGY

## 2019-01-21 PROCEDURE — 63600175 PHARM REV CODE 636 W HCPCS: Performed by: NURSE ANESTHETIST, CERTIFIED REGISTERED

## 2019-01-21 PROCEDURE — 88302 TISSUE EXAM BY PATHOLOGIST: CPT | Performed by: PATHOLOGY

## 2019-01-21 PROCEDURE — D9220A PRA ANESTHESIA: Mod: CRNA,,, | Performed by: NURSE ANESTHETIST, CERTIFIED REGISTERED

## 2019-01-21 PROCEDURE — 63600175 PHARM REV CODE 636 W HCPCS: Performed by: SURGERY

## 2019-01-21 PROCEDURE — 71000015 HC POSTOP RECOV 1ST HR: Performed by: SURGERY

## 2019-01-21 PROCEDURE — 25000003 PHARM REV CODE 250: Performed by: SURGERY

## 2019-01-21 PROCEDURE — D9220A PRA ANESTHESIA: Mod: ANES,,, | Performed by: ANESTHESIOLOGY

## 2019-01-21 PROCEDURE — D9220A PRA ANESTHESIA: ICD-10-PCS | Mod: CRNA,,, | Performed by: NURSE ANESTHETIST, CERTIFIED REGISTERED

## 2019-01-21 PROCEDURE — 36000707: Performed by: SURGERY

## 2019-01-21 PROCEDURE — 36000706: Performed by: SURGERY

## 2019-01-21 PROCEDURE — 49560 PR REPAIR INCISIONAL HERNIA,REDUCIBLE: CPT | Mod: ,,, | Performed by: SURGERY

## 2019-01-21 RX ORDER — BUPIVACAINE HYDROCHLORIDE 2.5 MG/ML
INJECTION, SOLUTION EPIDURAL; INFILTRATION; INTRACAUDAL
Status: DISCONTINUED | OUTPATIENT
Start: 2019-01-21 | End: 2019-01-21 | Stop reason: HOSPADM

## 2019-01-21 RX ORDER — HYDROCODONE BITARTRATE AND ACETAMINOPHEN 5; 325 MG/1; MG/1
1 TABLET ORAL EVERY 6 HOURS PRN
Qty: 30 TABLET | Refills: 0 | Status: SHIPPED | OUTPATIENT
Start: 2019-01-21 | End: 2019-01-28

## 2019-01-21 RX ORDER — ROCURONIUM BROMIDE 10 MG/ML
INJECTION, SOLUTION INTRAVENOUS
Status: DISCONTINUED | OUTPATIENT
Start: 2019-01-21 | End: 2019-01-21

## 2019-01-21 RX ORDER — SODIUM CHLORIDE 9 MG/ML
INJECTION, SOLUTION INTRAVENOUS CONTINUOUS
Status: DISCONTINUED | OUTPATIENT
Start: 2019-01-21 | End: 2019-01-21 | Stop reason: HOSPADM

## 2019-01-21 RX ORDER — FENTANYL CITRATE 50 UG/ML
INJECTION, SOLUTION INTRAMUSCULAR; INTRAVENOUS
Status: DISCONTINUED | OUTPATIENT
Start: 2019-01-21 | End: 2019-01-21

## 2019-01-21 RX ORDER — ACETAMINOPHEN 10 MG/ML
1000 INJECTION, SOLUTION INTRAVENOUS ONCE
Status: DISCONTINUED | OUTPATIENT
Start: 2019-01-21 | End: 2019-01-21 | Stop reason: HOSPADM

## 2019-01-21 RX ORDER — MORPHINE SULFATE 10 MG/ML
3 INJECTION INTRAMUSCULAR; INTRAVENOUS; SUBCUTANEOUS
Status: DISCONTINUED | OUTPATIENT
Start: 2019-01-21 | End: 2019-01-21 | Stop reason: HOSPADM

## 2019-01-21 RX ORDER — LIDOCAINE HYDROCHLORIDE 10 MG/ML
1 INJECTION, SOLUTION EPIDURAL; INFILTRATION; INTRACAUDAL; PERINEURAL ONCE
Status: DISCONTINUED | OUTPATIENT
Start: 2019-01-21 | End: 2019-01-21 | Stop reason: HOSPADM

## 2019-01-21 RX ORDER — SODIUM CHLORIDE 0.9 % (FLUSH) 0.9 %
3 SYRINGE (ML) INJECTION
Status: DISCONTINUED | OUTPATIENT
Start: 2019-01-21 | End: 2019-01-21 | Stop reason: HOSPADM

## 2019-01-21 RX ORDER — SCOLOPAMINE TRANSDERMAL SYSTEM 1 MG/1
PATCH, EXTENDED RELEASE TRANSDERMAL
Status: DISCONTINUED | OUTPATIENT
Start: 2019-01-21 | End: 2019-01-21

## 2019-01-21 RX ORDER — HYDROCODONE BITARTRATE AND ACETAMINOPHEN 5; 325 MG/1; MG/1
1 TABLET ORAL ONCE
Status: COMPLETED | OUTPATIENT
Start: 2019-01-21 | End: 2019-01-21

## 2019-01-21 RX ORDER — PROPOFOL 10 MG/ML
VIAL (ML) INTRAVENOUS
Status: DISCONTINUED | OUTPATIENT
Start: 2019-01-21 | End: 2019-01-21

## 2019-01-21 RX ORDER — METOCLOPRAMIDE HYDROCHLORIDE 5 MG/ML
INJECTION INTRAMUSCULAR; INTRAVENOUS
Status: DISCONTINUED | OUTPATIENT
Start: 2019-01-21 | End: 2019-01-21

## 2019-01-21 RX ORDER — SUCCINYLCHOLINE CHLORIDE 20 MG/ML
INJECTION INTRAMUSCULAR; INTRAVENOUS
Status: DISCONTINUED | OUTPATIENT
Start: 2019-01-21 | End: 2019-01-21

## 2019-01-21 RX ORDER — MIDAZOLAM HYDROCHLORIDE 1 MG/ML
INJECTION, SOLUTION INTRAMUSCULAR; INTRAVENOUS
Status: DISCONTINUED | OUTPATIENT
Start: 2019-01-21 | End: 2019-01-21

## 2019-01-21 RX ORDER — LIDOCAINE HCL/PF 100 MG/5ML
SYRINGE (ML) INTRAVENOUS
Status: DISCONTINUED | OUTPATIENT
Start: 2019-01-21 | End: 2019-01-21

## 2019-01-21 RX ORDER — MEPERIDINE HYDROCHLORIDE 50 MG/ML
12.5 INJECTION INTRAMUSCULAR; INTRAVENOUS; SUBCUTANEOUS EVERY 10 MIN PRN
Status: DISCONTINUED | OUTPATIENT
Start: 2019-01-21 | End: 2019-01-21 | Stop reason: HOSPADM

## 2019-01-21 RX ORDER — SODIUM CHLORIDE, SODIUM LACTATE, POTASSIUM CHLORIDE, CALCIUM CHLORIDE 600; 310; 30; 20 MG/100ML; MG/100ML; MG/100ML; MG/100ML
INJECTION, SOLUTION INTRAVENOUS CONTINUOUS PRN
Status: DISCONTINUED | OUTPATIENT
Start: 2019-01-21 | End: 2019-01-21

## 2019-01-21 RX ORDER — NEOSTIGMINE METHYLSULFATE 1 MG/ML
INJECTION, SOLUTION INTRAVENOUS
Status: DISCONTINUED | OUTPATIENT
Start: 2019-01-21 | End: 2019-01-21

## 2019-01-21 RX ORDER — HYDROMORPHONE HYDROCHLORIDE 2 MG/ML
0.2 INJECTION, SOLUTION INTRAMUSCULAR; INTRAVENOUS; SUBCUTANEOUS EVERY 5 MIN PRN
Status: DISCONTINUED | OUTPATIENT
Start: 2019-01-21 | End: 2019-01-21 | Stop reason: HOSPADM

## 2019-01-21 RX ORDER — GLYCOPYRROLATE 0.2 MG/ML
INJECTION INTRAMUSCULAR; INTRAVENOUS
Status: DISCONTINUED | OUTPATIENT
Start: 2019-01-21 | End: 2019-01-21

## 2019-01-21 RX ORDER — CEFAZOLIN SODIUM 2 G/50ML
2 SOLUTION INTRAVENOUS
Status: COMPLETED | OUTPATIENT
Start: 2019-01-21 | End: 2019-01-21

## 2019-01-21 RX ADMIN — LIDOCAINE HYDROCHLORIDE 75 MG: 20 INJECTION, SOLUTION INTRAVENOUS at 07:01

## 2019-01-21 RX ADMIN — PROPOFOL 140 MG: 10 INJECTION, EMULSION INTRAVENOUS at 07:01

## 2019-01-21 RX ADMIN — NEOSTIGMINE METHYLSULFATE 4 MG: 1 INJECTION INTRAVENOUS at 08:01

## 2019-01-21 RX ADMIN — ROCURONIUM BROMIDE 20 MG: 10 INJECTION, SOLUTION INTRAVENOUS at 07:01

## 2019-01-21 RX ADMIN — METOCLOPRAMIDE 10 MG: 5 INJECTION, SOLUTION INTRAMUSCULAR; INTRAVENOUS at 07:01

## 2019-01-21 RX ADMIN — GLYCOPYRROLATE 0.2 MG: 0.2 INJECTION, SOLUTION INTRAMUSCULAR; INTRAVENOUS at 07:01

## 2019-01-21 RX ADMIN — MIDAZOLAM HYDROCHLORIDE 2 MG: 1 INJECTION, SOLUTION INTRAMUSCULAR; INTRAVENOUS at 07:01

## 2019-01-21 RX ADMIN — ROCURONIUM BROMIDE 5 MG: 10 INJECTION, SOLUTION INTRAVENOUS at 07:01

## 2019-01-21 RX ADMIN — SODIUM CHLORIDE, SODIUM LACTATE, POTASSIUM CHLORIDE, AND CALCIUM CHLORIDE: .6; .31; .03; .02 INJECTION, SOLUTION INTRAVENOUS at 07:01

## 2019-01-21 RX ADMIN — SUCCINYLCHOLINE CHLORIDE 120 MG: 20 INJECTION, SOLUTION INTRAMUSCULAR; INTRAVENOUS at 07:01

## 2019-01-21 RX ADMIN — SCOPOLAMINE 1 PATCH: 1 PATCH, EXTENDED RELEASE TRANSDERMAL at 07:01

## 2019-01-21 RX ADMIN — FENTANYL CITRATE 100 MCG: 50 INJECTION INTRAMUSCULAR; INTRAVENOUS at 07:01

## 2019-01-21 RX ADMIN — GLYCOPYRROLATE 0.4 MG: 0.2 INJECTION, SOLUTION INTRAMUSCULAR; INTRAVENOUS at 08:01

## 2019-01-21 RX ADMIN — CEFAZOLIN SODIUM 2 G: 2 SOLUTION INTRAVENOUS at 07:01

## 2019-01-21 RX ADMIN — HYDROCODONE BITARTRATE AND ACETAMINOPHEN 1 TABLET: 5; 325 TABLET ORAL at 10:01

## 2019-01-21 RX ADMIN — ROCURONIUM BROMIDE 10 MG: 10 INJECTION, SOLUTION INTRAVENOUS at 07:01

## 2019-01-21 NOTE — INTERVAL H&P NOTE
The patient has been examined and the H&P has been reviewed:    I concur with the findings and no changes have occurred since H&P was written.    Anesthesia/Surgery risks, benefits and alternative options discussed and understood by patient/family.          Active Hospital Problems    Diagnosis  POA    Incisional hernia without obstruction or gangrene [K43.2]  Yes      Resolved Hospital Problems   No resolved problems to display.

## 2019-01-21 NOTE — TRANSFER OF CARE
"Anesthesia Transfer of Care Note    Patient: Sherrill Ennis    Procedure(s) Performed: Procedure(s) (LRB):  REPAIR, HERNIA, INCISIONAL OR VENTRAL, WITHOUT HISTORY OF PRIOR REPAIR (N/A)    Patient location: PACU    Anesthesia Type: general    Transport from OR: Transported from OR on room air with adequate spontaneous ventilation    Post pain: adequate analgesia    Post assessment: no apparent anesthetic complications    Post vital signs: stable    Level of consciousness: awake and responds to stimulation    Nausea/Vomiting: no nausea/vomiting    Complications: none    Transfer of care protocol was followed      Last vitals:   Visit Vitals  BP (!) 156/83 (BP Location: Left arm, Patient Position: Lying)   Pulse 80   Temp 36.5 °C (97.7 °F) (Oral)   Resp 10   Ht 5' 4" (1.626 m)   Wt 90.3 kg (199 lb)   LMP 06/12/2018 (LMP Unknown)   SpO2 (!) 93%   Breastfeeding? No   BMI 34.16 kg/m²     "

## 2019-01-21 NOTE — BRIEF OP NOTE
[facility]  Brief Operative Note     SUMMARY     Surgery Date: 1/21/2019     Surgeon(s) and Role:     * Estevan Barreto MD - Primary     * Keven Diaz MD - Resident - Assisting        Pre-op Diagnosis:  Incisional hernia without obstruction or gangrene [K43.2]    Post-op Diagnosis:  Post-Op Diagnosis Codes:     * Incisional hernia without obstruction or gangrene [K43.2]    Procedure(s) (LRB):  REPAIR, HERNIA, INCISIONAL OR VENTRAL, WITHOUT HISTORY OF PRIOR REPAIR (N/A)    Anesthesia: General    Description of the findings of the procedure: 3 cm abdominal wall defect    Findings/Key Components: same    Estimated Blood Loss: 10 mL         Specimens:   Specimen (12h ago, onward)    Start     Ordered    01/21/19 0751  Specimen to Pathology - Surgery  Once     Comments:  Hernia sac     Start Status   01/21/19 0751 Collected (01/21/19 0750)       01/21/19 0751          Discharge Note    SUMMARY     Admit Date: 1/21/2019    Discharge Date and Time:  01/21/2019 8:15 AM    Hospital Course (synopsis of major diagnoses, care, treatment, and services provided during the course of the hospital stay): uneventful post op course     Final Diagnosis: Post-Op Diagnosis Codes:     * Incisional hernia without obstruction or gangrene [K43.2]    Disposition: Home or Self Care    Follow Up/Patient Instructions:     Medications:  Reconciled Home Medications:      Medication List      START taking these medications    HYDROcodone-acetaminophen 5-325 mg per tablet  Commonly known as:  NORCO  Take 1 tablet by mouth every 6 (six) hours as needed for Pain.        CHANGE how you take these medications    amLODIPine 10 MG tablet  Commonly known as:  NORVASC  Take 1 tablet (10 mg total) by mouth once daily.  What changed:  how much to take        CONTINUE taking these medications    clonazePAM 0.5 MG tablet  Commonly known as:  KLONOPIN  Take 1 tablet (0.5 mg total) by mouth 2 (two) times daily as needed for Anxiety.      hydroCHLOROthiazide 12.5 MG Tab  Commonly known as:  HYDRODIURIL  Take 1 tablet (12.5 mg total) by mouth once daily.     naproxen 500 MG tablet  Commonly known as:  NAPROSYN  Take 1 tablet (500 mg total) by mouth 2 (two) times daily with meals.     pantoprazole 40 MG tablet  Commonly known as:  PROTONIX  Take 1 tablet (40 mg total) by mouth once daily.     promethazine 25 MG tablet  Commonly known as:  PHENERGAN  Take 1 tablet (25 mg total) by mouth every 4 (four) hours.     sertraline 25 MG tablet  Commonly known as:  ZOLOFT  Take 1 tablet (25 mg total) by mouth once daily.     valACYclovir 1000 MG tablet  Commonly known as:  VALTREX  Take 1 tablet (1,000 mg total) by mouth daily as needed (herpes outbreak). For 5 days          Discharge Procedure Orders   Diet general     Call MD for:  temperature >100.4     Call MD for:  persistent nausea and vomiting     Call MD for:  severe uncontrolled pain     Call MD for:  difficulty breathing, headache or visual disturbances     Call MD for:  redness, tenderness, or signs of infection (pain, swelling, redness, odor or green/yellow discharge around incision site)     Call MD for:  hives     Remove dressing in 24 hours     Shower on day dressing removed (No bath)     Follow-up Information     Estevan Barreto MD.    Specialties:  General Surgery, Oncology  Contact information:  [address]  [address] [place] [id number] [phone]

## 2019-01-21 NOTE — DISCHARGE INSTRUCTIONS
Dr. Barreto   Office # [phone]     Discharge Instructions for Same Day Surgery     Call the office for and appointment if one has not already been made.     Diet: Drink plenty of fluids the first 48 hours and you may resume your   usual diet.     Activity: No heavy lifting (over 10 pounds), pushing or pulling until your   post op visit. Your doctor's office may have told you to limit your lifting to less weight, or even no weight.  Be sure to follow those instructions.    Note: You may ride in a car and you may drive when comfortable.     Do not drive, drink alcohol, or sign legal documents for 24 hours, or if taking narcotic pain medication.    Dressings:Dermabond or a material like it was used on your incision.   It is like a liquid glue tape  Do not peel or try to remove it it will start to fall off in 7-10 days on its' own.   It is OK to shower, pat dry, do not apply any creams or lotions.    No tub baths, swimming pools, hot tubs or submersion of the incision until your surgeon says it's ok.       Medical: Call the doctor for any of the following problems: fever above 101,   severe pain, bleeding, or abdominal distention (swelling).   If constipated you may take any stool softener you choose.     Occasionally small areas of skin numbness or an unpleasant skin sensation can result. Also, you may find that your incision is swollen and tender for a few days.  Some redness around sutures and staples is a normal reaction, but if the discomfort persists or worsens, call you doctor.                                            Exparel information      To help control your pain after surgery, your surgeon injected Exparel (bupicacaine liposome injectable suspension) into your surgical incision just before the end of the procedure.      Exparel is a local analgesic that contains the local anesthetic bupivacaine..  Local anesthetics provide pain relief by numbing the tissue around the surgical site.    Exparel is  specifically designed to release pain medication over time an can control pain for up to 72 hours.    In addition to Exparel, your surgeon may provide other pain medications to control your pain.    Each patient is different and responds differently to pain medication.  Depending on how you respond to Exparel, you may require less additional pain medication during your recovery.    Side effects can occur with any medication and it is important not to ignore anything you may be experiencing.  Some patients who received Exparel experienced nausea, vomiting, or constipation.  Rarely, patients who receive bupivacaine (the active ingredient in Exparel) have experienced numbness and tingling in their mouth or lips, lightheadedness, or anxiety.  Speak with your doctor right away if you think you may be experiencing any of these sensations, or if you have other questions regarding possible side effects.    Products that contain bupivacaine, like Exparel, may cause a temporary loss of sensation or the ability to move in the area where bupivacaine was injected.    Other formulations of bupivacaine should not be administered within 96 hours following administrations of Exparel.  Do not remove the teal colored bracelet that you have on for 96 hours.  This bracelet will let other health care workers know that you have received Exparel, and not to give you bupivacaine during this 96 hours.    Fall Prevention  Millions of people fall every year and injure themselves. You may have had anesthesia or sedation which may increase your risk of falling. You may have health issues that put you at an increased risk of falling.     Here are ways to reduce your risk of falling.  ·   · Make your home safe by keeping walkways clear of objects you may trip over.  · Use non-slip pads under rugs. Do not use area rugs or small throw rugs.  · Use non-slip mats in bathtubs and showers.  · Install handrails and lights on staircases.  · Do not walk in  poorly lit areas.  · Do not stand on chairs or wobbly ladders.  · Use caution when reaching overhead or looking upward. This position can cause a loss of balance.  · Be sure your shoes fit properly, have non-slip bottoms and are in good condition.   · Wear shoes both inside and out. Avoid going barefoot or wearing slippers.  · Be cautious when going up and down stairs, curbs, and when walking on uneven sidewalks.  · If your balance is poor, consider using a cane or walker.  · If your fall was related to alcohol use, stop or limit alcohol intake.   · If your fall was related to use of sleeping medicines, talk to your doctor about this. You may need to reduce your dosage at bedtime if you awaken during the night to go to the bathroom.    · To reduce the need for nighttime bathroom trips:  ¨ Avoid drinking fluids for several hours before going to bed  ¨ Empty your bladder before going to bed  ¨ Men can keep a urinal at the bedside  · Stay as active as you can. Balance, flexibility, strength, and endurance all come from exercise. They all play a role in preventing falls. Ask your healthcare provider which types of activity are right for you.  · Get your vision checked on a regular basis.  · If you have pets, know where they are before you stand up or walk so you don't trip over them.  · Use night lights.

## 2019-01-21 NOTE — ANESTHESIA PREPROCEDURE EVALUATION
01/21/2019  Sherrill Ennis is a 43 y.o., female.    Anesthesia Evaluation         Review of Systems  Anesthesia Hx:  No problems with previous Anesthesia PONV, phenergan helps   Social:  Non-Smoker, Alcohol Use   Hematology/Oncology:  Hematology Normal   Oncology Normal     Cardiovascular:   Hypertension, well controlled Denies MI.    Denies Angina. 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  Denies Asthma.  Denies Shortness of breath.    Renal/:   Denies Chronic Renal Disease.     Hepatic/GI:   Denies Liver Disease.    Neurological:   Denies TIA. Denies CVA. Denies Seizures.    Endocrine:   Denies Diabetes.        Physical Exam  General:  Well nourished, Obesity    Airway/Jaw/Neck:  Airway Findings: Mouth Opening: Normal Tongue: Normal  General Airway Assessment: Adult, Average  Mallampati: II  TM Distance: Normal, at least 6 cm  Jaw/Neck Findings:  Neck ROM: Normal ROM       Chest/Lungs:  Chest/Lungs Findings: Clear to auscultation     Heart/Vascular:  Heart Findings: Rate: Normal  Rhythm: Regular Rhythm  Sounds: Normal        Mental Status:  Mental Status Findings:  Cooperative, Alert and Oriented         Anesthesia Plan  Type of Anesthesia, risks & benefits discussed:  Anesthesia Type:  general  Patient's Preference:   Intra-op Monitoring Plan:   Intra-op Monitoring Plan Comments:   Post Op Pain Control Plan:   Post Op Pain Control Plan Comments: As per surgeon's plan  Induction:   IV  Beta Blocker:  Patient is not currently on a Beta-Blocker (No further documentation required).       Informed Consent: Patient understands risks and agrees with Anesthesia plan.  Questions answered. Anesthesia consent signed with patient.  ASA Score: 2     Day of Surgery Review of History & Physical:    H&P update referred to the surgeon.          Ready For Surgery From Anesthesia Perspective.

## 2019-01-21 NOTE — OP NOTE
DATE OF PROCEDURE:  01/21/2019.    PREOPERATIVE DIAGNOSIS:  Incisional hernia.    POSTOPERATIVE DIAGNOSIS:  Incisional hernia.    PROCEDURE PERFORMED:  Incisional hernia repair.    SURGEON:  Estevan Barreto M.D.    ASSISTANT:  Keven Diaz MD, PGY4.    ANESTHESIA:  General.    ESTIMATED BLOOD LOSS:  Minimal.    DESCRIPTION OF OPERATION:  The patient was brought to the Operating Room, placed   on the operating table in the supine position.  Under adequate general   anesthesia, prepped and draped around her abdomen in the usual sterile fashion.    Incision was made to the previous incision site and this was deepened to expose   a defect that measured approximately 3 cm.  Circumferentially dissected free   sac was removed and then a pants-over-vest Smead-Gray type repair was then made   with #1 Ethibond sutures.  The fascia and defect were injected with Marcaine   and Exparel and the wound was then reapproximated with absorbable suture and   some Prineo tape.  A binder was placed.  The patient was awakened and   transported to Recovery Room in satisfactory condition.      WILLEM  dd: 01/21/2019 08:18:18 (CST)  td: 01/21/2019 09:06:42 (CST)  Doc ID   #[id number]  Job ID #[id number]    CC:

## 2019-01-21 NOTE — ANESTHESIA POSTPROCEDURE EVALUATION
"Anesthesia Post Evaluation    Patient: Sherrill Ennis    Procedure(s) Performed: Procedure(s) (LRB):  REPAIR, HERNIA, INCISIONAL OR VENTRAL, WITHOUT HISTORY OF PRIOR REPAIR (N/A)    Final Anesthesia Type: general  Patient location during evaluation: PACU  Patient participation: Yes- Able to Participate  Level of consciousness: awake and alert and oriented  Post-procedure vital signs: reviewed and stable  Pain management: adequate  Airway patency: patent  PONV status at discharge: No PONV  Anesthetic complications: no      Cardiovascular status: stable  Respiratory status: unassisted, spontaneous ventilation and room air  Hydration status: euvolemic  Follow-up not needed.        Visit Vitals  BP (!) 156/83 (BP Location: Left arm, Patient Position: Lying)   Pulse 80   Temp 36.5 °C (97.7 °F) (Oral)   Resp 10   Ht 5' 4" (1.626 m)   Wt 90.3 kg (199 lb)   LMP 06/12/2018 (LMP Unknown)   SpO2 (!) 93%   Breastfeeding? No   BMI 34.16 kg/m²       Pain/Franki Score: Franki Score: 8 (1/21/2019  8:34 AM)        "

## 2019-01-22 ENCOUNTER — TELEPHONE (OUTPATIENT)
Dept: SURGERY | Facility: CLINIC | Age: 44
End: 2019-01-22

## 2019-01-22 ENCOUNTER — PATIENT MESSAGE (OUTPATIENT)
Dept: SURGERY | Facility: CLINIC | Age: 44
End: 2019-01-22

## 2019-01-22 NOTE — TELEPHONE ENCOUNTER
Pt called c/o pain meds not helping-pt instructed to take NSAID's in between her pain pills.  Pt has Naproxen and will try that.

## 2019-01-28 ENCOUNTER — OFFICE VISIT (OUTPATIENT)
Dept: SURGERY | Facility: CLINIC | Age: 44
End: 2019-01-28
Payer: COMMERCIAL

## 2019-01-28 VITALS
HEIGHT: 64 IN | DIASTOLIC BLOOD PRESSURE: 89 MMHG | SYSTOLIC BLOOD PRESSURE: 132 MMHG | HEART RATE: 94 BPM | WEIGHT: 199 LBS | BODY MASS INDEX: 33.97 KG/M2

## 2019-01-28 DIAGNOSIS — K43.2 INCISIONAL HERNIA WITHOUT OBSTRUCTION OR GANGRENE: Primary | ICD-10-CM

## 2019-01-28 PROCEDURE — 99024 POSTOP FOLLOW-UP VISIT: CPT | Mod: S$GLB,,, | Performed by: SURGERY

## 2019-01-28 PROCEDURE — 99024 PR POST-OP FOLLOW-UP VISIT: ICD-10-PCS | Mod: S$GLB,,, | Performed by: SURGERY

## 2019-01-28 NOTE — PROGRESS NOTES
Subjective:       Patient ID: Sherrill Ennis is a 43 y.o. female.    Chief Complaint: Post-op Evaluation    HPI 44 yo female s/p incisional hernia repair without complaints  Review of Systems   Constitutional: Negative.    HENT: Negative.    Eyes: Negative.    Respiratory: Negative.    Cardiovascular: Negative.    Gastrointestinal: Negative.    Endocrine: Negative.    Musculoskeletal: Negative.    Skin: Negative.    Allergic/Immunologic: Negative.    Neurological: Negative.    Hematological: Negative.    Psychiatric/Behavioral: Negative.    All other systems reviewed and are negative.      Objective:      Physical Exam   Constitutional: She is oriented to person, place, and time. She appears well-developed and well-nourished.   HENT:   Head: Normocephalic and atraumatic.   Right Ear: External ear normal.   Left Ear: External ear normal.   Nose: Nose normal.   Mouth/Throat: Oropharynx is clear and moist.   Eyes: Conjunctivae and EOM are normal. Pupils are equal, round, and reactive to light.   Neck: Normal range of motion. Neck supple.   Cardiovascular: Normal rate, regular rhythm, normal heart sounds and intact distal pulses.   Pulmonary/Chest: Effort normal and breath sounds normal.   Abdominal: Soft. Bowel sounds are normal.   Musculoskeletal: Normal range of motion.   Neurological: She is alert and oriented to person, place, and time. She has normal reflexes.   Skin: Skin is warm and dry.   Psychiatric: She has a normal mood and affect. Her behavior is normal. Thought content normal.   Vitals reviewed.      Assessment:       1. Incisional hernia without obstruction or gangrene        Plan:       I will see her prn

## 2019-01-29 ENCOUNTER — PATIENT MESSAGE (OUTPATIENT)
Dept: SURGERY | Facility: CLINIC | Age: 44
End: 2019-01-29

## 2019-02-06 DIAGNOSIS — K29.00 ACUTE GASTRITIS WITHOUT HEMORRHAGE, UNSPECIFIED GASTRITIS TYPE: ICD-10-CM

## 2019-02-06 RX ORDER — PANTOPRAZOLE SODIUM 40 MG/1
TABLET, DELAYED RELEASE ORAL
Qty: 90 TABLET | Refills: 0 | Status: SHIPPED | OUTPATIENT
Start: 2019-02-06 | End: 2019-05-09 | Stop reason: SDUPTHER

## 2019-02-06 NOTE — HPI
"44 yo   Status post laparoscopic supracervical hysterectomy with bilateral salpingectomy and lysis of omental adhesions on 2018.  Discharged home the same day.  Had problems with urinary retention.  Went to our Emergency Department on 2018 with feeling of incomplete emptying.  Postvoid residual volume was 150 cc.  Came to my office today, 2018 for care.  She states that she has significant pain with constipation and problems voiding.    She had not been voiding for the past three days!!!  Denies fever or chills.  Some "sweating at time".  Minimal nausea.  Has been trying to eat.  Tolerating oral intake without vomiting.       "
Urinary Retention  This is a 43 year old woman who is POD #6 from 1.  Laparoscopic Supracervical Hysterectomy 2.  Bilateral salpingectomy 3.  Removal of right ovarian cysts  4.  Lysis of adhesions by Dr. Pickard.  She was discharged home that day.  She reports voiding prior to discharge.  She did not void for almost 24 hours and presented to the hospital. A catheter was placed for 150 mL in bladder.  She represented to Dr. Pickard with pelvic pain, constipation, and anuria/retention for 3 days.  Dr. Pickard sent her to the ED for CT and further evaluation.  She currently has a catheter but still with abdominal pain.  Mild nausea.  Constipation resolved.    
Clothing

## 2019-02-08 DIAGNOSIS — F43.22 ADJUSTMENT DISORDER WITH ANXIOUS MOOD: ICD-10-CM

## 2019-02-10 RX ORDER — CLONAZEPAM 0.5 MG/1
0.5 TABLET ORAL 2 TIMES DAILY PRN
Qty: 10 TABLET | Refills: 0 | OUTPATIENT
Start: 2019-02-10

## 2019-02-11 DIAGNOSIS — R11.0 NAUSEA AFTER ANESTHESIA, INITIAL ENCOUNTER: ICD-10-CM

## 2019-02-11 DIAGNOSIS — T88.59XA NAUSEA AFTER ANESTHESIA, INITIAL ENCOUNTER: ICD-10-CM

## 2019-02-11 RX ORDER — PROMETHAZINE HYDROCHLORIDE 25 MG/1
25 TABLET ORAL EVERY 4 HOURS
Qty: 30 TABLET | Refills: 0 | Status: SHIPPED | OUTPATIENT
Start: 2019-02-11 | End: 2019-07-31

## 2019-02-25 RX ORDER — AMLODIPINE BESYLATE 10 MG/1
10 TABLET ORAL DAILY
Qty: 90 TABLET | Refills: 3 | Status: SHIPPED | OUTPATIENT
Start: 2019-02-25 | End: 2019-03-13

## 2019-03-13 ENCOUNTER — LAB VISIT (OUTPATIENT)
Dept: LAB | Facility: HOSPITAL | Age: 44
End: 2019-03-13
Attending: INTERNAL MEDICINE
Payer: COMMERCIAL

## 2019-03-13 ENCOUNTER — PATIENT MESSAGE (OUTPATIENT)
Dept: FAMILY MEDICINE | Facility: CLINIC | Age: 44
End: 2019-03-13

## 2019-03-13 ENCOUNTER — OFFICE VISIT (OUTPATIENT)
Dept: FAMILY MEDICINE | Facility: CLINIC | Age: 44
End: 2019-03-13
Payer: COMMERCIAL

## 2019-03-13 VITALS
DIASTOLIC BLOOD PRESSURE: 90 MMHG | HEIGHT: 64 IN | OXYGEN SATURATION: 98 % | WEIGHT: 203.38 LBS | TEMPERATURE: 98 F | SYSTOLIC BLOOD PRESSURE: 122 MMHG | HEART RATE: 94 BPM | BODY MASS INDEX: 34.72 KG/M2

## 2019-03-13 DIAGNOSIS — Z00.00 ROUTINE MEDICAL EXAM: ICD-10-CM

## 2019-03-13 DIAGNOSIS — R23.2 HOT FLASHES: ICD-10-CM

## 2019-03-13 DIAGNOSIS — F17.200 TOBACCO DEPENDENCY: Chronic | ICD-10-CM

## 2019-03-13 DIAGNOSIS — I10 ESSENTIAL (PRIMARY) HYPERTENSION: Chronic | ICD-10-CM

## 2019-03-13 DIAGNOSIS — F43.22 ADJUSTMENT DISORDER WITH ANXIOUS MOOD: ICD-10-CM

## 2019-03-13 DIAGNOSIS — I10 ESSENTIAL (PRIMARY) HYPERTENSION: Primary | Chronic | ICD-10-CM

## 2019-03-13 DIAGNOSIS — D50.0 IRON DEFICIENCY ANEMIA DUE TO CHRONIC BLOOD LOSS: Chronic | ICD-10-CM

## 2019-03-13 PROBLEM — K21.9 GASTROESOPHAGEAL REFLUX DISEASE WITHOUT ESOPHAGITIS: Status: ACTIVE | Noted: 2018-09-12

## 2019-03-13 PROBLEM — N94.6 DYSMENORRHEA: Status: RESOLVED | Noted: 2018-03-21 | Resolved: 2019-03-13

## 2019-03-13 PROBLEM — N93.9 ABNORMAL UTERINE BLEEDING: Status: RESOLVED | Noted: 2018-04-04 | Resolved: 2019-03-13

## 2019-03-13 PROBLEM — N92.0 MENORRHAGIA WITH REGULAR CYCLE: Status: RESOLVED | Noted: 2018-03-21 | Resolved: 2019-03-13

## 2019-03-13 PROBLEM — K21.9 GASTROESOPHAGEAL REFLUX DISEASE WITHOUT ESOPHAGITIS: Chronic | Status: ACTIVE | Noted: 2018-09-12

## 2019-03-13 PROBLEM — K43.2 INCISIONAL HERNIA WITHOUT OBSTRUCTION OR GANGRENE: Status: RESOLVED | Noted: 2019-01-21 | Resolved: 2019-03-13

## 2019-03-13 LAB
ALBUMIN SERPL BCP-MCNC: 4.1 G/DL
ALBUMIN SERPL BCP-MCNC: 4.1 G/DL
ALP SERPL-CCNC: 91 U/L
ALP SERPL-CCNC: 91 U/L
ALT SERPL W/O P-5'-P-CCNC: 44 U/L
ALT SERPL W/O P-5'-P-CCNC: 44 U/L
ANION GAP SERPL CALC-SCNC: 8 MMOL/L
AST SERPL-CCNC: 30 U/L
AST SERPL-CCNC: 30 U/L
BASOPHILS # BLD AUTO: 0.04 K/UL
BASOPHILS NFR BLD: 0.4 %
BILIRUB DIRECT SERPL-MCNC: 0.1 MG/DL
BILIRUB SERPL-MCNC: 0.3 MG/DL
BILIRUB SERPL-MCNC: 0.3 MG/DL
BUN SERPL-MCNC: 15 MG/DL
CALCIUM SERPL-MCNC: 9.7 MG/DL
CHLORIDE SERPL-SCNC: 104 MMOL/L
CHOLEST SERPL-MCNC: 264 MG/DL
CHOLEST/HDLC SERPL: 8.3 {RATIO}
CO2 SERPL-SCNC: 26 MMOL/L
CREAT SERPL-MCNC: 0.8 MG/DL
DIFFERENTIAL METHOD: ABNORMAL
EOSINOPHIL # BLD AUTO: 0.3 K/UL
EOSINOPHIL NFR BLD: 2.9 %
ERYTHROCYTE [DISTWIDTH] IN BLOOD BY AUTOMATED COUNT: 13.5 %
EST. GFR  (AFRICAN AMERICAN): >60 ML/MIN/1.73 M^2
EST. GFR  (NON AFRICAN AMERICAN): >60 ML/MIN/1.73 M^2
FSH SERPL-ACNC: 28.5 MIU/ML
GLUCOSE SERPL-MCNC: 109 MG/DL
HCT VFR BLD AUTO: 42.9 %
HDLC SERPL-MCNC: 32 MG/DL
HDLC SERPL: 12.1 %
HGB BLD-MCNC: 13.7 G/DL
IMM GRANULOCYTES # BLD AUTO: 0.03 K/UL
IMM GRANULOCYTES NFR BLD AUTO: 0.3 %
LDLC SERPL CALC-MCNC: 186.6 MG/DL
LYMPHOCYTES # BLD AUTO: 2.2 K/UL
LYMPHOCYTES NFR BLD: 20.2 %
MCH RBC QN AUTO: 27 PG
MCHC RBC AUTO-ENTMCNC: 31.9 G/DL
MCV RBC AUTO: 84 FL
MONOCYTES # BLD AUTO: 0.9 K/UL
MONOCYTES NFR BLD: 8.3 %
NEUTROPHILS # BLD AUTO: 7.4 K/UL
NEUTROPHILS NFR BLD: 67.9 %
NONHDLC SERPL-MCNC: 232 MG/DL
NRBC BLD-RTO: 0 /100 WBC
PLATELET # BLD AUTO: 324 K/UL
PMV BLD AUTO: 10 FL
POTASSIUM SERPL-SCNC: 3.7 MMOL/L
PROT SERPL-MCNC: 7.5 G/DL
PROT SERPL-MCNC: 7.5 G/DL
RBC # BLD AUTO: 5.08 M/UL
SODIUM SERPL-SCNC: 138 MMOL/L
TRIGL SERPL-MCNC: 227 MG/DL
TSH SERPL DL<=0.005 MIU/L-ACNC: 1.59 UIU/ML
WBC # BLD AUTO: 10.83 K/UL

## 2019-03-13 PROCEDURE — 83001 ASSAY OF GONADOTROPIN (FSH): CPT

## 2019-03-13 PROCEDURE — 3080F PR MOST RECENT DIASTOLIC BLOOD PRESSURE >= 90 MM HG: ICD-10-PCS | Mod: CPTII,S$GLB,, | Performed by: INTERNAL MEDICINE

## 2019-03-13 PROCEDURE — 3074F PR MOST RECENT SYSTOLIC BLOOD PRESSURE < 130 MM HG: ICD-10-PCS | Mod: CPTII,S$GLB,, | Performed by: INTERNAL MEDICINE

## 2019-03-13 PROCEDURE — 3080F DIAST BP >= 90 MM HG: CPT | Mod: CPTII,S$GLB,, | Performed by: INTERNAL MEDICINE

## 2019-03-13 PROCEDURE — 99396 PREV VISIT EST AGE 40-64: CPT | Mod: S$GLB,,, | Performed by: INTERNAL MEDICINE

## 2019-03-13 PROCEDURE — 80053 COMPREHEN METABOLIC PANEL: CPT

## 2019-03-13 PROCEDURE — 3074F SYST BP LT 130 MM HG: CPT | Mod: CPTII,S$GLB,, | Performed by: INTERNAL MEDICINE

## 2019-03-13 PROCEDURE — 36415 COLL VENOUS BLD VENIPUNCTURE: CPT | Mod: PO

## 2019-03-13 PROCEDURE — 99396 PR PREVENTIVE VISIT,EST,40-64: ICD-10-PCS | Mod: S$GLB,,, | Performed by: INTERNAL MEDICINE

## 2019-03-13 PROCEDURE — 99999 PR PBB SHADOW E&M-EST. PATIENT-LVL III: CPT | Mod: PBBFAC,,, | Performed by: INTERNAL MEDICINE

## 2019-03-13 PROCEDURE — 84443 ASSAY THYROID STIM HORMONE: CPT

## 2019-03-13 PROCEDURE — 99999 PR PBB SHADOW E&M-EST. PATIENT-LVL III: ICD-10-PCS | Mod: PBBFAC,,, | Performed by: INTERNAL MEDICINE

## 2019-03-13 PROCEDURE — 80061 LIPID PANEL: CPT

## 2019-03-13 PROCEDURE — 85025 COMPLETE CBC W/AUTO DIFF WBC: CPT

## 2019-03-13 RX ORDER — SERTRALINE HYDROCHLORIDE 50 MG/1
50 TABLET, FILM COATED ORAL DAILY
Qty: 90 TABLET | Refills: 3 | Status: SHIPPED | OUTPATIENT
Start: 2019-03-13 | End: 2021-11-10

## 2019-03-13 RX ORDER — AMLODIPINE BESYLATE 10 MG/1
5 TABLET ORAL DAILY
Qty: 90 TABLET | Refills: 3
Start: 2019-03-13 | End: 2020-04-12

## 2019-03-13 RX ORDER — CLONAZEPAM 0.5 MG/1
0.5 TABLET ORAL 2 TIMES DAILY PRN
Qty: 10 TABLET | Refills: 0 | Status: SHIPPED | OUTPATIENT
Start: 2019-03-13 | End: 2019-10-14 | Stop reason: SDUPTHER

## 2019-03-13 RX ORDER — HYDROCHLOROTHIAZIDE 25 MG/1
25 TABLET ORAL DAILY
Qty: 90 TABLET | Refills: 3 | Status: SHIPPED | OUTPATIENT
Start: 2019-03-13 | End: 2019-10-14 | Stop reason: SDUPTHER

## 2019-03-13 NOTE — ASSESSMENT & PLAN NOTE
I counseled the patient on smoking cessation, risks associated with smoking, having a quit date, nicotine replacement, medications that can aid in cessation, and having a plan for future cravings.  The patient stated that she is working on total cessation

## 2019-03-13 NOTE — PATIENT INSTRUCTIONS
Below is a list of free Apps that you may find helpful (some of them may not apply to you since this is a general list of helpful Apps):    Android & Apple users:  EatFit - Created by [facility] nutritionists.  Tons of great recipes, links to >100 restaurants in town with healthy choices, and shopping guides.    Fooducate - Helps with healthy diet and weight loss  Shop Well - Scan barcodes to foods to see if it is healthy or not.  It will also suggest healthier alternatives  Lose It - Calorie tracking and goal setting for weight loss.  Peer support is also available  Calorie Counter and Diet Tracker by MyFitnessPal - Helps count calories for food intake and calories burned during exercise  PopsuCardiac Guard Active - has pictures and videos of preloaded workout routines  [facility] Diabetes Pal - log for home sugars, diet, weight, and blood pressure  Headspace - Guides your through meditation.  The first 10 programs are free.

## 2019-03-13 NOTE — TELEPHONE ENCOUNTER
The 10-year ASCVD risk score (Farhat WHITE Jr., et al., 2013) is: 3.6%    Values used to calculate the score:      Age: 43 years      Sex: Female      Is Non- : No      Diabetic: No      Tobacco smoker: No      Systolic Blood Pressure: 122 mmHg      Is BP treated: Yes      HDL Cholesterol: 32 mg/dL      Total Cholesterol: 264 mg/dL

## 2019-04-03 ENCOUNTER — PATIENT MESSAGE (OUTPATIENT)
Dept: FAMILY MEDICINE | Facility: CLINIC | Age: 44
End: 2019-04-03

## 2019-04-04 ENCOUNTER — PATIENT MESSAGE (OUTPATIENT)
Dept: FAMILY MEDICINE | Facility: CLINIC | Age: 44
End: 2019-04-04

## 2019-04-09 ENCOUNTER — OFFICE VISIT (OUTPATIENT)
Dept: FAMILY MEDICINE | Facility: CLINIC | Age: 44
End: 2019-04-09
Payer: COMMERCIAL

## 2019-04-09 ENCOUNTER — LAB VISIT (OUTPATIENT)
Dept: LAB | Facility: HOSPITAL | Age: 44
End: 2019-04-09
Attending: NURSE PRACTITIONER
Payer: COMMERCIAL

## 2019-04-09 ENCOUNTER — PATIENT MESSAGE (OUTPATIENT)
Dept: FAMILY MEDICINE | Facility: CLINIC | Age: 44
End: 2019-04-09

## 2019-04-09 VITALS
WEIGHT: 198 LBS | SYSTOLIC BLOOD PRESSURE: 138 MMHG | HEIGHT: 64 IN | DIASTOLIC BLOOD PRESSURE: 100 MMHG | HEART RATE: 78 BPM | TEMPERATURE: 98 F | BODY MASS INDEX: 33.8 KG/M2 | OXYGEN SATURATION: 98 %

## 2019-04-09 DIAGNOSIS — I10 ESSENTIAL (PRIMARY) HYPERTENSION: Chronic | ICD-10-CM

## 2019-04-09 DIAGNOSIS — K29.00 ACUTE GASTRITIS, PRESENCE OF BLEEDING UNSPECIFIED, UNSPECIFIED GASTRITIS TYPE: Primary | ICD-10-CM

## 2019-04-09 DIAGNOSIS — K21.9 GASTROESOPHAGEAL REFLUX DISEASE WITHOUT ESOPHAGITIS: Chronic | ICD-10-CM

## 2019-04-09 DIAGNOSIS — K29.00 ACUTE GASTRITIS, PRESENCE OF BLEEDING UNSPECIFIED, UNSPECIFIED GASTRITIS TYPE: ICD-10-CM

## 2019-04-09 PROCEDURE — 99214 PR OFFICE/OUTPT VISIT, EST, LEVL IV, 30-39 MIN: ICD-10-PCS | Mod: S$GLB,,, | Performed by: NURSE PRACTITIONER

## 2019-04-09 PROCEDURE — 99999 PR PBB SHADOW E&M-EST. PATIENT-LVL IV: ICD-10-PCS | Mod: PBBFAC,,, | Performed by: NURSE PRACTITIONER

## 2019-04-09 PROCEDURE — 87338 HPYLORI STOOL AG IA: CPT

## 2019-04-09 PROCEDURE — 99214 OFFICE O/P EST MOD 30 MIN: CPT | Mod: S$GLB,,, | Performed by: NURSE PRACTITIONER

## 2019-04-09 PROCEDURE — 99999 PR PBB SHADOW E&M-EST. PATIENT-LVL IV: CPT | Mod: PBBFAC,,, | Performed by: NURSE PRACTITIONER

## 2019-04-09 RX ORDER — SUCRALFATE 1 G/1
1 TABLET ORAL 4 TIMES DAILY
Qty: 60 TABLET | Refills: 0 | Status: SHIPPED | OUTPATIENT
Start: 2019-04-09 | End: 2019-04-24

## 2019-04-09 NOTE — PATIENT INSTRUCTIONS
Gastritis (Adult)    Gastritis is inflammation and irritation of the stomach lining. It can be present for a short time (acute) or be long lasting (chronic). Gastritis is often caused by infection with bacteria called H pylori. More than a third of people in the [place] have this bacteria in their bodies. In many cases, H pylori causes no problems or symptoms. In some people, though, the infection irritates the stomach lining and causes gastritis. Other causes of stomach irritation include drinking alcohol or taking pain-relieving medicines called NSAIDs (such as aspirin or ibuprofen).   Symptoms of gastritis can include:  · Abdominal pain or bloating  · Loss of appetite  · Nausea or vomiting  · Vomiting blood or having black stools  · Feeling more tired than usual  An inflamed and irritated stomach lining is more likely to develop a sore called an ulcer. To help prevent this, gastritis should be treated.  Home care  If needed, medicines may be prescribed. If you have H pylori infection, treating it will likely relieve your symptoms. Other changes can help reduce stomach irritation and help it heal.  · If you have been prescribed medicines for H pylori infection, take them as directed. Take all of the medicine until it is finished or your healthcare provider tells you to stop, even if you feel better.  · Your healthcare provider may recommend avoiding NSAIDs. If you take daily aspirin for your heart or other medical reasons, do not stop without talking to your healthcare provider first.  · Avoid drinking alcohol.  · Stop smoking. Smoking can irritate the stomach and delay healing. As much as possible, stay away from second hand smoke.  Follow-up care  Follow up with your healthcare provider, or as advised by our staff. Testing may be needed to check for inflammation or an ulcer.  When to seek medical advice  Call your healthcare provider for any of the following:  · Stomach pain that gets worse or moves to the lower  right abdomen (appendix area)  · Chest pain that appears or gets worse, or spreads to the back, neck, shoulder, or arm  · Frequent vomiting (cant keep down liquids)  · Blood in the stool or vomit (red or black in color)  · Feeling weak or dizzy  · Fever of 100.4ºF (38ºC) or higher, or as directed by your healthcare provider  Date Last Reviewed: 6/22/2015  © 2492-2006 [employer]. [address] [place] [address]. All rights reserved. This information is not intended as a substitute for professional medical care. Always follow your healthcare professional's instructions.

## 2019-04-09 NOTE — PROGRESS NOTES
History of Present Illness   Sherrill Ennis is a 43 y.o. woman with medical history as listed below who presents today for evaluation of abdominal pain for about one month now, gradually worsening. The pain is intermittent and described as a burning pain with intermittent sharp pains. The pain varies in intensity, but yesterday got bad enough that she went home from work. The pain is not associated with meals. She denies associated nausea, vomiting, diarrhea, constipation, BRBPR, or black tarry stool. She has tried NSAID and one Norco for pain with no relief. She is taking her Protonix as described. She has no additional complaints and is otherwise healthy on today's visit.    Past Medical History:   Diagnosis Date    Anxiety     Hypertension     Iron deficiency anemia due to chronic blood loss 2018    Resolved after hysterectomy    PONV (postoperative nausea and vomiting)        Past Surgical History:   Procedure Laterality Date     SECTION      x4    CHOLECYSTECTOMY      CYSTOGRAM N/A 2018    [facility]    CYSTOGRAM  2018    [facility]    CYSTOSCOPY, WITH RETROGRADE pyelLOGRAM Bilateral 2018    [facility]    CYSTOSCOPY, WITH RETROGRADE PYELOGRAM Bilateral 2018    [facility]    EXCISION, CYST, OVARY, LAPAROSCOPIC Right 2018    [facility]    HERNIA REPAIR      HYSTERECTOMY  2018    [id number] N/A 2018    [facility]    LAPAROTOMY,EXPLORATORY  2018    [facility]    REPAIR, BLADDER N/A 2018    [facility]    REPAIR, HERNIA, INCISIONAL OR VENTRAL, WITHOUT HISTORY OF PRIOR REPAIR N/A 2019    [facility]     SALPINGECTOMY, LAPAROSCOPIC Bilateral 2018    [facility]    TUBAL LIGATION         Social History     Socioeconomic History    Marital status:      Spouse name: Not on file    Number of children: Not on file    Years of education: Not on file    Highest education level: Not on file   Occupational History    Not on file   Social Needs    Financial resource strain: Not hard at all    Food insecurity:     Worry: Never true     Inability: Never true    Transportation needs:     Medical: No     Non-medical: No   Tobacco Use    Smoking status: Former Smoker     Packs/day: 0.00     Years: 17.00     Pack years: 0.00     Types: Cigarettes     Last attempt to quit: 2018     Years since quittin.8    Smokeless tobacco: Never Used   Substance and Sexual Activity    Alcohol use: Yes     Frequency: Monthly or less     Drinks per session: 3 or 4     Binge frequency: Never     Comment: Occasional    Drug use: No    Sexual activity: Not Currently     Partners: Male     Comment:    Lifestyle    Physical activity:     Days per week: 0 days     Minutes per session: 0 min    Stress: Not at all   Relationships    Social connections:     Talks on phone: More than three times a week     Gets together: More than three times a week     Attends Yarsani service: Not on file     Active member of club or organization: No     Attends meetings of clubs or organizations: Never     Relationship status:    Other Topics Concern    Not on file   Social History Narrative     since     Together since     He drives 18-wheelers    She is the  for a dental office    Previously  and     Lives with her  and youngest daughter.     with three daughters from previous marriage also       Family History   Problem Relation Age of Onset    Hypertension Mother     Stroke Mother     Aneurysm Mother     Hypertension Father   "   Hypertension Brother     Hypertension Maternal Grandfather     Heart disease Maternal Grandfather         MI    Cancer Maternal Grandfather     Cancer Paternal Grandmother        Review of Systems  Review of Systems   Constitutional: Negative for chills, fever and weight loss.   Cardiovascular: Negative for chest pain and palpitations.   Gastrointestinal: Positive for abdominal pain and nausea. Negative for constipation, diarrhea, heartburn, melena and vomiting.   Genitourinary: Negative for dysuria, frequency and hematuria.     A complete review of systems was otherwise negative.    Physical Exam  BP (!) 138/100 (BP Location: Right arm, Patient Position: Sitting, BP Method: Medium (Manual))   Pulse 78   Temp 98.4 °F (36.9 °C) (Oral)   Ht 5' 4" (1.626 m)   Wt 89.8 kg (198 lb)   LMP 06/12/2018 (LMP Unknown) Comment: [facility] on 06/20/18  SpO2 98%   BMI 33.99 kg/m²   General appearance: alert, appears stated age, cooperative and no distress  Back: symmetric, no curvature. ROM normal. No CVA tenderness.  Lungs: clear to auscultation bilaterally  Heart: regular rate and rhythm, S1, S2 normal, no murmur, click, rub or gallop  Abdomen: bowel sounds normal, abdomen is soft and non-distended. TTP over epigastric region and LUQ with no rebound tenderness, guarding, or rigidity  Skin: Skin color, texture, turgor normal. No rashes or lesions  Neurologic: Grossly normal    Assessment/Plan  Acute gastritis, presence of bleeding unspecified, unspecified gastritis type  Continue Protonix daily.  Add Carafate and take as directed.  We discussed limiting spicy foods, red sauce/tomato based foods, citrus, and alcohol consumption.  She is still smoking cigarettes which we discussed is not helping.  Small, frequent, bland meals.  Check h pylori antigen and start triple therapy as indicated.  If no improvement with conservative management, proceed with EGD and GI referral.  No red flags on exam, ER precautions discussed.  RTC " PRN.  -     sucralfate (CARAFATE) 1 gram tablet; Take 1 tablet (1 g total) by mouth 4 (four) times daily. for 15 days  Dispense: 60 tablet; Refill: 0  -     H. pylori antigen, stool; Future; Expected date: 04/09/2019    Gastroesophageal reflux disease without esophagitis  The current medical regimen is effective;  continue present plan and medications.  As above.    Essential (primary) hypertension  The current medical regimen is effective;  continue present plan and medications.    Patient has verbalized understanding and is in agreement with plan of care.    Follow up if symptoms worsen or fail to improve.  Answers for HPI/ROS submitted by the patient on 4/8/2019   Abdominal pain  Chronicity: new  Onset: more than 1 month ago  Onset quality: sudden  Frequency: daily  Episode duration: 3 hours  Pain location: generalized abdominal region  Pain - numeric: 10/10  Pain quality: burning, sharp  anorexia: No  arthralgias: No  belching: No  flatus: No  hematochezia: No  Aggravated by: nothing  Relieved by: nothing  Diagnostic workup: CT scan, surgery  Pain severity: severe  Treatments tried: acetaminophen  Improvement on treatment: no relief  abdominal surgery: Yes  colon cancer: No  Crohn's disease: No  gallstones: No  GERD: Yes  irritable bowel syndrome: No  kidney stones: No  pancreatitis: No  PUD: No  ulcerative colitis: No  UTI: No

## 2019-04-12 ENCOUNTER — PATIENT MESSAGE (OUTPATIENT)
Dept: FAMILY MEDICINE | Facility: CLINIC | Age: 44
End: 2019-04-12

## 2019-04-13 LAB — H PYLORI AG STL QL IA: DETECTED

## 2019-04-15 ENCOUNTER — PATIENT MESSAGE (OUTPATIENT)
Dept: FAMILY MEDICINE | Facility: CLINIC | Age: 44
End: 2019-04-15

## 2019-04-15 DIAGNOSIS — A04.8 H. PYLORI INFECTION: Primary | ICD-10-CM

## 2019-04-16 ENCOUNTER — PATIENT MESSAGE (OUTPATIENT)
Dept: FAMILY MEDICINE | Facility: CLINIC | Age: 44
End: 2019-04-16

## 2019-04-16 RX ORDER — CLARITHROMYCIN 500 MG/1
500 TABLET, FILM COATED ORAL EVERY 12 HOURS
Qty: 28 TABLET | Refills: 0 | Status: SHIPPED | OUTPATIENT
Start: 2019-04-16 | End: 2019-04-30

## 2019-04-16 RX ORDER — AMOXICILLIN 875 MG/1
875 TABLET, FILM COATED ORAL EVERY 12 HOURS
Qty: 28 TABLET | Refills: 0 | Status: SHIPPED | OUTPATIENT
Start: 2019-04-16 | End: 2019-04-30

## 2019-04-16 NOTE — TELEPHONE ENCOUNTER
Please call the pharmacy to see if there is an alternative to the clarithromycin.    Thanks!  JO-ANN Urbina

## 2019-04-23 ENCOUNTER — PATIENT MESSAGE (OUTPATIENT)
Dept: FAMILY MEDICINE | Facility: CLINIC | Age: 44
End: 2019-04-23

## 2019-04-24 DIAGNOSIS — F43.22 ADJUSTMENT DISORDER WITH ANXIOUS MOOD: ICD-10-CM

## 2019-04-24 RX ORDER — SERTRALINE HYDROCHLORIDE 25 MG/1
TABLET, FILM COATED ORAL
Qty: 90 TABLET | Refills: 0 | OUTPATIENT
Start: 2019-04-24

## 2019-05-02 PROBLEM — N80.03 ADENOMYOSIS: Status: RESOLVED | Noted: 2018-03-21 | Resolved: 2019-05-02

## 2019-05-09 ENCOUNTER — PATIENT MESSAGE (OUTPATIENT)
Dept: FAMILY MEDICINE | Facility: CLINIC | Age: 44
End: 2019-05-09

## 2019-05-09 DIAGNOSIS — K29.00 ACUTE GASTRITIS WITHOUT HEMORRHAGE, UNSPECIFIED GASTRITIS TYPE: ICD-10-CM

## 2019-05-09 RX ORDER — PANTOPRAZOLE SODIUM 40 MG/1
40 TABLET, DELAYED RELEASE ORAL 2 TIMES DAILY
Qty: 90 TABLET | Refills: 0 | Status: SHIPPED | OUTPATIENT
Start: 2019-05-09 | End: 2019-06-30 | Stop reason: SDUPTHER

## 2019-05-28 ENCOUNTER — PATIENT MESSAGE (OUTPATIENT)
Dept: FAMILY MEDICINE | Facility: CLINIC | Age: 44
End: 2019-05-28

## 2019-06-10 ENCOUNTER — OFFICE VISIT (OUTPATIENT)
Dept: FAMILY MEDICINE | Facility: CLINIC | Age: 44
End: 2019-06-10
Payer: COMMERCIAL

## 2019-06-10 VITALS
HEART RATE: 89 BPM | SYSTOLIC BLOOD PRESSURE: 126 MMHG | DIASTOLIC BLOOD PRESSURE: 86 MMHG | BODY MASS INDEX: 34.03 KG/M2 | TEMPERATURE: 98 F | OXYGEN SATURATION: 98 % | HEIGHT: 64 IN | WEIGHT: 199.31 LBS

## 2019-06-10 DIAGNOSIS — R10.84 GENERALIZED ABDOMINAL PAIN: Primary | ICD-10-CM

## 2019-06-10 DIAGNOSIS — I10 ESSENTIAL (PRIMARY) HYPERTENSION: Chronic | ICD-10-CM

## 2019-06-10 DIAGNOSIS — D22.9 NEVUS: ICD-10-CM

## 2019-06-10 DIAGNOSIS — K21.9 GASTROESOPHAGEAL REFLUX DISEASE WITHOUT ESOPHAGITIS: Chronic | ICD-10-CM

## 2019-06-10 PROCEDURE — 99999 PR PBB SHADOW E&M-EST. PATIENT-LVL IV: ICD-10-PCS | Mod: PBBFAC,,, | Performed by: INTERNAL MEDICINE

## 2019-06-10 PROCEDURE — 3008F PR BODY MASS INDEX (BMI) DOCUMENTED: ICD-10-PCS | Mod: CPTII,S$GLB,, | Performed by: INTERNAL MEDICINE

## 2019-06-10 PROCEDURE — 3079F DIAST BP 80-89 MM HG: CPT | Mod: CPTII,S$GLB,, | Performed by: INTERNAL MEDICINE

## 2019-06-10 PROCEDURE — 3008F BODY MASS INDEX DOCD: CPT | Mod: CPTII,S$GLB,, | Performed by: INTERNAL MEDICINE

## 2019-06-10 PROCEDURE — 3074F PR MOST RECENT SYSTOLIC BLOOD PRESSURE < 130 MM HG: ICD-10-PCS | Mod: CPTII,S$GLB,, | Performed by: INTERNAL MEDICINE

## 2019-06-10 PROCEDURE — 99999 PR PBB SHADOW E&M-EST. PATIENT-LVL IV: CPT | Mod: PBBFAC,,, | Performed by: INTERNAL MEDICINE

## 2019-06-10 PROCEDURE — 99214 PR OFFICE/OUTPT VISIT, EST, LEVL IV, 30-39 MIN: ICD-10-PCS | Mod: S$GLB,,, | Performed by: INTERNAL MEDICINE

## 2019-06-10 PROCEDURE — 99214 OFFICE O/P EST MOD 30 MIN: CPT | Mod: S$GLB,,, | Performed by: INTERNAL MEDICINE

## 2019-06-10 PROCEDURE — 3079F PR MOST RECENT DIASTOLIC BLOOD PRESSURE 80-89 MM HG: ICD-10-PCS | Mod: CPTII,S$GLB,, | Performed by: INTERNAL MEDICINE

## 2019-06-10 PROCEDURE — 3074F SYST BP LT 130 MM HG: CPT | Mod: CPTII,S$GLB,, | Performed by: INTERNAL MEDICINE

## 2019-06-10 NOTE — PROGRESS NOTES
Assessment & Plan  Problem List Items Addressed This Visit        Cardiac/Vascular    Essential (primary) hypertension (Chronic)    Current Assessment & Plan     The current medical regimen is effective;  continue present plan and medications.          Relevant Orders    CBC auto differential    Comprehensive metabolic panel    Lipid panel       GI    Gastroesophageal reflux disease without esophagitis (Chronic)    Current Assessment & Plan     Continue PPI until GI evaluation         Relevant Orders    Ambulatory referral to Gastroenterology      Other Visit Diagnoses     Generalized abdominal pain    -  Primary  -    Has not been 8 weeks since treatment to repeat stool antigen.  Will refer to GI for eval for EGD    Relevant Orders    Ambulatory referral to Gastroenterology    Nevus    -  Referred to [facility] derm at her request.     Relevant Orders    Ambulatory referral to Dermatology            Health Maintenance reviewed, as above.    Follow-up: Follow up in about 7 months (around 1/10/2020) for Routine Physical.  ______________________________________________________________________    Chief Complaint  Chief Complaint   Patient presents with    Abdominal Pain       HPI  Sherrill Ennis is a 44 y.o. female with medical diagnoses as listed in the medical history and problem list that presents for abdominal pain.  Pt is known to me with her last appointment 4/9/2019.  She was seen at that time for acute gastritis and stool antigen returned positive for h pylori.        Answers for HPI/ROS submitted by the patient on 6/10/2019   Abdominal pain  Chronicity: chronic  Onset: more than 1 month ago  Onset quality: sudden  Frequency: every several days  Episode duration: 4 hours  Progression since onset: waxing and waning  Pain location: epigastric region, suprapubic region  Pain - numeric: 10/10  Pain quality: burning  anorexia: No  belching: No  flatus: No  hematochezia: No  melena: No  weight loss: No  Aggravated  by: nothing  Relieved by: being still  Pain severity: severe  Treatments tried: antibiotics  Improvement on treatment: mild  abdominal surgery: Yes  colon cancer: No  Crohn's disease: No  gallstones: Yes  GERD: Yes  irritable bowel syndrome: No  kidney stones: No  pancreatitis: No  PUD: No  ulcerative colitis: No  UTI: Yes    Abdominal pain improved some in severity and is back to being intermittent since she was treated for H pylori.  Having intermittent abdominal distension as well.  No N/V/BRBPR/melena.  Duration of pain is variable as well.  No clear triggers.     She is having some pelvic pain as well that she is seeing GYN about and will have an US for this.        PAST MEDICAL HISTORY:  Past Medical History:   Diagnosis Date    Anxiety     Diverticulosis     GERD (gastroesophageal reflux disease)     Herpes simplex virus (HSV) infection     Hypertension     Iron deficiency anemia due to chronic blood loss 2018    Resolved after hysterectomy    Mental disorder     RLS (restless legs syndrome)        PAST SURGICAL HISTORY:  Past Surgical History:   Procedure Laterality Date     SECTION      x4    CHOLECYSTECTOMY      CYSTOGRAM N/A 2018    [facility]    CYSTOGRAM  2018    [facility]    CYSTOSCOPY, WITH RETROGRADE pyelLOGRAM Bilateral 2018    [facility]    CYSTOSCOPY, WITH RETROGRADE PYELOGRAM Bilateral 2018    [facility] eL [facility]    EXCISION, CYST, OVARY, LAPAROSCOPIC Right 2018    [facility]    HERNIA REPAIR      HYSTERECTOMY  2018    [id number] N/A 2018    [facility]    LAPAROTOMY,EXPLORATORY  2018    [facility]    REPAIR, BLADDER N/A 2018    [facility]  OR    REPAIR, HERNIA, INCISIONAL OR VENTRAL, WITHOUT HISTORY OF PRIOR REPAIR N/A 2019    [facility]    SALPINGECTOMY, LAPAROSCOPIC Bilateral 2018    [facility]    TUBAL LIGATION         SOCIAL HISTORY:  Social History     Socioeconomic History    Marital status:      Spouse name: Not on file    Number of children: Not on file    Years of education: Not on file    Highest education level: Not on file   Occupational History    Not on file   Social Needs    Financial resource strain: Not hard at all    Food insecurity:     Worry: Never true     Inability: Never true    Transportation needs:     Medical: No     Non-medical: No   Tobacco Use    Smoking status: Current Every Day Smoker     Packs/day: 0.50     Years: 15.00     Pack years: 7.50     Types: Cigarettes     Last attempt to quit: 2018     Years since quittin.9    Smokeless tobacco: Never Used   Substance and Sexual Activity    Alcohol use: Yes     Frequency: Monthly or less     Drinks per session: 3 or 4     Binge frequency: Never    Drug use: No    Sexual activity: Yes     Partners: Male     Birth control/protection: See Surgical Hx     Comment:    Lifestyle    Physical activity:     Days per week: 0 days     Minutes per session: 0 min    Stress: Not at all   Relationships    Social connections:     Talks on phone: More than three times a week     Gets together: More than three times a week     Attends Alevism service: Not on file     Active member of club or organization: No     Attends meetings of clubs or organizations: Never     Relationship status:    Other Topics Concern    Not on file   Social History Narrative     since     Together since     He drives 18-wheelers    She is the  for a dental office    Previously  and     Lives with her  and youngest daughter.     with three  daughters from previous marriage also       FAMILY HISTORY:  Family History   Problem Relation Age of Onset    Hypertension Mother     Stroke Mother     Aneurysm Mother     Hypertension Father     Hypertension Brother     Hypertension Maternal Grandfather     Heart disease Maternal Grandfather         MI    Cancer Maternal Grandfather     Cancer Paternal Grandmother     Breast cancer Neg Hx     Colon cancer Neg Hx     Ovarian cancer Neg Hx        ALLERGIES AND MEDICATIONS: updated and reviewed.  Review of patient's allergies indicates:   Allergen Reactions    Ace inhibitors Other (See Comments)     cough     Current Outpatient Medications   Medication Sig Dispense Refill    amLODIPine (NORVASC) 10 MG tablet Take 0.5 tablets (5 mg total) by mouth once daily. 90 tablet 3    clonazePAM (KLONOPIN) 0.5 MG tablet Take 1 tablet (0.5 mg total) by mouth 2 (two) times daily as needed for Anxiety. 10 tablet 0    hydroCHLOROthiazide (HYDRODIURIL) 25 MG tablet Take 1 tablet (25 mg total) by mouth once daily. 90 tablet 3    pantoprazole (PROTONIX) 40 MG tablet Take 1 tablet (40 mg total) by mouth 2 (two) times daily. 90 tablet 0    promethazine (PHENERGAN) 25 MG tablet Take 1 tablet (25 mg total) by mouth every 4 (four) hours. 30 tablet 0    sertraline (ZOLOFT) 50 MG tablet Take 1 tablet (50 mg total) by mouth once daily. 90 tablet 3    valACYclovir (VALTREX) 1000 MG tablet Take 1 tablet (1,000 mg total) by mouth daily as needed (herpes outbreak). For 5 days 30 tablet 1     No current facility-administered medications for this visit.          ROS  Review of Systems   Constitutional: Negative for chills, fever and unexpected weight change.   HENT: Negative for congestion, ear pain, hearing loss, rhinorrhea, sore throat and trouble swallowing.    Eyes: Negative for discharge, redness and visual disturbance.   Respiratory: Negative for cough, chest tightness, shortness of breath and wheezing.    Cardiovascular:  "Negative for chest pain, palpitations and leg swelling.   Gastrointestinal: Positive for abdominal pain and nausea. Negative for constipation, diarrhea and vomiting.   Endocrine: Negative for polydipsia, polyphagia and polyuria.   Genitourinary: Positive for frequency. Negative for decreased urine volume, dysuria and hematuria.   Musculoskeletal: Negative for arthralgias, joint swelling and myalgias.   Skin: Negative for color change and rash.   Neurological: Positive for headaches. Negative for dizziness, weakness and light-headedness.   Psychiatric/Behavioral: Negative for decreased concentration, dysphoric mood, sleep disturbance and suicidal ideas.           Physical Exam  Vitals:    06/10/19 1444 06/10/19 1503   BP: (!) 130/90 126/86   Pulse: 89    Temp: 98.2 °F (36.8 °C)    SpO2: 98%    Weight: 90.4 kg (199 lb 4.7 oz)    Height: 5' 4" (1.626 m)     Body mass index is 34.21 kg/m².  Weight: 90.4 kg (199 lb 4.7 oz)   Height: 5' 4" (162.6 cm)   Physical Exam   Constitutional: She is oriented to person, place, and time. She appears well-developed and well-nourished. No distress.   HENT:   Head: Normocephalic and atraumatic.   Eyes: Pupils are equal, round, and reactive to light. Conjunctivae, EOM and lids are normal. No scleral icterus.   Neck: Full passive range of motion without pain. Neck supple. No JVD present. Carotid bruit is not present. No thyromegaly present.   Cardiovascular: Normal rate, regular rhythm, normal heart sounds, intact distal pulses and normal pulses. Exam reveals no S3, no S4 and no friction rub.   No murmur heard.  Pulmonary/Chest: Effort normal and breath sounds normal. She has no wheezes. She has no rhonchi. She has no rales.   Abdominal: Soft. Bowel sounds are normal. She exhibits no distension. There is no tenderness.   Musculoskeletal: She exhibits no edema or tenderness.   Lymphadenopathy:        Head (right side): No submental and no submandibular adenopathy present.        Head " (left side): No submental and no submandibular adenopathy present.     She has no cervical adenopathy.   Neurological: She is alert and oriented to person, place, and time.   Motor grossly intact.  Sensory grossly intact.  Symmetric facial movements palate elevated symmetrically tongue midline   Skin: Skin is warm and dry. No rash noted.   Psychiatric: She has a normal mood and affect. Her speech is normal and behavior is normal. Thought content normal.           Health Maintenance       Date Due Completion Date    Pneumococcal Vaccine (Medium Risk) (1 of 1 - PPSV23) 04/20/1996 ---    Influenza Vaccine 08/01/2019 10/12/2018    Override on 1/19/2018: Declined    Mammogram 03/08/2020 3/8/2018    TETANUS VACCINE 09/12/2028 9/12/2018

## 2019-06-13 ENCOUNTER — PATIENT MESSAGE (OUTPATIENT)
Dept: FAMILY MEDICINE | Facility: CLINIC | Age: 44
End: 2019-06-13

## 2019-06-30 DIAGNOSIS — K29.00 ACUTE GASTRITIS WITHOUT HEMORRHAGE, UNSPECIFIED GASTRITIS TYPE: ICD-10-CM

## 2019-07-01 RX ORDER — PANTOPRAZOLE SODIUM 40 MG/1
TABLET, DELAYED RELEASE ORAL
Qty: 90 TABLET | Refills: 0 | Status: SHIPPED | OUTPATIENT
Start: 2019-07-01 | End: 2019-10-14 | Stop reason: SDUPTHER

## 2019-07-05 DIAGNOSIS — R10.84 GENERALIZED ABDOMINAL PAIN: ICD-10-CM

## 2019-07-05 DIAGNOSIS — K29.70 GASTRITIS, UNSPECIFIED, WITHOUT BLEEDING: Primary | ICD-10-CM

## 2019-07-05 DIAGNOSIS — R10.33 ABDOMINAL PAIN, PERIUMBILICAL: ICD-10-CM

## 2019-07-16 ENCOUNTER — HOSPITAL ENCOUNTER (OUTPATIENT)
Dept: RADIOLOGY | Facility: HOSPITAL | Age: 44
Discharge: HOME | End: 2019-07-16
Attending: INTERNAL MEDICINE
Payer: COMMERCIAL

## 2019-07-16 DIAGNOSIS — R10.33 ABDOMINAL PAIN, PERIUMBILICAL: ICD-10-CM

## 2019-07-16 DIAGNOSIS — K29.70 GASTRITIS, UNSPECIFIED, WITHOUT BLEEDING: ICD-10-CM

## 2019-07-16 PROCEDURE — 74177 CT ABDOMEN PELVIS WITH CONTRAST: ICD-10-PCS | Mod: 26,,, | Performed by: RADIOLOGY

## 2019-07-16 PROCEDURE — 74177 CT ABD & PELVIS W/CONTRAST: CPT | Mod: TC

## 2019-07-16 PROCEDURE — 25500020 PHARM REV CODE 255: Performed by: INTERNAL MEDICINE

## 2019-07-16 PROCEDURE — 74177 CT ABD & PELVIS W/CONTRAST: CPT | Mod: 26,,, | Performed by: RADIOLOGY

## 2019-07-16 RX ADMIN — IOHEXOL 15 ML: 300 INJECTION, SOLUTION INTRAVENOUS at 09:07

## 2019-07-16 RX ADMIN — IOHEXOL 75 ML: 350 INJECTION, SOLUTION INTRAVENOUS at 09:07

## 2019-07-17 ENCOUNTER — PATIENT MESSAGE (OUTPATIENT)
Dept: SURGERY | Facility: CLINIC | Age: 44
End: 2019-07-17

## 2019-07-24 ENCOUNTER — OFFICE VISIT (OUTPATIENT)
Dept: FAMILY MEDICINE | Facility: CLINIC | Age: 44
End: 2019-07-24
Payer: COMMERCIAL

## 2019-07-24 VITALS
BODY MASS INDEX: 34.82 KG/M2 | HEIGHT: 64 IN | OXYGEN SATURATION: 97 % | SYSTOLIC BLOOD PRESSURE: 125 MMHG | HEART RATE: 87 BPM | TEMPERATURE: 99 F | WEIGHT: 203.94 LBS | DIASTOLIC BLOOD PRESSURE: 78 MMHG

## 2019-07-24 DIAGNOSIS — K21.9 GASTROESOPHAGEAL REFLUX DISEASE WITHOUT ESOPHAGITIS: Chronic | ICD-10-CM

## 2019-07-24 DIAGNOSIS — I10 ESSENTIAL (PRIMARY) HYPERTENSION: Chronic | ICD-10-CM

## 2019-07-24 DIAGNOSIS — N30.01 ACUTE CYSTITIS WITH HEMATURIA: Primary | ICD-10-CM

## 2019-07-24 DIAGNOSIS — R11.0 NAUSEA: ICD-10-CM

## 2019-07-24 DIAGNOSIS — R30.0 DYSURIA: ICD-10-CM

## 2019-07-24 LAB
BILIRUB SERPL-MCNC: ABNORMAL MG/DL
BLOOD URINE, POC: ABNORMAL
COLOR, POC UA: YELLOW
GLUCOSE UR QL STRIP: NORMAL
KETONES UR QL STRIP: ABNORMAL
LEUKOCYTE ESTERASE URINE, POC: ABNORMAL
NITRITE, POC UA: ABNORMAL
PH, POC UA: 6
PROTEIN, POC: ABNORMAL
SPECIFIC GRAVITY, POC UA: 1
UROBILINOGEN, POC UA: NORMAL

## 2019-07-24 PROCEDURE — 81002 URINALYSIS NONAUTO W/O SCOPE: CPT | Mod: S$GLB,,, | Performed by: NURSE PRACTITIONER

## 2019-07-24 PROCEDURE — 3074F SYST BP LT 130 MM HG: CPT | Mod: CPTII,S$GLB,, | Performed by: NURSE PRACTITIONER

## 2019-07-24 PROCEDURE — 81002 POCT URINE DIPSTICK WITHOUT MICROSCOPE: ICD-10-PCS | Mod: S$GLB,,, | Performed by: NURSE PRACTITIONER

## 2019-07-24 PROCEDURE — 99999 PR PBB SHADOW E&M-EST. PATIENT-LVL IV: ICD-10-PCS | Mod: PBBFAC,,, | Performed by: NURSE PRACTITIONER

## 2019-07-24 PROCEDURE — 3078F DIAST BP <80 MM HG: CPT | Mod: CPTII,S$GLB,, | Performed by: NURSE PRACTITIONER

## 2019-07-24 PROCEDURE — 3078F PR MOST RECENT DIASTOLIC BLOOD PRESSURE < 80 MM HG: ICD-10-PCS | Mod: CPTII,S$GLB,, | Performed by: NURSE PRACTITIONER

## 2019-07-24 PROCEDURE — 99214 PR OFFICE/OUTPT VISIT, EST, LEVL IV, 30-39 MIN: ICD-10-PCS | Mod: 25,S$GLB,, | Performed by: NURSE PRACTITIONER

## 2019-07-24 PROCEDURE — 99999 PR PBB SHADOW E&M-EST. PATIENT-LVL IV: CPT | Mod: PBBFAC,,, | Performed by: NURSE PRACTITIONER

## 2019-07-24 PROCEDURE — 3008F PR BODY MASS INDEX (BMI) DOCUMENTED: ICD-10-PCS | Mod: CPTII,S$GLB,, | Performed by: NURSE PRACTITIONER

## 2019-07-24 PROCEDURE — 99214 OFFICE O/P EST MOD 30 MIN: CPT | Mod: 25,S$GLB,, | Performed by: NURSE PRACTITIONER

## 2019-07-24 PROCEDURE — 3008F BODY MASS INDEX DOCD: CPT | Mod: CPTII,S$GLB,, | Performed by: NURSE PRACTITIONER

## 2019-07-24 PROCEDURE — 3074F PR MOST RECENT SYSTOLIC BLOOD PRESSURE < 130 MM HG: ICD-10-PCS | Mod: CPTII,S$GLB,, | Performed by: NURSE PRACTITIONER

## 2019-07-24 RX ORDER — IBUPROFEN 800 MG/1
TABLET ORAL
COMMUNITY
Start: 2019-06-18 | End: 2021-11-10

## 2019-07-24 RX ORDER — NITROFURANTOIN 25; 75 MG/1; MG/1
100 CAPSULE ORAL 2 TIMES DAILY
Qty: 10 CAPSULE | Refills: 0 | Status: SHIPPED | OUTPATIENT
Start: 2019-07-24 | End: 2019-07-29

## 2019-07-24 RX ORDER — PROMETHAZINE HYDROCHLORIDE 25 MG/1
25 TABLET ORAL EVERY 4 HOURS PRN
Qty: 40 TABLET | Refills: 3 | Status: SHIPPED | OUTPATIENT
Start: 2019-07-24 | End: 2019-08-03

## 2019-07-24 NOTE — PROGRESS NOTES
History of Present Illness   Sherrill Ennis is a 44 y.o. woman with medical history as listed below who presents today for evaluation of UTI symptoms x5 days. She reports suprapubic pressure, pain, and burning with urination. She has also noticed dark color and odor to urine. She denies flank pain, urinary frequency, nausea, vomiting, or fevers. She has not tried OTC medication for symptoms. She has no additional complaints and is otherwise healthy on today's visit.    Past Medical History:   Diagnosis Date    Anxiety     Diverticulosis     GERD (gastroesophageal reflux disease)     Herpes simplex virus (HSV) infection     Hypertension     Iron deficiency anemia due to chronic blood loss 2018    Resolved after hysterectomy    Mental disorder     RLS (restless legs syndrome)        Past Surgical History:   Procedure Laterality Date     SECTION      x4    CHOLECYSTECTOMY      CYSTOGRAM N/A 2018    [facility] at [facility]    CYSTOGRAM  2018    [facility]    CYSTOSCOPY, WITH RETROGRADE pyelLOGRAM Bilateral 2018    [facility] by [facility]    CYSTOSCOPY, WITH RETROGRADE PYELOGRAM Bilateral 2018    [facility] by [facility] at [facility]    EXCISION, CYST, OVARY, LAPAROSCOPIC Right 2018    [facility] by [facility] at [facility]    HERNIA REPAIR      HYSTERECTOMY  2018    [id number] N/A 2018    [facility] by [facility]    LAPAROTOMY,EXPLORATORY  2018    [facility]    REPAIR, BLADDER N/A 2018    [facility]    REPAIR, HERNIA, INCISIONAL OR VENTRAL, WITHOUT HISTORY OF PRIOR REPAIR N/A 2019    Performed by [facility]evan [facility]reto [facility] at [facility]    SALPINGECTOMY, LAPAROSCOPIC Bilateral 2018    [facility] by [facility]    TUBAL  LIGATION         Social History     Socioeconomic History    Marital status:      Spouse name: Not on file    Number of children: Not on file    Years of education: Not on file    Highest education level: Not on file   Occupational History    Not on file   Social Needs    Financial resource strain: Not hard at all    Food insecurity:     Worry: Never true     Inability: Never true    Transportation needs:     Medical: No     Non-medical: No   Tobacco Use    Smoking status: Current Every Day Smoker     Packs/day: 0.50     Years: 15.00     Pack years: 7.50     Types: Cigarettes     Last attempt to quit: 2018     Years since quittin.0    Smokeless tobacco: Never Used   Substance and Sexual Activity    Alcohol use: Yes     Frequency: Monthly or less     Drinks per session: 3 or 4     Binge frequency: Never    Drug use: No    Sexual activity: Yes     Partners: Male     Birth control/protection: See Surgical Hx     Comment:    Lifestyle    Physical activity:     Days per week: 0 days     Minutes per session: 0 min    Stress: Not at all   Relationships    Social connections:     Talks on phone: More than three times a week     Gets together: More than three times a week     Attends [employer] service: Not on file     Active member of club or organization: No     Attends meetings of clubs or organizations: Never     Relationship status:    Other Topics Concern    Not on file   Social History Narrative     since     Together since     He drives 18-wheelers    She is the  for a dental office    Previously  and     Lives with her  and youngest daughter.     with three daughters from previous marriage also       Family History   Problem Relation Age of Onset    Hypertension Mother     Stroke Mother     Aneurysm Mother     Hypertension Father     Hypertension Brother     Hypertension Maternal Grandfather     Heart  "disease Maternal Grandfather         MI    Cancer Maternal Grandfather     Cancer Paternal Grandmother     Breast cancer Neg Hx     Colon cancer Neg Hx     Ovarian cancer Neg Hx        Review of Systems  Review of Systems   Constitutional: Negative for chills, fever and weight loss.   Gastrointestinal: Positive for nausea (chronic). Negative for constipation and vomiting.   Genitourinary: Positive for dysuria, frequency and urgency. Negative for flank pain and hematuria.     A complete review of systems was otherwise negative.    Physical Exam  /78   Pulse 87   Temp 98.5 °F (36.9 °C) (Oral)   Ht 5' 4" (1.626 m)   Wt 92.5 kg (203 lb 14.8 oz)   LMP 06/12/2018 (LMP Unknown) Comment: [facility] on 06/20/18  SpO2 97%   BMI 35.00 kg/m²   General appearance: alert, appears stated age, cooperative and no distress  Back: symmetric, no curvature. ROM normal. No CVA tenderness.  Lungs: clear to auscultation bilaterally  Heart: regular rate and rhythm, S1, S2 normal, no murmur, click, rub or gallop  Abdomen: soft, non-tender; bowel sounds normal; no masses,  no organomegaly and suprapubic TTP  Neurologic: Grossly normal    Assessment/Plan  Acute cystitis with hematuria  Treat with Macrobid pending culture report.  Increase water intake.  Tylenol for fever.  RTC PRN.  -     nitrofurantoin, macrocrystal-monohydrate, (MACROBID) 100 MG capsule; Take 1 capsule (100 mg total) by mouth 2 (two) times daily. for 5 days  Dispense: 10 capsule; Refill: 0    Dysuria  As above.  -     POCT urine dipstick without microscope  -     Urine culture    Nausea  Chronic nausea related to her GERD/gastritis with worsening with new incisional hernia, refill on Phenergan today.  -     promethazine (PHENERGAN) 25 MG tablet; Take 1 tablet (25 mg total) by mouth every 4 (four) hours as needed for Nausea.  Dispense: 40 tablet; Refill: 3    Essential (primary) hypertension  The current medical regimen is effective;  continue present plan and " medications.    Gastroesophageal reflux disease without esophagitis  The current medical regimen is effective;  continue present plan and medications.  PPI until evaluation by general surgery.    Patient has verbalized understanding and is in agreement with plan of care.    Follow up if symptoms worsen or fail to improve.  Answers for HPI/ROS submitted by the patient on 7/24/2019   Dysuria  Chronicity: new  Onset: in the past 7 days  Frequency: every urination  Progression since onset: gradually worsening  Pain quality: aching  Pain - numeric: 7/10  Fever: no fever  Sexually active?: Yes  History of pyelonephritis?: No  discharge: Yes  hesitancy: Yes  possible pregnancy: No  sweats: No  withholding: No  behavior changes: No  Treatments tried: nothing  Pain severity: mild  catheterization: Yes  diabetes insipidus: No  diabetes mellitus: No  genitourinary reflux: No  hypertension: Yes  recurrent UTIs: No  single kidney: No  STD: No  urinary stasis: No  urological procedure: Yes  kidney stones: No

## 2019-07-31 ENCOUNTER — OFFICE VISIT (OUTPATIENT)
Dept: SURGERY | Facility: CLINIC | Age: 44
End: 2019-07-31
Payer: COMMERCIAL

## 2019-07-31 VITALS
SYSTOLIC BLOOD PRESSURE: 134 MMHG | BODY MASS INDEX: 34.82 KG/M2 | HEART RATE: 92 BPM | HEIGHT: 64 IN | DIASTOLIC BLOOD PRESSURE: 90 MMHG | WEIGHT: 203.94 LBS

## 2019-07-31 DIAGNOSIS — K43.2 INCISIONAL HERNIA WITHOUT OBSTRUCTION OR GANGRENE: Primary | ICD-10-CM

## 2019-07-31 PROCEDURE — 3075F PR MOST RECENT SYSTOLIC BLOOD PRESS GE 130-139MM HG: ICD-10-PCS | Mod: CPTII,S$GLB,, | Performed by: SURGERY

## 2019-07-31 PROCEDURE — 99214 PR OFFICE/OUTPT VISIT, EST, LEVL IV, 30-39 MIN: ICD-10-PCS | Mod: S$GLB,,, | Performed by: SURGERY

## 2019-07-31 PROCEDURE — 3080F DIAST BP >= 90 MM HG: CPT | Mod: CPTII,S$GLB,, | Performed by: SURGERY

## 2019-07-31 PROCEDURE — 3075F SYST BP GE 130 - 139MM HG: CPT | Mod: CPTII,S$GLB,, | Performed by: SURGERY

## 2019-07-31 PROCEDURE — 3008F PR BODY MASS INDEX (BMI) DOCUMENTED: ICD-10-PCS | Mod: CPTII,S$GLB,, | Performed by: SURGERY

## 2019-07-31 PROCEDURE — 3080F PR MOST RECENT DIASTOLIC BLOOD PRESSURE >= 90 MM HG: ICD-10-PCS | Mod: CPTII,S$GLB,, | Performed by: SURGERY

## 2019-07-31 PROCEDURE — 99214 OFFICE O/P EST MOD 30 MIN: CPT | Mod: S$GLB,,, | Performed by: SURGERY

## 2019-07-31 PROCEDURE — 3008F BODY MASS INDEX DOCD: CPT | Mod: CPTII,S$GLB,, | Performed by: SURGERY

## 2019-07-31 RX ORDER — SODIUM CHLORIDE 9 MG/ML
INJECTION, SOLUTION INTRAVENOUS CONTINUOUS
Status: CANCELLED | OUTPATIENT
Start: 2019-07-31

## 2019-07-31 RX ORDER — HYDROCODONE BITARTRATE AND ACETAMINOPHEN 5; 325 MG/1; MG/1
1 TABLET ORAL EVERY 6 HOURS PRN
Qty: 30 TABLET | Refills: 0 | Status: SHIPPED | OUTPATIENT
Start: 2019-07-31 | End: 2019-09-03 | Stop reason: SDUPTHER

## 2019-07-31 RX ORDER — LIDOCAINE HYDROCHLORIDE 10 MG/ML
1 INJECTION, SOLUTION EPIDURAL; INFILTRATION; INTRACAUDAL; PERINEURAL ONCE
Status: DISCONTINUED | OUTPATIENT
Start: 2019-07-31 | End: 2019-08-26 | Stop reason: HOSPADM

## 2019-07-31 NOTE — H&P (VIEW-ONLY)
History & Physical    SUBJECTIVE:     History of Present Illness:  Patient is a 44 y.o. female presents with incisional hernia with symptoms on the CT scan.     Chief Complaint   Patient presents with    Umbilical Hernia    Follow-up       Review of patient's allergies indicates:   Allergen Reactions    Ace inhibitors Other (See Comments)     cough       Current Outpatient Medications   Medication Sig Dispense Refill    amLODIPine (NORVASC) 10 MG tablet Take 0.5 tablets (5 mg total) by mouth once daily. 90 tablet 3    clonazePAM (KLONOPIN) 0.5 MG tablet Take 1 tablet (0.5 mg total) by mouth 2 (two) times daily as needed for Anxiety. 10 tablet 0    hydroCHLOROthiazide (HYDRODIURIL) 25 MG tablet Take 1 tablet (25 mg total) by mouth once daily. 90 tablet 3    ibuprofen (ADVIL,MOTRIN) 800 MG tablet       pantoprazole (PROTONIX) 40 MG tablet TAKE 1 TABLET BY MOUTH TWICE DAILY 90 tablet 0    promethazine (PHENERGAN) 25 MG tablet Take 1 tablet (25 mg total) by mouth every 4 (four) hours as needed for Nausea. 40 tablet 3    sertraline (ZOLOFT) 50 MG tablet Take 1 tablet (50 mg total) by mouth once daily. 90 tablet 3    valACYclovir (VALTREX) 1000 MG tablet Take 1 tablet (1,000 mg total) by mouth daily as needed (herpes outbreak). For 5 days 30 tablet 1    norethindrone (MICRONOR) 0.35 mg tablet Take 1 tablet (0.35 mg total) by mouth once daily. 30 tablet 11     No current facility-administered medications for this visit.        Past Medical History:   Diagnosis Date    Anxiety     Diverticulosis     GERD (gastroesophageal reflux disease)     Herpes simplex virus (HSV) infection     Hypertension     Iron deficiency anemia due to chronic blood loss 2018    Resolved after hysterectomy    Mental disorder     RLS (restless legs syndrome)      Past Surgical History:   Procedure Laterality Date     SECTION      x4    CHOLECYSTECTOMY      CYSTOGRAM N/A 2018    Performed by [facility]ELIA Padgett  [facility] OR    CYSTOGRAM  2018    [facility] at [facility]    CYSTOSCOPY, WITH RETROGRADE pyelLOGRAM Bilateral 2018    [facility] at [facility]    CYSTOSCOPY, WITH RETROGRADE PYELOGRAM Bilateral 2018    [facility] by [facility] at [facility]    EXCISION, CYST, OVARY, LAPAROSCOPIC Right 2018    Performed by [facility] at [facility] OR    HERNIA REPAIR      HYSTERECTOMY  2018    [id number] N/A 2018    [facility] by [facility]    LAPAROTOMY,EXPLORATORY  2018    [facility]formed by [facility] at [facility]    REPAIR, BLADDER N/A 2018    [facility] by [facility] at [facility]    REPAIR, HERNIA, INCISIONAL OR VENTRAL, WITHOUT HISTORY OF PRIOR REPAIR N/A 2019    [facility] at [facility]    SALPINGECTOMY, LAPAROSCOPIC Bilateral 2018    [facility]formed by [facility] at [facility]    TUBAL LIGATION       Family History   Problem Relation Age of Onset    Hypertension Mother     Stroke Mother     Aneurysm Mother     Hypertension Father     Hypertension Brother     Hypertension Maternal Grandfather     Heart disease Maternal Grandfather         MI    Cancer Maternal Grandfather     Cancer Paternal Grandmother     Breast cancer Neg Hx     Colon cancer Neg Hx     Ovarian cancer Neg Hx      Social History     Tobacco Use    Smoking status: Current Every Day Smoker     Packs/day: 0.50     Years: 15.00     Pack years: 7.50     Types: Cigarettes     Last attempt to quit: 2018     Years since quittin.1    Smokeless tobacco: Never Used   Substance Use Topics    Alcohol use: Yes     Frequency: Monthly or less     Drinks per session: 3 or 4     Binge frequency: Never    Drug use: No        Review of Systems:  Review of Systems   Constitutional: Negative.  Negative for fever.   HENT: Negative.    Eyes: Negative.    Respiratory:  "Negative.    Cardiovascular: Negative.    Gastrointestinal: Positive for abdominal pain and nausea. Negative for constipation, diarrhea and vomiting.   Endocrine: Negative.    Genitourinary: Positive for dysuria, frequency and hematuria.   Musculoskeletal: Positive for myalgias. Negative for arthralgias.   Skin: Negative.    Allergic/Immunologic: Negative.    Neurological: Negative.  Negative for headaches.   Hematological: Negative.    Psychiatric/Behavioral: Negative.    All other systems reviewed and are negative.      OBJECTIVE:     Vital Signs (Most Recent)  Pulse: 92 (07/31/19 1341)  BP: (!) 134/90 (07/31/19 1341)  5' 4" (1.626 m)  92.5 kg (203 lb 14.8 oz)     Physical Exam:  Physical Exam   Constitutional: She is oriented to person, place, and time. She appears well-developed and well-nourished.   HENT:   Head: Normocephalic and atraumatic.   Right Ear: External ear normal.   Left Ear: External ear normal.   Nose: Nose normal.   Mouth/Throat: Oropharynx is clear and moist.   Eyes: Pupils are equal, round, and reactive to light. Conjunctivae and EOM are normal.   Neck: Normal range of motion. Neck supple.   Cardiovascular: Normal rate, regular rhythm, normal heart sounds and intact distal pulses.   Pulmonary/Chest: Effort normal and breath sounds normal.   Abdominal: Soft. Bowel sounds are normal. A hernia is present. Hernia confirmed positive in the ventral area.       Musculoskeletal: Normal range of motion.   Neurological: She is alert and oriented to person, place, and time. She has normal reflexes.   Skin: Skin is warm and dry.   Psychiatric: She has a normal mood and affect. Her behavior is normal. Thought content normal.   Vitals reviewed.      Laboratory  none    Diagnostic Results:  CT: Reviewed  incisional hernia    ASSESSMENT/PLAN:     Incisional hernia    PLAN:Plan     I will take her OR for repair of incisional hernia with the risks and possible complications and she agrees to proceed     "   Answers for HPI/ROS submitted by the patient on 7/29/2019   Abdominal pain  Chronicity: chronic  Onset: more than 1 month ago  Onset quality: sudden  Frequency: daily  Episode duration: 4 hours  Pain location: LLQ, suprapubic region  Pain - numeric: 10/10  Pain quality: aching, burning, sharp  anorexia: No  belching: No  flatus: Yes  hematochezia: No  melena: No  weight loss: No  Diagnostic workup: CT scan, GI consult, upper endoscopy  Pain severity: severe  Treatments tried: acetaminophen  Improvement on treatment: no relief  abdominal surgery: Yes  colon cancer: No  Crohn's disease: No  gallstones: Yes  GERD: Yes  irritable bowel syndrome: No  kidney stones: No  pancreatitis: No  PUD: No  ulcerative colitis: No  UTI: Yes

## 2019-07-31 NOTE — PROGRESS NOTES
History & Physical    SUBJECTIVE:     History of Present Illness:  Patient is a 44 y.o. female presents with incisional hernia with symptoms on the CT scan.     Chief Complaint   Patient presents with    Umbilical Hernia    Follow-up       Review of patient's allergies indicates:   Allergen Reactions    Ace inhibitors Other (See Comments)     cough       Current Outpatient Medications   Medication Sig Dispense Refill    amLODIPine (NORVASC) 10 MG tablet Take 0.5 tablets (5 mg total) by mouth once daily. 90 tablet 3    clonazePAM (KLONOPIN) 0.5 MG tablet Take 1 tablet (0.5 mg total) by mouth 2 (two) times daily as needed for Anxiety. 10 tablet 0    hydroCHLOROthiazide (HYDRODIURIL) 25 MG tablet Take 1 tablet (25 mg total) by mouth once daily. 90 tablet 3    ibuprofen (ADVIL,MOTRIN) 800 MG tablet       pantoprazole (PROTONIX) 40 MG tablet TAKE 1 TABLET BY MOUTH TWICE DAILY 90 tablet 0    promethazine (PHENERGAN) 25 MG tablet Take 1 tablet (25 mg total) by mouth every 4 (four) hours as needed for Nausea. 40 tablet 3    sertraline (ZOLOFT) 50 MG tablet Take 1 tablet (50 mg total) by mouth once daily. 90 tablet 3    valACYclovir (VALTREX) 1000 MG tablet Take 1 tablet (1,000 mg total) by mouth daily as needed (herpes outbreak). For 5 days 30 tablet 1    norethindrone (MICRONOR) 0.35 mg tablet Take 1 tablet (0.35 mg total) by mouth once daily. 30 tablet 11     No current facility-administered medications for this visit.        Past Medical History:   Diagnosis Date    Anxiety     Diverticulosis     GERD (gastroesophageal reflux disease)     Herpes simplex virus (HSV) infection     Hypertension     Iron deficiency anemia due to chronic blood loss 2018    Resolved after hysterectomy    Mental disorder     RLS (restless legs syndrome)      Past Surgical History:   Procedure Laterality Date     SECTION      x4    CHOLECYSTECTOMY      CYSTOGRAM N/A 2018    Performed by NELIA Padgett  [facility] at [facility]    CYSTOGRAM  2018    [facility] at [facility]    CYSTOSCOPY, WITH RETROGRADE pyelLOGRAM Bilateral 2018    [facility]formed by [facility] at [facility] OR    CYSTOSCOPY, WITH RETROGRADE PYELOGRAM Bilateral 2018    [facility] by [facility] at [facility]    EXCISION, CYST, OVARY, LAPAROSCOPIC Right 2018    Performed by Vince [facility] at [facility]    HERNIA REPAIR      HYSTERECTOMY  2018    [id number] N/A 2018    [facility] by [facility]    LAPAROTOMY,EXPLORATORY  2018    [facility] by [facility] at [facility]    REPAIR, BLADDER N/A 2018    [facility] by [facility] at [facility]    REPAIR, HERNIA, INCISIONAL OR VENTRAL, WITHOUT HISTORY OF PRIOR REPAIR N/A 2019    [facility] by [facility] at [facility]    SALPINGECTOMY, LAPAROSCOPIC Bilateral 2018    Performed by [facility] at [facility]    TUBAL LIGATION       Family History   Problem Relation Age of Onset    Hypertension Mother     Stroke Mother     Aneurysm Mother     Hypertension Father     Hypertension Brother     Hypertension Maternal Grandfather     Heart disease Maternal Grandfather         MI    Cancer Maternal Grandfather     Cancer Paternal Grandmother     Breast cancer Neg Hx     Colon cancer Neg Hx     Ovarian cancer Neg Hx      Social History     Tobacco Use    Smoking status: Current Every Day Smoker     Packs/day: 0.50     Years: 15.00     Pack years: 7.50     Types: Cigarettes     Last attempt to quit: 2018     Years since quittin.1    Smokeless tobacco: Never Used   Substance Use Topics    Alcohol use: Yes     Frequency: Monthly or less     Drinks per session: 3 or 4     Binge frequency: Never    Drug use: No        Review of Systems:  Review of Systems   Constitutional: Negative.  Negative for fever.   HENT: Negative.    Eyes: Negative.    Respiratory:  "Negative.    Cardiovascular: Negative.    Gastrointestinal: Positive for abdominal pain and nausea. Negative for constipation, diarrhea and vomiting.   Endocrine: Negative.    Genitourinary: Positive for dysuria, frequency and hematuria.   Musculoskeletal: Positive for myalgias. Negative for arthralgias.   Skin: Negative.    Allergic/Immunologic: Negative.    Neurological: Negative.  Negative for headaches.   Hematological: Negative.    Psychiatric/Behavioral: Negative.    All other systems reviewed and are negative.      OBJECTIVE:     Vital Signs (Most Recent)  Pulse: 92 (07/31/19 1341)  BP: (!) 134/90 (07/31/19 1341)  5' 4" (1.626 m)  92.5 kg (203 lb 14.8 oz)     Physical Exam:  Physical Exam   Constitutional: She is oriented to person, place, and time. She appears well-developed and well-nourished.   HENT:   Head: Normocephalic and atraumatic.   Right Ear: External ear normal.   Left Ear: External ear normal.   Nose: Nose normal.   Mouth/Throat: Oropharynx is clear and moist.   Eyes: Pupils are equal, round, and reactive to light. Conjunctivae and EOM are normal.   Neck: Normal range of motion. Neck supple.   Cardiovascular: Normal rate, regular rhythm, normal heart sounds and intact distal pulses.   Pulmonary/Chest: Effort normal and breath sounds normal.   Abdominal: Soft. Bowel sounds are normal. A hernia is present. Hernia confirmed positive in the ventral area.       Musculoskeletal: Normal range of motion.   Neurological: She is alert and oriented to person, place, and time. She has normal reflexes.   Skin: Skin is warm and dry.   Psychiatric: She has a normal mood and affect. Her behavior is normal. Thought content normal.   Vitals reviewed.      Laboratory  none    Diagnostic Results:  CT: Reviewed  incisional hernia    ASSESSMENT/PLAN:     Incisional hernia    PLAN:Plan     I will take her OR for repair of incisional hernia with the risks and possible complications and she agrees to proceed     "   Answers for HPI/ROS submitted by the patient on 7/29/2019   Abdominal pain  Chronicity: chronic  Onset: more than 1 month ago  Onset quality: sudden  Frequency: daily  Episode duration: 4 hours  Pain location: LLQ, suprapubic region  Pain - numeric: 10/10  Pain quality: aching, burning, sharp  anorexia: No  belching: No  flatus: Yes  hematochezia: No  melena: No  weight loss: No  Diagnostic workup: CT scan, GI consult, upper endoscopy  Pain severity: severe  Treatments tried: acetaminophen  Improvement on treatment: no relief  abdominal surgery: Yes  colon cancer: No  Crohn's disease: No  gallstones: Yes  GERD: Yes  irritable bowel syndrome: No  kidney stones: No  pancreatitis: No  PUD: No  ulcerative colitis: No  UTI: Yes

## 2019-08-01 ENCOUNTER — PATIENT MESSAGE (OUTPATIENT)
Dept: SURGERY | Facility: HOSPITAL | Age: 44
End: 2019-08-01

## 2019-08-19 ENCOUNTER — HOSPITAL ENCOUNTER (OUTPATIENT)
Dept: PREADMISSION TESTING | Facility: HOSPITAL | Age: 44
Discharge: HOME | End: 2019-08-19
Attending: SURGERY
Payer: COMMERCIAL

## 2019-08-19 VITALS
TEMPERATURE: 98 F | WEIGHT: 204.13 LBS | HEART RATE: 74 BPM | HEIGHT: 64 IN | BODY MASS INDEX: 34.85 KG/M2 | OXYGEN SATURATION: 97 % | RESPIRATION RATE: 18 BRPM

## 2019-08-19 DIAGNOSIS — Z01.818 PREOP TESTING: Primary | ICD-10-CM

## 2019-08-19 LAB
ANION GAP SERPL CALC-SCNC: 7 MMOL/L (ref 8–16)
BASOPHILS # BLD AUTO: 0.04 K/UL (ref 0–0.2)
BASOPHILS NFR BLD: 0.4 % (ref 0–1.9)
BUN SERPL-MCNC: 11 MG/DL (ref 6–20)
CALCIUM SERPL-MCNC: 9.4 MG/DL (ref 8.7–10.5)
CHLORIDE SERPL-SCNC: 105 MMOL/L (ref 95–110)
CO2 SERPL-SCNC: 28 MMOL/L (ref 23–29)
CREAT SERPL-MCNC: 0.8 MG/DL (ref 0.5–1.4)
DIFFERENTIAL METHOD: ABNORMAL
EOSINOPHIL # BLD AUTO: 0.3 K/UL (ref 0–0.5)
EOSINOPHIL NFR BLD: 3 % (ref 0–8)
ERYTHROCYTE [DISTWIDTH] IN BLOOD BY AUTOMATED COUNT: 14 % (ref 11.5–14.5)
EST. GFR  (AFRICAN AMERICAN): >60 ML/MIN/1.73 M^2
EST. GFR  (NON AFRICAN AMERICAN): >60 ML/MIN/1.73 M^2
GLUCOSE SERPL-MCNC: 132 MG/DL (ref 70–110)
HCT VFR BLD AUTO: 39.5 % (ref 37–48.5)
HGB BLD-MCNC: 12.8 G/DL (ref 12–16)
LYMPHOCYTES # BLD AUTO: 2.5 K/UL (ref 1–4.8)
LYMPHOCYTES NFR BLD: 22.7 % (ref 18–48)
MCH RBC QN AUTO: 26.9 PG (ref 27–31)
MCHC RBC AUTO-ENTMCNC: 32.4 G/DL (ref 32–36)
MCV RBC AUTO: 83 FL (ref 82–98)
MONOCYTES # BLD AUTO: 0.7 K/UL (ref 0.3–1)
MONOCYTES NFR BLD: 6.7 % (ref 4–15)
NEUTROPHILS # BLD AUTO: 7.2 K/UL (ref 1.8–7.7)
NEUTROPHILS NFR BLD: 67.2 % (ref 38–73)
PLATELET # BLD AUTO: 287 K/UL (ref 150–350)
PMV BLD AUTO: 9.3 FL (ref 9.2–12.9)
POTASSIUM SERPL-SCNC: 3.5 MMOL/L (ref 3.5–5.1)
RBC # BLD AUTO: 4.76 M/UL (ref 4–5.4)
SODIUM SERPL-SCNC: 140 MMOL/L (ref 136–145)
WBC # BLD AUTO: 10.77 K/UL (ref 3.9–12.7)

## 2019-08-19 PROCEDURE — 93010 EKG 12-LEAD: ICD-10-PCS | Mod: ,,, | Performed by: INTERNAL MEDICINE

## 2019-08-19 PROCEDURE — 80048 BASIC METABOLIC PNL TOTAL CA: CPT

## 2019-08-19 PROCEDURE — 93010 ELECTROCARDIOGRAM REPORT: CPT | Mod: ,,, | Performed by: INTERNAL MEDICINE

## 2019-08-19 PROCEDURE — 85025 COMPLETE CBC W/AUTO DIFF WBC: CPT

## 2019-08-19 PROCEDURE — 36415 COLL VENOUS BLD VENIPUNCTURE: CPT

## 2019-08-19 PROCEDURE — 93005 ELECTROCARDIOGRAM TRACING: CPT

## 2019-08-19 NOTE — DISCHARGE INSTRUCTIONS
"Your procedure  is scheduled for ___Monday, 8/26/19_______.    Call [phone] between 2pm and 5pm on _Friday, 8/23/19______to find out your arrival time for the day of surgery.    Report to Same Day Surgery Unit at ____ AM on the 2nd floor of the hospital.  Use the front entrance of the hospital.  The front doors of the hospital open promptly at 5:30am.  If you need wheelchair assistance, call [phone] from your cell phone, or call "0" from the courtesy phone in the lobby.    Important instructions:   Do not eat or drink after 12 midnight, including water.  It is okay to brush your teeth.  Do not have gum, candy or mints.     Take only these medications with a small swallow of water on the morning of your surgery ___Amlodipine, Protonix___________      Stop taking Aspirin, Ibuprofen, Motrin and Aleve , Fish oil, and Vitamin E for at least 7 days before your surgery. You may use Tylenol unless otherwise instructed by your doctor.         Prep instructions:  ENEMA   SHOWER   OTHER_____________     Please shower the night before and the morning of your surgery.        Use Hibiclens soap as instructed by your pre op nurse.   Please place clean linens on your bed the night before surgery. Please wear fresh clean clothing after each shower.     No shaving of procedural area at least 4-5 days before surgery due to increased risk of skin irritation and/or possible infection.      You may be asked to take a third shower on arrival to Same Day Surgery depending on the type of surgery you are having.     Do not wear make- up, including mascara.     You may wear deodorant only.      Do not wear powder, body lotion or perfume/cologne.     Do not wear any jewelry or have any metal on your body.     You will be asked to remove any dentures or partials for the procedure.     Please bring any documents given to you by your doctor.     If you are going home on the same day of surgery, you must arrange for a family member " or a friend to drive you home.  Public transportation is prohibited.  You will not be able to drive home if you were given anesthesia or sedation.     Children under 18 years of age require a parent/guardian present the entire time that they are here.     Wear loose fitting clothes allowing for bandages.     Please leave money and valuables home.       You may bring your cell phone.     Call the doctor if fever or illness should occur before your surgery.    Call [phone] to contact us here if needed.

## 2019-08-23 ENCOUNTER — ANESTHESIA EVENT (OUTPATIENT)
Dept: SURGERY | Facility: HOSPITAL | Age: 44
End: 2019-08-23
Payer: COMMERCIAL

## 2019-08-26 ENCOUNTER — HOSPITAL ENCOUNTER (OUTPATIENT)
Facility: HOSPITAL | Age: 44
Discharge: HOME | End: 2019-08-26
Attending: SURGERY | Admitting: SURGERY
Payer: COMMERCIAL

## 2019-08-26 ENCOUNTER — ANESTHESIA (OUTPATIENT)
Dept: SURGERY | Facility: HOSPITAL | Age: 44
End: 2019-08-26
Payer: COMMERCIAL

## 2019-08-26 VITALS
WEIGHT: 204.13 LBS | RESPIRATION RATE: 14 BRPM | SYSTOLIC BLOOD PRESSURE: 132 MMHG | OXYGEN SATURATION: 92 % | DIASTOLIC BLOOD PRESSURE: 73 MMHG | TEMPERATURE: 98 F | BODY MASS INDEX: 34.85 KG/M2 | HEIGHT: 64 IN | HEART RATE: 83 BPM

## 2019-08-26 DIAGNOSIS — K43.2 INCISIONAL HERNIA WITHOUT OBSTRUCTION OR GANGRENE: Primary | ICD-10-CM

## 2019-08-26 LAB — POCT GLUCOSE: 102 MG/DL (ref 70–110)

## 2019-08-26 PROCEDURE — 63600175 PHARM REV CODE 636 W HCPCS: Performed by: ANESTHESIOLOGY

## 2019-08-26 PROCEDURE — 25000003 PHARM REV CODE 250: Performed by: NURSE ANESTHETIST, CERTIFIED REGISTERED

## 2019-08-26 PROCEDURE — 63600175 PHARM REV CODE 636 W HCPCS: Performed by: SURGERY

## 2019-08-26 PROCEDURE — 49561 PR REPAIR INCISIONAL HERNIA,STRANG: CPT | Mod: ,,, | Performed by: SURGERY

## 2019-08-26 PROCEDURE — 63600175 PHARM REV CODE 636 W HCPCS: Performed by: NURSE ANESTHETIST, CERTIFIED REGISTERED

## 2019-08-26 PROCEDURE — 49561 PR REPAIR INCISIONAL HERNIA,STRANG: ICD-10-PCS | Mod: ,,, | Performed by: SURGERY

## 2019-08-26 PROCEDURE — D9220A PRA ANESTHESIA: Mod: ANES,,, | Performed by: ANESTHESIOLOGY

## 2019-08-26 PROCEDURE — 49568 PR IMPLANT MESH HERNIA REPAIR/DEBRIDEMENT CLOSURE: ICD-10-PCS | Mod: ,,, | Performed by: SURGERY

## 2019-08-26 PROCEDURE — 27201423 OPTIME MED/SURG SUP & DEVICES STERILE SUPPLY: Performed by: SURGERY

## 2019-08-26 PROCEDURE — 37000009 HC ANESTHESIA EA ADD 15 MINS: Performed by: SURGERY

## 2019-08-26 PROCEDURE — D9220A PRA ANESTHESIA: Mod: CRNA,,, | Performed by: NURSE ANESTHETIST, CERTIFIED REGISTERED

## 2019-08-26 PROCEDURE — 71000033 HC RECOVERY, INTIAL HOUR: Performed by: SURGERY

## 2019-08-26 PROCEDURE — 49568 PR IMPLANT MESH HERNIA REPAIR/DEBRIDEMENT CLOSURE: CPT | Mod: ,,, | Performed by: SURGERY

## 2019-08-26 PROCEDURE — 71000015 HC POSTOP RECOV 1ST HR: Performed by: SURGERY

## 2019-08-26 PROCEDURE — C1781 MESH (IMPLANTABLE): HCPCS | Performed by: SURGERY

## 2019-08-26 PROCEDURE — D9220A PRA ANESTHESIA: ICD-10-PCS | Mod: CRNA,,, | Performed by: NURSE ANESTHETIST, CERTIFIED REGISTERED

## 2019-08-26 PROCEDURE — 37000008 HC ANESTHESIA 1ST 15 MINUTES: Performed by: SURGERY

## 2019-08-26 PROCEDURE — 36000707: Performed by: SURGERY

## 2019-08-26 PROCEDURE — 63600175 PHARM REV CODE 636 W HCPCS

## 2019-08-26 PROCEDURE — D9220A PRA ANESTHESIA: ICD-10-PCS | Mod: ANES,,, | Performed by: ANESTHESIOLOGY

## 2019-08-26 PROCEDURE — 36000706: Performed by: SURGERY

## 2019-08-26 PROCEDURE — C9290 INJ, BUPIVACAINE LIPOSOME: HCPCS | Performed by: SURGERY

## 2019-08-26 PROCEDURE — 25000242 PHARM REV CODE 250 ALT 637 W/ HCPCS: Performed by: NURSE ANESTHETIST, CERTIFIED REGISTERED

## 2019-08-26 PROCEDURE — 25000003 PHARM REV CODE 250: Performed by: SURGERY

## 2019-08-26 PROCEDURE — 71000039 HC RECOVERY, EACH ADD'L HOUR: Performed by: SURGERY

## 2019-08-26 DEVICE — PATCH HERNIA VENTRIO ST MED: Type: IMPLANTABLE DEVICE | Site: ABDOMEN | Status: FUNCTIONAL

## 2019-08-26 RX ORDER — HYDROMORPHONE HYDROCHLORIDE 2 MG/ML
0.2 INJECTION, SOLUTION INTRAMUSCULAR; INTRAVENOUS; SUBCUTANEOUS EVERY 5 MIN PRN
Status: DISCONTINUED | OUTPATIENT
Start: 2019-08-26 | End: 2019-08-26 | Stop reason: HOSPADM

## 2019-08-26 RX ORDER — ONDANSETRON 2 MG/ML
INJECTION INTRAMUSCULAR; INTRAVENOUS
Status: DISCONTINUED | OUTPATIENT
Start: 2019-08-26 | End: 2019-08-26

## 2019-08-26 RX ORDER — PROPOFOL 10 MG/ML
VIAL (ML) INTRAVENOUS
Status: DISCONTINUED | OUTPATIENT
Start: 2019-08-26 | End: 2019-08-26

## 2019-08-26 RX ORDER — PHENYLEPHRINE HYDROCHLORIDE 10 MG/ML
INJECTION INTRAVENOUS
Status: DISCONTINUED | OUTPATIENT
Start: 2019-08-26 | End: 2019-08-26

## 2019-08-26 RX ORDER — MORPHINE SULFATE 10 MG/ML
3 INJECTION INTRAMUSCULAR; INTRAVENOUS; SUBCUTANEOUS
Status: DISCONTINUED | OUTPATIENT
Start: 2019-08-26 | End: 2019-08-26 | Stop reason: HOSPADM

## 2019-08-26 RX ORDER — SUCCINYLCHOLINE CHLORIDE 20 MG/ML
INJECTION INTRAMUSCULAR; INTRAVENOUS
Status: DISCONTINUED | OUTPATIENT
Start: 2019-08-26 | End: 2019-08-26

## 2019-08-26 RX ORDER — SODIUM CHLORIDE 0.9 % (FLUSH) 0.9 %
10 SYRINGE (ML) INJECTION
Status: DISCONTINUED | OUTPATIENT
Start: 2019-08-26 | End: 2019-08-26 | Stop reason: HOSPADM

## 2019-08-26 RX ORDER — LIDOCAINE HCL/PF 100 MG/5ML
SYRINGE (ML) INTRAVENOUS
Status: DISCONTINUED | OUTPATIENT
Start: 2019-08-26 | End: 2019-08-26

## 2019-08-26 RX ORDER — ACETAMINOPHEN 10 MG/ML
1000 INJECTION, SOLUTION INTRAVENOUS ONCE
Status: COMPLETED | OUTPATIENT
Start: 2019-08-26 | End: 2019-08-26

## 2019-08-26 RX ORDER — SODIUM CHLORIDE, SODIUM LACTATE, POTASSIUM CHLORIDE, CALCIUM CHLORIDE 600; 310; 30; 20 MG/100ML; MG/100ML; MG/100ML; MG/100ML
INJECTION, SOLUTION INTRAVENOUS CONTINUOUS
Status: DISCONTINUED | OUTPATIENT
Start: 2019-08-26 | End: 2019-08-26 | Stop reason: HOSPADM

## 2019-08-26 RX ORDER — NEOSTIGMINE METHYLSULFATE 1 MG/ML
INJECTION, SOLUTION INTRAVENOUS
Status: DISCONTINUED | OUTPATIENT
Start: 2019-08-26 | End: 2019-08-26

## 2019-08-26 RX ORDER — BUPIVACAINE HYDROCHLORIDE 2.5 MG/ML
INJECTION, SOLUTION EPIDURAL; INFILTRATION; INTRACAUDAL
Status: DISCONTINUED | OUTPATIENT
Start: 2019-08-26 | End: 2019-08-26 | Stop reason: HOSPADM

## 2019-08-26 RX ORDER — HYDROCODONE BITARTRATE AND ACETAMINOPHEN 5; 325 MG/1; MG/1
1 TABLET ORAL EVERY 6 HOURS PRN
Qty: 30 TABLET | Refills: 0 | Status: SHIPPED | OUTPATIENT
Start: 2019-08-26 | End: 2019-09-16 | Stop reason: SDUPTHER

## 2019-08-26 RX ORDER — SODIUM CHLORIDE 9 MG/ML
INJECTION, SOLUTION INTRAVENOUS CONTINUOUS
Status: DISCONTINUED | OUTPATIENT
Start: 2019-08-26 | End: 2019-08-26 | Stop reason: HOSPADM

## 2019-08-26 RX ORDER — DEXAMETHASONE SODIUM PHOSPHATE 4 MG/ML
INJECTION, SOLUTION INTRA-ARTICULAR; INTRALESIONAL; INTRAMUSCULAR; INTRAVENOUS; SOFT TISSUE
Status: DISCONTINUED | OUTPATIENT
Start: 2019-08-26 | End: 2019-08-26

## 2019-08-26 RX ORDER — ONDANSETRON 2 MG/ML
4 INJECTION INTRAMUSCULAR; INTRAVENOUS DAILY PRN
Status: DISCONTINUED | OUTPATIENT
Start: 2019-08-26 | End: 2019-08-26 | Stop reason: HOSPADM

## 2019-08-26 RX ORDER — ALBUTEROL SULFATE 90 UG/1
AEROSOL, METERED RESPIRATORY (INHALATION)
Status: DISCONTINUED | OUTPATIENT
Start: 2019-08-26 | End: 2019-08-26

## 2019-08-26 RX ORDER — GLYCOPYRROLATE 0.2 MG/ML
INJECTION INTRAMUSCULAR; INTRAVENOUS
Status: DISCONTINUED | OUTPATIENT
Start: 2019-08-26 | End: 2019-08-26

## 2019-08-26 RX ORDER — LIDOCAINE HYDROCHLORIDE 10 MG/ML
1 INJECTION, SOLUTION EPIDURAL; INFILTRATION; INTRACAUDAL; PERINEURAL ONCE
Status: DISCONTINUED | OUTPATIENT
Start: 2019-08-26 | End: 2019-08-26 | Stop reason: HOSPADM

## 2019-08-26 RX ORDER — CEFAZOLIN SODIUM 2 G/50ML
2 SOLUTION INTRAVENOUS
Status: COMPLETED | OUTPATIENT
Start: 2019-08-26 | End: 2019-08-26

## 2019-08-26 RX ORDER — MIDAZOLAM HYDROCHLORIDE 1 MG/ML
INJECTION, SOLUTION INTRAMUSCULAR; INTRAVENOUS
Status: DISCONTINUED | OUTPATIENT
Start: 2019-08-26 | End: 2019-08-26

## 2019-08-26 RX ORDER — ACETAMINOPHEN 10 MG/ML
INJECTION, SOLUTION INTRAVENOUS
Status: COMPLETED
Start: 2019-08-26 | End: 2019-08-26

## 2019-08-26 RX ORDER — EPHEDRINE SULFATE 50 MG/ML
INJECTION, SOLUTION INTRAVENOUS
Status: DISCONTINUED | OUTPATIENT
Start: 2019-08-26 | End: 2019-08-26

## 2019-08-26 RX ORDER — FENTANYL CITRATE 50 UG/ML
INJECTION, SOLUTION INTRAMUSCULAR; INTRAVENOUS
Status: DISCONTINUED | OUTPATIENT
Start: 2019-08-26 | End: 2019-08-26

## 2019-08-26 RX ORDER — ROCURONIUM BROMIDE 10 MG/ML
INJECTION, SOLUTION INTRAVENOUS
Status: DISCONTINUED | OUTPATIENT
Start: 2019-08-26 | End: 2019-08-26

## 2019-08-26 RX ADMIN — CEFAZOLIN SODIUM 2 G: 2 SOLUTION INTRAVENOUS at 10:08

## 2019-08-26 RX ADMIN — EPHEDRINE SULFATE 10 MG: 50 INJECTION, SOLUTION INTRAMUSCULAR; INTRAVENOUS; SUBCUTANEOUS at 11:08

## 2019-08-26 RX ADMIN — DEXAMETHASONE SODIUM PHOSPHATE 8 MG: 4 INJECTION, SOLUTION INTRAMUSCULAR; INTRAVENOUS at 10:08

## 2019-08-26 RX ADMIN — SODIUM CHLORIDE, SODIUM LACTATE, POTASSIUM CHLORIDE, AND CALCIUM CHLORIDE: .6; .31; .03; .02 INJECTION, SOLUTION INTRAVENOUS at 07:08

## 2019-08-26 RX ADMIN — HYDROMORPHONE HYDROCHLORIDE 0.2 MG: 2 INJECTION, SOLUTION INTRAMUSCULAR; INTRAVENOUS; SUBCUTANEOUS at 12:08

## 2019-08-26 RX ADMIN — NEOSTIGMINE METHYLSULFATE 5 MG: 1 INJECTION INTRAVENOUS at 11:08

## 2019-08-26 RX ADMIN — FENTANYL CITRATE 100 MCG: 50 INJECTION INTRAMUSCULAR; INTRAVENOUS at 09:08

## 2019-08-26 RX ADMIN — MIDAZOLAM HYDROCHLORIDE 2 MG: 1 INJECTION, SOLUTION INTRAMUSCULAR; INTRAVENOUS at 09:08

## 2019-08-26 RX ADMIN — EPHEDRINE SULFATE 10 MG: 50 INJECTION, SOLUTION INTRAMUSCULAR; INTRAVENOUS; SUBCUTANEOUS at 10:08

## 2019-08-26 RX ADMIN — GLYCOPYRROLATE 0.8 MG: 0.2 INJECTION, SOLUTION INTRAMUSCULAR; INTRAVENOUS at 11:08

## 2019-08-26 RX ADMIN — ALBUTEROL SULFATE 2 PUFF: 90 AEROSOL, METERED RESPIRATORY (INHALATION) at 10:08

## 2019-08-26 RX ADMIN — ROCURONIUM BROMIDE 20 MG: 10 INJECTION, SOLUTION INTRAVENOUS at 10:08

## 2019-08-26 RX ADMIN — SODIUM CHLORIDE, SODIUM LACTATE, POTASSIUM CHLORIDE, AND CALCIUM CHLORIDE: .6; .31; .03; .02 INJECTION, SOLUTION INTRAVENOUS at 10:08

## 2019-08-26 RX ADMIN — SODIUM CHLORIDE, SODIUM LACTATE, POTASSIUM CHLORIDE, AND CALCIUM CHLORIDE: .6; .31; .03; .02 INJECTION, SOLUTION INTRAVENOUS at 09:08

## 2019-08-26 RX ADMIN — LIDOCAINE HYDROCHLORIDE 100 MG: 20 INJECTION, SOLUTION INTRAVENOUS at 10:08

## 2019-08-26 RX ADMIN — ONDANSETRON 4 MG: 2 INJECTION, SOLUTION INTRAMUSCULAR; INTRAVENOUS at 11:08

## 2019-08-26 RX ADMIN — PHENYLEPHRINE HYDROCHLORIDE 200 MCG: 10 INJECTION INTRAVENOUS at 10:08

## 2019-08-26 RX ADMIN — PROPOFOL 50 MG: 10 INJECTION, EMULSION INTRAVENOUS at 10:08

## 2019-08-26 RX ADMIN — SUCCINYLCHOLINE CHLORIDE 200 MG: 20 INJECTION, SOLUTION INTRAMUSCULAR; INTRAVENOUS at 10:08

## 2019-08-26 RX ADMIN — ACETAMINOPHEN 1000 MG: 10 INJECTION, SOLUTION INTRAVENOUS at 12:08

## 2019-08-26 RX ADMIN — PROPOFOL 200 MG: 10 INJECTION, EMULSION INTRAVENOUS at 10:08

## 2019-08-26 RX ADMIN — ROCURONIUM BROMIDE 5 MG: 10 INJECTION, SOLUTION INTRAVENOUS at 10:08

## 2019-08-26 RX ADMIN — ROCURONIUM BROMIDE 15 MG: 10 INJECTION, SOLUTION INTRAVENOUS at 10:08

## 2019-08-26 NOTE — ANESTHESIA PREPROCEDURE EVALUATION
08/26/2019  Sherrill Ennis is a 44 y.o., female.    Anesthesia Evaluation    I have reviewed the Patient Summary Reports.    I have reviewed the Nursing Notes.      Review of Systems  Anesthesia Hx:  Hx of Anesthetic complications  Denies Family Hx of Anesthesia complications.  Personal Hx of Anesthesia complications, Post-Operative Nausea/Vomiting   Social:  Smoker, Alcohol Use    Hematology/Oncology:         -- Anemia:   Cardiovascular:   Exercise tolerance: good Hypertension Denies MI.   Denies Dysrhythmias.   Denies Angina.    Pulmonary:  Pulmonary Normal    Renal/:  Renal/ Normal     Hepatic/GI:   GERD, well controlled    Musculoskeletal:   Incisional hernia   OB/GYN/PEDS:  S/p hysterectomy   Neurological:  Neurology Normal    Endocrine:  Endocrine Normal        Physical Exam  General:  Well nourished    Airway/Jaw/Neck:  Airway Findings: Mouth Opening: Normal Tongue: Normal  Mallampati: III  TM Distance: 4 - 6 cm  Jaw/Neck Findings:  Neck ROM: Normal ROM      Dental:  Dental Findings: In tact   Chest/Lungs:  Chest/Lungs Findings: Clear to auscultation, Normal Respiratory Rate     Heart/Vascular:  Heart Findings: Rate: Normal  Rhythm: Regular Rhythm        Mental Status:  Mental Status Findings:  Cooperative, Alert and Oriented       Wt Readings from Last 3 Encounters:   08/23/19 92.6 kg (204 lb 2.3 oz)   08/19/19 92.6 kg (204 lb 2.3 oz)   07/31/19 92.5 kg (203 lb 14.8 oz)     Temp Readings from Last 3 Encounters:   08/26/19 36.8 °C (98.2 °F) (Oral)   08/19/19 36.6 °C (97.8 °F) (Oral)   07/24/19 36.9 °C (98.5 °F) (Oral)     BP Readings from Last 3 Encounters:   08/26/19 (!) 152/91   07/31/19 (!) 134/90   07/24/19 125/78     Pulse Readings from Last 3 Encounters:   08/26/19 77   08/19/19 74   07/31/19 92     Lab Results   Component Value Date    WBC 10.77 08/19/2019    HGB 12.8 08/19/2019     HCT 39.5 08/19/2019    MCV 83 08/19/2019     08/19/2019     CMP  Sodium   Date Value Ref Range Status   08/19/2019 140 136 - 145 mmol/L Final     Potassium   Date Value Ref Range Status   08/19/2019 3.5 3.5 - 5.1 mmol/L Final     Chloride   Date Value Ref Range Status   08/19/2019 105 95 - 110 mmol/L Final     CO2   Date Value Ref Range Status   08/19/2019 28 23 - 29 mmol/L Final     Glucose   Date Value Ref Range Status   08/19/2019 132 (H) 70 - 110 mg/dL Final     BUN, Bld   Date Value Ref Range Status   08/19/2019 11 6 - 20 mg/dL Final     Creatinine   Date Value Ref Range Status   08/19/2019 0.8 0.5 - 1.4 mg/dL Final     Calcium   Date Value Ref Range Status   08/19/2019 9.4 8.7 - 10.5 mg/dL Final     Total Protein   Date Value Ref Range Status   03/13/2019 7.5 6.0 - 8.4 g/dL Final   03/13/2019 7.5 6.0 - 8.4 g/dL Final     Albumin   Date Value Ref Range Status   03/13/2019 4.1 3.5 - 5.2 g/dL Final   03/13/2019 4.1 3.5 - 5.2 g/dL Final     Total Bilirubin   Date Value Ref Range Status   03/13/2019 0.3 0.1 - 1.0 mg/dL Final     Comment:     For infants and newborns, interpretation of results should be based  on gestational age, weight and in agreement with clinical  observations.  Premature Infant recommended reference ranges:  Up to 24 hours.............<8.0 mg/dL  Up to 48 hours............<12.0 mg/dL  3-5 days..................<15.0 mg/dL  6-29 days.................<15.0 mg/dL     03/13/2019 0.3 0.1 - 1.0 mg/dL Final     Comment:     For infants and newborns, interpretation of results should be based  on gestational age, weight and in agreement with clinical  observations.  Premature Infant recommended reference ranges:  Up to 24 hours.............<8.0 mg/dL  Up to 48 hours............<12.0 mg/dL  3-5 days..................<15.0 mg/dL  6-29 days.................<15.0 mg/dL       Alkaline Phosphatase   Date Value Ref Range Status   03/13/2019 91 55 - 135 U/L Final   03/13/2019 91 55 - 135 U/L Final      AST   Date Value Ref Range Status   03/13/2019 30 10 - 40 U/L Final   03/13/2019 30 10 - 40 U/L Final     ALT   Date Value Ref Range Status   03/13/2019 44 10 - 44 U/L Final   03/13/2019 44 10 - 44 U/L Final     Anion Gap   Date Value Ref Range Status   08/19/2019 7 (L) 8 - 16 mmol/L Final     eGFR if    Date Value Ref Range Status   08/19/2019 >60 >60 mL/min/1.73 m^2 Final     eGFR if non    Date Value Ref Range Status   08/19/2019 >60 >60 mL/min/1.73 m^2 Final     Comment:     Calculation used to obtain the estimated glomerular filtration  rate (eGFR) is the CKD-EPI equation.            Anesthesia Plan  Type of Anesthesia, risks & benefits discussed:  Anesthesia Type:  general  Patient's Preference:   Intra-op Monitoring Plan: standard ASA monitors  Intra-op Monitoring Plan Comments:   Post Op Pain Control Plan: multimodal analgesia and per primary service following discharge from PACU  Post Op Pain Control Plan Comments:   Induction:   IV  Beta Blocker:  Patient is not currently on a Beta-Blocker (No further documentation required).       Informed Consent: Patient understands risks and agrees with Anesthesia plan.  Questions answered. Anesthesia consent signed with patient.  ASA Score: 2     Day of Surgery Review of History & Physical: I have interviewed and examined the patient. I have reviewed the patient's H&P dated:            Ready For Surgery From Anesthesia Perspective.

## 2019-08-26 NOTE — ANESTHESIA POSTPROCEDURE EVALUATION
Anesthesia Post Evaluation    Patient: Sherrill Ennis    Procedure(s) Performed: Procedure(s):  REPAIR, HERNIA, INCISIONAL , WITHOUT HISTORY OF PRIOR REPAIR    Final Anesthesia Type: general  Patient location during evaluation: PACU  Patient participation: Yes- Able to Participate  Level of consciousness: awake and alert  Post-procedure vital signs: reviewed and stable  Pain management: adequate  Airway patency: patent  PONV status at discharge: No PONV  Anesthetic complications: no      Cardiovascular status: hemodynamically stable  Respiratory status: unassisted and spontaneous ventilation  Hydration status: euvolemic  Follow-up not needed.          Vitals Value Taken Time   /83 8/26/2019 11:48 AM   Temp 36.6 °C (97.8 °F) 8/26/2019 11:48 AM   Pulse 93 8/26/2019 11:48 AM   Resp 18 8/26/2019 11:48 AM   SpO2 94 % 8/26/2019 11:48 AM         No case tracking events are documented in the log.      Pain/Franki Score: No data recorded

## 2019-08-26 NOTE — BRIEF OP NOTE
[facility]  Brief Operative Note     SUMMARY     Surgery Date: 8/26/2019     Surgeon(s) and Role:     * Estevan Barreto MD - Primary    Assisting Surgeon: Jennie Mckay MD    Pre-op Diagnosis:  Incisional hernia without obstruction or gangrene [K43.2]    Post-op Diagnosis:  Post-Op Diagnosis Codes:     * Incisional hernia without obstruction or gangrene [K43.2]    Procedure(s):  REPAIR, HERNIA, INCISIONAL , WITHOUT HISTORY OF PRIOR REPAIR    Anesthesia: General    Description of the findings of the procedure: 8X5cm combined defect with omentum and small bowel    Findings/Key Components: same    Estimated Blood Loss: minimal         Specimens:   Specimen (12h ago, onward)    None          Discharge Note    SUMMARY     Admit Date: 8/26/2019    Discharge Date and Time:  08/26/2019 11:14 AM    Hospital Course (synopsis of major diagnoses, care, treatment, and services provided during the course of the hospital stay): uneventful post op course     Final Diagnosis: Post-Op Diagnosis Codes:     * Incisional hernia without obstruction or gangrene [K43.2]    Disposition: Home or Self Care    Follow Up/Patient Instructions:     Medications:  Reconciled Home Medications:      Medication List      CHANGE how you take these medications    * HYDROcodone-acetaminophen 5-325 mg per tablet  Commonly known as:  NORCO  Take 1 tablet by mouth every 6 (six) hours as needed for Pain.  What changed:  Another medication with the same name was added. Make sure you understand how and when to take each.     * HYDROcodone-acetaminophen 5-325 mg per tablet  Commonly known as:  NORCO  Take 1 tablet by mouth every 6 (six) hours as needed for Pain.  What changed:  You were already taking a medication with the same name, and this prescription was added. Make sure you understand how and when to take each.         * This list has 2 medication(s) that are the same as other medications prescribed for you. Read the directions  carefully, and ask your doctor or other care provider to review them with you.            CONTINUE taking these medications    amLODIPine 10 MG tablet  Commonly known as:  NORVASC  Take 0.5 tablets (5 mg total) by mouth once daily.     clonazePAM 0.5 MG tablet  Commonly known as:  KLONOPIN  Take 1 tablet (0.5 mg total) by mouth 2 (two) times daily as needed for Anxiety.     hydroCHLOROthiazide 25 MG tablet  Commonly known as:  HYDRODIURIL  Take 1 tablet (25 mg total) by mouth once daily.     ibuprofen 800 MG tablet  Commonly known as:  ADVIL,MOTRIN     norethindrone 0.35 mg tablet  Commonly known as:  MICRONOR  Take 1 tablet (0.35 mg total) by mouth once daily.     pantoprazole 40 MG tablet  Commonly known as:  PROTONIX  TAKE 1 TABLET BY MOUTH TWICE DAILY     sertraline 50 MG tablet  Commonly known as:  ZOLOFT  Take 1 tablet (50 mg total) by mouth once daily.     valACYclovir 1000 MG tablet  Commonly known as:  VALTREX  Take 1 tablet (1,000 mg total) by mouth daily as needed (herpes outbreak). For 5 days          Discharge Procedure Orders   Diet general     Call MD for:  temperature >100.4     Call MD for:  persistent nausea and vomiting     Call MD for:  severe uncontrolled pain     Call MD for:  difficulty breathing, headache or visual disturbances     Call MD for:  redness, tenderness, or signs of infection (pain, swelling, redness, odor or green/yellow discharge around incision site)     Call MD for:  hives     Remove dressing in 24 hours     Shower on day dressing removed (No bath)     Follow-up Information     Estevan Barreto MD In 1 week.    Specialties:  General Surgery, Oncology  Contact information:  [address]  [address]  [facility] [address] [phone]

## 2019-08-26 NOTE — TRANSFER OF CARE
"Anesthesia Transfer of Care Note    Patient: Sherrill Ennis    Procedure(s) Performed: Procedure(s):  REPAIR, HERNIA, INCISIONAL , WITHOUT HISTORY OF PRIOR REPAIR    Patient location: PACU    Anesthesia Type: general    Transport from OR: Transported from OR on room air with adequate spontaneous ventilation    Post pain: adequate analgesia    Post assessment: no apparent anesthetic complications    Post vital signs: stable    Level of consciousness: awake and alert    Nausea/Vomiting: no nausea/vomiting    Complications: none    Transfer of care protocol was followed      Last vitals:   Visit Vitals  BP (!) 146/83 (BP Location: Left arm, Patient Position: Sitting)   Pulse 93   Temp 36.6 °C (97.8 °F) (Oral)   Resp 18   Ht 5' 4" (1.626 m)   Wt 92.6 kg (204 lb 2.3 oz)   LMP 06/12/2018 (LMP Unknown)   SpO2 (!) 94%   Breastfeeding? No   BMI 35.04 kg/m²     "

## 2019-08-26 NOTE — DISCHARGE INSTRUCTIONS
Dr. Barreto   Office # [phone]     Discharge Instructions for Same Day Surgery     Call the office for and appointment if one has not already been made.     Diet: Drink plenty of fluids the first 48 hours and you may resume your   usual diet.     Activity: No heavy lifting (over 10 pounds), pushing or pulling until your   post op visit. Your doctor's office may have told you to limit your lifting to less weight, or even no weight.  Be sure to follow those instructions.    Note: You may ride in a car and you may drive when comfortable.     Do not drive, drink alcohol, or sign legal documents for 24 hours, or if taking narcotic pain medication.    Dressings: Dermabond / Prineotape    or a material like it was used on your incision.   It is like a liquid glue.   Do not peel or try to remove it it will start to fall off in 7-10 days on its' own.   It is OK to shower, pat dry, do not apply any creams or lotions.    No tub baths, swimming pools, hot tubs or submersion of the incision until your surgeon says it's ok.     Medical: Call the doctor for any of the following problems: fever above 101,   severe pain, bleeding, or abdominal distention (swelling).   If constipated you may take any stool softener you choose.     Occasionally small areas of skin numbness or an unpleasant skin sensation can result. Also, you may find that your incision is swollen and tender for a few days.  Some redness around sutures and staples is a normal reaction, but if the discomfort persists or worsens, call you doctor.                                                -Exparel information-      To help control your pain after surgery, your surgeon injected Exparel (bupicacaine liposome injectable suspension) into your surgical incision just before the end of the procedure.      Exparel is a local analgesic that contains the local anesthetic bupivacaine..  Local anesthetics provide pain relief by numbing the tissue around the surgical  site.    Exparel is specifically designed to release pain medication over time an can control pain for up to 72 hours.    In addition to Exparel, your surgeon may provide other pain medications to control your pain.    Each patient is different and responds differently to pain medication.  Depending on how you respond to Exparel, you may require less additional pain medication during your recovery.    Side effects can occur with any medication and it is important not to ignore anything you may be experiencing.  Some patients who received Exparel experienced nausea, vomiting, or constipation.  Rarely, patients who receive bupivacaine (the active ingredient in Exparel) have experienced numbness and tingling in their mouth or lips, lightheadedness, or anxiety.  Speak with your doctor right away if you think you may be experiencing any of these sensations, or if you have other questions regarding possible side effects.    Products that contain bupivacaine, like Exparel, may cause a temporary loss of sensation or the ability to move in the area where bupivacaine was injected.    Other formulations of bupivacaine should not be administered within 96 hours following administrations of Exparel.      Do not remove the teal colored bracelet that you have on for 96 hours.  (4 DAYS)    This bracelet will let other health care workers know that you have received Exparel, and not to give you bupivacaine during this 96 hours.      Fall Prevention  Millions of people fall every year and injure themselves. You may have had anesthesia or sedation which may increase your risk of falling. You may have health issues that put you at an increased risk of falling.     Here are ways to reduce your risk of falling.  ·   · Make your home safe by keeping walkways clear of objects you may trip over.  · Use non-slip pads under rugs. Do not use area rugs or small throw rugs.  · Use non-slip mats in bathtubs and showers.  · Install handrails and  lights on staircases.  · Do not walk in poorly lit areas.  · Do not stand on chairs or wobbly ladders.  · Use caution when reaching overhead or looking upward. This position can cause a loss of balance.  · Be sure your shoes fit properly, have non-slip bottoms and are in good condition.   · Wear shoes both inside and out. Avoid going barefoot or wearing slippers.  · Be cautious when going up and down stairs, curbs, and when walking on uneven sidewalks.  · If your balance is poor, consider using a cane or walker.  · If your fall was related to alcohol use, stop or limit alcohol intake.   · If your fall was related to use of sleeping medicines, talk to your doctor about this. You may need to reduce your dosage at bedtime if you awaken during the night to go to the bathroom.    · To reduce the need for nighttime bathroom trips:  ¨ Avoid drinking fluids for several hours before going to bed  ¨ Empty your bladder before going to bed  ¨ Men can keep a urinal at the bedside  · Stay as active as you can. Balance, flexibility, strength, and endurance all come from exercise. They all play a role in preventing falls. Ask your healthcare provider which types of activity are right for you.  · Get your vision checked on a regular basis.  · If you have pets, know where they are before you stand up or walk so you don't trip over them.  Use night lights.

## 2019-08-27 ENCOUNTER — PATIENT MESSAGE (OUTPATIENT)
Dept: SURGERY | Facility: CLINIC | Age: 44
End: 2019-08-27

## 2019-09-03 ENCOUNTER — OFFICE VISIT (OUTPATIENT)
Dept: SURGERY | Facility: CLINIC | Age: 44
End: 2019-09-03
Payer: COMMERCIAL

## 2019-09-03 VITALS
DIASTOLIC BLOOD PRESSURE: 91 MMHG | HEART RATE: 82 BPM | BODY MASS INDEX: 35 KG/M2 | WEIGHT: 205 LBS | HEIGHT: 64 IN | SYSTOLIC BLOOD PRESSURE: 132 MMHG

## 2019-09-03 DIAGNOSIS — K43.2 INCISIONAL HERNIA WITHOUT OBSTRUCTION OR GANGRENE: Primary | ICD-10-CM

## 2019-09-03 PROCEDURE — 99024 POSTOP FOLLOW-UP VISIT: CPT | Mod: S$GLB,,, | Performed by: SURGERY

## 2019-09-03 PROCEDURE — 99024 PR POST-OP FOLLOW-UP VISIT: ICD-10-PCS | Mod: S$GLB,,, | Performed by: SURGERY

## 2019-09-03 NOTE — PROGRESS NOTES
Subjective:       Patient ID: Sherrill Ennis is a 44 y.o. female.    Chief Complaint: Post-op Evaluation    HPI 45 yo female with incisional hernia repair without complaints  Review of Systems   Constitutional: Negative.    HENT: Negative.    Eyes: Negative.    Respiratory: Negative.    Cardiovascular: Negative.    Gastrointestinal: Negative.    Endocrine: Negative.    Musculoskeletal: Negative.    Skin: Negative.    Allergic/Immunologic: Negative.    Neurological: Negative.    Hematological: Negative.    Psychiatric/Behavioral: Negative.    All other systems reviewed and are negative.      Objective:      Physical Exam   Constitutional: She is oriented to person, place, and time. She appears well-developed and well-nourished.   HENT:   Head: Normocephalic and atraumatic.   Right Ear: External ear normal.   Left Ear: External ear normal.   Nose: Nose normal.   Mouth/Throat: Oropharynx is clear and moist.   Eyes: Pupils are equal, round, and reactive to light. Conjunctivae and EOM are normal.   Neck: Normal range of motion. Neck supple.   Cardiovascular: Normal rate, regular rhythm, normal heart sounds and intact distal pulses.   Pulmonary/Chest: Effort normal and breath sounds normal.   Abdominal: Soft. Bowel sounds are normal.       Musculoskeletal: Normal range of motion.   Neurological: She is alert and oriented to person, place, and time. She has normal reflexes.   Skin: Skin is warm and dry.   Psychiatric: She has a normal mood and affect. Her behavior is normal. Thought content normal.   Vitals reviewed.      Assessment:       1. Incisional hernia without obstruction or gangrene      healing well  Plan:       FU prn and no work or lifting for 2 weeks

## 2019-09-12 ENCOUNTER — OFFICE VISIT (OUTPATIENT)
Dept: FAMILY MEDICINE | Facility: CLINIC | Age: 44
End: 2019-09-12
Payer: COMMERCIAL

## 2019-09-12 ENCOUNTER — PATIENT MESSAGE (OUTPATIENT)
Dept: SURGERY | Facility: CLINIC | Age: 44
End: 2019-09-12

## 2019-09-12 VITALS
TEMPERATURE: 99 F | BODY MASS INDEX: 34.02 KG/M2 | HEIGHT: 64 IN | OXYGEN SATURATION: 96 % | DIASTOLIC BLOOD PRESSURE: 83 MMHG | HEART RATE: 88 BPM | SYSTOLIC BLOOD PRESSURE: 118 MMHG | WEIGHT: 199.25 LBS

## 2019-09-12 DIAGNOSIS — E66.9 OBESITY, CLASS I, BMI 30-34.9: Chronic | ICD-10-CM

## 2019-09-12 DIAGNOSIS — N76.0 RECURRENT VAGINITIS: ICD-10-CM

## 2019-09-12 DIAGNOSIS — K43.2 INCISIONAL HERNIA WITHOUT OBSTRUCTION OR GANGRENE: ICD-10-CM

## 2019-09-12 DIAGNOSIS — K21.9 GASTROESOPHAGEAL REFLUX DISEASE WITHOUT ESOPHAGITIS: Chronic | ICD-10-CM

## 2019-09-12 DIAGNOSIS — I10 ESSENTIAL (PRIMARY) HYPERTENSION: Chronic | ICD-10-CM

## 2019-09-12 DIAGNOSIS — J01.90 ACUTE RHINOSINUSITIS: Primary | ICD-10-CM

## 2019-09-12 PROCEDURE — 96372 THER/PROPH/DIAG INJ SC/IM: CPT | Mod: S$GLB,,, | Performed by: NURSE PRACTITIONER

## 2019-09-12 PROCEDURE — 3079F PR MOST RECENT DIASTOLIC BLOOD PRESSURE 80-89 MM HG: ICD-10-PCS | Mod: CPTII,S$GLB,, | Performed by: NURSE PRACTITIONER

## 2019-09-12 PROCEDURE — 99214 OFFICE O/P EST MOD 30 MIN: CPT | Mod: 25,S$GLB,, | Performed by: NURSE PRACTITIONER

## 2019-09-12 PROCEDURE — 3074F PR MOST RECENT SYSTOLIC BLOOD PRESSURE < 130 MM HG: ICD-10-PCS | Mod: CPTII,S$GLB,, | Performed by: NURSE PRACTITIONER

## 2019-09-12 PROCEDURE — 3079F DIAST BP 80-89 MM HG: CPT | Mod: CPTII,S$GLB,, | Performed by: NURSE PRACTITIONER

## 2019-09-12 PROCEDURE — 3008F BODY MASS INDEX DOCD: CPT | Mod: CPTII,S$GLB,, | Performed by: NURSE PRACTITIONER

## 2019-09-12 PROCEDURE — 3074F SYST BP LT 130 MM HG: CPT | Mod: CPTII,S$GLB,, | Performed by: NURSE PRACTITIONER

## 2019-09-12 PROCEDURE — 99999 PR PBB SHADOW E&M-EST. PATIENT-LVL IV: ICD-10-PCS | Mod: PBBFAC,,, | Performed by: NURSE PRACTITIONER

## 2019-09-12 PROCEDURE — 99999 PR PBB SHADOW E&M-EST. PATIENT-LVL IV: CPT | Mod: PBBFAC,,, | Performed by: NURSE PRACTITIONER

## 2019-09-12 PROCEDURE — 3008F PR BODY MASS INDEX (BMI) DOCUMENTED: ICD-10-PCS | Mod: CPTII,S$GLB,, | Performed by: NURSE PRACTITIONER

## 2019-09-12 PROCEDURE — 99214 PR OFFICE/OUTPT VISIT, EST, LEVL IV, 30-39 MIN: ICD-10-PCS | Mod: 25,S$GLB,, | Performed by: NURSE PRACTITIONER

## 2019-09-12 PROCEDURE — 96372 PR INJECTION,THERAP/PROPH/DIAG2ST, IM OR SUBCUT: ICD-10-PCS | Mod: S$GLB,,, | Performed by: NURSE PRACTITIONER

## 2019-09-12 RX ORDER — BENZONATATE 200 MG/1
200 CAPSULE ORAL 2 TIMES DAILY PRN
Qty: 30 CAPSULE | Refills: 0 | Status: SHIPPED | OUTPATIENT
Start: 2019-09-12 | End: 2019-09-22

## 2019-09-12 RX ORDER — IPRATROPIUM BROMIDE 21 UG/1
2 SPRAY, METERED NASAL 3 TIMES DAILY
Qty: 30 ML | Refills: 2 | Status: SHIPPED | OUTPATIENT
Start: 2019-09-12 | End: 2019-09-22

## 2019-09-12 RX ORDER — PROMETHAZINE HYDROCHLORIDE AND DEXTROMETHORPHAN HYDROBROMIDE 6.25; 15 MG/5ML; MG/5ML
5 SYRUP ORAL NIGHTLY
Qty: 180 ML | Refills: 0 | Status: SHIPPED | OUTPATIENT
Start: 2019-09-12 | End: 2019-09-22

## 2019-09-12 RX ORDER — METRONIDAZOLE 500 MG/1
500 TABLET ORAL EVERY 12 HOURS
Qty: 14 TABLET | Refills: 0 | Status: SHIPPED | OUTPATIENT
Start: 2019-09-12 | End: 2019-09-19

## 2019-09-12 RX ORDER — TRIAMCINOLONE ACETONIDE 40 MG/ML
40 INJECTION, SUSPENSION INTRA-ARTICULAR; INTRAMUSCULAR
Status: COMPLETED | OUTPATIENT
Start: 2019-09-12 | End: 2019-09-12

## 2019-09-12 RX ADMIN — TRIAMCINOLONE ACETONIDE 40 MG: 40 INJECTION, SUSPENSION INTRA-ARTICULAR; INTRAMUSCULAR at 04:09

## 2019-09-12 NOTE — PROGRESS NOTES
History of Present Illness   Sherrill Ennis is a 44 y.o. woman with medical history as listed below who presents today for evaluation of sore throat x5 days. She reports dry throat with burning and pain with swallowing. She also reports a dry hacking cough, bilateral ear fullness, headaches, and overall fatigue. She denies fevers or chills. She has tried Zyrtec and Ibuprofen with no relief.     She also reports recurrent bacterial vaginosis. She is having a white discharge with fishy odor and vaginal irritation. She is unable to see her OBGYN and is asking for a prescription for flagyl today. She has declined pelvic exam.    She has no additional complaints and is otherwise healthy on today's visit.    Past Medical History:   Diagnosis Date    Anxiety     Diverticulosis     GERD (gastroesophageal reflux disease)     Herpes simplex virus (HSV) infection     Hypertension     Iron deficiency anemia due to chronic blood loss 2018    Resolved after hysterectomy    Mental disorder     RLS (restless legs syndrome)        Past Surgical History:   Procedure Laterality Date     SECTION      x4    CHOLECYSTECTOMY      CYSTOGRAM N/A 2018    [facility] at [facility]    CYSTOGRAM  2018    [facility]    CYSTOSCOPY, WITH RETROGRADE pyelLOGRAM Bilateral 2018    [facility] at [facility]    CYSTOSCOPY, WITH RETROGRADE PYELOGRAM Bilateral 2018    [facility] at [facility]    EXCISION, CYST, OVARY, LAPAROSCOPIC Right 2018    [facility] by [facility] at [facility]    HERNIA REPAIR      HYSTERECTOMY  2018    [id number] N/A 2018    [facility] at [facility]    LAPAROTOMY,EXPLORATORY  2018    [facility] at [facility]    REPAIR, BLADDER N/A 2018    [facility] at [facility]     REPAIR, HERNIA, INCISIONAL , WITHOUT HISTORY OF PRIOR REPAIR  2019    [facility] [place]    REPAIR, HERNIA, INCISIONAL OR VENTRAL, WITHOUT HISTORY OF PRIOR REPAIR N/A 2019    [facility] at [facility] [place]    SALPINGECTOMY, LAPAROSCOPIC Bilateral 2018    [facility] at [facility] [place]    TUBAL LIGATION         Social History     Socioeconomic History    Marital status:      Spouse name: Not on file    Number of children: Not on file    Years of education: Not on file    Highest education level: Not on file   Occupational History    Not on file   Social Needs    Financial resource strain: Not hard at all    Food insecurity:     Worry: Never true     Inability: Never true    Transportation needs:     Medical: No     Non-medical: No   Tobacco Use    Smoking status: Current Every Day Smoker     Packs/day: 0.50     Years: 15.00     Pack years: 7.50     Types: Cigarettes     Last attempt to quit: 2018     Years since quittin.2    Smokeless tobacco: Never Used   Substance and Sexual Activity    Alcohol use: Yes     Frequency: Monthly or less     Drinks per session: 3 or 4     Binge frequency: Never    Drug use: No    Sexual activity: Yes     Partners: Male     Birth control/protection: See Surgical Hx     Comment:    Lifestyle    Physical activity:     Days per week: 0 days     Minutes per session: 0 min    Stress: Not at all   Relationships    Social connections:     Talks on phone: More than three times a week     Gets together: More than three times a week     Attends Mandaeism service: Not on file     Active member of club or organization: No     Attends meetings of clubs or organizations: Never     Relationship status:    Other Topics Concern    Not on file   Social History Narrative     since     Together since     He drives 18-wheelers    She is the  for a dental office     "Previously  and     Lives with her  and youngest daughter.     with three daughters from previous marriage also       Family History   Problem Relation Age of Onset    Hypertension Mother     Stroke Mother     Aneurysm Mother     Hypertension Father     Hypertension Brother     Hypertension Maternal Grandfather     Heart disease Maternal Grandfather         MI    Cancer Maternal Grandfather     Cancer Paternal Grandmother     Breast cancer Neg Hx     Colon cancer Neg Hx     Ovarian cancer Neg Hx        Review of Systems  Review of Systems   Constitutional: Positive for malaise/fatigue. Negative for chills and fever.   HENT: Positive for ear pain and sore throat. Negative for congestion, ear discharge and sinus pain.    Respiratory: Positive for cough. Negative for shortness of breath, wheezing and stridor.    Gastrointestinal: Negative for abdominal pain, diarrhea and vomiting.   Neurological: Positive for headaches.     A complete review of systems was otherwise negative.    Physical Exam  /83   Pulse 88   Temp 98.7 °F (37.1 °C) (Oral)   Ht 5' 4" (1.626 m)   Wt 90.4 kg (199 lb 3.7 oz)   LMP 06/12/2018 (LMP Unknown) Comment: SAH on 06/20/18  SpO2 96%   BMI 34.20 kg/m²   General appearance: alert, appears stated age, cooperative and no distress  Eyes: negative findings: lids and lashes normal and conjunctivae and sclerae normal  Ears: normal TM's and external ear canals both ears  Nose: clear discharge, mild congestion, turbinates red, no sinus tenderness  Throat: lips, mucosa, and tongue normal; teeth and gums normal and moderate oropharyngeal erythema and edema, there is no exudates present  Lungs: clear to auscultation bilaterally  Heart: regular rate and rhythm, S1, S2 normal, no murmur, click, rub or gallop  Pelvic: deferred  Lymph nodes: Cervical, supraclavicular, and axillary nodes normal.  Neurologic: Grossly normal    Assessment/Plan  Acute " rhinosinusitis  One time IM dose of Kenalog provided.  Continue Zyrtec daily.  Add Tessalon for daytime cough with PRN nighttime cough medication.  Add Atrovent nasal spray for congestion PRN.  Continue Tylenol for fever and throat pain.  Adequate hydration with lozenges and throat spray as needed.  Viral, antibiotics not indicated.  RTC PRN.  -     triamcinolone acetonide injection 40 mg  -     benzonatate (TESSALON) 200 MG capsule; Take 1 capsule (200 mg total) by mouth 2 (two) times daily as needed.  Dispense: 30 capsule; Refill: 0  -     promethazine-dextromethorphan (PROMETHAZINE-DM) 6.25-15 mg/5 mL Syrp; Take 5 mLs by mouth every evening. for 10 days  Dispense: 180 mL; Refill: 0  -     ipratropium (ATROVENT) 0.03 % nasal spray; 2 sprays by Nasal route 3 (three) times daily. for 10 days  Dispense: 30 mL; Refill: 2    Recurrent vaginitis  One time course of Flagyl provided.  Followed by OBGYN routinely.  -     metroNIDAZOLE (FLAGYL) 500 MG tablet; Take 1 tablet (500 mg total) by mouth every 12 (twelve) hours. for 7 days  Dispense: 14 tablet; Refill: 0    Essential (primary) hypertension  The current medical regimen is effective;  continue present plan and medications.    Incisional hernia without obstruction or gangrene  The current medical regimen is effective;  continue present plan and medications.  Overall, doing well post-op.    Gastroesophageal reflux disease without esophagitis  The current medical regimen is effective;  continue present plan and medications.    Obesity, Class I, BMI 30-34.9  The patient is asked to make an attempt to improve diet and exercise patterns to aid in medical management of this problem.    Patient has verbalized understanding and is in agreement with plan of care.    Follow up if symptoms worsen or fail to improve.

## 2019-09-16 RX ORDER — HYDROCODONE BITARTRATE AND ACETAMINOPHEN 5; 325 MG/1; MG/1
1 TABLET ORAL EVERY 6 HOURS PRN
Qty: 30 TABLET | Refills: 0 | Status: SHIPPED | OUTPATIENT
Start: 2019-09-16 | End: 2019-10-09

## 2019-09-17 ENCOUNTER — TELEPHONE (OUTPATIENT)
Dept: SURGERY | Facility: CLINIC | Age: 44
End: 2019-09-17

## 2019-09-17 ENCOUNTER — PATIENT MESSAGE (OUTPATIENT)
Dept: SURGERY | Facility: CLINIC | Age: 44
End: 2019-09-17

## 2019-09-17 NOTE — TELEPHONE ENCOUNTER
Pt called she feels a knot on her left side where her hernia was.  Dr. Barreto notified.  Pt advised per Dr. Barreto it is fluid and will get better.  Pt notified to call in a few weeks if its not better.

## 2019-10-07 ENCOUNTER — PATIENT MESSAGE (OUTPATIENT)
Dept: SURGERY | Facility: CLINIC | Age: 44
End: 2019-10-07

## 2019-10-09 ENCOUNTER — OFFICE VISIT (OUTPATIENT)
Dept: SURGERY | Facility: CLINIC | Age: 44
End: 2019-10-09
Payer: COMMERCIAL

## 2019-10-09 VITALS
HEIGHT: 64 IN | SYSTOLIC BLOOD PRESSURE: 134 MMHG | HEART RATE: 84 BPM | WEIGHT: 194 LBS | BODY MASS INDEX: 33.12 KG/M2 | DIASTOLIC BLOOD PRESSURE: 84 MMHG

## 2019-10-09 DIAGNOSIS — K43.2 INCISIONAL HERNIA WITHOUT OBSTRUCTION OR GANGRENE: Primary | ICD-10-CM

## 2019-10-09 PROCEDURE — 3079F PR MOST RECENT DIASTOLIC BLOOD PRESSURE 80-89 MM HG: ICD-10-PCS | Mod: CPTII,S$GLB,, | Performed by: SURGERY

## 2019-10-09 PROCEDURE — 99024 POSTOP FOLLOW-UP VISIT: CPT | Mod: S$GLB,,, | Performed by: SURGERY

## 2019-10-09 PROCEDURE — 3008F PR BODY MASS INDEX (BMI) DOCUMENTED: ICD-10-PCS | Mod: CPTII,S$GLB,, | Performed by: SURGERY

## 2019-10-09 PROCEDURE — 3075F SYST BP GE 130 - 139MM HG: CPT | Mod: CPTII,S$GLB,, | Performed by: SURGERY

## 2019-10-09 PROCEDURE — 3075F PR MOST RECENT SYSTOLIC BLOOD PRESS GE 130-139MM HG: ICD-10-PCS | Mod: CPTII,S$GLB,, | Performed by: SURGERY

## 2019-10-09 PROCEDURE — 3008F BODY MASS INDEX DOCD: CPT | Mod: CPTII,S$GLB,, | Performed by: SURGERY

## 2019-10-09 PROCEDURE — 3079F DIAST BP 80-89 MM HG: CPT | Mod: CPTII,S$GLB,, | Performed by: SURGERY

## 2019-10-09 PROCEDURE — 99024 PR POST-OP FOLLOW-UP VISIT: ICD-10-PCS | Mod: S$GLB,,, | Performed by: SURGERY

## 2019-10-09 NOTE — PROGRESS NOTES
Subjective:       Patient ID: Sherrill Ennis is a 44 y.o. female.    Chief Complaint: Follow-up    HPI 45 yo female with hernia repair with abdominal pain and a mass with bloating and nausea  Review of Systems   Constitutional: Negative.    HENT: Negative.    Eyes: Negative.    Respiratory: Negative.    Cardiovascular: Negative.    Gastrointestinal: Negative.    Endocrine: Negative.    Musculoskeletal: Negative.    Skin: Negative.    Allergic/Immunologic: Negative.    Neurological: Negative.    Hematological: Negative.    Psychiatric/Behavioral: Negative.    All other systems reviewed and are negative.      Objective:      Physical Exam   Constitutional: She is oriented to person, place, and time. She appears well-developed and well-nourished.   HENT:   Head: Normocephalic and atraumatic.   Right Ear: External ear normal.   Left Ear: External ear normal.   Nose: Nose normal.   Mouth/Throat: Oropharynx is clear and moist.   Eyes: Pupils are equal, round, and reactive to light. Conjunctivae and EOM are normal.   Neck: Normal range of motion. Neck supple.   Cardiovascular: Normal rate, regular rhythm, normal heart sounds and intact distal pulses.   Pulmonary/Chest: Effort normal and breath sounds normal.   Abdominal: Soft. Bowel sounds are normal.       Musculoskeletal: Normal range of motion.   Neurological: She is alert and oriented to person, place, and time. She has normal reflexes.   Skin: Skin is warm and dry.   Psychiatric: She has a normal mood and affect. Her behavior is normal. Thought content normal.   Vitals reviewed.      Assessment:       1. Incisional hernia without obstruction or gangrene      abdominal pain and a mass  Plan:       I will a get a CT scan and see her back

## 2019-10-10 ENCOUNTER — OFFICE VISIT (OUTPATIENT)
Dept: FAMILY MEDICINE | Facility: CLINIC | Age: 44
End: 2019-10-10
Payer: COMMERCIAL

## 2019-10-10 ENCOUNTER — PATIENT MESSAGE (OUTPATIENT)
Dept: FAMILY MEDICINE | Facility: CLINIC | Age: 44
End: 2019-10-10

## 2019-10-10 ENCOUNTER — HOSPITAL ENCOUNTER (OUTPATIENT)
Dept: RADIOLOGY | Facility: HOSPITAL | Age: 44
Discharge: HOME | End: 2019-10-10
Attending: NURSE PRACTITIONER
Payer: COMMERCIAL

## 2019-10-10 ENCOUNTER — TELEPHONE (OUTPATIENT)
Dept: FAMILY MEDICINE | Facility: CLINIC | Age: 44
End: 2019-10-10

## 2019-10-10 VITALS
TEMPERATURE: 99 F | DIASTOLIC BLOOD PRESSURE: 72 MMHG | BODY MASS INDEX: 32.92 KG/M2 | OXYGEN SATURATION: 97 % | HEART RATE: 80 BPM | SYSTOLIC BLOOD PRESSURE: 110 MMHG | WEIGHT: 191.81 LBS

## 2019-10-10 DIAGNOSIS — R10.2 SUPRAPUBIC PRESSURE: ICD-10-CM

## 2019-10-10 DIAGNOSIS — K57.92 ACUTE DIVERTICULITIS: Primary | ICD-10-CM

## 2019-10-10 DIAGNOSIS — K43.2 INCISIONAL HERNIA WITHOUT OBSTRUCTION OR GANGRENE: ICD-10-CM

## 2019-10-10 DIAGNOSIS — K43.2 INCISIONAL HERNIA WITHOUT OBSTRUCTION OR GANGRENE: Primary | ICD-10-CM

## 2019-10-10 LAB
BILIRUB SERPL-MCNC: NORMAL MG/DL
BLOOD URINE, POC: NORMAL
COLOR, POC UA: YELLOW
GLUCOSE UR QL STRIP: NORMAL
KETONES UR QL STRIP: NORMAL
LEUKOCYTE ESTERASE URINE, POC: NORMAL
NITRITE, POC UA: NORMAL
PH, POC UA: 7
PROTEIN, POC: NORMAL
SPECIFIC GRAVITY, POC UA: 1.01
UROBILINOGEN, POC UA: 1

## 2019-10-10 PROCEDURE — 25500020 PHARM REV CODE 255: Performed by: NURSE PRACTITIONER

## 2019-10-10 PROCEDURE — 74177 CT ABD & PELVIS W/CONTRAST: CPT | Mod: 26,,, | Performed by: RADIOLOGY

## 2019-10-10 PROCEDURE — 81002 URINALYSIS NONAUTO W/O SCOPE: CPT | Mod: S$GLB,,, | Performed by: NURSE PRACTITIONER

## 2019-10-10 PROCEDURE — 74177 CT ABDOMEN PELVIS WITH CONTRAST: ICD-10-PCS | Mod: 26,,, | Performed by: RADIOLOGY

## 2019-10-10 PROCEDURE — 3078F DIAST BP <80 MM HG: CPT | Mod: CPTII,S$GLB,, | Performed by: NURSE PRACTITIONER

## 2019-10-10 PROCEDURE — 3008F BODY MASS INDEX DOCD: CPT | Mod: CPTII,S$GLB,, | Performed by: NURSE PRACTITIONER

## 2019-10-10 PROCEDURE — 3008F PR BODY MASS INDEX (BMI) DOCUMENTED: ICD-10-PCS | Mod: CPTII,S$GLB,, | Performed by: NURSE PRACTITIONER

## 2019-10-10 PROCEDURE — 99214 OFFICE O/P EST MOD 30 MIN: CPT | Mod: 25,S$GLB,, | Performed by: NURSE PRACTITIONER

## 2019-10-10 PROCEDURE — 3074F SYST BP LT 130 MM HG: CPT | Mod: CPTII,S$GLB,, | Performed by: NURSE PRACTITIONER

## 2019-10-10 PROCEDURE — 99999 PR PBB SHADOW E&M-EST. PATIENT-LVL IV: CPT | Mod: PBBFAC,,, | Performed by: NURSE PRACTITIONER

## 2019-10-10 PROCEDURE — 74177 CT ABD & PELVIS W/CONTRAST: CPT | Mod: TC

## 2019-10-10 PROCEDURE — 3074F PR MOST RECENT SYSTOLIC BLOOD PRESSURE < 130 MM HG: ICD-10-PCS | Mod: CPTII,S$GLB,, | Performed by: NURSE PRACTITIONER

## 2019-10-10 PROCEDURE — 81002 POCT URINE DIPSTICK WITHOUT MICROSCOPE: ICD-10-PCS | Mod: S$GLB,,, | Performed by: NURSE PRACTITIONER

## 2019-10-10 PROCEDURE — 3078F PR MOST RECENT DIASTOLIC BLOOD PRESSURE < 80 MM HG: ICD-10-PCS | Mod: CPTII,S$GLB,, | Performed by: NURSE PRACTITIONER

## 2019-10-10 PROCEDURE — 99214 PR OFFICE/OUTPT VISIT, EST, LEVL IV, 30-39 MIN: ICD-10-PCS | Mod: 25,S$GLB,, | Performed by: NURSE PRACTITIONER

## 2019-10-10 PROCEDURE — 99999 PR PBB SHADOW E&M-EST. PATIENT-LVL IV: ICD-10-PCS | Mod: PBBFAC,,, | Performed by: NURSE PRACTITIONER

## 2019-10-10 RX ORDER — METRONIDAZOLE 500 MG/1
500 TABLET ORAL EVERY 8 HOURS
Qty: 30 TABLET | Refills: 0 | Status: SHIPPED | OUTPATIENT
Start: 2019-10-10 | End: 2019-10-20

## 2019-10-10 RX ORDER — CIPROFLOXACIN 500 MG/1
500 TABLET ORAL EVERY 12 HOURS
Qty: 20 TABLET | Refills: 0 | Status: SHIPPED | OUTPATIENT
Start: 2019-10-10 | End: 2019-10-20

## 2019-10-10 RX ADMIN — IOHEXOL 75 ML: 350 INJECTION, SOLUTION INTRAVENOUS at 05:10

## 2019-10-10 NOTE — TELEPHONE ENCOUNTER
----- Message from Lori Hogan sent at 10/10/2019 11:57 AM CDT -----  Contact: Self  Type:  Patient Returning Call    Who Called:Self    Who Left Message for Patient: Braydon    Does the patient know what this is regarding?: Yes    Would the patient rather a call back or a response via [facility]? Call    Best Call Back Number:[phone]

## 2019-10-10 NOTE — PROGRESS NOTES
History of Present Illness   Sherrill Ennis is a 44 y.o. woman with medical history as listed below who presents today for follow-up, mass to [facility]. She is s/p incisional hernia repair in August. She had follow-up with her surgeon yesterday, and it was noted possible recurrence of hernia given palpable mass to [facility]. She reports that she last night she developed fever of 101.1 and reports fatigue with chills and sweats since that time. She did have some abdominal discomfort, but that has resolved. She denies nausea, vomiting, diarrhea, constipation, BRBPR, or black tarry stool. Her bowel movements have been normal for her, and her appetite is at baseline. She denies urinary complaints. She has tried Ibuprofen, Tylenol, and left over pain medication from her surgery which has helped with pain and fever. She is scheduled for CT scan tomorrow per general surgery. She has no additional complaints and is otherwise healthy on today's visit.    Past Medical History:   Diagnosis Date    Anxiety     Diverticulosis     GERD (gastroesophageal reflux disease)     Herpes simplex virus (HSV) infection     Hypertension     Iron deficiency anemia due to chronic blood loss 2018    Resolved after hysterectomy    Mental disorder     RLS (restless legs syndrome)        Past Surgical History:   Procedure Laterality Date    BLADDER REPAIR N/A 2018    Procedure: REPAIR, BLADDER;  Surgeon: NELIA Le MD;  Location: [facility] OR;  Service: Urology;  Laterality: N/A;     SECTION      x4    CHOLECYSTECTOMY      CYSTOGRAM  2018    Procedure: CYSTOGRAM;  Surgeon: NELIA Le MD;  Location: [facility] OR;  Service: Urology;;    CYSTOGRAM N/A 2018    Procedure: CYSTOGRAM;  Surgeon: NELIA Le MD;  Location: [facility] OR;  Service: Urology;  Laterality: N/A;  RN Phone Pre op 18.    CYSTOSCOPY W/ RETROGRADES Bilateral 2018    Procedure: CYSTOSCOPY, WITH RETROGRADE pyelLOGRAM;   Surgeon: NELIA Le MD;  Location: [facility] OR;  Service: Urology;  Laterality: Bilateral;    CYSTOSCOPY W/ RETROGRADES Bilateral 8/29/2018    Procedure: CYSTOSCOPY, WITH RETROGRADE PYELOGRAM;  Surgeon: NELIA Le MD;  Location: [facility] OR;  Service: Urology;  Laterality: Bilateral;  RN PRE OP 8/27/2018    HERNIA REPAIR      HYSTERECTOMY  06/20/2018    LAPAROSCOPIC SALPINGECTOMY Bilateral 6/20/2018    Procedure: SALPINGECTOMY, LAPAROSCOPIC;  Surgeon: Vince Garcia MD;  Location: [facility] OR;  Service: OB/GYN;  Laterality: Bilateral;    LAPAROSCOPIC SUPRACERVICAL HYSTERECTOMY N/A 6/20/2018    Procedure: [id number];  Surgeon: Vince Garcia MD;  Location: [facility] OR;  Service: OB/GYN;  Laterality: N/A;  1ST CASE  RN PRE OP 6/13/2018    LAPAROSCOPIC SURGICAL REMOVAL OF CYST OF OVARY Right 6/20/2018    Procedure: EXCISION, CYST, OVARY, LAPAROSCOPIC;  Surgeon: Vince Garcia MD;  Location: [facility] OR;  Service: OB/GYN;  Laterality: Right;    TUBAL LIGATION         Social History     Socioeconomic History    Marital status:      Spouse name: Not on file    Number of children: Not on file    Years of education: Not on file    Highest education level: Not on file   Occupational History    Not on file   Social Needs    Financial resource strain: Not hard at all    Food insecurity:     Worry: Never true     Inability: Never true    Transportation needs:     Medical: No     Non-medical: No   Tobacco Use    Smoking status: Current Every Day Smoker     Packs/day: 0.50     Years: 15.00     Pack years: 7.50     Types: Cigarettes    Smokeless tobacco: Never Used   Substance and Sexual Activity    Alcohol use: Yes     Frequency: Monthly or less     Drinks per session: 3 or 4     Binge frequency: Never    Drug use: No    Sexual activity: Yes     Partners: Male     Birth control/protection: See Surgical Hx     Comment:    Lifestyle    Physical activity:     Days per week: 0 days      Minutes per session: 0 min    Stress: Not at all   Relationships    Social connections:     Talks on phone: More than three times a week     Gets together: More than three times a week     Attends Yarsanism service: Not on file     Active member of club or organization: No     Attends meetings of clubs or organizations: Never     Relationship status:    Other Topics Concern    Not on file   Social History Narrative     since 2016    Together since 2013    He drives 18-wheelers    She is the  for a dental office    Previously  and     Lives with her  and youngest daughter.     with three daughters from previous marriage also       Family History   Problem Relation Age of Onset    Hypertension Mother     Stroke Mother     Aneurysm Mother     Hypertension Father     Hypertension Brother     Hypertension Maternal Grandfather     Heart disease Maternal Grandfather         MI    Cancer Maternal Grandfather     Cancer Paternal Grandmother     Breast cancer Neg Hx     Colon cancer Neg Hx     Ovarian cancer Neg Hx        Review of Systems  Review of Systems   Constitutional: Positive for chills, fever and malaise/fatigue.   Gastrointestinal: Positive for abdominal pain. Negative for blood in stool, constipation, diarrhea, nausea and vomiting.   Genitourinary: Negative for dysuria, flank pain and hematuria.     A complete review of systems was otherwise negative.    Physical Exam  /72 (BP Location: Right arm, Patient Position: Sitting, BP Method: Large (Manual))   Pulse 80   Temp 98.6 °F (37 °C) (Oral)   Wt 87 kg (191 lb 12.8 oz)   LMP 06/12/2018 (LMP Unknown) Comment: SAH on 06/20/18  SpO2 97%   BMI 32.92 kg/m²   General appearance: alert, appears stated age, cooperative and no distress  Back: symmetric, no curvature. ROM normal. No CVA tenderness.  Lungs: clear to auscultation bilaterally  Heart: regular rate and rhythm, S1, S2 normal,  no murmur, click, rub or gallop  Abdomen: bowel sounds hyperactive, abdomen is soft and non-distended, there is a palpable LLQ mass that is non-reproducible and non-tender to palpation with no rebound tenderness, guarding, or rigidity  Skin: Skin color, texture, turgor normal. No rashes or lesions  Lymph nodes: Cervical, supraclavicular, and axillary nodes normal.  Neurologic: Grossly normal    Assessment/Plan  Incisional hernia without obstruction or gangrene  Her abdominal exam shows no sign of acute abdomen.  Likely recurrence of incisional hernia in LLQ.  We have moved her CT scheduled for tomorrow to today, she will head to hospital now for outpatient CT.  I have discussed ER precautions such as: fever, rigid/hard abdomen, worsening or persistent pain, blood in stool or black tarry stool, inability to have bowel movement, or vomiting.  Continue Tylenol and Ibuprofen as needed for pain control.  She is agreeable with this plan.    Suprapubic pressure  Urine dipstick negative. I doubt this is the cause of her symptoms.  -     POCT urine dipstick without microscope    Patient has verbalized understanding and is in agreement with plan of care.    Follow up in about 1 day (around 10/11/2019) for CT of abdomen.

## 2019-10-10 NOTE — TELEPHONE ENCOUNTER
Hi Dr. Barreto,    I saw Sherrill in clinic today. After your visit yesterday, she developed fever and worsening of the pain in her LLQ. I went ahead and had her CT done today. It appears she has acute diverticulitis and possible abscess at the site of the hernia repair. I started her on Cipro and Flagyl and instructed her to contact your office tomorrow. I also gave her strict ER precautions and plan to check on her tomorrow as well. Please let me know if you have any questions or concerns, or need any additional information.    Thanks!  NAEL UrbinaC

## 2019-10-10 NOTE — PATIENT INSTRUCTIONS
Go to ER immediately for:  High fever  Hard or rigid abdomen  Blood in stool  Inability to have a bowel movement  Nausea, vomiting  Or worsening/persistent pain

## 2019-10-11 ENCOUNTER — TELEPHONE (OUTPATIENT)
Dept: SURGERY | Facility: CLINIC | Age: 44
End: 2019-10-11

## 2019-10-11 NOTE — TELEPHONE ENCOUNTER
Pt notified ct scan reviewed by Dr. Barreto-he said her pain is from the acute diverticulits and the fluid collection seen is a seroma.  Pt instructed to continue antibiotics given by PCP and to call me Monday with an update.  Pt instructed to call if she starts feeling worse.  Pt verbalized understanding.

## 2019-10-14 DIAGNOSIS — K29.00 ACUTE GASTRITIS WITHOUT HEMORRHAGE, UNSPECIFIED GASTRITIS TYPE: ICD-10-CM

## 2019-10-14 DIAGNOSIS — I10 ESSENTIAL (PRIMARY) HYPERTENSION: Chronic | ICD-10-CM

## 2019-10-14 DIAGNOSIS — F43.22 ADJUSTMENT DISORDER WITH ANXIOUS MOOD: ICD-10-CM

## 2019-10-15 ENCOUNTER — PATIENT MESSAGE (OUTPATIENT)
Dept: FAMILY MEDICINE | Facility: CLINIC | Age: 44
End: 2019-10-15

## 2019-10-15 RX ORDER — PANTOPRAZOLE SODIUM 40 MG/1
40 TABLET, DELAYED RELEASE ORAL 2 TIMES DAILY
Qty: 90 TABLET | Refills: 0 | Status: SHIPPED | OUTPATIENT
Start: 2019-10-15 | End: 2020-02-14 | Stop reason: SDUPTHER

## 2019-10-15 RX ORDER — HYDROCHLOROTHIAZIDE 25 MG/1
25 TABLET ORAL DAILY
Qty: 90 TABLET | Refills: 3 | Status: SHIPPED | OUTPATIENT
Start: 2019-10-15 | End: 2021-03-04 | Stop reason: SDUPTHER

## 2019-10-15 RX ORDER — CLONAZEPAM 0.5 MG/1
0.5 TABLET ORAL 2 TIMES DAILY PRN
Qty: 5 TABLET | Refills: 0 | Status: SHIPPED | OUTPATIENT
Start: 2019-10-15 | End: 2020-07-07 | Stop reason: SDUPTHER

## 2019-11-04 ENCOUNTER — PATIENT MESSAGE (OUTPATIENT)
Dept: FAMILY MEDICINE | Facility: CLINIC | Age: 44
End: 2019-11-04

## 2019-11-05 ENCOUNTER — PATIENT MESSAGE (OUTPATIENT)
Dept: FAMILY MEDICINE | Facility: CLINIC | Age: 44
End: 2019-11-05

## 2019-11-05 DIAGNOSIS — R19.7 DIARRHEA, UNSPECIFIED TYPE: Primary | ICD-10-CM

## 2019-11-06 ENCOUNTER — LAB VISIT (OUTPATIENT)
Dept: LAB | Facility: HOSPITAL | Age: 44
End: 2019-11-06
Attending: NURSE PRACTITIONER
Payer: COMMERCIAL

## 2019-11-06 ENCOUNTER — IMMUNIZATION (OUTPATIENT)
Dept: FAMILY MEDICINE | Facility: CLINIC | Age: 44
End: 2019-11-06
Payer: COMMERCIAL

## 2019-11-06 DIAGNOSIS — R19.7 DIARRHEA, UNSPECIFIED TYPE: ICD-10-CM

## 2019-11-06 DIAGNOSIS — Z23 NEED FOR INFLUENZA VACCINATION: Primary | ICD-10-CM

## 2019-11-06 LAB
C DIFF GDH STL QL: NEGATIVE
C DIFF TOX A+B STL QL IA: NEGATIVE

## 2019-11-06 PROCEDURE — 99499 NO LOS: ICD-10-PCS | Mod: S$GLB,,, | Performed by: INTERNAL MEDICINE

## 2019-11-06 PROCEDURE — 87449 NOS EACH ORGANISM AG IA: CPT

## 2019-11-06 PROCEDURE — 87045 FECES CULTURE AEROBIC BACT: CPT

## 2019-11-06 PROCEDURE — 90686 IIV4 VACC NO PRSV 0.5 ML IM: CPT | Mod: S$GLB,,, | Performed by: INTERNAL MEDICINE

## 2019-11-06 PROCEDURE — 90686 FLU VACCINE (QUAD) GREATER THAN OR EQUAL TO 3YO PRESERVATIVE FREE IM: ICD-10-PCS | Mod: S$GLB,,, | Performed by: INTERNAL MEDICINE

## 2019-11-06 PROCEDURE — 87209 SMEAR COMPLEX STAIN: CPT

## 2019-11-06 PROCEDURE — 87046 STOOL CULTR AEROBIC BACT EA: CPT

## 2019-11-06 PROCEDURE — 99499 UNLISTED E&M SERVICE: CPT | Mod: S$GLB,,, | Performed by: INTERNAL MEDICINE

## 2019-11-06 PROCEDURE — 87427 SHIGA-LIKE TOXIN AG IA: CPT | Mod: 59

## 2019-11-07 LAB — O+P STL TRI STN: NORMAL

## 2019-11-08 LAB
E COLI SXT1 STL QL IA: NEGATIVE
E COLI SXT2 STL QL IA: NEGATIVE

## 2019-11-09 LAB
BACTERIA STL CULT: NORMAL
BACTERIA STL CULT: NORMAL

## 2019-11-11 ENCOUNTER — PATIENT MESSAGE (OUTPATIENT)
Dept: FAMILY MEDICINE | Facility: CLINIC | Age: 44
End: 2019-11-11

## 2019-11-13 ENCOUNTER — OFFICE VISIT (OUTPATIENT)
Dept: FAMILY MEDICINE | Facility: CLINIC | Age: 44
End: 2019-11-13
Payer: COMMERCIAL

## 2019-11-13 VITALS
HEIGHT: 64 IN | OXYGEN SATURATION: 98 % | TEMPERATURE: 98 F | DIASTOLIC BLOOD PRESSURE: 85 MMHG | BODY MASS INDEX: 32.5 KG/M2 | SYSTOLIC BLOOD PRESSURE: 127 MMHG | WEIGHT: 190.38 LBS | HEART RATE: 89 BPM

## 2019-11-13 DIAGNOSIS — K43.2 INCISIONAL HERNIA WITHOUT OBSTRUCTION OR GANGRENE: ICD-10-CM

## 2019-11-13 DIAGNOSIS — N89.8 VAGINAL DISCHARGE: ICD-10-CM

## 2019-11-13 DIAGNOSIS — R30.0 DYSURIA: ICD-10-CM

## 2019-11-13 DIAGNOSIS — I10 ESSENTIAL (PRIMARY) HYPERTENSION: Chronic | ICD-10-CM

## 2019-11-13 DIAGNOSIS — J01.90 ACUTE RHINOSINUSITIS: ICD-10-CM

## 2019-11-13 DIAGNOSIS — R19.7 DIARRHEA, UNSPECIFIED TYPE: Primary | ICD-10-CM

## 2019-11-13 PROCEDURE — 99999 PR PBB SHADOW E&M-EST. PATIENT-LVL IV: ICD-10-PCS | Mod: PBBFAC,,, | Performed by: NURSE PRACTITIONER

## 2019-11-13 PROCEDURE — 99214 PR OFFICE/OUTPT VISIT, EST, LEVL IV, 30-39 MIN: ICD-10-PCS | Mod: S$GLB,,, | Performed by: NURSE PRACTITIONER

## 2019-11-13 PROCEDURE — 3008F PR BODY MASS INDEX (BMI) DOCUMENTED: ICD-10-PCS | Mod: CPTII,S$GLB,, | Performed by: NURSE PRACTITIONER

## 2019-11-13 PROCEDURE — 87481 CANDIDA DNA AMP PROBE: CPT | Mod: 59

## 2019-11-13 PROCEDURE — 3079F PR MOST RECENT DIASTOLIC BLOOD PRESSURE 80-89 MM HG: ICD-10-PCS | Mod: CPTII,S$GLB,, | Performed by: NURSE PRACTITIONER

## 2019-11-13 PROCEDURE — 3079F DIAST BP 80-89 MM HG: CPT | Mod: CPTII,S$GLB,, | Performed by: NURSE PRACTITIONER

## 2019-11-13 PROCEDURE — 87086 URINE CULTURE/COLONY COUNT: CPT

## 2019-11-13 PROCEDURE — 99999 PR PBB SHADOW E&M-EST. PATIENT-LVL IV: CPT | Mod: PBBFAC,,, | Performed by: NURSE PRACTITIONER

## 2019-11-13 PROCEDURE — 3074F SYST BP LT 130 MM HG: CPT | Mod: CPTII,S$GLB,, | Performed by: NURSE PRACTITIONER

## 2019-11-13 PROCEDURE — 99214 OFFICE O/P EST MOD 30 MIN: CPT | Mod: S$GLB,,, | Performed by: NURSE PRACTITIONER

## 2019-11-13 PROCEDURE — 3008F BODY MASS INDEX DOCD: CPT | Mod: CPTII,S$GLB,, | Performed by: NURSE PRACTITIONER

## 2019-11-13 PROCEDURE — 3074F PR MOST RECENT SYSTOLIC BLOOD PRESSURE < 130 MM HG: ICD-10-PCS | Mod: CPTII,S$GLB,, | Performed by: NURSE PRACTITIONER

## 2019-11-13 RX ORDER — PROMETHAZINE HYDROCHLORIDE 25 MG/1
TABLET ORAL
COMMUNITY
Start: 2019-10-21 | End: 2020-06-02 | Stop reason: SDUPTHER

## 2019-11-13 NOTE — PATIENT INSTRUCTIONS
We are sending your urine and vaginosis swab, I will call you with results.  You can try a probiotic daily or yogurt daily to help with the diarrhea.  If it persists and you want to try Bentyl, let me know.   I would call Dr. Rai and schedule a follow-up since the diverticulitis has resolved.

## 2019-11-14 ENCOUNTER — PATIENT MESSAGE (OUTPATIENT)
Dept: SURGERY | Facility: CLINIC | Age: 44
End: 2019-11-14

## 2019-11-14 NOTE — PROGRESS NOTES
History of Present Illness   Sherrill Ennis is a 44 y.o. woman with medical history as listed below who presents today for evaluation of diarrhea x 2 weeks. She was seen by myself 10/10/2019 and CT abdomen revealed acute diverticulitis. She was treated with Ciprofloxacin and Flagyl- which she has completed. Since that time, she has had diarrhea. Initially loose to watery with about 5-8 episodes per day. Today it was a bit more formed, and she had only 2 episodes. She has cramping prior to bowel movement. She denies other abdominal pain, BRBPR, black tarry stool, nausea, vomiting, constipation, or fevers.     She also reports that about two days ago she noticed dysuria, vaginal discharge with odor, and urinary frequency.    She also has sinus symptoms with congestion, pressure, and post nasal drip. She has not tried OTC medication for symptoms.    She has no additional complaints and is otherwise healthy on today's visit.    Past Medical History:   Diagnosis Date    Anxiety     Diverticulosis     GERD (gastroesophageal reflux disease)     Herpes simplex virus (HSV) infection     Hypertension     Iron deficiency anemia due to chronic blood loss 2018    Resolved after hysterectomy    Mental disorder     RLS (restless legs syndrome)        Past Surgical History:   Procedure Laterality Date    BLADDER REPAIR N/A 2018    Procedure: REPAIR, BLADDER;  Surgeon: NELIA Le MD;  Location: [facility] OR;  Service: Urology;  Laterality: N/A;     SECTION      x4    CHOLECYSTECTOMY      CYSTOGRAM  2018    Procedure: CYSTOGRAM;  Surgeon: NELIA Le MD;  Location: [facility] OR;  Service: Urology;;    CYSTOGRAM N/A 2018    Procedure: CYSTOGRAM;  Surgeon: NELIA Le MD;  Location: [facility] OR;  Service: Urology;  Laterality: N/A;  RN Phone Pre op 18.    CYSTOSCOPY W/ RETROGRADES Bilateral 2018    Procedure: CYSTOSCOPY, WITH RETROGRADE pyelLOGRAM;  Surgeon: NELIA Padgett  MD Mimi;  Location: [facility] OR;  Service: Urology;  Laterality: Bilateral;    CYSTOSCOPY W/ RETROGRADES Bilateral 8/29/2018    Procedure: CYSTOSCOPY, WITH RETROGRADE PYELOGRAM;  Surgeon: NELIA Le MD;  Location: [facility] OR;  Service: Urology;  Laterality: Bilateral;  RN PRE OP 8/27/2018    HERNIA REPAIR      HYSTERECTOMY  06/20/2018    LAPAROSCOPIC SALPINGECTOMY Bilateral 6/20/2018    Procedure: SALPINGECTOMY, LAPAROSCOPIC;  Surgeon: Vince Garcia MD;  Location: [facility] OR;  Service: OB/GYN;  Laterality: Bilateral;    LAPAROSCOPIC SUPRACERVICAL HYSTERECTOMY N/A 6/20/2018    Procedure: [id number];  Surgeon: Vince Garcia MD;  Location: [facility] OR;  Service: OB/GYN;  Laterality: N/A;  1ST CASE  RN PRE OP 6/13/2018    LAPAROSCOPIC SURGICAL REMOVAL OF CYST OF OVARY Right 6/20/2018    Procedure: EXCISION, CYST, OVARY, LAPAROSCOPIC;  Surgeon: Vince Garcia MD;  Location: [facility] OR;  Service: OB/GYN;  Laterality: Right;    TUBAL LIGATION         Social History     Socioeconomic History    Marital status:      Spouse name: Not on file    Number of children: Not on file    Years of education: Not on file    Highest education level: Not on file   Occupational History    Not on file   Social Needs    Financial resource strain: Not hard at all    Food insecurity:     Worry: Never true     Inability: Never true    Transportation needs:     Medical: No     Non-medical: No   Tobacco Use    Smoking status: Current Every Day Smoker     Packs/day: 0.50     Years: 15.00     Pack years: 7.50     Types: Cigarettes    Smokeless tobacco: Never Used   Substance and Sexual Activity    Alcohol use: Yes     Frequency: Monthly or less     Drinks per session: 3 or 4     Binge frequency: Never    Drug use: No    Sexual activity: Yes     Partners: Male     Birth control/protection: See Surgical Hx     Comment:    Lifestyle    Physical activity:     Days per week: 0 days     Minutes per  "session: 0 min    Stress: Not at all   Relationships    Social connections:     Talks on phone: More than three times a week     Gets together: More than three times a week     Attends [employer] service: Not on file     Active member of club or organization: No     Attends meetings of clubs or organizations: Never     Relationship status:    Other Topics Concern    Not on file   Social History Narrative     since 2016    Together since 2013    He drives 18-wheelers    She is the  for a dental office    Previously  and     Lives with her  and youngest daughter.     with three daughters from previous marriage also       Family History   Problem Relation Age of Onset    Hypertension Mother     Stroke Mother     Aneurysm Mother     Hypertension Father     Hypertension Brother     Hypertension Maternal Grandfather     Heart disease Maternal Grandfather         MI    Cancer Maternal Grandfather     Cancer Paternal Grandmother     Breast cancer Neg Hx     Colon cancer Neg Hx     Ovarian cancer Neg Hx        Review of Systems  Review of Systems   Constitutional: Negative for chills and fever.   HENT: Positive for congestion, sinus pain and sore throat. Negative for ear pain.    Cardiovascular: Negative for chest pain and palpitations.   Gastrointestinal: Positive for diarrhea. Negative for abdominal pain, blood in stool, constipation, heartburn, melena, nausea and vomiting.   Genitourinary: Positive for dysuria. Negative for flank pain and hematuria.        Positive for vaginal discharge.     A complete review of systems was otherwise negative.    Physical Exam  /85   Pulse 89   Temp 98.4 °F (36.9 °C) (Oral)   Ht 5' 4" (1.626 m)   Wt 86.4 kg (190 lb 5.9 oz)   LMP 06/12/2018 (LMP Unknown) Comment: SAH on 06/20/18  SpO2 98%   BMI 32.68 kg/m²   General appearance: alert, appears stated age, cooperative and no distress  Eyes: negative findings: " lids and lashes normal and conjunctivae and sclerae normal  Ears: normal TM's and external ear canals both ears and bilateral middle ear effusion  Nose: clear discharge, moderate congestion, turbinates red, no sinus tenderness  Throat: lips, mucosa, and tongue normal; teeth and gums normal  Lungs: clear to auscultation bilaterally  Heart: regular rate and rhythm, S1, S2 normal, no murmur, click, rub or gallop  Abdomen: bowel sounds normal, abdomen is soft and non-distended, there is TTP with palpable incisional hernia to LLQ that is not reproducible, there is no guarding, rigidity, or rebound tenderness  Pelvic: deferred and self swab performed  Lymph nodes: Cervical, supraclavicular, and axillary nodes normal.  Neurologic: Grossly normal    Assessment/Plan  Diarrhea, unspecified type  Her stool studies were negative.  Her symptoms seem to be improving.  I recommend she start a probiotic daily and increase fiber in her diet.  I have offered Bentyl, but she prefers to wait as her symptoms were much better today.  ER precautions discussed.  Schedule follow-up with general surgery.  RTC PRN.    Vaginal discharge  Vaginos vs UTI given recent diarrhea.  Check urine culture and vaginosis swab and treat as indicated.  -     Urine culture  -     VAGINOSIS SCREEN BY DNA PROBE    Dysuria  As above.  -     Urine culture  -     VAGINOSIS SCREEN BY DNA PROBE    Acute rhinosinusitis  Recommend she start Zyrtec and Flonase daily.  Tylenol for headaches.  Antibiotics not indicated.    Incisional hernia without obstruction or gangrene  Recommend close follow-up with general surgery.    Essential (primary) hypertension  The current medical regimen is effective;  continue present plan and medications.    Patient has verbalized understanding and is in agreement with plan of care.    Follow up if symptoms worsen or fail to improve.

## 2019-11-15 LAB
BACTERIA UR CULT: NORMAL
BACTERIA UR CULT: NORMAL

## 2019-11-16 LAB
BACTERIAL VAGINOSIS DNA: NEGATIVE
CANDIDA GLABRATA DNA: NEGATIVE
CANDIDA KRUSEI DNA: NEGATIVE
CANDIDA RRNA VAG QL PROBE: NEGATIVE
T VAGINALIS RRNA GENITAL QL PROBE: NEGATIVE

## 2019-11-18 ENCOUNTER — PATIENT MESSAGE (OUTPATIENT)
Dept: FAMILY MEDICINE | Facility: CLINIC | Age: 44
End: 2019-11-18

## 2019-11-18 DIAGNOSIS — R19.7 DIARRHEA, UNSPECIFIED TYPE: Primary | ICD-10-CM

## 2019-11-18 RX ORDER — DICYCLOMINE HYDROCHLORIDE 20 MG/1
20 TABLET ORAL 3 TIMES DAILY
Qty: 90 TABLET | Refills: 0 | Status: SHIPPED | OUTPATIENT
Start: 2019-11-18 | End: 2019-12-18

## 2019-12-09 ENCOUNTER — PATIENT MESSAGE (OUTPATIENT)
Dept: FAMILY MEDICINE | Facility: CLINIC | Age: 44
End: 2019-12-09

## 2020-02-14 ENCOUNTER — PATIENT MESSAGE (OUTPATIENT)
Dept: FAMILY MEDICINE | Facility: CLINIC | Age: 45
End: 2020-02-14

## 2020-02-14 DIAGNOSIS — K29.00 ACUTE GASTRITIS WITHOUT HEMORRHAGE, UNSPECIFIED GASTRITIS TYPE: ICD-10-CM

## 2020-02-14 RX ORDER — PANTOPRAZOLE SODIUM 40 MG/1
40 TABLET, DELAYED RELEASE ORAL 2 TIMES DAILY
Qty: 90 TABLET | Refills: 0 | Status: SHIPPED | OUTPATIENT
Start: 2020-02-14 | End: 2020-04-17 | Stop reason: SDUPTHER

## 2020-04-10 DIAGNOSIS — I10 ESSENTIAL (PRIMARY) HYPERTENSION: Chronic | ICD-10-CM

## 2020-04-12 RX ORDER — AMLODIPINE BESYLATE 10 MG/1
TABLET ORAL
Qty: 90 TABLET | Refills: 0 | Status: SHIPPED | OUTPATIENT
Start: 2020-04-12 | End: 2020-04-17 | Stop reason: SDUPTHER

## 2020-04-17 DIAGNOSIS — I10 ESSENTIAL (PRIMARY) HYPERTENSION: Chronic | ICD-10-CM

## 2020-04-17 DIAGNOSIS — K29.00 ACUTE GASTRITIS WITHOUT HEMORRHAGE, UNSPECIFIED GASTRITIS TYPE: ICD-10-CM

## 2020-04-17 RX ORDER — PANTOPRAZOLE SODIUM 40 MG/1
40 TABLET, DELAYED RELEASE ORAL 2 TIMES DAILY
Qty: 90 TABLET | Refills: 0 | Status: SHIPPED | OUTPATIENT
Start: 2020-04-17 | End: 2020-08-25 | Stop reason: SDUPTHER

## 2020-04-17 RX ORDER — AMLODIPINE BESYLATE 10 MG/1
10 TABLET ORAL DAILY
Qty: 90 TABLET | Refills: 0 | Status: SHIPPED | OUTPATIENT
Start: 2020-04-17 | End: 2020-07-07 | Stop reason: SDUPTHER

## 2020-06-02 ENCOUNTER — HOSPITAL ENCOUNTER (OUTPATIENT)
Dept: RADIOLOGY | Facility: HOSPITAL | Age: 45
Discharge: HOME | End: 2020-06-02
Attending: NURSE PRACTITIONER
Payer: COMMERCIAL

## 2020-06-02 ENCOUNTER — OFFICE VISIT (OUTPATIENT)
Dept: FAMILY MEDICINE | Facility: CLINIC | Age: 45
End: 2020-06-02
Payer: COMMERCIAL

## 2020-06-02 VITALS
WEIGHT: 201.25 LBS | HEIGHT: 64 IN | OXYGEN SATURATION: 96 % | TEMPERATURE: 98 F | BODY MASS INDEX: 34.36 KG/M2 | DIASTOLIC BLOOD PRESSURE: 82 MMHG | HEART RATE: 94 BPM | SYSTOLIC BLOOD PRESSURE: 128 MMHG

## 2020-06-02 DIAGNOSIS — K57.92 ACUTE DIVERTICULITIS: Primary | ICD-10-CM

## 2020-06-02 DIAGNOSIS — R10.32 CONTINUOUS LLQ ABDOMINAL PAIN: ICD-10-CM

## 2020-06-02 DIAGNOSIS — K57.92 ACUTE DIVERTICULITIS: ICD-10-CM

## 2020-06-02 DIAGNOSIS — E66.9 OBESITY, CLASS I, BMI 30-34.9: Chronic | ICD-10-CM

## 2020-06-02 DIAGNOSIS — K21.9 GASTROESOPHAGEAL REFLUX DISEASE WITHOUT ESOPHAGITIS: Chronic | ICD-10-CM

## 2020-06-02 DIAGNOSIS — I10 ESSENTIAL (PRIMARY) HYPERTENSION: Chronic | ICD-10-CM

## 2020-06-02 PROCEDURE — 99999 PR PBB SHADOW E&M-EST. PATIENT-LVL IV: CPT | Mod: PBBFAC,,, | Performed by: NURSE PRACTITIONER

## 2020-06-02 PROCEDURE — 74019 RADEX ABDOMEN 2 VIEWS: CPT | Mod: TC,FY,PO

## 2020-06-02 PROCEDURE — 3074F PR MOST RECENT SYSTOLIC BLOOD PRESSURE < 130 MM HG: ICD-10-PCS | Mod: CPTII,S$GLB,, | Performed by: NURSE PRACTITIONER

## 2020-06-02 PROCEDURE — 99214 OFFICE O/P EST MOD 30 MIN: CPT | Mod: S$GLB,,, | Performed by: NURSE PRACTITIONER

## 2020-06-02 PROCEDURE — 74019 XR ABDOMEN FLAT AND ERECT: ICD-10-PCS | Mod: 26,,, | Performed by: RADIOLOGY

## 2020-06-02 PROCEDURE — 3079F PR MOST RECENT DIASTOLIC BLOOD PRESSURE 80-89 MM HG: ICD-10-PCS | Mod: CPTII,S$GLB,, | Performed by: NURSE PRACTITIONER

## 2020-06-02 PROCEDURE — 3008F BODY MASS INDEX DOCD: CPT | Mod: CPTII,S$GLB,, | Performed by: NURSE PRACTITIONER

## 2020-06-02 PROCEDURE — 3008F PR BODY MASS INDEX (BMI) DOCUMENTED: ICD-10-PCS | Mod: CPTII,S$GLB,, | Performed by: NURSE PRACTITIONER

## 2020-06-02 PROCEDURE — 3074F SYST BP LT 130 MM HG: CPT | Mod: CPTII,S$GLB,, | Performed by: NURSE PRACTITIONER

## 2020-06-02 PROCEDURE — 74019 RADEX ABDOMEN 2 VIEWS: CPT | Mod: 26,,, | Performed by: RADIOLOGY

## 2020-06-02 PROCEDURE — 99214 PR OFFICE/OUTPT VISIT, EST, LEVL IV, 30-39 MIN: ICD-10-PCS | Mod: S$GLB,,, | Performed by: NURSE PRACTITIONER

## 2020-06-02 PROCEDURE — 99999 PR PBB SHADOW E&M-EST. PATIENT-LVL IV: ICD-10-PCS | Mod: PBBFAC,,, | Performed by: NURSE PRACTITIONER

## 2020-06-02 PROCEDURE — 3079F DIAST BP 80-89 MM HG: CPT | Mod: CPTII,S$GLB,, | Performed by: NURSE PRACTITIONER

## 2020-06-02 RX ORDER — PROMETHAZINE HYDROCHLORIDE 25 MG/1
25 TABLET ORAL EVERY 6 HOURS PRN
Qty: 40 TABLET | Refills: 1 | Status: SHIPPED | OUTPATIENT
Start: 2020-06-02 | End: 2020-06-12

## 2020-06-02 RX ORDER — METRONIDAZOLE 500 MG/1
500 TABLET ORAL EVERY 12 HOURS
Qty: 20 TABLET | Refills: 0 | Status: SHIPPED | OUTPATIENT
Start: 2020-06-02 | End: 2020-06-12

## 2020-06-02 RX ORDER — CIPROFLOXACIN 500 MG/1
500 TABLET ORAL EVERY 12 HOURS
Qty: 20 TABLET | Refills: 0 | Status: SHIPPED | OUTPATIENT
Start: 2020-06-02 | End: 2020-06-12

## 2020-06-02 NOTE — PROGRESS NOTES
"History of Present Illness   Sherrill Ennis is a 45 y.o. woman with medical history as listed below who presents today for evaluation of abdominal pain x 3 days. Pain is located across lower abdomen, most significant to LLQ. Pain is constant with intermittent worsening and described as a sharp pain with bloating/fullness and rectal pressure. She reports diarrhea two days prior with no bowel movement since that time. She reports nausea without vomiting. She denies BRBPR or black tarry stool. She denies fevers. She reports "this feels like a diverticulitis flare." She has tried NSAID for pain relief without improvement in symptoms. She has no additional complaints and is otherwise healthy on today's visit.    Past Medical History:   Diagnosis Date    Anxiety     Diverticulosis     GERD (gastroesophageal reflux disease)     Herpes simplex virus (HSV) infection     Hypertension     Iron deficiency anemia due to chronic blood loss 2018    Resolved after hysterectomy    Mental disorder     RLS (restless legs syndrome)        Past Surgical History:   Procedure Laterality Date    BLADDER REPAIR N/A 2018    Procedure: REPAIR, BLADDER;  Surgeon: NELIA Le MD;  Location: [facility] OR;  Service: Urology;  Laterality: N/A;     SECTION      x4    CHOLECYSTECTOMY      CYSTOGRAM  2018    Procedure: CYSTOGRAM;  Surgeon: NELIA Le MD;  Location: [facility] OR;  Service: Urology;;    CYSTOGRAM N/A 2018    Procedure: CYSTOGRAM;  Surgeon: NELIA Le MD;  Location: [facility] OR;  Service: Urology;  Laterality: N/A;  RN Phone Pre op 18.    CYSTOSCOPY W/ RETROGRADES Bilateral 2018    Procedure: CYSTOSCOPY, WITH RETROGRADE pyelLOGRAM;  Surgeon: NELIA Le MD;  Location: [facility] OR;  Service: Urology;  Laterality: Bilateral;    CYSTOSCOPY W/ RETROGRADES Bilateral 2018    Procedure: CYSTOSCOPY, WITH RETROGRADE PYELOGRAM;  Surgeon: NELIA Le MD;  " Location: [facility] OR;  Service: Urology;  Laterality: Bilateral;  RN PRE OP 8/27/2018    HERNIA REPAIR      HYSTERECTOMY  06/20/2018    LAPAROSCOPIC SALPINGECTOMY Bilateral 6/20/2018    Procedure: SALPINGECTOMY, LAPAROSCOPIC;  Surgeon: Vince Garcia MD;  Location: [facility] OR;  Service: OB/GYN;  Laterality: Bilateral;    LAPAROSCOPIC SUPRACERVICAL HYSTERECTOMY N/A 6/20/2018    Procedure: [id number];  Surgeon: Vince Garcia MD;  Location: [facility] OR;  Service: OB/GYN;  Laterality: N/A;  1ST CASE  RN PRE OP 6/13/2018    LAPAROSCOPIC SURGICAL REMOVAL OF CYST OF OVARY Right 6/20/2018    Procedure: EXCISION, CYST, OVARY, LAPAROSCOPIC;  Surgeon: Vince Garcia MD;  Location: [facility] OR;  Service: OB/GYN;  Laterality: Right;    TUBAL LIGATION         Social History     Socioeconomic History    Marital status:      Spouse name: Not on file    Number of children: Not on file    Years of education: Not on file    Highest education level: Not on file   Occupational History    Not on file   Social Needs    Financial resource strain: Not hard at all    Food insecurity:     Worry: Never true     Inability: Never true    Transportation needs:     Medical: No     Non-medical: No   Tobacco Use    Smoking status: Current Every Day Smoker     Packs/day: 0.50     Years: 15.00     Pack years: 7.50     Types: Cigarettes    Smokeless tobacco: Never Used   Substance and Sexual Activity    Alcohol use: Yes     Frequency: Monthly or less     Drinks per session: 3 or 4     Binge frequency: Never    Drug use: No    Sexual activity: Yes     Partners: Male     Birth control/protection: See Surgical Hx     Comment:    Lifestyle    Physical activity:     Days per week: 0 days     Minutes per session: 0 min    Stress: Not at all   Relationships    Social connections:     Talks on phone: More than three times a week     Gets together: More than three times a week     Attends Yars[employer] service: Not on  "file     Active member of club or organization: No     Attends meetings of clubs or organizations: Never     Relationship status:    Other Topics Concern    Not on file   Social History Narrative     since 2016    Together since 2013    He drives 18-wheelers    She is the  for a dental office    Previously  and     Lives with her  and youngest daughter.     with three daughters from previous marriage also       Family History   Problem Relation Age of Onset    Hypertension Mother     Stroke Mother     Aneurysm Mother     Hypertension Father     Hypertension Brother     Hypertension Maternal Grandfather     Heart disease Maternal Grandfather         MI    Cancer Maternal Grandfather     Cancer Paternal Grandmother     Breast cancer Neg Hx     Colon cancer Neg Hx     Ovarian cancer Neg Hx        Review of Systems  Review of Systems   Constitutional: Negative for chills, fever and malaise/fatigue.   Gastrointestinal: Positive for abdominal pain, diarrhea (resolved), heartburn and nausea. Negative for blood in stool, constipation, melena and vomiting.   Genitourinary: Negative for dysuria, flank pain and hematuria.     A complete review of systems was otherwise negative.    Physical Exam  /82   Pulse 94   Temp 98.1 °F (36.7 °C) (Oral)   Ht 5' 4" (1.626 m)   Wt 91.3 kg (201 lb 4.5 oz)   LMP 06/12/2018 (LMP Unknown) Comment: SAH on 06/20/18  SpO2 96%   BMI 34.55 kg/m²   General appearance: alert, appears stated age, cooperative, no distress and appears uncomfortable  Back: symmetric, no curvature. ROM normal. No CVA tenderness.  Lungs: clear to auscultation bilaterally  Heart: regular rate and rhythm, S1, S2 normal, no murmur, click, rub or gallop  Abdomen: hypoactive bowel sounds, abdomen is soft and non-distended, there is TTP to LLQ and suprapubic region, there is no rebound tenderness, guarding, or rigidity  Skin: Skin color, texture, " turgor normal. No rashes or lesions  Lymph nodes: Cervical, supraclavicular, and axillary nodes normal.  Neurologic: Grossly normal    Assessment/Plan  Acute diverticulitis  Will treat for acute diverticulitis given presentation today and history- Cipro and Flagyl with Phenergan for nausea PRN. We will obtain x-ray abdomen. Discussed with patient CT scan would be preferred imaging, but she reports unable to afford at this time. Discussed that CT may be indicated pending her x-ray results. ER precautions discussed at length with patient. Further work-up and management pending results.  -     promethazine (PHENERGAN) 25 MG tablet; Take 1 tablet (25 mg total) by mouth every 6 (six) hours as needed for Nausea.  Dispense: 40 tablet; Refill: 1  -     ciprofloxacin HCl (CIPRO) 500 MG tablet; Take 1 tablet (500 mg total) by mouth every 12 (twelve) hours. for 10 days  Dispense: 20 tablet; Refill: 0  -     metroNIDAZOLE (FLAGYL) 500 MG tablet; Take 1 tablet (500 mg total) by mouth every 12 (twelve) hours. for 10 days  Dispense: 20 tablet; Refill: 0  -     X-Ray Abdomen Flat And Erect; Future; Expected date: 06/02/2020    Continuous LLQ abdominal pain  As above.  -     promethazine (PHENERGAN) 25 MG tablet; Take 1 tablet (25 mg total) by mouth every 6 (six) hours as needed for Nausea.  Dispense: 40 tablet; Refill: 1  -     ciprofloxacin HCl (CIPRO) 500 MG tablet; Take 1 tablet (500 mg total) by mouth every 12 (twelve) hours. for 10 days  Dispense: 20 tablet; Refill: 0  -     metroNIDAZOLE (FLAGYL) 500 MG tablet; Take 1 tablet (500 mg total) by mouth every 12 (twelve) hours. for 10 days  Dispense: 20 tablet; Refill: 0  -     X-Ray Abdomen Flat And Erect; Future; Expected date: 06/02/2020    Gastroesophageal reflux disease without esophagitis  The current medical regimen is effective;  continue present plan and medications.    Essential (primary) hypertension  The current medical regimen is effective;  continue present plan and  medications.    Obesity, Class I, BMI 30-34.9  The patient is asked to make an attempt to improve diet and exercise patterns to aid in medical management of this problem.    Patient has verbalized understanding and is in agreement with plan of care.    Follow up if symptoms worsen or fail to improve.

## 2020-06-02 NOTE — PATIENT INSTRUCTIONS
Diverticulitis    Some people get pouches along the wall of the colon as they get older. The pouches, called diverticuli, usually cause no symptoms. If the pouches become blocked, you can get an infection. This infection is called diverticulitis. It causes pain in your lower abdomen and fever. If not treated, it can become a serious condition, causing an abscess to form inside the pouch. The abscess may block the intestinal tract even or rupture, spreading infection throughout the abdomen.  When treatment is started early, oral antibiotics alone may be enough to cure diverticulitis. This method is tried first. But, if you don't improve or if your condition gets worse while using oral antibiotics, you may need to be admitted to the hospital for IV antibiotics. Severe cases may require surgery.  Home care  The following guidelines will help you care for yourself at home:  · During the acute illness, rest and follow your healthcare provider's instructions about diet. Sometimes you will need to follow a clear liquid diet to rest your bowel. Once your symptoms are better, you may be told to follow a low-fiber diet for some time. Include foods like:  ¨ Flake cereal, mashed potatoes, pancakes, waffles, pasta, white bread, rice, applesauce, bananas, eggs, fish, poultry, tofu, and cooked soft vegetables  · Take antibiotics exactly as instructed. Don't miss any doses or stop taking the medication, even if you feel better.  · Monitor your temperature and tell your healthcare provider if you have rising temperatures.  Preventing future attacks  Once you have an episode of diverticulitis, you are at risk for having it again. After you have recovered from this episode, you may be able to lower your risk by eating a high-fiber diet (20 gm/day to 35 gm/day of fiber). This cleans out the colon pouches that already exist and may prevent new ones from forming. Foods high in fiber include fresh fruits and edible peelings, raw or  lightly cooked vegetables, whole grain cereals and breads, dried beans and peas, and bran.  Other steps that can help prevent future attacks include:  · Take your medicines, such as antibiotics, as your healthcare provider says.  · Drink 6 to 8 glasses of water every day, unless told otherwise.  · Use a heating pad or hot water bottle to help abdominal cramping or pain.  · Begin an exercise program. Ask your healthcare provider how to get started. You can benefit from simple activities such as walking or gardening.  · Treat diarrhea with a bland diet. Start with liquids only; then slowly add fiber over time.  · Watch for changes in your bowel movements (constipation to diarrhea). Avoid constipation by eating a high fiber diet and taking a stool softener if needed.  · Get plenty of rest and sleep.  Follow-up care  Follow up with your healthcare provider as advised or sooner if you are not getting better in the next 2 days.  When to seek medical advice  Call your healthcare provider right away if any of these occur:  · Fever of 100.4°F (38°C) or higher, or as directed by your healthcare provider  · Repeated vomiting or swelling of the abdomen  · Weakness, dizziness, light-headedness  · Pain in your abdomen that gets worse, severe, or spreads to your back  · Pain that moves to the right lower abdomen  · Rectal bleeding (stools that are red, black or maroon color)  · Unexpected vaginal bleeding  Date Last Reviewed: 9/1/2016  © 7253-4918 [employer]. [address] [place] [address], [place] [address]. All rights reserved. This information is not intended as a substitute for professional medical care. Always follow your healthcare professional's instructions.

## 2020-06-11 ENCOUNTER — OFFICE VISIT (OUTPATIENT)
Dept: FAMILY MEDICINE | Facility: CLINIC | Age: 45
End: 2020-06-11
Payer: COMMERCIAL

## 2020-06-11 VITALS
OXYGEN SATURATION: 97 % | SYSTOLIC BLOOD PRESSURE: 124 MMHG | BODY MASS INDEX: 34.36 KG/M2 | HEIGHT: 64 IN | TEMPERATURE: 97 F | DIASTOLIC BLOOD PRESSURE: 86 MMHG | WEIGHT: 201.25 LBS | HEART RATE: 82 BPM

## 2020-06-11 DIAGNOSIS — Z23 NEED FOR 23-POLYVALENT PNEUMOCOCCAL POLYSACCHARIDE VACCINE: ICD-10-CM

## 2020-06-11 DIAGNOSIS — Z11.4 ENCOUNTER FOR SCREENING FOR HIV: ICD-10-CM

## 2020-06-11 DIAGNOSIS — K21.9 GASTROESOPHAGEAL REFLUX DISEASE WITHOUT ESOPHAGITIS: Chronic | ICD-10-CM

## 2020-06-11 DIAGNOSIS — F43.22 ADJUSTMENT DISORDER WITH ANXIOUS MOOD: Chronic | ICD-10-CM

## 2020-06-11 DIAGNOSIS — Z00.00 ROUTINE MEDICAL EXAM: Primary | ICD-10-CM

## 2020-06-11 DIAGNOSIS — I10 ESSENTIAL (PRIMARY) HYPERTENSION: Chronic | ICD-10-CM

## 2020-06-11 DIAGNOSIS — G43.019 INTRACTABLE MIGRAINE WITHOUT AURA AND WITHOUT STATUS MIGRAINOSUS: ICD-10-CM

## 2020-06-11 DIAGNOSIS — E66.9 OBESITY, CLASS I, BMI 30-34.9: Chronic | ICD-10-CM

## 2020-06-11 PROCEDURE — 90732 PNEUMOCOCCAL POLYSACCHARIDE VACCINE 23-VALENT =>2YO SQ IM: ICD-10-PCS | Mod: S$GLB,,, | Performed by: NURSE PRACTITIONER

## 2020-06-11 PROCEDURE — 90732 PPSV23 VACC 2 YRS+ SUBQ/IM: CPT | Mod: S$GLB,,, | Performed by: NURSE PRACTITIONER

## 2020-06-11 PROCEDURE — 90471 IMMUNIZATION ADMIN: CPT | Mod: S$GLB,,, | Performed by: NURSE PRACTITIONER

## 2020-06-11 PROCEDURE — 99999 PR PBB SHADOW E&M-EST. PATIENT-LVL IV: ICD-10-PCS | Mod: PBBFAC,,, | Performed by: NURSE PRACTITIONER

## 2020-06-11 PROCEDURE — 99396 PREV VISIT EST AGE 40-64: CPT | Mod: 25,S$GLB,, | Performed by: NURSE PRACTITIONER

## 2020-06-11 PROCEDURE — 3074F SYST BP LT 130 MM HG: CPT | Mod: CPTII,S$GLB,, | Performed by: NURSE PRACTITIONER

## 2020-06-11 PROCEDURE — 3079F PR MOST RECENT DIASTOLIC BLOOD PRESSURE 80-89 MM HG: ICD-10-PCS | Mod: CPTII,S$GLB,, | Performed by: NURSE PRACTITIONER

## 2020-06-11 PROCEDURE — 99396 PR PREVENTIVE VISIT,EST,40-64: ICD-10-PCS | Mod: 25,S$GLB,, | Performed by: NURSE PRACTITIONER

## 2020-06-11 PROCEDURE — 3079F DIAST BP 80-89 MM HG: CPT | Mod: CPTII,S$GLB,, | Performed by: NURSE PRACTITIONER

## 2020-06-11 PROCEDURE — 90471 PNEUMOCOCCAL POLYSACCHARIDE VACCINE 23-VALENT =>2YO SQ IM: ICD-10-PCS | Mod: S$GLB,,, | Performed by: NURSE PRACTITIONER

## 2020-06-11 PROCEDURE — 99999 PR PBB SHADOW E&M-EST. PATIENT-LVL IV: CPT | Mod: PBBFAC,,, | Performed by: NURSE PRACTITIONER

## 2020-06-11 PROCEDURE — 3074F PR MOST RECENT SYSTOLIC BLOOD PRESSURE < 130 MM HG: ICD-10-PCS | Mod: CPTII,S$GLB,, | Performed by: NURSE PRACTITIONER

## 2020-06-11 RX ORDER — SUMATRIPTAN SUCCINATE 100 MG/1
TABLET ORAL
Qty: 15 TABLET | Refills: 5 | Status: SHIPPED | OUTPATIENT
Start: 2020-06-11 | End: 2021-11-10

## 2020-06-11 NOTE — PROGRESS NOTES
Patient tolerated injection well.  Instructed to remain in lobby for 15 minutes and to report any adverse reactions right away.  Verbalized understanding.

## 2020-06-11 NOTE — PROGRESS NOTES
History of Present Illness   Sherrill Ennis is a 45 y.o. woman with medical history as listed below who presents today for routine physical exam. It has been about two years since her last routine physical. She is followed by OBGYN for her pap smear. She reports taking medications as prescribed without perceived SE. She was recently seen by myself with acute diverticulitis, feeling much better with antibiotic management. She reports worsening of migraine headache, now having two per month which has increased form baseline of one per one to two months. Excedrin migraine is not provided much relief. There has been no change in the character of her migraine headaches. We will address her HM today. She has no additional complaints and is otherwise healthy on today's visit.    Past Medical History:   Diagnosis Date    Anxiety     Diverticulosis     GERD (gastroesophageal reflux disease)     Herpes simplex virus (HSV) infection     Hypertension     Iron deficiency anemia due to chronic blood loss 2018    Resolved after hysterectomy    Mental disorder     RLS (restless legs syndrome)        Past Surgical History:   Procedure Laterality Date    BLADDER REPAIR N/A 2018    Procedure: REPAIR, BLADDER;  Surgeon: NELIA Le MD;  Location: [facility] OR;  Service: Urology;  Laterality: N/A;     SECTION      x4    CHOLECYSTECTOMY      CYSTOGRAM  2018    Procedure: CYSTOGRAM;  Surgeon: NELIA Le MD;  Location: [facility] OR;  Service: Urology;;    CYSTOGRAM N/A 2018    Procedure: CYSTOGRAM;  Surgeon: NELIA Le MD;  Location: [facility] OR;  Service: Urology;  Laterality: N/A;  RN Phone Pre op 18.    CYSTOSCOPY W/ RETROGRADES Bilateral 2018    Procedure: CYSTOSCOPY, WITH RETROGRADE pyelLOGRAM;  Surgeon: NELIA Le MD;  Location: [facility] OR;  Service: Urology;  Laterality: Bilateral;    CYSTOSCOPY W/ RETROGRADES Bilateral 2018    Procedure: CYSTOSCOPY,  WITH RETROGRADE PYELOGRAM;  Surgeon: NELIA Le MD;  Location: [facility] OR;  Service: Urology;  Laterality: Bilateral;  RN PRE OP 8/27/2018    HERNIA REPAIR      HYSTERECTOMY  06/20/2018    LAPAROSCOPIC SALPINGECTOMY Bilateral 6/20/2018    Procedure: SALPINGECTOMY, LAPAROSCOPIC;  Surgeon: Vince Garcia MD;  Location: [facility] OR;  Service: OB/GYN;  Laterality: Bilateral;    LAPAROSCOPIC SUPRACERVICAL HYSTERECTOMY N/A 6/20/2018    Procedure: [id number];  Surgeon: Vince Garcia MD;  Location: [facility] OR;  Service: OB/GYN;  Laterality: N/A;  1ST CASE  RN PRE OP 6/13/2018    LAPAROSCOPIC SURGICAL REMOVAL OF CYST OF OVARY Right 6/20/2018    Procedure: EXCISION, CYST, OVARY, LAPAROSCOPIC;  Surgeon: Vince Garcia MD;  Location: [facility] OR;  Service: OB/GYN;  Laterality: Right;    TUBAL LIGATION         Social History     Socioeconomic History    Marital status:      Spouse name: Not on file    Number of children: Not on file    Years of education: Not on file    Highest education level: Not on file   Occupational History    Not on file   Social Needs    Financial resource strain: Not hard at all    Food insecurity:     Worry: Never true     Inability: Never true    Transportation needs:     Medical: No     Non-medical: No   Tobacco Use    Smoking status: Current Every Day Smoker     Packs/day: 0.50     Years: 15.00     Pack years: 7.50     Types: Cigarettes    Smokeless tobacco: Never Used   Substance and Sexual Activity    Alcohol use: Yes     Frequency: Monthly or less     Drinks per session: 1 or 2     Binge frequency: Never    Drug use: No    Sexual activity: Yes     Partners: Male     Birth control/protection: See Surgical Hx     Comment:    Lifestyle    Physical activity:     Days per week: 5 days     Minutes per session: 20 min    Stress: Not at all   Relationships    Social connections:     Talks on phone: More than three times a week     Gets together: More  than three times a week     Attends [employer] service: Not on file     Active member of club or organization: No     Attends meetings of clubs or organizations: Never     Relationship status:    Other Topics Concern    Not on file   Social History Narrative     since 2016    Together since 2013    He drives 18-wheelers    She is the  for a dental office    Previously  and     Lives with her  and youngest daughter.     with three daughters from previous marriage also       Family History   Problem Relation Age of Onset    Hypertension Mother     Stroke Mother     Aneurysm Mother     Hypertension Father     Hypertension Brother     Hypertension Maternal Grandfather     Heart disease Maternal Grandfather         MI    Cancer Maternal Grandfather     Cancer Paternal Grandmother     Breast cancer Neg Hx     Colon cancer Neg Hx     Ovarian cancer Neg Hx        Review of Systems  Review of Systems   Constitutional: Negative for malaise/fatigue and weight loss.   HENT: Negative for hearing loss.    Eyes: Negative for blurred vision and double vision.   Respiratory: Negative for shortness of breath and wheezing.    Cardiovascular: Negative for chest pain, palpitations, orthopnea and leg swelling.   Gastrointestinal: Positive for heartburn. Negative for abdominal pain, blood in stool, constipation, diarrhea, melena, nausea and vomiting.   Genitourinary: Negative for flank pain and hematuria.   Musculoskeletal: Negative for back pain and neck pain.   Skin: Negative for rash.   Neurological: Positive for headaches. Negative for dizziness and loss of consciousness.   Endo/Heme/Allergies: Negative for polydipsia.   Psychiatric/Behavioral: Negative for depression. The patient is nervous/anxious. The patient does not have insomnia.      A complete review of systems was otherwise negative.    Physical Exam  /86   Pulse 82   Temp 97.2 °F (36.2 °C)  "(Temporal)   Ht 5' 4" (1.626 m)   Wt 91.3 kg (201 lb 4.5 oz)   LMP 06/12/2018 (LMP Unknown) Comment: [facility] on 06/20/18  SpO2 97%   BMI 34.55 kg/m²   General appearance: alert, appears stated age, cooperative and no distress  Eyes: conjunctivae/corneas clear. PERRL, EOM's intact. Fundi benign.  Ears: normal TM's and external ear canals both ears  Nose: Nares normal. Septum midline. Mucosa normal. No drainage or sinus tenderness.  Throat: lips, mucosa, and tongue normal; teeth and gums normal  Neck: no carotid bruit, no JVD, supple, symmetrical, trachea midline and thyroid not enlarged, symmetric, no tenderness/mass/nodules  Back: symmetric, no curvature. ROM normal. No CVA tenderness.  Lungs: clear to auscultation bilaterally  Heart: regular rate and rhythm, S1, S2 normal, no murmur, click, rub or gallop  Abdomen: soft, non-tender; bowel sounds normal; no masses,  no organomegaly  Pelvic: deferred  Extremities: extremities normal, atraumatic, no cyanosis or edema  Pulses: 2+ and symmetric  Skin: Skin color, texture, turgor normal. No rashes or lesions  Lymph nodes: Cervical, supraclavicular, and axillary nodes normal.  Neurologic: Grossly normal    Assessment/Plan  Routine medical exam  Age appropriate screening recommendations and HM discussed and updated.  Discussed importance of heart healthy diet and exercise.  Labs as listed below.  Routine follow-up 6 months pending results.  -     CBC auto differential; Future; Expected date: 06/11/2020  -     Comprehensive metabolic panel; Future; Expected date: 06/11/2020  -     Lipid Panel; Future; Expected date: 06/11/2020  -     TSH; Future; Expected date: 06/11/2020  -     HIV 1/2 Ag/Ab (4th Gen); Future; Expected date: 06/11/2020  -     (In Office Administered) Pneumococcal Polysaccharide Vaccine (23 Valent) (SQ/IM)    Essential (primary) hypertension  The current medical regimen is effective;  continue present plan and medications.  At goal today.    Adjustment " disorder with anxious mood  The current medical regimen is effective;  continue present plan and medications.    Obesity, Class I, BMI 30-34.9  The patient is asked to make an attempt to improve diet and exercise patterns to aid in medical management of this problem. We specifically discussed cutting calorie intake by 500-1000 calories per day for a goal of a 1-2 pound weight loss per week and recommendations for a mostly plant based diet with limited red meats/refined grains/processed foods/added sugars.    Gastroesophageal reflux disease without esophagitis  The current medical regimen is effective;  continue present plan and medications.    Intractable migraine without aura and without status migrainosus  Trial of Imitrex with NSAID.  If no improvement, refer to neurology.  -     sumatriptan (IMITREX) 100 MG tablet; Take 0.5 tablet at onset of headache followed by 0.5 tablet 1 hour after initial dose not to exceed 1 tablet in 24 hours  Dispense: 15 tablet; Refill: 5    Encounter for screening for HIV  Routine screening.  -     HIV 1/2 Ag/Ab (4th Gen); Future; Expected date: 06/11/2020    Need for 23-polyvalent pneumococcal polysaccharide vaccine  Given today.  -     (In Office Administered) Pneumococcal Polysaccharide Vaccine (23 Valent) (SQ/IM)    Patient has verbalized understanding and is in agreement with plan of care.    Follow up in about 6 months (around 12/11/2020) for routine follow-up, pending labs.

## 2020-06-15 ENCOUNTER — PATIENT MESSAGE (OUTPATIENT)
Dept: FAMILY MEDICINE | Facility: CLINIC | Age: 45
End: 2020-06-15

## 2020-06-15 DIAGNOSIS — R30.0 DYSURIA: Primary | ICD-10-CM

## 2020-06-17 ENCOUNTER — LAB VISIT (OUTPATIENT)
Dept: LAB | Facility: HOSPITAL | Age: 45
End: 2020-06-17
Attending: NURSE PRACTITIONER
Payer: COMMERCIAL

## 2020-06-17 DIAGNOSIS — Z00.00 ROUTINE MEDICAL EXAM: ICD-10-CM

## 2020-06-17 DIAGNOSIS — Z11.4 ENCOUNTER FOR SCREENING FOR HIV: ICD-10-CM

## 2020-06-17 LAB
ALBUMIN SERPL BCP-MCNC: 4.1 G/DL (ref 3.5–5.2)
ALP SERPL-CCNC: 93 U/L (ref 55–135)
ALT SERPL W/O P-5'-P-CCNC: 30 U/L (ref 10–44)
ANION GAP SERPL CALC-SCNC: 12 MMOL/L (ref 8–16)
AST SERPL-CCNC: 20 U/L (ref 10–40)
BASOPHILS # BLD AUTO: 0.07 K/UL (ref 0–0.2)
BASOPHILS NFR BLD: 0.6 % (ref 0–1.9)
BILIRUB SERPL-MCNC: 0.3 MG/DL (ref 0.1–1)
BUN SERPL-MCNC: 12 MG/DL (ref 6–20)
CALCIUM SERPL-MCNC: 9.3 MG/DL (ref 8.7–10.5)
CHLORIDE SERPL-SCNC: 108 MMOL/L (ref 95–110)
CHOLEST SERPL-MCNC: 230 MG/DL (ref 120–199)
CHOLEST/HDLC SERPL: 7.4 {RATIO} (ref 2–5)
CO2 SERPL-SCNC: 22 MMOL/L (ref 23–29)
CREAT SERPL-MCNC: 0.7 MG/DL (ref 0.5–1.4)
DIFFERENTIAL METHOD: ABNORMAL
EOSINOPHIL # BLD AUTO: 0.3 K/UL (ref 0–0.5)
EOSINOPHIL NFR BLD: 2.6 % (ref 0–8)
ERYTHROCYTE [DISTWIDTH] IN BLOOD BY AUTOMATED COUNT: 13 % (ref 11.5–14.5)
EST. GFR  (AFRICAN AMERICAN): >60 ML/MIN/1.73 M^2
EST. GFR  (NON AFRICAN AMERICAN): >60 ML/MIN/1.73 M^2
GLUCOSE SERPL-MCNC: 118 MG/DL (ref 70–110)
HCT VFR BLD AUTO: 44.7 % (ref 37–48.5)
HDLC SERPL-MCNC: 31 MG/DL (ref 40–75)
HDLC SERPL: 13.5 % (ref 20–50)
HGB BLD-MCNC: 14.1 G/DL (ref 12–16)
IMM GRANULOCYTES # BLD AUTO: 0.03 K/UL (ref 0–0.04)
IMM GRANULOCYTES NFR BLD AUTO: 0.3 % (ref 0–0.5)
LDLC SERPL CALC-MCNC: 143 MG/DL (ref 63–159)
LYMPHOCYTES # BLD AUTO: 2.3 K/UL (ref 1–4.8)
LYMPHOCYTES NFR BLD: 20.3 % (ref 18–48)
MCH RBC QN AUTO: 28.2 PG (ref 27–31)
MCHC RBC AUTO-ENTMCNC: 31.5 G/DL (ref 32–36)
MCV RBC AUTO: 89 FL (ref 82–98)
MONOCYTES # BLD AUTO: 0.9 K/UL (ref 0.3–1)
MONOCYTES NFR BLD: 7.4 % (ref 4–15)
NEUTROPHILS # BLD AUTO: 7.9 K/UL (ref 1.8–7.7)
NEUTROPHILS NFR BLD: 68.8 % (ref 38–73)
NONHDLC SERPL-MCNC: 199 MG/DL
NRBC BLD-RTO: 0 /100 WBC
PLATELET # BLD AUTO: 334 K/UL (ref 150–350)
PMV BLD AUTO: 10.4 FL (ref 9.2–12.9)
POTASSIUM SERPL-SCNC: 4 MMOL/L (ref 3.5–5.1)
PROT SERPL-MCNC: 7.9 G/DL (ref 6–8.4)
RBC # BLD AUTO: 5 M/UL (ref 4–5.4)
SODIUM SERPL-SCNC: 142 MMOL/L (ref 136–145)
TRIGL SERPL-MCNC: 280 MG/DL (ref 30–150)
TSH SERPL DL<=0.005 MIU/L-ACNC: 2.12 UIU/ML (ref 0.4–4)
WBC # BLD AUTO: 11.5 K/UL (ref 3.9–12.7)

## 2020-06-17 PROCEDURE — 86703 HIV-1/HIV-2 1 RESULT ANTBDY: CPT

## 2020-06-17 PROCEDURE — 85025 COMPLETE CBC W/AUTO DIFF WBC: CPT

## 2020-06-17 PROCEDURE — 80061 LIPID PANEL: CPT

## 2020-06-17 PROCEDURE — 80053 COMPREHEN METABOLIC PANEL: CPT

## 2020-06-17 PROCEDURE — 84443 ASSAY THYROID STIM HORMONE: CPT

## 2020-06-17 PROCEDURE — 36415 COLL VENOUS BLD VENIPUNCTURE: CPT | Mod: PO

## 2020-06-18 PROBLEM — E78.2 MIXED HYPERLIPIDEMIA: Status: ACTIVE | Noted: 2020-06-18

## 2020-06-18 LAB — HIV 1+2 AB+HIV1 P24 AG SERPL QL IA: NEGATIVE

## 2020-07-07 DIAGNOSIS — I10 ESSENTIAL (PRIMARY) HYPERTENSION: Chronic | ICD-10-CM

## 2020-07-07 DIAGNOSIS — A60.04 HERPES SIMPLEX VULVOVAGINITIS: ICD-10-CM

## 2020-07-07 DIAGNOSIS — F43.22 ADJUSTMENT DISORDER WITH ANXIOUS MOOD: ICD-10-CM

## 2020-07-08 RX ORDER — VALACYCLOVIR HYDROCHLORIDE 1 G/1
1000 TABLET, FILM COATED ORAL DAILY PRN
Qty: 30 TABLET | Refills: 1 | Status: SHIPPED | OUTPATIENT
Start: 2020-07-08 | End: 2022-09-27 | Stop reason: SDUPTHER

## 2020-07-08 RX ORDER — AMLODIPINE BESYLATE 10 MG/1
10 TABLET ORAL DAILY
Qty: 90 TABLET | Refills: 0 | Status: SHIPPED | OUTPATIENT
Start: 2020-07-08 | End: 2020-10-12 | Stop reason: SDUPTHER

## 2020-07-08 RX ORDER — CLONAZEPAM 0.5 MG/1
0.5 TABLET ORAL 2 TIMES DAILY PRN
Qty: 5 TABLET | Refills: 0 | Status: SHIPPED | OUTPATIENT
Start: 2020-07-08 | End: 2020-08-25 | Stop reason: SDUPTHER

## 2020-08-14 DIAGNOSIS — Z11.59 NEED FOR HEPATITIS C SCREENING TEST: ICD-10-CM

## 2020-10-12 DIAGNOSIS — I10 ESSENTIAL (PRIMARY) HYPERTENSION: Chronic | ICD-10-CM

## 2020-10-13 ENCOUNTER — PATIENT MESSAGE (OUTPATIENT)
Dept: FAMILY MEDICINE | Facility: CLINIC | Age: 45
End: 2020-10-13

## 2020-10-13 RX ORDER — AMLODIPINE BESYLATE 10 MG/1
10 TABLET ORAL DAILY
Qty: 90 TABLET | Refills: 0 | Status: SHIPPED | OUTPATIENT
Start: 2020-10-13 | End: 2020-11-11 | Stop reason: SDUPTHER

## 2020-11-10 ENCOUNTER — PATIENT MESSAGE (OUTPATIENT)
Dept: FAMILY MEDICINE | Facility: CLINIC | Age: 45
End: 2020-11-10

## 2020-11-11 DIAGNOSIS — I10 ESSENTIAL (PRIMARY) HYPERTENSION: Chronic | ICD-10-CM

## 2020-11-11 RX ORDER — AMLODIPINE BESYLATE 10 MG/1
10 TABLET ORAL DAILY
Qty: 90 TABLET | Refills: 1 | Status: SHIPPED | OUTPATIENT
Start: 2020-11-11 | End: 2021-02-01 | Stop reason: SDUPTHER

## 2020-11-30 DIAGNOSIS — K29.00 ACUTE GASTRITIS WITHOUT HEMORRHAGE, UNSPECIFIED GASTRITIS TYPE: ICD-10-CM

## 2020-11-30 RX ORDER — PANTOPRAZOLE SODIUM 40 MG/1
40 TABLET, DELAYED RELEASE ORAL 2 TIMES DAILY
Qty: 90 TABLET | Refills: 0 | Status: SHIPPED | OUTPATIENT
Start: 2020-11-30 | End: 2020-12-01 | Stop reason: SDUPTHER

## 2020-12-01 DIAGNOSIS — K29.00 ACUTE GASTRITIS WITHOUT HEMORRHAGE, UNSPECIFIED GASTRITIS TYPE: ICD-10-CM

## 2020-12-01 RX ORDER — PANTOPRAZOLE SODIUM 40 MG/1
40 TABLET, DELAYED RELEASE ORAL 2 TIMES DAILY
Qty: 90 TABLET | Refills: 0 | Status: SHIPPED | OUTPATIENT
Start: 2020-12-01 | End: 2021-01-20 | Stop reason: SDUPTHER

## 2021-01-13 DIAGNOSIS — Z12.31 OTHER SCREENING MAMMOGRAM: ICD-10-CM

## 2021-01-20 DIAGNOSIS — K29.00 ACUTE GASTRITIS WITHOUT HEMORRHAGE, UNSPECIFIED GASTRITIS TYPE: ICD-10-CM

## 2021-01-20 RX ORDER — PANTOPRAZOLE SODIUM 40 MG/1
40 TABLET, DELAYED RELEASE ORAL 2 TIMES DAILY
Qty: 90 TABLET | Refills: 0 | Status: SHIPPED | OUTPATIENT
Start: 2021-01-20 | End: 2021-03-16 | Stop reason: SDUPTHER

## 2021-02-01 DIAGNOSIS — I10 ESSENTIAL (PRIMARY) HYPERTENSION: Chronic | ICD-10-CM

## 2021-02-02 RX ORDER — AMLODIPINE BESYLATE 10 MG/1
10 TABLET ORAL DAILY
Qty: 90 TABLET | Refills: 0 | Status: SHIPPED | OUTPATIENT
Start: 2021-02-02 | End: 2021-05-06 | Stop reason: SDUPTHER

## 2021-03-04 DIAGNOSIS — I10 ESSENTIAL (PRIMARY) HYPERTENSION: Chronic | ICD-10-CM

## 2021-03-04 RX ORDER — HYDROCHLOROTHIAZIDE 25 MG/1
25 TABLET ORAL DAILY
Qty: 90 TABLET | Refills: 0 | Status: SHIPPED | OUTPATIENT
Start: 2021-03-04 | End: 2021-08-26 | Stop reason: SDUPTHER

## 2021-03-16 DIAGNOSIS — K29.00 ACUTE GASTRITIS WITHOUT HEMORRHAGE, UNSPECIFIED GASTRITIS TYPE: ICD-10-CM

## 2021-03-17 DIAGNOSIS — K29.00 ACUTE GASTRITIS WITHOUT HEMORRHAGE, UNSPECIFIED GASTRITIS TYPE: ICD-10-CM

## 2021-03-17 RX ORDER — PANTOPRAZOLE SODIUM 40 MG/1
40 TABLET, DELAYED RELEASE ORAL 2 TIMES DAILY
Qty: 90 TABLET | Refills: 0 | Status: SHIPPED | OUTPATIENT
Start: 2021-03-17 | End: 2021-03-17

## 2021-03-17 RX ORDER — PANTOPRAZOLE SODIUM 40 MG/1
TABLET, DELAYED RELEASE ORAL
Qty: 180 TABLET | Refills: 0 | Status: SHIPPED | OUTPATIENT
Start: 2021-03-17 | End: 2021-06-15 | Stop reason: SDUPTHER

## 2021-04-26 ENCOUNTER — HOSPITAL ENCOUNTER (OUTPATIENT)
Dept: RADIOLOGY | Facility: HOSPITAL | Age: 46
Discharge: HOME | End: 2021-04-26
Attending: INTERNAL MEDICINE
Payer: COMMERCIAL

## 2021-04-26 VITALS — WEIGHT: 201 LBS | HEIGHT: 64 IN | BODY MASS INDEX: 34.31 KG/M2

## 2021-04-26 DIAGNOSIS — Z12.31 OTHER SCREENING MAMMOGRAM: ICD-10-CM

## 2021-04-26 PROCEDURE — 77067 SCR MAMMO BI INCL CAD: CPT | Mod: TC,PO

## 2021-04-26 PROCEDURE — 77067 MAMMO DIGITAL SCREENING BILAT WITH TOMO: ICD-10-PCS | Mod: 26,,, | Performed by: RADIOLOGY

## 2021-04-26 PROCEDURE — 77063 BREAST TOMOSYNTHESIS BI: CPT | Mod: 26,,, | Performed by: RADIOLOGY

## 2021-04-26 PROCEDURE — 77067 SCR MAMMO BI INCL CAD: CPT | Mod: 26,,, | Performed by: RADIOLOGY

## 2021-04-26 PROCEDURE — 77063 MAMMO DIGITAL SCREENING BILAT WITH TOMO: ICD-10-PCS | Mod: 26,,, | Performed by: RADIOLOGY

## 2021-05-06 DIAGNOSIS — F43.22 ADJUSTMENT DISORDER WITH ANXIOUS MOOD: ICD-10-CM

## 2021-05-06 DIAGNOSIS — I10 ESSENTIAL (PRIMARY) HYPERTENSION: Chronic | ICD-10-CM

## 2021-05-06 RX ORDER — CLONAZEPAM 0.5 MG/1
0.5 TABLET ORAL 2 TIMES DAILY PRN
Qty: 5 TABLET | Refills: 0 | Status: SHIPPED | OUTPATIENT
Start: 2021-05-06 | End: 2021-11-18 | Stop reason: SDUPTHER

## 2021-05-06 RX ORDER — AMLODIPINE BESYLATE 10 MG/1
10 TABLET ORAL DAILY
Qty: 90 TABLET | Refills: 0 | Status: SHIPPED | OUTPATIENT
Start: 2021-05-06 | End: 2021-08-12 | Stop reason: SDUPTHER

## 2021-06-15 DIAGNOSIS — K29.00 ACUTE GASTRITIS WITHOUT HEMORRHAGE, UNSPECIFIED GASTRITIS TYPE: ICD-10-CM

## 2021-06-16 RX ORDER — PANTOPRAZOLE SODIUM 40 MG/1
40 TABLET, DELAYED RELEASE ORAL 2 TIMES DAILY
Qty: 180 TABLET | Refills: 0 | Status: SHIPPED | OUTPATIENT
Start: 2021-06-16 | End: 2021-11-18 | Stop reason: SDUPTHER

## 2021-07-14 ENCOUNTER — TELEPHONE (OUTPATIENT)
Dept: FAMILY MEDICINE | Facility: CLINIC | Age: 46
End: 2021-07-14

## 2021-08-12 DIAGNOSIS — I10 ESSENTIAL (PRIMARY) HYPERTENSION: Chronic | ICD-10-CM

## 2021-08-12 RX ORDER — AMLODIPINE BESYLATE 10 MG/1
10 TABLET ORAL DAILY
Qty: 90 TABLET | Refills: 0 | Status: SHIPPED | OUTPATIENT
Start: 2021-08-12 | End: 2021-11-18 | Stop reason: SDUPTHER

## 2021-08-26 DIAGNOSIS — I10 ESSENTIAL (PRIMARY) HYPERTENSION: Chronic | ICD-10-CM

## 2021-08-26 RX ORDER — HYDROCHLOROTHIAZIDE 25 MG/1
25 TABLET ORAL DAILY
Qty: 90 TABLET | Refills: 0 | Status: SHIPPED | OUTPATIENT
Start: 2021-08-26 | End: 2021-11-18 | Stop reason: SDUPTHER

## 2021-10-04 ENCOUNTER — PATIENT MESSAGE (OUTPATIENT)
Dept: ADMINISTRATIVE | Facility: HOSPITAL | Age: 46
End: 2021-10-04

## 2021-10-12 ENCOUNTER — TELEPHONE (OUTPATIENT)
Dept: FAMILY MEDICINE | Facility: CLINIC | Age: 46
End: 2021-10-12

## 2021-11-09 ENCOUNTER — TELEPHONE (OUTPATIENT)
Dept: FAMILY MEDICINE | Facility: CLINIC | Age: 46
End: 2021-11-09
Payer: COMMERCIAL

## 2021-11-10 ENCOUNTER — OFFICE VISIT (OUTPATIENT)
Dept: FAMILY MEDICINE | Facility: CLINIC | Age: 46
End: 2021-11-10
Payer: COMMERCIAL

## 2021-11-10 VITALS
WEIGHT: 202.81 LBS | DIASTOLIC BLOOD PRESSURE: 82 MMHG | HEIGHT: 64 IN | HEART RATE: 77 BPM | BODY MASS INDEX: 34.62 KG/M2 | TEMPERATURE: 98 F | RESPIRATION RATE: 18 BRPM | SYSTOLIC BLOOD PRESSURE: 120 MMHG | OXYGEN SATURATION: 97 %

## 2021-11-10 DIAGNOSIS — F43.22 ADJUSTMENT DISORDER WITH ANXIOUS MOOD: Chronic | ICD-10-CM

## 2021-11-10 DIAGNOSIS — K21.9 GASTROESOPHAGEAL REFLUX DISEASE WITHOUT ESOPHAGITIS: Chronic | ICD-10-CM

## 2021-11-10 DIAGNOSIS — E66.9 OBESITY, CLASS I, BMI 30-34.9: Chronic | ICD-10-CM

## 2021-11-10 DIAGNOSIS — R11.0 NAUSEA: ICD-10-CM

## 2021-11-10 DIAGNOSIS — Z00.00 ROUTINE MEDICAL EXAM: Primary | ICD-10-CM

## 2021-11-10 DIAGNOSIS — E78.2 MIXED HYPERLIPIDEMIA: ICD-10-CM

## 2021-11-10 DIAGNOSIS — F17.200 TOBACCO DEPENDENCY: Chronic | ICD-10-CM

## 2021-11-10 DIAGNOSIS — Z23 INFLUENZA VACCINE NEEDED: ICD-10-CM

## 2021-11-10 DIAGNOSIS — Z11.59 NEED FOR HEPATITIS C SCREENING TEST: ICD-10-CM

## 2021-11-10 DIAGNOSIS — I10 ESSENTIAL (PRIMARY) HYPERTENSION: Chronic | ICD-10-CM

## 2021-11-10 PROCEDURE — 90686 IIV4 VACC NO PRSV 0.5 ML IM: CPT | Mod: S$GLB,,, | Performed by: NURSE PRACTITIONER

## 2021-11-10 PROCEDURE — 1160F RVW MEDS BY RX/DR IN RCRD: CPT | Mod: CPTII,S$GLB,, | Performed by: NURSE PRACTITIONER

## 2021-11-10 PROCEDURE — 99999 PR PBB SHADOW E&M-EST. PATIENT-LVL IV: CPT | Mod: PBBFAC,,, | Performed by: NURSE PRACTITIONER

## 2021-11-10 PROCEDURE — 3008F PR BODY MASS INDEX (BMI) DOCUMENTED: ICD-10-PCS | Mod: CPTII,S$GLB,, | Performed by: NURSE PRACTITIONER

## 2021-11-10 PROCEDURE — 90471 FLU VACCINE (QUAD) GREATER THAN OR EQUAL TO 3YO PRESERVATIVE FREE IM: ICD-10-PCS | Mod: S$GLB,,, | Performed by: NURSE PRACTITIONER

## 2021-11-10 PROCEDURE — 90471 IMMUNIZATION ADMIN: CPT | Mod: S$GLB,,, | Performed by: NURSE PRACTITIONER

## 2021-11-10 PROCEDURE — 99999 PR PBB SHADOW E&M-EST. PATIENT-LVL IV: ICD-10-PCS | Mod: PBBFAC,,, | Performed by: NURSE PRACTITIONER

## 2021-11-10 PROCEDURE — 99396 PR PREVENTIVE VISIT,EST,40-64: ICD-10-PCS | Mod: 25,S$GLB,, | Performed by: NURSE PRACTITIONER

## 2021-11-10 PROCEDURE — 3008F BODY MASS INDEX DOCD: CPT | Mod: CPTII,S$GLB,, | Performed by: NURSE PRACTITIONER

## 2021-11-10 PROCEDURE — 99396 PREV VISIT EST AGE 40-64: CPT | Mod: 25,S$GLB,, | Performed by: NURSE PRACTITIONER

## 2021-11-10 PROCEDURE — 3079F DIAST BP 80-89 MM HG: CPT | Mod: CPTII,S$GLB,, | Performed by: NURSE PRACTITIONER

## 2021-11-10 PROCEDURE — 3074F SYST BP LT 130 MM HG: CPT | Mod: CPTII,S$GLB,, | Performed by: NURSE PRACTITIONER

## 2021-11-10 PROCEDURE — 3079F PR MOST RECENT DIASTOLIC BLOOD PRESSURE 80-89 MM HG: ICD-10-PCS | Mod: CPTII,S$GLB,, | Performed by: NURSE PRACTITIONER

## 2021-11-10 PROCEDURE — 1160F PR REVIEW ALL MEDS BY PRESCRIBER/CLIN PHARMACIST DOCUMENTED: ICD-10-PCS | Mod: CPTII,S$GLB,, | Performed by: NURSE PRACTITIONER

## 2021-11-10 PROCEDURE — 3074F PR MOST RECENT SYSTOLIC BLOOD PRESSURE < 130 MM HG: ICD-10-PCS | Mod: CPTII,S$GLB,, | Performed by: NURSE PRACTITIONER

## 2021-11-10 PROCEDURE — 1159F PR MEDICATION LIST DOCUMENTED IN MEDICAL RECORD: ICD-10-PCS | Mod: CPTII,S$GLB,, | Performed by: NURSE PRACTITIONER

## 2021-11-10 PROCEDURE — 90686 FLU VACCINE (QUAD) GREATER THAN OR EQUAL TO 3YO PRESERVATIVE FREE IM: ICD-10-PCS | Mod: S$GLB,,, | Performed by: NURSE PRACTITIONER

## 2021-11-10 PROCEDURE — 1159F MED LIST DOCD IN RCRD: CPT | Mod: CPTII,S$GLB,, | Performed by: NURSE PRACTITIONER

## 2021-11-10 RX ORDER — COVID-19 MOLECULAR TEST ASSAY
KIT MISCELLANEOUS
COMMUNITY
Start: 2021-07-31

## 2021-11-10 RX ORDER — AZITHROMYCIN 250 MG/1
TABLET, FILM COATED ORAL
COMMUNITY
Start: 2021-08-16 | End: 2022-11-11

## 2021-11-10 RX ORDER — CIPROFLOXACIN 500 MG/1
TABLET ORAL
COMMUNITY
Start: 2021-08-27 | End: 2022-11-11

## 2021-11-10 RX ORDER — ONDANSETRON 8 MG/1
8 TABLET, ORALLY DISINTEGRATING ORAL EVERY 8 HOURS PRN
Qty: 15 TABLET | Refills: 11 | Status: SHIPPED | OUTPATIENT
Start: 2021-11-10 | End: 2022-11-11 | Stop reason: SDUPTHER

## 2021-11-10 RX ORDER — BUTALBITAL, ACETAMINOPHEN AND CAFFEINE 50; 325; 40 MG/1; MG/1; MG/1
TABLET ORAL
COMMUNITY
Start: 2021-05-20 | End: 2022-11-11

## 2021-11-18 ENCOUNTER — TELEPHONE (OUTPATIENT)
Dept: FAMILY MEDICINE | Facility: CLINIC | Age: 46
End: 2021-11-18
Payer: COMMERCIAL

## 2021-11-18 ENCOUNTER — LAB VISIT (OUTPATIENT)
Dept: LAB | Facility: HOSPITAL | Age: 46
End: 2021-11-18
Attending: NURSE PRACTITIONER
Payer: COMMERCIAL

## 2021-11-18 DIAGNOSIS — Z00.00 ROUTINE MEDICAL EXAM: ICD-10-CM

## 2021-11-18 DIAGNOSIS — I10 ESSENTIAL (PRIMARY) HYPERTENSION: Chronic | ICD-10-CM

## 2021-11-18 DIAGNOSIS — F43.22 ADJUSTMENT DISORDER WITH ANXIOUS MOOD: ICD-10-CM

## 2021-11-18 DIAGNOSIS — Z11.59 NEED FOR HEPATITIS C SCREENING TEST: ICD-10-CM

## 2021-11-18 DIAGNOSIS — K29.00 ACUTE GASTRITIS WITHOUT HEMORRHAGE, UNSPECIFIED GASTRITIS TYPE: ICD-10-CM

## 2021-11-18 LAB
ALBUMIN SERPL BCP-MCNC: 4.1 G/DL (ref 3.5–5.2)
ALP SERPL-CCNC: 83 U/L (ref 55–135)
ALT SERPL W/O P-5'-P-CCNC: 86 U/L (ref 10–44)
ANION GAP SERPL CALC-SCNC: 9 MMOL/L (ref 8–16)
AST SERPL-CCNC: 63 U/L (ref 10–40)
BASOPHILS # BLD AUTO: 0.04 K/UL (ref 0–0.2)
BASOPHILS NFR BLD: 0.4 % (ref 0–1.9)
BILIRUB SERPL-MCNC: 0.4 MG/DL (ref 0.1–1)
BUN SERPL-MCNC: 10 MG/DL (ref 6–20)
CALCIUM SERPL-MCNC: 9.6 MG/DL (ref 8.7–10.5)
CHLORIDE SERPL-SCNC: 108 MMOL/L (ref 95–110)
CHOLEST SERPL-MCNC: 234 MG/DL (ref 120–199)
CHOLEST/HDLC SERPL: 6.9 {RATIO} (ref 2–5)
CO2 SERPL-SCNC: 24 MMOL/L (ref 23–29)
CREAT SERPL-MCNC: 0.7 MG/DL (ref 0.5–1.4)
DIFFERENTIAL METHOD: NORMAL
EOSINOPHIL # BLD AUTO: 0.2 K/UL (ref 0–0.5)
EOSINOPHIL NFR BLD: 2.4 % (ref 0–8)
ERYTHROCYTE [DISTWIDTH] IN BLOOD BY AUTOMATED COUNT: 12.6 % (ref 11.5–14.5)
EST. GFR  (AFRICAN AMERICAN): >60 ML/MIN/1.73 M^2
EST. GFR  (NON AFRICAN AMERICAN): >60 ML/MIN/1.73 M^2
GLUCOSE SERPL-MCNC: 110 MG/DL (ref 70–110)
HCT VFR BLD AUTO: 44.5 % (ref 37–48.5)
HCV AB SERPL QL IA: NEGATIVE
HDLC SERPL-MCNC: 34 MG/DL (ref 40–75)
HDLC SERPL: 14.5 % (ref 20–50)
HGB BLD-MCNC: 14.6 G/DL (ref 12–16)
IMM GRANULOCYTES # BLD AUTO: 0.02 K/UL (ref 0–0.04)
IMM GRANULOCYTES NFR BLD AUTO: 0.2 % (ref 0–0.5)
LDLC SERPL CALC-MCNC: 168.8 MG/DL (ref 63–159)
LYMPHOCYTES # BLD AUTO: 2 K/UL (ref 1–4.8)
LYMPHOCYTES NFR BLD: 20.5 % (ref 18–48)
MCH RBC QN AUTO: 27.7 PG (ref 27–31)
MCHC RBC AUTO-ENTMCNC: 32.8 G/DL (ref 32–36)
MCV RBC AUTO: 84 FL (ref 82–98)
MONOCYTES # BLD AUTO: 0.7 K/UL (ref 0.3–1)
MONOCYTES NFR BLD: 6.7 % (ref 4–15)
NEUTROPHILS # BLD AUTO: 6.8 K/UL (ref 1.8–7.7)
NEUTROPHILS NFR BLD: 69.8 % (ref 38–73)
NONHDLC SERPL-MCNC: 200 MG/DL
NRBC BLD-RTO: 0 /100 WBC
PLATELET # BLD AUTO: 272 K/UL (ref 150–450)
PMV BLD AUTO: 10 FL (ref 9.2–12.9)
POTASSIUM SERPL-SCNC: 3.7 MMOL/L (ref 3.5–5.1)
PROT SERPL-MCNC: 7.6 G/DL (ref 6–8.4)
RBC # BLD AUTO: 5.28 M/UL (ref 4–5.4)
SODIUM SERPL-SCNC: 141 MMOL/L (ref 136–145)
TRIGL SERPL-MCNC: 156 MG/DL (ref 30–150)
TSH SERPL DL<=0.005 MIU/L-ACNC: 1.12 UIU/ML (ref 0.4–4)
WBC # BLD AUTO: 9.81 K/UL (ref 3.9–12.7)

## 2021-11-18 PROCEDURE — 36415 COLL VENOUS BLD VENIPUNCTURE: CPT | Mod: PO | Performed by: NURSE PRACTITIONER

## 2021-11-18 PROCEDURE — 80061 LIPID PANEL: CPT | Performed by: NURSE PRACTITIONER

## 2021-11-18 PROCEDURE — 86803 HEPATITIS C AB TEST: CPT | Performed by: NURSE PRACTITIONER

## 2021-11-18 PROCEDURE — 84443 ASSAY THYROID STIM HORMONE: CPT | Performed by: NURSE PRACTITIONER

## 2021-11-18 PROCEDURE — 85025 COMPLETE CBC W/AUTO DIFF WBC: CPT | Performed by: NURSE PRACTITIONER

## 2021-11-18 PROCEDURE — 80053 COMPREHEN METABOLIC PANEL: CPT | Performed by: NURSE PRACTITIONER

## 2021-11-18 RX ORDER — PANTOPRAZOLE SODIUM 40 MG/1
40 TABLET, DELAYED RELEASE ORAL 2 TIMES DAILY
Qty: 180 TABLET | Refills: 0 | Status: SHIPPED | OUTPATIENT
Start: 2021-11-18 | End: 2022-02-14 | Stop reason: SDUPTHER

## 2021-11-18 RX ORDER — HYDROCHLOROTHIAZIDE 25 MG/1
25 TABLET ORAL DAILY
Qty: 90 TABLET | Refills: 0 | Status: SHIPPED | OUTPATIENT
Start: 2021-11-18 | End: 2022-11-11

## 2021-11-18 RX ORDER — AMLODIPINE BESYLATE 10 MG/1
10 TABLET ORAL DAILY
Qty: 90 TABLET | Refills: 0 | Status: SHIPPED | OUTPATIENT
Start: 2021-11-18 | End: 2022-02-14 | Stop reason: SDUPTHER

## 2021-11-18 RX ORDER — CLONAZEPAM 0.5 MG/1
0.5 TABLET ORAL 2 TIMES DAILY PRN
Qty: 5 TABLET | Refills: 0 | Status: SHIPPED | OUTPATIENT
Start: 2021-11-18 | End: 2022-02-14 | Stop reason: SDUPTHER

## 2021-12-28 ENCOUNTER — PATIENT MESSAGE (OUTPATIENT)
Dept: FAMILY MEDICINE | Facility: CLINIC | Age: 46
End: 2021-12-28
Payer: COMMERCIAL

## 2022-01-20 ENCOUNTER — PATIENT MESSAGE (OUTPATIENT)
Dept: FAMILY MEDICINE | Facility: CLINIC | Age: 47
End: 2022-01-20
Payer: COMMERCIAL

## 2022-02-14 DIAGNOSIS — K29.00 ACUTE GASTRITIS WITHOUT HEMORRHAGE, UNSPECIFIED GASTRITIS TYPE: ICD-10-CM

## 2022-02-14 DIAGNOSIS — F43.22 ADJUSTMENT DISORDER WITH ANXIOUS MOOD: ICD-10-CM

## 2022-02-14 DIAGNOSIS — I10 ESSENTIAL (PRIMARY) HYPERTENSION: Chronic | ICD-10-CM

## 2022-02-14 RX ORDER — PANTOPRAZOLE SODIUM 40 MG/1
40 TABLET, DELAYED RELEASE ORAL 2 TIMES DAILY
Qty: 180 TABLET | Refills: 0 | Status: SHIPPED | OUTPATIENT
Start: 2022-02-14 | End: 2022-09-23 | Stop reason: SDUPTHER

## 2022-02-14 RX ORDER — CLONAZEPAM 0.5 MG/1
0.5 TABLET ORAL 2 TIMES DAILY PRN
Qty: 5 TABLET | Refills: 0 | Status: SHIPPED | OUTPATIENT
Start: 2022-02-14

## 2022-02-14 RX ORDER — AMLODIPINE BESYLATE 10 MG/1
10 TABLET ORAL DAILY
Qty: 90 TABLET | Refills: 0 | Status: SHIPPED | OUTPATIENT
Start: 2022-02-14 | End: 2022-05-20 | Stop reason: SDUPTHER

## 2022-02-14 NOTE — TELEPHONE ENCOUNTER
Care Due:                  Date            Visit Type   Department     Provider  --------------------------------------------------------------------------------    Last Visit: None Found      None         None Found  Next Visit: None Scheduled  None         None Found                                                            Last  Test          Frequency    Reason                     Performed    Due Date  --------------------------------------------------------------------------------    Office Visit  12 months..  amLODIPine,                Not Found    Overdue                             hydroCHLOROthiazide,                             pantoprazole.............    Powered by [employer]. Reference number: [id number].   2/14/2022 1:27:10 PM CST

## 2022-05-04 ENCOUNTER — PATIENT MESSAGE (OUTPATIENT)
Dept: FAMILY MEDICINE | Facility: CLINIC | Age: 47
End: 2022-05-04
Payer: COMMERCIAL

## 2022-05-04 DIAGNOSIS — Z12.31 BREAST CANCER SCREENING BY MAMMOGRAM: Primary | ICD-10-CM

## 2022-05-20 DIAGNOSIS — I10 ESSENTIAL (PRIMARY) HYPERTENSION: Chronic | ICD-10-CM

## 2022-05-20 RX ORDER — AMLODIPINE BESYLATE 10 MG/1
10 TABLET ORAL DAILY
Qty: 90 TABLET | Refills: 1 | Status: SHIPPED | OUTPATIENT
Start: 2022-05-20 | End: 2022-11-28 | Stop reason: SDUPTHER

## 2022-05-20 NOTE — TELEPHONE ENCOUNTER
Care Due:                  Date            Visit Type   Department     Provider  --------------------------------------------------------------------------------    Last Visit: None Found      None         None Found  Next Visit: None Scheduled  None         None Found                                                            Last  Test          Frequency    Reason                     Performed    Due Date  --------------------------------------------------------------------------------    Office Visit  12 months..  amLODIPine,                Not Found    Overdue                             hydroCHLOROthiazide,                             pantoprazole.............    [employer] Embedded Care Gaps. Reference number: [id number]. 5/20/2022   8:11:46 AM T

## 2022-05-20 NOTE — TELEPHONE ENCOUNTER
Refill Routing Note   Medication(s) are not appropriate for processing by [facility] for the following reason(s):      - Patient has not been seen in over 15 months by PCP  - Patient has been seen in the ED/Hospital since the last PCP visit    [facility] action(s):  Defer          Medication reconciliation completed: No     Appointments  past 12m or future 3m with PCP    Date Provider   Last Visit   6/10/2019 Reagan Holland MD   Next Visit   Visit date not found Reagan Holland MD   ED visits in past 90 days: 0        Note composed:12:38 PM 05/20/2022

## 2022-05-23 ENCOUNTER — PATIENT MESSAGE (OUTPATIENT)
Dept: SMOKING CESSATION | Facility: CLINIC | Age: 47
End: 2022-05-23
Payer: COMMERCIAL

## 2022-06-02 ENCOUNTER — HOSPITAL ENCOUNTER (OUTPATIENT)
Dept: RADIOLOGY | Facility: HOSPITAL | Age: 47
Discharge: HOME | End: 2022-06-02
Attending: NURSE PRACTITIONER
Payer: COMMERCIAL

## 2022-06-02 DIAGNOSIS — Z12.31 BREAST CANCER SCREENING BY MAMMOGRAM: ICD-10-CM

## 2022-06-02 PROCEDURE — 77063 MAMMO DIGITAL SCREENING BILAT WITH TOMO: ICD-10-PCS | Mod: 26,,, | Performed by: RADIOLOGY

## 2022-06-02 PROCEDURE — 77067 SCR MAMMO BI INCL CAD: CPT | Mod: 26,,, | Performed by: RADIOLOGY

## 2022-06-02 PROCEDURE — 77063 BREAST TOMOSYNTHESIS BI: CPT | Mod: 26,,, | Performed by: RADIOLOGY

## 2022-06-02 PROCEDURE — 77067 MAMMO DIGITAL SCREENING BILAT WITH TOMO: ICD-10-PCS | Mod: 26,,, | Performed by: RADIOLOGY

## 2022-06-02 PROCEDURE — 77067 SCR MAMMO BI INCL CAD: CPT | Mod: TC,PO

## 2022-06-09 ENCOUNTER — PATIENT OUTREACH (OUTPATIENT)
Dept: ADMINISTRATIVE | Facility: HOSPITAL | Age: 47
End: 2022-06-09
Payer: COMMERCIAL

## 2022-06-09 DIAGNOSIS — Z12.11 COLON CANCER SCREENING: Primary | ICD-10-CM

## 2022-06-09 NOTE — PROGRESS NOTES
Health Maintenance Due   Topic Date Due    Pneumococcal Vaccines (Age 0-64) (2 - PCV) 06/11/2021    COVID-19 Vaccine (3 - Booster for Pfizer series) 07/05/2021    Colorectal Cancer Screening  Never done     Updates were requested from care everywhere.  Chart was reviewed for overdue Proactive [facility] Encounters (ELIZABETH) topics (CRS, Breast Cancer Screening, Eye exam)  Health Maintenance has been updated.  LINKS immunization registry triggered.  Immunizations were reconciled.

## 2022-10-18 ENCOUNTER — PATIENT MESSAGE (OUTPATIENT)
Dept: FAMILY MEDICINE | Facility: CLINIC | Age: 47
End: 2022-10-18
Payer: COMMERCIAL

## 2022-11-11 ENCOUNTER — OFFICE VISIT (OUTPATIENT)
Dept: FAMILY MEDICINE | Facility: CLINIC | Age: 47
End: 2022-11-11
Payer: COMMERCIAL

## 2022-11-11 VITALS
HEIGHT: 64 IN | TEMPERATURE: 99 F | SYSTOLIC BLOOD PRESSURE: 134 MMHG | DIASTOLIC BLOOD PRESSURE: 82 MMHG | BODY MASS INDEX: 34.8 KG/M2 | HEART RATE: 84 BPM | WEIGHT: 203.81 LBS | OXYGEN SATURATION: 95 %

## 2022-11-11 DIAGNOSIS — F17.200 TOBACCO DEPENDENCE: ICD-10-CM

## 2022-11-11 DIAGNOSIS — Z00.00 ROUTINE MEDICAL EXAM: Primary | ICD-10-CM

## 2022-11-11 DIAGNOSIS — Z12.11 SCREENING FOR MALIGNANT NEOPLASM OF COLON: ICD-10-CM

## 2022-11-11 DIAGNOSIS — R11.0 NAUSEA: ICD-10-CM

## 2022-11-11 DIAGNOSIS — F43.22 ADJUSTMENT DISORDER WITH ANXIOUS MOOD: Chronic | ICD-10-CM

## 2022-11-11 DIAGNOSIS — I10 ESSENTIAL (PRIMARY) HYPERTENSION: Chronic | ICD-10-CM

## 2022-11-11 DIAGNOSIS — Z23 NEEDS FLU SHOT: ICD-10-CM

## 2022-11-11 DIAGNOSIS — Z23 NEED FOR PNEUMOCOCCAL VACCINATION: ICD-10-CM

## 2022-11-11 DIAGNOSIS — E78.2 MIXED HYPERLIPIDEMIA: ICD-10-CM

## 2022-11-11 DIAGNOSIS — E66.9 OBESITY, CLASS I, BMI 30-34.9: Chronic | ICD-10-CM

## 2022-11-11 PROCEDURE — 3075F SYST BP GE 130 - 139MM HG: CPT | Mod: CPTII,S$GLB,, | Performed by: NURSE PRACTITIONER

## 2022-11-11 PROCEDURE — 1160F PR REVIEW ALL MEDS BY PRESCRIBER/CLIN PHARMACIST DOCUMENTED: ICD-10-PCS | Mod: CPTII,S$GLB,, | Performed by: NURSE PRACTITIONER

## 2022-11-11 PROCEDURE — 90686 FLU VACCINE (QUAD) GREATER THAN OR EQUAL TO 3YO PRESERVATIVE FREE IM: ICD-10-PCS | Mod: S$GLB,,, | Performed by: NURSE PRACTITIONER

## 2022-11-11 PROCEDURE — 3079F DIAST BP 80-89 MM HG: CPT | Mod: CPTII,S$GLB,, | Performed by: NURSE PRACTITIONER

## 2022-11-11 PROCEDURE — 99999 PR PBB SHADOW E&M-EST. PATIENT-LVL IV: ICD-10-PCS | Mod: PBBFAC,,, | Performed by: NURSE PRACTITIONER

## 2022-11-11 PROCEDURE — 99396 PREV VISIT EST AGE 40-64: CPT | Mod: 25,S$GLB,, | Performed by: NURSE PRACTITIONER

## 2022-11-11 PROCEDURE — 1159F MED LIST DOCD IN RCRD: CPT | Mod: CPTII,S$GLB,, | Performed by: NURSE PRACTITIONER

## 2022-11-11 PROCEDURE — 3075F PR MOST RECENT SYSTOLIC BLOOD PRESS GE 130-139MM HG: ICD-10-PCS | Mod: CPTII,S$GLB,, | Performed by: NURSE PRACTITIONER

## 2022-11-11 PROCEDURE — 90472 PNEUMOCOCCAL CONJUGATE VACCINE 20-VALENT: ICD-10-PCS | Mod: S$GLB,,, | Performed by: NURSE PRACTITIONER

## 2022-11-11 PROCEDURE — 1160F RVW MEDS BY RX/DR IN RCRD: CPT | Mod: CPTII,S$GLB,, | Performed by: NURSE PRACTITIONER

## 2022-11-11 PROCEDURE — 99396 PR PREVENTIVE VISIT,EST,40-64: ICD-10-PCS | Mod: 25,S$GLB,, | Performed by: NURSE PRACTITIONER

## 2022-11-11 PROCEDURE — 3008F BODY MASS INDEX DOCD: CPT | Mod: CPTII,S$GLB,, | Performed by: NURSE PRACTITIONER

## 2022-11-11 PROCEDURE — 90677 PNEUMOCOCCAL CONJUGATE VACCINE 20-VALENT: ICD-10-PCS | Mod: S$GLB,,, | Performed by: NURSE PRACTITIONER

## 2022-11-11 PROCEDURE — 90686 IIV4 VACC NO PRSV 0.5 ML IM: CPT | Mod: S$GLB,,, | Performed by: NURSE PRACTITIONER

## 2022-11-11 PROCEDURE — 3008F PR BODY MASS INDEX (BMI) DOCUMENTED: ICD-10-PCS | Mod: CPTII,S$GLB,, | Performed by: NURSE PRACTITIONER

## 2022-11-11 PROCEDURE — 1159F PR MEDICATION LIST DOCUMENTED IN MEDICAL RECORD: ICD-10-PCS | Mod: CPTII,S$GLB,, | Performed by: NURSE PRACTITIONER

## 2022-11-11 PROCEDURE — 3079F PR MOST RECENT DIASTOLIC BLOOD PRESSURE 80-89 MM HG: ICD-10-PCS | Mod: CPTII,S$GLB,, | Performed by: NURSE PRACTITIONER

## 2022-11-11 PROCEDURE — 99999 PR PBB SHADOW E&M-EST. PATIENT-LVL IV: CPT | Mod: PBBFAC,,, | Performed by: NURSE PRACTITIONER

## 2022-11-11 PROCEDURE — 90472 IMMUNIZATION ADMIN EACH ADD: CPT | Mod: S$GLB,,, | Performed by: NURSE PRACTITIONER

## 2022-11-11 PROCEDURE — 90471 IMMUNIZATION ADMIN: CPT | Mod: S$GLB,,, | Performed by: NURSE PRACTITIONER

## 2022-11-11 PROCEDURE — 90471 FLU VACCINE (QUAD) GREATER THAN OR EQUAL TO 3YO PRESERVATIVE FREE IM: ICD-10-PCS | Mod: S$GLB,,, | Performed by: NURSE PRACTITIONER

## 2022-11-11 PROCEDURE — 90677 PCV20 VACCINE IM: CPT | Mod: S$GLB,,, | Performed by: NURSE PRACTITIONER

## 2022-11-11 RX ORDER — ONDANSETRON 8 MG/1
8 TABLET, ORALLY DISINTEGRATING ORAL EVERY 8 HOURS PRN
Qty: 15 TABLET | Refills: 11 | Status: SHIPPED | OUTPATIENT
Start: 2022-11-11 | End: 2023-06-26 | Stop reason: SDUPTHER

## 2022-11-11 RX ORDER — TIRZEPATIDE 2.5 MG/.5ML
2.5 INJECTION, SOLUTION SUBCUTANEOUS
COMMUNITY

## 2022-11-11 NOTE — PROGRESS NOTES
History of Present Illness   Sherrill Ennis is a 47 y.o. woman with medical history as listed below who presents today for routine physical exam. She did not have labs prior to our visit. She is taking medications as prescribed without perceived SE. Complaints today: none.     Past Medical History:   Diagnosis Date    Anxiety     Diverticulosis     GERD (gastroesophageal reflux disease)     Herpes simplex virus (HSV) infection     Hypertension     Iron deficiency anemia due to chronic blood loss 2018    Resolved after hysterectomy    Mental disorder     RLS (restless legs syndrome)        Past Surgical History:   Procedure Laterality Date    BLADDER REPAIR N/A 2018    Procedure: REPAIR, BLADDER;  Surgeon: NELIA Le MD;  Location: [facility] OR;  Service: Urology;  Laterality: N/A;     SECTION      x4    CHOLECYSTECTOMY      CYSTOGRAM  2018    Procedure: CYSTOGRAM;  Surgeon: NELIA Le MD;  Location: [facility] OR;  Service: Urology;;    CYSTOGRAM N/A 2018    Procedure: CYSTOGRAM;  Surgeon: NELIA Le MD;  Location: [facility] OR;  Service: Urology;  Laterality: N/A;  RN Phone Pre op 18.    CYSTOSCOPY W/ RETROGRADES Bilateral 2018    Procedure: CYSTOSCOPY, WITH RETROGRADE pyelLOGRAM;  Surgeon: NELIA Le MD;  Location: [facility] OR;  Service: Urology;  Laterality: Bilateral;    CYSTOSCOPY W/ RETROGRADES Bilateral 2018    Procedure: CYSTOSCOPY, WITH RETROGRADE PYELOGRAM;  Surgeon: NELIA Le MD;  Location: [facility] OR;  Service: Urology;  Laterality: Bilateral;  RN PRE OP 2018    HERNIA REPAIR      HYSTERECTOMY  2018    LAPAROSCOPIC SALPINGECTOMY Bilateral 2018    Procedure: SALPINGECTOMY, LAPAROSCOPIC;  Surgeon: Vince Garcia MD;  Location: [facility] OR;  Service: OB/GYN;  Laterality: Bilateral;    LAPAROSCOPIC SUPRACERVICAL HYSTERECTOMY N/A 2018    Procedure: [id number];  Surgeon: Vince Garcia MD;  Location:  [facility] OR;  Service: OB/GYN;  Laterality: N/A;  1ST CASE  RN PRE OP 6/13/2018    LAPAROSCOPIC SURGICAL REMOVAL OF CYST OF OVARY Right 6/20/2018    Procedure: EXCISION, CYST, OVARY, LAPAROSCOPIC;  Surgeon: Vince Garcia MD;  Location: [facility] OR;  Service: OB/GYN;  Laterality: Right;    TUBAL LIGATION         Social History     Socioeconomic History    Marital status:    Tobacco Use    Smoking status: Every Day     Packs/day: 0.50     Years: 15.00     Pack years: 7.50     Types: Cigarettes    Smokeless tobacco: Never   Substance and Sexual Activity    Alcohol use: Yes    Drug use: No    Sexual activity: Yes     Partners: Male     Birth control/protection: See Surgical Hx     Comment:    Social History Narrative     since 2016    Together since 2013    He drives 18-wheelers    She is the  for a dental office    Previously  and     Lives with her  and youngest daughter.     with three daughters from previous marriage also     Social Determinants of Health     Financial Resource Strain: Unknown    Difficulty of Paying Living Expenses: Patient refused   Food Insecurity: No Food Insecurity    Worried About Running Out of Food in the Last Year: Never true    Ran Out of Food in the Last Year: Never true   Transportation Needs: No Transportation Needs    Lack of Transportation (Medical): No    Lack of Transportation (Non-Medical): No   Physical Activity: Unknown    Days of Exercise per Week: 0 days   Stress: No Stress Concern Present    Feeling of Stress : Not at all   Social Connections: Unknown    Frequency of Communication with Friends and Family: More than three times a week    Frequency of Social Gatherings with Friends and Family: More than three times a week    Active Member of Clubs or Organizations: No    Attends Club or Organization Meetings: Never    Marital Status:    Housing Stability: Low Risk     Unable to Pay for Housing in the Last Year: No     "Number of Places Lived in the Last Year: 1    Unstable Housing in the Last Year: No       Family History   Problem Relation Age of Onset    Hypertension Mother     Stroke Mother     Aneurysm Mother     Hypertension Father     Hypertension Brother     Hypertension Maternal Grandfather     Heart disease Maternal Grandfather         MI    Cancer Maternal Grandfather     Cancer Paternal Grandmother     Breast cancer Neg Hx     Colon cancer Neg Hx     Ovarian cancer Neg Hx        Review of Systems  Review of Systems   Constitutional:  Negative for malaise/fatigue and weight loss.   HENT:  Negative for hearing loss and tinnitus.    Eyes:  Negative for blurred vision, double vision and discharge.   Respiratory:  Negative for shortness of breath and wheezing.    Cardiovascular:  Negative for chest pain, palpitations, orthopnea, claudication and leg swelling.   Gastrointestinal:  Negative for blood in stool, constipation, diarrhea, melena and vomiting.   Genitourinary:  Negative for dysuria, flank pain and hematuria.   Musculoskeletal:  Negative for back pain, joint pain and neck pain.   Skin:  Negative for itching and rash.   Neurological:  Positive for headaches. Negative for dizziness, loss of consciousness and weakness.   Endo/Heme/Allergies:  Negative for polydipsia.   Psychiatric/Behavioral:  Negative for depression. The patient is not nervous/anxious and does not have insomnia.      A complete review of systems was otherwise negative.    Physical Exam  /82   Pulse 84   Temp 98.6 °F (37 °C) (Oral)   Ht 5' 4" (1.626 m)   Wt 92.4 kg (203 lb 13 oz)   LMP 06/12/2018 (LMP Unknown) Comment: [facility] on 06/20/18  SpO2 95%   BMI 34.98 kg/m²   General appearance: alert, appears stated age, cooperative, and no distress  Head: Normocephalic, without obvious abnormality, atraumatic  Eyes: negative findings: lids and lashes normal and conjunctivae and sclerae normal  Ears: normal TM's and external ear canals both " ears  Nose: Nares normal. Septum midline. Mucosa normal. No drainage or sinus tenderness.  Throat: lips, mucosa, and tongue normal; teeth and gums normal  Neck: no adenopathy, no carotid bruit, no JVD, supple, symmetrical, trachea midline, and thyroid not enlarged, symmetric, no tenderness/mass/nodules  Back: symmetric, no curvature. ROM normal. No CVA tenderness.  Lungs: clear to auscultation bilaterally  Heart: regular rate and rhythm, S1, S2 normal, no murmur, click, rub or gallop  Abdomen: soft, non-tender; bowel sounds normal; no masses,  no organomegaly  Extremities: extremities normal, atraumatic, no cyanosis or edema  Pulses: 2+ and symmetric  Skin: Skin color, texture, turgor normal. No rashes or lesions  Lymph nodes: Cervical, supraclavicular, and axillary nodes normal.  Neurologic: Grossly normal    Assessment/Plan  Routine medical exam  Age appropriate screening recommendations and HM were discussed and updated.  Discussed importance of heart healthy diet and exercise.  Routine labs as below.  Follow-up TBD pending labs.  -     CBC Auto Differential; Future; Expected date: 11/11/2022  -     Comprehensive Metabolic Panel; Future; Expected date: 11/11/2022  -     Lipid Panel; Future; Expected date: 11/11/2022    Essential (primary) hypertension  The current medical regimen is effective;  continue present plan and medications.    Mixed hyperlipidemia  The current medical regimen is effective;  continue present plan and medications.    Adjustment disorder with anxious mood  The current medical regimen is effective;  continue present plan and medications.    Tobacco dependence  Discussed cessation, she is interested in planning to quit over the next year, referral placed.  -     Ambulatory referral/consult to Smoking Cessation Program; Future; Expected date: 11/18/2022    Obesity, Class I, BMI 30-34.9  The patient is asked to make an attempt to improve diet and exercise patterns to aid in medical management  of this problem.  Followed by weight loss specialist and taking Mounjaro, doing well.    Nausea  The current medical regimen is effective;  continue present plan and medications.  PRN use.  -     ondansetron (ZOFRAN-ODT) 8 MG TbDL; Take 1 tablet (8 mg total) by mouth every 8 (eight) hours as needed (nausea).  Dispense: 15 tablet; Refill: 11    Needs flu shot  Given today.  -     Influenza - Quadrivalent *Preferred* (6 months+) (PF)    Need for pneumococcal vaccination  Given today.  -     (In Office Administered) Pneumococcal Conjugate Vaccine (20 Valent) (IM)    Screening for malignant neoplasm of colon  [employer] was given to patient on 11/11/2022 9:35 AM   -     Fecal Immunochemical Test (iFOBT); Future; Expected date: 11/11/2022    Patient has verbalized understanding and is in agreement with plan of care.    Follow up for TBD pending labs.

## 2022-11-17 ENCOUNTER — LAB VISIT (OUTPATIENT)
Dept: LAB | Facility: HOSPITAL | Age: 47
End: 2022-11-17
Attending: NURSE PRACTITIONER
Payer: COMMERCIAL

## 2022-11-17 DIAGNOSIS — Z00.00 ROUTINE MEDICAL EXAM: ICD-10-CM

## 2022-11-17 LAB
ALBUMIN SERPL BCP-MCNC: 4.4 G/DL (ref 3.5–5.2)
ALP SERPL-CCNC: 140 U/L (ref 55–135)
ALT SERPL W/O P-5'-P-CCNC: 37 U/L (ref 10–44)
ANION GAP SERPL CALC-SCNC: 10 MMOL/L (ref 8–16)
AST SERPL-CCNC: 22 U/L (ref 10–40)
BASOPHILS # BLD AUTO: 0.05 K/UL (ref 0–0.2)
BASOPHILS NFR BLD: 0.5 % (ref 0–1.9)
BILIRUB SERPL-MCNC: 0.3 MG/DL (ref 0.1–1)
BUN SERPL-MCNC: 13 MG/DL (ref 6–20)
CALCIUM SERPL-MCNC: 10 MG/DL (ref 8.7–10.5)
CHLORIDE SERPL-SCNC: 106 MMOL/L (ref 95–110)
CHOLEST SERPL-MCNC: 243 MG/DL (ref 120–199)
CHOLEST/HDLC SERPL: 7.1 {RATIO} (ref 2–5)
CO2 SERPL-SCNC: 26 MMOL/L (ref 23–29)
CREAT SERPL-MCNC: 0.7 MG/DL (ref 0.5–1.4)
DIFFERENTIAL METHOD: ABNORMAL
EOSINOPHIL # BLD AUTO: 0.2 K/UL (ref 0–0.5)
EOSINOPHIL NFR BLD: 2.3 % (ref 0–8)
ERYTHROCYTE [DISTWIDTH] IN BLOOD BY AUTOMATED COUNT: 13 % (ref 11.5–14.5)
EST. GFR  (NO RACE VARIABLE): >60 ML/MIN/1.73 M^2
GLUCOSE SERPL-MCNC: 99 MG/DL (ref 70–110)
HCT VFR BLD AUTO: 47.6 % (ref 37–48.5)
HDLC SERPL-MCNC: 34 MG/DL (ref 40–75)
HDLC SERPL: 14 % (ref 20–50)
HGB BLD-MCNC: 15.2 G/DL (ref 12–16)
IMM GRANULOCYTES # BLD AUTO: 0.01 K/UL (ref 0–0.04)
IMM GRANULOCYTES NFR BLD AUTO: 0.1 % (ref 0–0.5)
LDLC SERPL CALC-MCNC: 168 MG/DL (ref 63–159)
LYMPHOCYTES # BLD AUTO: 2.5 K/UL (ref 1–4.8)
LYMPHOCYTES NFR BLD: 26.8 % (ref 18–48)
MCH RBC QN AUTO: 28.4 PG (ref 27–31)
MCHC RBC AUTO-ENTMCNC: 31.9 G/DL (ref 32–36)
MCV RBC AUTO: 89 FL (ref 82–98)
MONOCYTES # BLD AUTO: 0.8 K/UL (ref 0.3–1)
MONOCYTES NFR BLD: 8.2 % (ref 4–15)
NEUTROPHILS # BLD AUTO: 5.8 K/UL (ref 1.8–7.7)
NEUTROPHILS NFR BLD: 62.1 % (ref 38–73)
NONHDLC SERPL-MCNC: 209 MG/DL
NRBC BLD-RTO: 0 /100 WBC
PLATELET # BLD AUTO: 285 K/UL (ref 150–450)
PMV BLD AUTO: 9.8 FL (ref 9.2–12.9)
POTASSIUM SERPL-SCNC: 4.1 MMOL/L (ref 3.5–5.1)
PROT SERPL-MCNC: 7.9 G/DL (ref 6–8.4)
RBC # BLD AUTO: 5.35 M/UL (ref 4–5.4)
SODIUM SERPL-SCNC: 142 MMOL/L (ref 136–145)
TRIGL SERPL-MCNC: 205 MG/DL (ref 30–150)
WBC # BLD AUTO: 9.27 K/UL (ref 3.9–12.7)

## 2022-11-17 PROCEDURE — 80061 LIPID PANEL: CPT | Performed by: NURSE PRACTITIONER

## 2022-11-17 PROCEDURE — 80053 COMPREHEN METABOLIC PANEL: CPT | Performed by: NURSE PRACTITIONER

## 2022-11-17 PROCEDURE — 36415 COLL VENOUS BLD VENIPUNCTURE: CPT | Mod: PO | Performed by: NURSE PRACTITIONER

## 2022-11-17 PROCEDURE — 85025 COMPLETE CBC W/AUTO DIFF WBC: CPT | Performed by: NURSE PRACTITIONER

## 2022-11-18 ENCOUNTER — TELEPHONE (OUTPATIENT)
Dept: FAMILY MEDICINE | Facility: CLINIC | Age: 47
End: 2022-11-18
Payer: COMMERCIAL

## 2022-11-18 DIAGNOSIS — E78.5 HYPERLIPIDEMIA, UNSPECIFIED HYPERLIPIDEMIA TYPE: Primary | ICD-10-CM

## 2022-11-18 RX ORDER — ATORVASTATIN CALCIUM 20 MG/1
20 TABLET, FILM COATED ORAL DAILY
Qty: 90 TABLET | Refills: 1 | Status: SHIPPED | OUTPATIENT
Start: 2022-11-18 | End: 2023-05-23 | Stop reason: SDUPTHER

## 2022-11-18 NOTE — TELEPHONE ENCOUNTER
Please let the patient know her cholesterol remains very high. We need to start her on a cholesterol lowering medication. I am sending this to her pharmacy to take once a day. We should repeat her cholesterol in 3 months, please schedule.    Thanks!  Fani BUSCH

## 2023-01-03 ENCOUNTER — OFFICE VISIT (OUTPATIENT)
Dept: FAMILY MEDICINE | Facility: CLINIC | Age: 48
End: 2023-01-03
Payer: COMMERCIAL

## 2023-01-03 VITALS
SYSTOLIC BLOOD PRESSURE: 130 MMHG | HEIGHT: 64 IN | BODY MASS INDEX: 33.69 KG/M2 | HEART RATE: 87 BPM | WEIGHT: 197.31 LBS | DIASTOLIC BLOOD PRESSURE: 82 MMHG | OXYGEN SATURATION: 97 % | TEMPERATURE: 98 F

## 2023-01-03 DIAGNOSIS — Z12.4 ENCOUNTER FOR PAP SMEAR OF CERVIX WITH HPV DNA COTESTING: Primary | ICD-10-CM

## 2023-01-03 PROCEDURE — 88175 CYTOPATH C/V AUTO FLUID REDO: CPT | Performed by: NURSE PRACTITIONER

## 2023-01-03 PROCEDURE — 1159F MED LIST DOCD IN RCRD: CPT | Mod: CPTII,S$GLB,, | Performed by: NURSE PRACTITIONER

## 2023-01-03 PROCEDURE — 1159F PR MEDICATION LIST DOCUMENTED IN MEDICAL RECORD: ICD-10-PCS | Mod: CPTII,S$GLB,, | Performed by: NURSE PRACTITIONER

## 2023-01-03 PROCEDURE — 3008F PR BODY MASS INDEX (BMI) DOCUMENTED: ICD-10-PCS | Mod: CPTII,S$GLB,, | Performed by: NURSE PRACTITIONER

## 2023-01-03 PROCEDURE — 99214 PR OFFICE/OUTPT VISIT, EST, LEVL IV, 30-39 MIN: ICD-10-PCS | Mod: S$GLB,,, | Performed by: NURSE PRACTITIONER

## 2023-01-03 PROCEDURE — 1160F PR REVIEW ALL MEDS BY PRESCRIBER/CLIN PHARMACIST DOCUMENTED: ICD-10-PCS | Mod: CPTII,S$GLB,, | Performed by: NURSE PRACTITIONER

## 2023-01-03 PROCEDURE — 3079F PR MOST RECENT DIASTOLIC BLOOD PRESSURE 80-89 MM HG: ICD-10-PCS | Mod: CPTII,S$GLB,, | Performed by: NURSE PRACTITIONER

## 2023-01-03 PROCEDURE — 99999 PR PBB SHADOW E&M-EST. PATIENT-LVL IV: CPT | Mod: PBBFAC,,, | Performed by: NURSE PRACTITIONER

## 2023-01-03 PROCEDURE — 81514 NFCT DS BV&VAGINITIS DNA ALG: CPT | Performed by: NURSE PRACTITIONER

## 2023-01-03 PROCEDURE — 3079F DIAST BP 80-89 MM HG: CPT | Mod: CPTII,S$GLB,, | Performed by: NURSE PRACTITIONER

## 2023-01-03 PROCEDURE — 1160F RVW MEDS BY RX/DR IN RCRD: CPT | Mod: CPTII,S$GLB,, | Performed by: NURSE PRACTITIONER

## 2023-01-03 PROCEDURE — 99214 OFFICE O/P EST MOD 30 MIN: CPT | Mod: S$GLB,,, | Performed by: NURSE PRACTITIONER

## 2023-01-03 PROCEDURE — 3075F SYST BP GE 130 - 139MM HG: CPT | Mod: CPTII,S$GLB,, | Performed by: NURSE PRACTITIONER

## 2023-01-03 PROCEDURE — 3008F BODY MASS INDEX DOCD: CPT | Mod: CPTII,S$GLB,, | Performed by: NURSE PRACTITIONER

## 2023-01-03 PROCEDURE — 3075F PR MOST RECENT SYSTOLIC BLOOD PRESS GE 130-139MM HG: ICD-10-PCS | Mod: CPTII,S$GLB,, | Performed by: NURSE PRACTITIONER

## 2023-01-03 PROCEDURE — 87624 HPV HI-RISK TYP POOLED RSLT: CPT | Performed by: NURSE PRACTITIONER

## 2023-01-03 PROCEDURE — 99999 PR PBB SHADOW E&M-EST. PATIENT-LVL IV: ICD-10-PCS | Mod: PBBFAC,,, | Performed by: NURSE PRACTITIONER

## 2023-01-03 NOTE — PROGRESS NOTES
"Subjective:       Sherrill Ennis is a 47 y.o. female here for a routine exam.  Current complaints: none.  Personal health questionnaire reviewed: no.     Gynecologic History  Patient's last menstrual period was 2018 (lmp unknown).  Contraception: none  Last Pap: 2018. Results were: normal  Last mammogram: 22. Results were: normal    Obstetric History  OB History    Para Term  AB Living   4 4 4     4   SAB IAB Ectopic Multiple Live Births           4      # Outcome Date GA Lbr Wei/2nd Weight Sex Delivery Anes PTL Lv   4 Term 2001 38w0d  3.118 kg (6 lb 14 oz) F CS-LTranv  N DESTINI   3 Term 1997 39w0d  3.997 kg (8 lb 13 oz) F CS-LTranv  N DESTINI   2 Term 1994 39w0d  3.742 kg (8 lb 4 oz) F CS-LTranv  N DESTINI   1 Term  40w0d  3.402 kg (7 lb 8 oz) F CS-LTranv  N DESTINI      Complications: Fetal Intolerance, Cephalopelvic Disproportion         The following portions of the patient's history were reviewed and updated as appropriate: allergies, current medications, past family history, past medical history, past social history, past surgical history, and problem list.    Review of Systems  A comprehensive review of systems was negative.      Objective:      /82   Pulse 87   Temp 97.9 °F (36.6 °C) (Oral)   Ht 5' 4" (1.626 m)   Wt 89.5 kg (197 lb 5 oz)   LMP 2018 (LMP Unknown) Comment: [facility] on 18  SpO2 97%   BMI 33.87 kg/m²   General appearance: alert, appears stated age, cooperative, and no distress  Pelvic: external genitalia normal, no adnexal masses or tenderness, and vagina normal without discharge  Neurologic: Grossly normal      Assessment:      Healthy female exam.      Plan:     Encounter for Pap smear of cervix with HPV DNA cotesting  Routine screening completed.  -     Liquid-Based Pap Smear, Screening  -     HPV High Risk Genotypes, PCR  -     VAGINOSIS SCREEN BY DNA PROBE        "

## 2023-01-09 LAB
CLINICAL INFO: NORMAL
CYTO CVX: NORMAL
CYTOLOGIST CVX/VAG CYTO: NORMAL
CYTOLOGIST CVX/VAG CYTO: NORMAL
CYTOLOGY CMNT CVX/VAG CYTO-IMP: NORMAL
CYTOLOGY PAP THIN PREP EXPLANATION: NORMAL
DATE OF PREVIOUS PAP: NORMAL
DATE PREVIOUS BX: NO
GEN CATEG CVX/VAG CYTO-IMP: NORMAL
HPV E6+E7 MRNA CVX QL NAA+PROBE: NOT DETECTED
LMP START DATE: NORMAL
MICROORGANISM CVX/VAG CYTO: NORMAL
PATHOLOGIST CVX/VAG CYTO: NORMAL
SERVICE CMNT-IMP: NORMAL
SPECIMEN SOURCE CVX/VAG CYTO: NORMAL
STAT OF ADQ CVX/VAG CYTO-IMP: NORMAL

## 2023-01-10 ENCOUNTER — TELEPHONE (OUTPATIENT)
Dept: FAMILY MEDICINE | Facility: CLINIC | Age: 48
End: 2023-01-10
Payer: COMMERCIAL

## 2023-01-10 NOTE — TELEPHONE ENCOUNTER
Please let the patient know that unfortunately there were insuffienct cells to complete the pap smear, this is likely because she has had a hysterectomy. I am happy to send her to OBGYN if she would like to have this repeated.    Thanks!  Fani BUSCH

## 2023-01-10 NOTE — TELEPHONE ENCOUNTER
Patient was informed of provider's recommendations/results below. VU.    Patient stated that she isn't having any issues at this time. Would it be okay to postponed being referred to OBGYN?

## 2023-01-31 ENCOUNTER — OFFICE VISIT (OUTPATIENT)
Dept: FAMILY MEDICINE | Facility: CLINIC | Age: 48
End: 2023-01-31
Payer: COMMERCIAL

## 2023-01-31 DIAGNOSIS — M54.42 ACUTE LEFT-SIDED LOW BACK PAIN WITH LEFT-SIDED SCIATICA: Primary | ICD-10-CM

## 2023-01-31 PROCEDURE — 1160F PR REVIEW ALL MEDS BY PRESCRIBER/CLIN PHARMACIST DOCUMENTED: ICD-10-PCS | Mod: CPTII,95,, | Performed by: NURSE PRACTITIONER

## 2023-01-31 PROCEDURE — 1160F RVW MEDS BY RX/DR IN RCRD: CPT | Mod: CPTII,95,, | Performed by: NURSE PRACTITIONER

## 2023-01-31 PROCEDURE — 1159F PR MEDICATION LIST DOCUMENTED IN MEDICAL RECORD: ICD-10-PCS | Mod: CPTII,95,, | Performed by: NURSE PRACTITIONER

## 2023-01-31 PROCEDURE — 1159F MED LIST DOCD IN RCRD: CPT | Mod: CPTII,95,, | Performed by: NURSE PRACTITIONER

## 2023-01-31 PROCEDURE — 99214 OFFICE O/P EST MOD 30 MIN: CPT | Mod: 95,,, | Performed by: NURSE PRACTITIONER

## 2023-01-31 PROCEDURE — 99214 PR OFFICE/OUTPT VISIT, EST, LEVL IV, 30-39 MIN: ICD-10-PCS | Mod: 95,,, | Performed by: NURSE PRACTITIONER

## 2023-01-31 RX ORDER — DICLOFENAC SODIUM 50 MG/1
50 TABLET, DELAYED RELEASE ORAL 3 TIMES DAILY
Qty: 90 TABLET | Refills: 0 | Status: SHIPPED | OUTPATIENT
Start: 2023-01-31 | End: 2023-03-02

## 2023-01-31 RX ORDER — TIZANIDINE 4 MG/1
4 TABLET ORAL EVERY 8 HOURS
Qty: 30 TABLET | Refills: 1 | Status: SHIPPED | OUTPATIENT
Start: 2023-01-31 | End: 2023-02-10

## 2023-01-31 NOTE — PROGRESS NOTES
The patient location is: [facility]  The chief complaint leading to consultation is: low back pain    Visit type: audiovisual    Face to Face time with patient: 15 minutes  20 minutes of total time spent on the encounter, which includes face to face time and non-face to face time preparing to see the patient (eg, review of tests), Obtaining and/or reviewing separately obtained history, Documenting clinical information in the electronic or other health record, Independently interpreting results (not separately reported) and communicating results to the patient/family/caregiver, or Care coordination (not separately reported).         Each patient to whom he or she provides medical services by telemedicine is:  (1) informed of the relationship between the physician and patient and the respective role of any other health care provider with respect to management of the patient; and (2) notified that he or she may decline to receive medical services by telemedicine and may withdraw from such care at any time.    Notes:   History of Present Illness   Sherrill Ennis is a 47 y.o. woman with medical history as listed below who presents today for evaluation of back pain x 5 days. Pain is left lower and described as a burning, twisting, and tight sensation. Pain radiates in to buttocks and in to thigh. Pain is worse with standing and walking and relieved with rest. She reports no numbness, tingling, saddle anesthesia, focal weakness, or loss of bowel or bladder. She denies rash or fevers. She is alternating heat and ice as well as taking Tylenol and Motrin with no relief. She reports no injury or trauma. She has no additional complaints and is otherwise healthy on today's visit.    Past Medical History:   Diagnosis Date    Anxiety     Diverticulosis     GERD (gastroesophageal reflux disease)     Herpes simplex virus (HSV) infection     Hypertension     Iron deficiency anemia due to chronic blood loss  2018    Resolved after hysterectomy    Mental disorder     RLS (restless legs syndrome)        Past Surgical History:   Procedure Laterality Date    BLADDER REPAIR N/A 2018    Procedure: REPAIR, BLADDER;  Surgeon: NELIA Le MD;  Location: [facility] OR;  Service: Urology;  Laterality: N/A;     SECTION      x4    CHOLECYSTECTOMY      CYSTOGRAM  2018    Procedure: CYSTOGRAM;  Surgeon: NELIA Le MD;  Location: [facility] OR;  Service: Urology;;    CYSTOGRAM N/A 2018    Procedure: CYSTOGRAM;  Surgeon: NELIA Le MD;  Location: [facility] OR;  Service: Urology;  Laterality: N/A;  RN Phone Pre op 18.    CYSTOSCOPY W/ RETROGRADES Bilateral 2018    Procedure: CYSTOSCOPY, WITH RETROGRADE pyelLOGRAM;  Surgeon: NELIA Le MD;  Location: [facility] OR;  Service: Urology;  Laterality: Bilateral;    CYSTOSCOPY W/ RETROGRADES Bilateral 2018    Procedure: CYSTOSCOPY, WITH RETROGRADE PYELOGRAM;  Surgeon: NELIA Le MD;  Location: [facility] OR;  Service: Urology;  Laterality: Bilateral;  RN PRE OP 2018    HERNIA REPAIR      HYSTERECTOMY  2018    LAPAROSCOPIC SALPINGECTOMY Bilateral 2018    Procedure: SALPINGECTOMY, LAPAROSCOPIC;  Surgeon: Vince Garcia MD;  Location: [facility] OR;  Service: OB/GYN;  Laterality: Bilateral;    LAPAROSCOPIC SUPRACERVICAL HYSTERECTOMY N/A 2018    Procedure: [id number];  Surgeon: Vince Garcia MD;  Location: [facility] OR;  Service: OB/GYN;  Laterality: N/A;  1ST CASE  RN PRE OP 2018    LAPAROSCOPIC SURGICAL REMOVAL OF CYST OF OVARY Right 2018    Procedure: EXCISION, CYST, OVARY, LAPAROSCOPIC;  Surgeon: Vince Garcia MD;  Location: [facility] OR;  Service: OB/GYN;  Laterality: Right;    TUBAL LIGATION         Social History     Socioeconomic History    Marital status:    Tobacco Use    Smoking status: Every Day     Packs/day: 0.50     Years: 15.00     Pack years: 7.50     Types: Cigarettes     Passive  exposure: Never    Smokeless tobacco: Never   Substance and Sexual Activity    Alcohol use: Yes    Drug use: No    Sexual activity: Yes     Partners: Male     Birth control/protection: See Surgical Hx     Comment:    Social History Narrative     since 2016    Together since 2013    He drives 18-wheelers    She is the  for a dental office    Previously  and     Lives with her  and youngest daughter.     with three daughters from previous marriage also     Social Determinants of Health     Financial Resource Strain: Low Risk     Difficulty of Paying Living Expenses: Not hard at all   Food Insecurity: No Food Insecurity    Worried About Running Out of Food in the Last Year: Never true    Ran Out of Food in the Last Year: Never true   Transportation Needs: No Transportation Needs    Lack of Transportation (Medical): No    Lack of Transportation (Non-Medical): No   Physical Activity: Inactive    Days of Exercise per Week: 0 days    Minutes of Exercise per Session: 0 min   Stress: No Stress Concern Present    Feeling of Stress : Not at all   Social Connections: Unknown    Frequency of Communication with Friends and Family: More than three times a week    Frequency of Social Gatherings with Friends and Family: More than three times a week    Active Member of Clubs or Organizations: No    Attends Club or Organization Meetings: Never    Marital Status:    Housing Stability: Low Risk     Unable to Pay for Housing in the Last Year: No    Number of Places Lived in the Last Year: 1    Unstable Housing in the Last Year: No       Family History   Problem Relation Age of Onset    Hypertension Mother     Stroke Mother     Aneurysm Mother     Hypertension Father     Hypertension Brother     Hypertension Maternal Grandfather     Heart disease Maternal Grandfather         MI    Cancer Maternal Grandfather     Cancer Paternal Grandmother     Breast cancer Neg Hx     Colon  cancer Neg Hx     Ovarian cancer Neg Hx        Review of Systems  Review of Systems   Constitutional:  Negative for fever and weight loss.   Cardiovascular:  Negative for chest pain.   Gastrointestinal:  Negative for abdominal pain, blood in stool and melena.   Genitourinary:  Negative for dysuria, flank pain and hematuria.   Musculoskeletal:  Positive for back pain. Negative for falls, joint pain and neck pain.   Skin:  Negative for rash.   Neurological:  Negative for tingling, sensory change, focal weakness, weakness and headaches.     A complete review of systems was otherwise negative.    Physical Exam  General appearance: alert, appears stated age, cooperative, and no distress  Lungs:  respirations even and unlabored  Skin:  no visible rash or lesions  Neurologic: Grossly normal    Assessment/Plan  Acute left-sided low back pain with left-sided sciatica  Trial NSAID TID with alternating Tylenol and PRN muscle relaxant.  Continue heat.  Hand out provided on gentle stretches, HEP, BID.  ER Precautions discussed.  If no improvement over next two weeks, recommend in office visit.  -     diclofenac (VOLTAREN) 50 MG EC tablet; Take 1 tablet (50 mg total) by mouth 3 (three) times daily.  Dispense: 90 tablet; Refill: 0  -     tiZANidine (ZANAFLEX) 4 MG tablet; Take 1 tablet (4 mg total) by mouth every 8 (eight) hours. for 10 days  Dispense: 30 tablet; Refill: 1    Patient has verbalized understanding and is in agreement with plan of care.    Follow up if symptoms worsen or fail to improve.    Answers submitted by the patient for this visit:  Back Pain Questionnaire (Submitted on 1/30/2023)  Chief Complaint: Back pain  Chronicity: new  Onset: 1 to 4 weeks ago  Frequency: daily  Progression since onset: waxing and waning  Pain location: lumbar spine, sacro-iliac  Pain quality: burning, shooting  Radiates to: left thigh  Pain - numeric: 8/10  Pain is: the same all the time  bladder incontinence: No  bowel incontinence:  No  leg pain: Yes  numbness: No  paresis: No  paresthesias: No  pelvic pain: No  perianal numbness: No  genital pain: No  Pain severity: moderate  Improvement on treatment: mild

## 2023-02-17 ENCOUNTER — LAB VISIT (OUTPATIENT)
Dept: LAB | Facility: HOSPITAL | Age: 48
End: 2023-02-17
Attending: NURSE PRACTITIONER
Payer: COMMERCIAL

## 2023-02-17 DIAGNOSIS — E78.5 HYPERLIPIDEMIA, UNSPECIFIED HYPERLIPIDEMIA TYPE: ICD-10-CM

## 2023-02-17 LAB
CHOLEST SERPL-MCNC: 178 MG/DL (ref 120–199)
CHOLEST/HDLC SERPL: 4.3 {RATIO} (ref 2–5)
HDLC SERPL-MCNC: 41 MG/DL (ref 40–75)
HDLC SERPL: 23 % (ref 20–50)
LDLC SERPL CALC-MCNC: 112.4 MG/DL (ref 63–159)
NONHDLC SERPL-MCNC: 137 MG/DL
TRIGL SERPL-MCNC: 123 MG/DL (ref 30–150)

## 2023-02-17 PROCEDURE — 36415 COLL VENOUS BLD VENIPUNCTURE: CPT | Mod: PO | Performed by: NURSE PRACTITIONER

## 2023-02-17 PROCEDURE — 80061 LIPID PANEL: CPT | Performed by: NURSE PRACTITIONER

## 2023-04-12 ENCOUNTER — PATIENT MESSAGE (OUTPATIENT)
Dept: ADMINISTRATIVE | Facility: HOSPITAL | Age: 48
End: 2023-04-12
Payer: COMMERCIAL

## 2023-05-23 DIAGNOSIS — E78.5 HYPERLIPIDEMIA, UNSPECIFIED HYPERLIPIDEMIA TYPE: ICD-10-CM

## 2023-05-23 RX ORDER — ATORVASTATIN CALCIUM 20 MG/1
20 TABLET, FILM COATED ORAL DAILY
Qty: 90 TABLET | Refills: 1 | Status: SHIPPED | OUTPATIENT
Start: 2023-05-23 | End: 2023-11-20

## 2023-06-21 DIAGNOSIS — Z12.31 OTHER SCREENING MAMMOGRAM: ICD-10-CM

## 2023-06-26 DIAGNOSIS — K29.00 ACUTE GASTRITIS WITHOUT HEMORRHAGE, UNSPECIFIED GASTRITIS TYPE: ICD-10-CM

## 2023-06-26 DIAGNOSIS — R11.0 NAUSEA: ICD-10-CM

## 2023-06-26 NOTE — TELEPHONE ENCOUNTER
Refill Routing Note   Medication(s) are not appropriate for processing by [facility] for the following reason(s):      Non-participating provider:      [facility] action(s):  Route Care Due:  None identified          Appointments  past 12m or future 3m with PCP    Date Provider   Last Visit   1/31/2023 Fani Huitron NP   Next Visit   11/14/2023 Fani Huitron NP   ED visits in past 90 days: 0        Note composed:4:13 PM 06/26/2023

## 2023-06-27 RX ORDER — ONDANSETRON 8 MG/1
8 TABLET, ORALLY DISINTEGRATING ORAL EVERY 8 HOURS PRN
Qty: 15 TABLET | Refills: 11 | Status: SHIPPED | OUTPATIENT
Start: 2023-06-27 | End: 2023-10-25 | Stop reason: SDUPTHER

## 2023-06-27 RX ORDER — PANTOPRAZOLE SODIUM 40 MG/1
40 TABLET, DELAYED RELEASE ORAL 2 TIMES DAILY
Qty: 180 TABLET | Refills: 0 | Status: SHIPPED | OUTPATIENT
Start: 2023-06-27 | End: 2024-01-10 | Stop reason: SDUPTHER

## 2023-07-18 DIAGNOSIS — A60.04 HERPES SIMPLEX VULVOVAGINITIS: ICD-10-CM

## 2023-07-18 RX ORDER — VALACYCLOVIR HYDROCHLORIDE 1 G/1
TABLET, FILM COATED ORAL
Qty: 30 TABLET | Refills: 3 | Status: SHIPPED | OUTPATIENT
Start: 2023-07-18

## 2023-07-18 NOTE — TELEPHONE ENCOUNTER
Care Due:                  Date            Visit Type   Department     Provider  --------------------------------------------------------------------------------    Last Visit: None Found      None         None Found  Next Visit: None Scheduled  None         None Found                                                            Last  Test          Frequency    Reason                     Performed    Due Date  --------------------------------------------------------------------------------    Office Visit  12 months..  amLODIPine, atorvastatin,   Not Found    Overdue                             valACYclovir.............    [facility] Embedded Care Due Messages. Reference number: [id number].   7/18/2023 5:59:24 AM CDT

## 2023-07-18 NOTE — TELEPHONE ENCOUNTER
Refill Routing Note   Medication(s) are not appropriate for processing by [facility] for the following reason(s):      Patient not seen by provider within 15 months    [facility] action(s):  Defer Care Due:  Appointment due            Appointments  past 12m or future 3m with PCP    Date Provider   Last Visit   6/10/2019 Reagan Holland MD   Next Visit   Visit date not found Reagan Holland MD   ED visits in past 90 days: 0        Note composed:11:33 AM 07/18/2023

## 2023-08-02 ENCOUNTER — HOSPITAL ENCOUNTER (OUTPATIENT)
Dept: RADIOLOGY | Facility: HOSPITAL | Age: 48
Discharge: HOME | End: 2023-08-02
Attending: INTERNAL MEDICINE
Payer: COMMERCIAL

## 2023-08-02 DIAGNOSIS — Z12.31 OTHER SCREENING MAMMOGRAM: ICD-10-CM

## 2023-08-02 PROCEDURE — 77067 SCR MAMMO BI INCL CAD: CPT | Mod: 26,,, | Performed by: RADIOLOGY

## 2023-08-02 PROCEDURE — 77067 SCR MAMMO BI INCL CAD: CPT | Mod: TC,PO

## 2023-08-02 PROCEDURE — 77063 BREAST TOMOSYNTHESIS BI: CPT | Mod: 26,,, | Performed by: RADIOLOGY

## 2023-08-02 PROCEDURE — 77063 MAMMO DIGITAL SCREENING BILAT WITH TOMO: ICD-10-PCS | Mod: 26,,, | Performed by: RADIOLOGY

## 2023-08-02 PROCEDURE — 77067 MAMMO DIGITAL SCREENING BILAT WITH TOMO: ICD-10-PCS | Mod: 26,,, | Performed by: RADIOLOGY

## 2023-08-03 NOTE — OP NOTE
[facility]  General Surgery  Operative Note    SUMMARY     Date of Procedure: 8/26/2019     Procedure: Procedure(s):  REPAIR, HERNIA, INCISIONAL , WITHOUT HISTORY OF PRIOR REPAIR       Surgeon(s) and Role:     * Estevan Barreto MD - Primary    Assisting Surgeon:Jennie Mckay MD PGY-5    Pre-Operative Diagnosis: Incisional hernia without obstruction or gangrene [K43.2]    Post-Operative Diagnosis: Post-Op Diagnosis Codes:     * Incisional hernia without obstruction or gangrene [K43.2]    Anesthesia: General    Technical Procedures Used:  Patient was taken to the operating room and placed on operating room table in supine position.  Under adequate general anesthesia was prepped and draped around abdomen usual sterile fashion. Her previous incision was used from her umbilicus all the way down to just above her pubic symphysis.  This is deepened to expose the to 3 lobe of the areas of defect that was kind of like mid lower abdomen all the way down to the pubic symphysis where there was an space created laterally that was under the rectus fascia but over the rectus muscle.  Circumferential dissection around was performed and hernia sac was reduced.  There was small intestines as well as omentum as contents of the hernia in this were brought back into the patient's abdominal cavity.  With the entire margin of the hernia delineated was measured and it was felt that this space was 5 cm x 8 cm.  An appropriate sized piece of mesh was then used in was placed in a sub fascial underlay fashion and this was stitched circumferentially using Ethibond suture. The fascia was then reapproximated on top of this with nonabsorbable suture in after injection with Exparel as well as some Evicel the rest of the wound was closed in layers with absorbable suture Dermabond tape was used a binder was placed she was awakened and transported to the recovery room in satisfactory condition.    Description of the Findings of the  Procedure:  5 x 8 cm defect containing fat and small intestine    Significant Surgical Tasks Conducted by the Assistant(s), if Applicable:  Less than 50%    Complications: No    Estimated Blood Loss (EBL):  50 mL           Implants:   Implant Name Type Inv. Item Serial No.  Lot No. LRB No. Used   PATCH HERNIA [employer] - [id number]  PATCH HERNIA [employer]  [employer] [id number] N/A 1       Specimens:   Specimen (12h ago, onward)    None                  Condition: Good    Disposition: PACU - hemodynamically stable.    Attestation: I was present and scrubbed for the entire procedure.   Secondary Intention Text (Leave Blank If You Do Not Want): The defect will heal with secondary intention.

## 2023-10-25 DIAGNOSIS — R11.0 NAUSEA: ICD-10-CM

## 2023-10-25 RX ORDER — ONDANSETRON 8 MG/1
8 TABLET, ORALLY DISINTEGRATING ORAL EVERY 8 HOURS PRN
Qty: 15 TABLET | Refills: 0 | Status: SHIPPED | OUTPATIENT
Start: 2023-10-25 | End: 2023-11-13 | Stop reason: SDUPTHER

## 2023-11-13 ENCOUNTER — TELEPHONE (OUTPATIENT)
Dept: FAMILY MEDICINE | Facility: CLINIC | Age: 48
End: 2023-11-13
Payer: COMMERCIAL

## 2023-11-13 DIAGNOSIS — I10 ESSENTIAL (PRIMARY) HYPERTENSION: Chronic | ICD-10-CM

## 2023-11-13 DIAGNOSIS — R11.0 NAUSEA: ICD-10-CM

## 2023-11-13 NOTE — TELEPHONE ENCOUNTER
----- Message from Steve Alves sent at 11/13/2023  1:57 PM CST -----  Regarding: pt called  Type: Patient Call Back         Who called: CAROLINA CRONIN [[id number]]         What is the request in detail:  [facility]          Can the clinic reply by [facility]? No         Would the patient rather a call back or a response via [facility]? Call back         Best call back number: Telephone Information:  Mobile          [phone]             Additional Information:         Thank you.

## 2023-11-13 NOTE — TELEPHONE ENCOUNTER
Left a voicemail to inform he Patient that FELICITAS Mohr, will not be in the office on tomorrow.  Requested the Patient to call the office back to be rescheduled.  Sent a message thru [facility] as well.

## 2023-11-14 RX ORDER — AMLODIPINE BESYLATE 10 MG/1
10 TABLET ORAL DAILY
Qty: 90 TABLET | Refills: 3 | Status: SHIPPED | OUTPATIENT
Start: 2023-11-14 | End: 2023-11-20

## 2023-11-14 RX ORDER — ONDANSETRON 8 MG/1
8 TABLET, ORALLY DISINTEGRATING ORAL EVERY 8 HOURS PRN
Qty: 15 TABLET | Refills: 0 | Status: SHIPPED | OUTPATIENT
Start: 2023-11-14 | End: 2023-12-08 | Stop reason: SDUPTHER

## 2023-11-20 DIAGNOSIS — I10 ESSENTIAL (PRIMARY) HYPERTENSION: Chronic | ICD-10-CM

## 2023-11-20 DIAGNOSIS — E78.5 HYPERLIPIDEMIA, UNSPECIFIED HYPERLIPIDEMIA TYPE: ICD-10-CM

## 2023-11-20 RX ORDER — AMLODIPINE BESYLATE 10 MG/1
TABLET ORAL
Qty: 90 TABLET | Refills: 0 | Status: SHIPPED | OUTPATIENT
Start: 2023-11-20

## 2023-11-20 RX ORDER — ATORVASTATIN CALCIUM 20 MG/1
TABLET, FILM COATED ORAL
Qty: 90 TABLET | Refills: 0 | Status: SHIPPED | OUTPATIENT
Start: 2023-11-20 | End: 2024-02-19

## 2023-11-20 NOTE — TELEPHONE ENCOUNTER
Refill Routing Note   Medication(s) are not appropriate for processing by [facility] for the following reason(s):      - NO PCP LISTED IN EPIC; ROUTING TO DR HOLLAND AS LAST PRESCRIBING PROVIDER    [facility] action(s):  Route          Medication reconciliation completed: No     Appointments  past 12m or future 3m with PCP    Date Provider   Last Visit   6/10/2019 Reagan Holland MD   Next Visit   Visit date not found Reagan Hollnad MD   ED visits in past 90 days: 0        Note composed:9:50 AM 11/20/2023

## 2023-12-08 ENCOUNTER — OFFICE VISIT (OUTPATIENT)
Dept: FAMILY MEDICINE | Facility: CLINIC | Age: 48
End: 2023-12-08
Payer: COMMERCIAL

## 2023-12-08 ENCOUNTER — LAB VISIT (OUTPATIENT)
Dept: LAB | Facility: HOSPITAL | Age: 48
End: 2023-12-08
Payer: COMMERCIAL

## 2023-12-08 VITALS
BODY MASS INDEX: 28.15 KG/M2 | WEIGHT: 164.88 LBS | OXYGEN SATURATION: 96 % | HEIGHT: 64 IN | HEART RATE: 81 BPM | TEMPERATURE: 98 F | DIASTOLIC BLOOD PRESSURE: 82 MMHG | SYSTOLIC BLOOD PRESSURE: 128 MMHG

## 2023-12-08 DIAGNOSIS — I10 ESSENTIAL (PRIMARY) HYPERTENSION: Chronic | ICD-10-CM

## 2023-12-08 DIAGNOSIS — E78.2 MIXED HYPERLIPIDEMIA: ICD-10-CM

## 2023-12-08 DIAGNOSIS — E66.3 OVERWEIGHT (BMI 25.0-29.9): ICD-10-CM

## 2023-12-08 DIAGNOSIS — Z00.00 ROUTINE MEDICAL EXAM: Primary | ICD-10-CM

## 2023-12-08 DIAGNOSIS — Z12.11 SCREEN FOR COLON CANCER: ICD-10-CM

## 2023-12-08 DIAGNOSIS — F17.200 TOBACCO DEPENDENCY: Chronic | ICD-10-CM

## 2023-12-08 DIAGNOSIS — Z23 NEEDS FLU SHOT: ICD-10-CM

## 2023-12-08 DIAGNOSIS — G25.81 RLS (RESTLESS LEGS SYNDROME): ICD-10-CM

## 2023-12-08 DIAGNOSIS — Z00.00 ROUTINE MEDICAL EXAM: ICD-10-CM

## 2023-12-08 DIAGNOSIS — K21.9 GASTROESOPHAGEAL REFLUX DISEASE WITHOUT ESOPHAGITIS: Chronic | ICD-10-CM

## 2023-12-08 DIAGNOSIS — R11.0 NAUSEA: ICD-10-CM

## 2023-12-08 DIAGNOSIS — E78.5 HYPERLIPIDEMIA, UNSPECIFIED HYPERLIPIDEMIA TYPE: ICD-10-CM

## 2023-12-08 DIAGNOSIS — F43.22 ADJUSTMENT DISORDER WITH ANXIOUS MOOD: Chronic | ICD-10-CM

## 2023-12-08 LAB
ALBUMIN SERPL BCP-MCNC: 4.5 G/DL (ref 3.5–5.2)
ALBUMIN SERPL BCP-MCNC: 4.5 G/DL (ref 3.5–5.2)
ALP SERPL-CCNC: 140 U/L (ref 55–135)
ALP SERPL-CCNC: 140 U/L (ref 55–135)
ALT SERPL W/O P-5'-P-CCNC: 21 U/L (ref 10–44)
ALT SERPL W/O P-5'-P-CCNC: 21 U/L (ref 10–44)
ANION GAP SERPL CALC-SCNC: 14 MMOL/L (ref 8–16)
ANION GAP SERPL CALC-SCNC: 14 MMOL/L (ref 8–16)
AST SERPL-CCNC: 19 U/L (ref 10–40)
AST SERPL-CCNC: 19 U/L (ref 10–40)
BASOPHILS # BLD AUTO: 0.05 K/UL (ref 0–0.2)
BASOPHILS NFR BLD: 0.4 % (ref 0–1.9)
BILIRUB SERPL-MCNC: 0.4 MG/DL (ref 0.1–1)
BILIRUB SERPL-MCNC: 0.4 MG/DL (ref 0.1–1)
BUN SERPL-MCNC: 12 MG/DL (ref 6–20)
BUN SERPL-MCNC: 12 MG/DL (ref 6–20)
CALCIUM SERPL-MCNC: 9.5 MG/DL (ref 8.7–10.5)
CALCIUM SERPL-MCNC: 9.5 MG/DL (ref 8.7–10.5)
CHLORIDE SERPL-SCNC: 105 MMOL/L (ref 95–110)
CHLORIDE SERPL-SCNC: 105 MMOL/L (ref 95–110)
CHOLEST SERPL-MCNC: 171 MG/DL (ref 120–199)
CHOLEST/HDLC SERPL: 5.2 {RATIO} (ref 2–5)
CO2 SERPL-SCNC: 24 MMOL/L (ref 23–29)
CO2 SERPL-SCNC: 24 MMOL/L (ref 23–29)
CREAT SERPL-MCNC: 0.7 MG/DL (ref 0.5–1.4)
CREAT SERPL-MCNC: 0.7 MG/DL (ref 0.5–1.4)
DIFFERENTIAL METHOD: ABNORMAL
EOSINOPHIL # BLD AUTO: 0.2 K/UL (ref 0–0.5)
EOSINOPHIL NFR BLD: 1.3 % (ref 0–8)
ERYTHROCYTE [DISTWIDTH] IN BLOOD BY AUTOMATED COUNT: 12.8 % (ref 11.5–14.5)
EST. GFR  (NO RACE VARIABLE): >60 ML/MIN/1.73 M^2
EST. GFR  (NO RACE VARIABLE): >60 ML/MIN/1.73 M^2
ESTIMATED AVG GLUCOSE: 108 MG/DL (ref 68–131)
GLUCOSE SERPL-MCNC: 74 MG/DL (ref 70–110)
GLUCOSE SERPL-MCNC: 74 MG/DL (ref 70–110)
HBA1C MFR BLD: 5.4 % (ref 4–5.6)
HCT VFR BLD AUTO: 42.2 % (ref 37–48.5)
HDLC SERPL-MCNC: 33 MG/DL (ref 40–75)
HDLC SERPL: 19.3 % (ref 20–50)
HGB BLD-MCNC: 14.7 G/DL (ref 12–16)
IMM GRANULOCYTES # BLD AUTO: 0.04 K/UL (ref 0–0.04)
IMM GRANULOCYTES NFR BLD AUTO: 0.3 % (ref 0–0.5)
LDLC SERPL CALC-MCNC: 111 MG/DL (ref 63–159)
LYMPHOCYTES # BLD AUTO: 2.6 K/UL (ref 1–4.8)
LYMPHOCYTES NFR BLD: 22.9 % (ref 18–48)
MCH RBC QN AUTO: 28.4 PG (ref 27–31)
MCHC RBC AUTO-ENTMCNC: 34.8 G/DL (ref 32–36)
MCV RBC AUTO: 82 FL (ref 82–98)
MONOCYTES # BLD AUTO: 0.8 K/UL (ref 0.3–1)
MONOCYTES NFR BLD: 7 % (ref 4–15)
NEUTROPHILS # BLD AUTO: 7.8 K/UL (ref 1.8–7.7)
NEUTROPHILS NFR BLD: 68.1 % (ref 38–73)
NONHDLC SERPL-MCNC: 138 MG/DL
NRBC BLD-RTO: 0 /100 WBC
PLATELET # BLD AUTO: 306 K/UL (ref 150–450)
PMV BLD AUTO: 9.4 FL (ref 9.2–12.9)
POTASSIUM SERPL-SCNC: 3.8 MMOL/L (ref 3.5–5.1)
POTASSIUM SERPL-SCNC: 3.8 MMOL/L (ref 3.5–5.1)
PROT SERPL-MCNC: 7.8 G/DL (ref 6–8.4)
PROT SERPL-MCNC: 7.8 G/DL (ref 6–8.4)
RBC # BLD AUTO: 5.18 M/UL (ref 4–5.4)
SODIUM SERPL-SCNC: 143 MMOL/L (ref 136–145)
SODIUM SERPL-SCNC: 143 MMOL/L (ref 136–145)
TRIGL SERPL-MCNC: 135 MG/DL (ref 30–150)
WBC # BLD AUTO: 11.52 K/UL (ref 3.9–12.7)

## 2023-12-08 PROCEDURE — 3079F DIAST BP 80-89 MM HG: CPT | Mod: CPTII,S$GLB,, | Performed by: NURSE PRACTITIONER

## 2023-12-08 PROCEDURE — 85025 COMPLETE CBC W/AUTO DIFF WBC: CPT | Performed by: NURSE PRACTITIONER

## 2023-12-08 PROCEDURE — 99396 PREV VISIT EST AGE 40-64: CPT | Mod: 25,S$GLB,, | Performed by: NURSE PRACTITIONER

## 2023-12-08 PROCEDURE — 90686 IIV4 VACC NO PRSV 0.5 ML IM: CPT | Mod: S$GLB,,, | Performed by: NURSE PRACTITIONER

## 2023-12-08 PROCEDURE — 36415 COLL VENOUS BLD VENIPUNCTURE: CPT | Mod: PO | Performed by: NURSE PRACTITIONER

## 2023-12-08 PROCEDURE — 3074F SYST BP LT 130 MM HG: CPT | Mod: CPTII,S$GLB,, | Performed by: NURSE PRACTITIONER

## 2023-12-08 PROCEDURE — 80061 LIPID PANEL: CPT | Performed by: NURSE PRACTITIONER

## 2023-12-08 PROCEDURE — 3079F PR MOST RECENT DIASTOLIC BLOOD PRESSURE 80-89 MM HG: ICD-10-PCS | Mod: CPTII,S$GLB,, | Performed by: NURSE PRACTITIONER

## 2023-12-08 PROCEDURE — 1160F RVW MEDS BY RX/DR IN RCRD: CPT | Mod: CPTII,S$GLB,, | Performed by: NURSE PRACTITIONER

## 2023-12-08 PROCEDURE — 1159F PR MEDICATION LIST DOCUMENTED IN MEDICAL RECORD: ICD-10-PCS | Mod: CPTII,S$GLB,, | Performed by: NURSE PRACTITIONER

## 2023-12-08 PROCEDURE — 80053 COMPREHEN METABOLIC PANEL: CPT | Performed by: NURSE PRACTITIONER

## 2023-12-08 PROCEDURE — 3008F BODY MASS INDEX DOCD: CPT | Mod: CPTII,S$GLB,, | Performed by: NURSE PRACTITIONER

## 2023-12-08 PROCEDURE — 3008F PR BODY MASS INDEX (BMI) DOCUMENTED: ICD-10-PCS | Mod: CPTII,S$GLB,, | Performed by: NURSE PRACTITIONER

## 2023-12-08 PROCEDURE — 90471 FLU VACCINE (QUAD) GREATER THAN OR EQUAL TO 3YO PRESERVATIVE FREE IM: ICD-10-PCS | Mod: S$GLB,,, | Performed by: NURSE PRACTITIONER

## 2023-12-08 PROCEDURE — 1160F PR REVIEW ALL MEDS BY PRESCRIBER/CLIN PHARMACIST DOCUMENTED: ICD-10-PCS | Mod: CPTII,S$GLB,, | Performed by: NURSE PRACTITIONER

## 2023-12-08 PROCEDURE — 1159F MED LIST DOCD IN RCRD: CPT | Mod: CPTII,S$GLB,, | Performed by: NURSE PRACTITIONER

## 2023-12-08 PROCEDURE — 90686 FLU VACCINE (QUAD) GREATER THAN OR EQUAL TO 3YO PRESERVATIVE FREE IM: ICD-10-PCS | Mod: S$GLB,,, | Performed by: NURSE PRACTITIONER

## 2023-12-08 PROCEDURE — 83036 HEMOGLOBIN GLYCOSYLATED A1C: CPT | Performed by: NURSE PRACTITIONER

## 2023-12-08 PROCEDURE — 90471 IMMUNIZATION ADMIN: CPT | Mod: S$GLB,,, | Performed by: NURSE PRACTITIONER

## 2023-12-08 PROCEDURE — 99999 PR PBB SHADOW E&M-EST. PATIENT-LVL V: ICD-10-PCS | Mod: PBBFAC,,, | Performed by: NURSE PRACTITIONER

## 2023-12-08 PROCEDURE — 3074F PR MOST RECENT SYSTOLIC BLOOD PRESSURE < 130 MM HG: ICD-10-PCS | Mod: CPTII,S$GLB,, | Performed by: NURSE PRACTITIONER

## 2023-12-08 PROCEDURE — 99396 PR PREVENTIVE VISIT,EST,40-64: ICD-10-PCS | Mod: 25,S$GLB,, | Performed by: NURSE PRACTITIONER

## 2023-12-08 PROCEDURE — 99999 PR PBB SHADOW E&M-EST. PATIENT-LVL V: CPT | Mod: PBBFAC,,, | Performed by: NURSE PRACTITIONER

## 2023-12-08 RX ORDER — ONDANSETRON 8 MG/1
8 TABLET, ORALLY DISINTEGRATING ORAL EVERY 8 HOURS PRN
Qty: 15 TABLET | Refills: 0 | Status: SHIPPED | OUTPATIENT
Start: 2023-12-08 | End: 2024-01-10 | Stop reason: SDUPTHER

## 2023-12-08 NOTE — PROGRESS NOTES
History of Present Illness   Sherrill Ennis is a 48 y.o. woman with medical history as listed below who presents today for routine physical exam. She did not have labs prior to our visit. She is taking medications as prescribed without perceived SE. Complaints today: none. Pertinent negatives as listed in ROS. We will address HM today.    Past Medical History:   Diagnosis Date    Anxiety     Diverticulosis     GERD (gastroesophageal reflux disease)     Herpes simplex virus (HSV) infection     Hypertension     Iron deficiency anemia due to chronic blood loss 2018    Resolved after hysterectomy    Mental disorder     RLS (restless legs syndrome)        Past Surgical History:   Procedure Laterality Date    BLADDER REPAIR N/A 2018    Procedure: REPAIR, BLADDER;  Surgeon: NELIA Le MD;  Location: [facility] OR;  Service: Urology;  Laterality: N/A;     SECTION      x4    CHOLECYSTECTOMY      CYSTOGRAM  2018    Procedure: CYSTOGRAM;  Surgeon: NELIA Le MD;  Location: [facility] OR;  Service: Urology;;    CYSTOGRAM N/A 2018    Procedure: CYSTOGRAM;  Surgeon: NELIA Le MD;  Location: [facility] OR;  Service: Urology;  Laterality: N/A;  RN Phone Pre op 18.    CYSTOSCOPY W/ RETROGRADES Bilateral 2018    Procedure: CYSTOSCOPY, WITH RETROGRADE pyelLOGRAM;  Surgeon: NELIA Le MD;  Location: [facility] OR;  Service: Urology;  Laterality: Bilateral;    CYSTOSCOPY W/ RETROGRADES Bilateral 2018    Procedure: CYSTOSCOPY, WITH RETROGRADE PYELOGRAM;  Surgeon: NELIA Le MD;  Location: [facility] OR;  Service: Urology;  Laterality: Bilateral;  RN PRE OP 2018    HERNIA REPAIR      HYSTERECTOMY  2018    LAPAROSCOPIC SALPINGECTOMY Bilateral 2018    Procedure: SALPINGECTOMY, LAPAROSCOPIC;  Surgeon: Vince Garcia MD;  Location: [facility] OR;  Service: OB/GYN;  Laterality: Bilateral;    LAPAROSCOPIC SUPRACERVICAL HYSTERECTOMY N/A 2018    Procedure:  [id number];  Surgeon: Vince Garcia MD;  Location: [facility] OR;  Service: OB/GYN;  Laterality: N/A;  1ST CASE  RN PRE OP 6/13/2018    LAPAROSCOPIC SURGICAL REMOVAL OF CYST OF OVARY Right 6/20/2018    Procedure: EXCISION, CYST, OVARY, LAPAROSCOPIC;  Surgeon: Vince Garcia MD;  Location: [facility] OR;  Service: OB/GYN;  Laterality: Right;    TUBAL LIGATION         Social History     Socioeconomic History    Marital status:    Tobacco Use    Smoking status: Every Day     Current packs/day: 0.50     Average packs/day: 0.5 packs/day for 15.0 years (7.5 ttl pk-yrs)     Types: Cigarettes     Passive exposure: Never    Smokeless tobacco: Never   Substance and Sexual Activity    Alcohol use: Yes    Drug use: No    Sexual activity: Yes     Partners: Male     Birth control/protection: See Surgical Hx     Comment:    Social History Narrative     since 2016    Together since 2013    He drives 18-wheelers    She is the  for a dental office    Previously  and     Lives with her  and youngest daughter.     with three daughters from previous marriage also     Social Determinants of Health     Financial Resource Strain: Low Risk  (11/13/2023)    Overall Financial Resource Strain (CARDIA)     Difficulty of Paying Living Expenses: Not hard at all   Food Insecurity: No Food Insecurity (11/13/2023)    Hunger Vital Sign     Worried About Running Out of Food in the Last Year: Never true     Ran Out of Food in the Last Year: Never true   Transportation Needs: No Transportation Needs (11/13/2023)    PRAPARE - Transportation     Lack of Transportation (Medical): No     Lack of Transportation (Non-Medical): No   Physical Activity: Inactive (11/13/2023)    Exercise Vital Sign     Days of Exercise per Week: 0 days     Minutes of Exercise per Session: 0 min   Stress: No Stress Concern Present (11/13/2023)    [employer] [place] [employer] - Occupational  Stress Questionnaire     Feeling of Stress : Only a little   Social Connections: Unknown (11/13/2023)    Social Connection and Isolation Panel [NHANES]     Frequency of Communication with Friends and Family: More than three times a week     Frequency of Social Gatherings with Friends and Family: More than three times a week     Active Member of Clubs or Organizations: No     Attends Club or Organization Meetings: Never     Marital Status:    Housing Stability: Low Risk  (11/13/2023)    Housing Stability Vital Sign     Unable to Pay for Housing in the Last Year: No     Number of Places Lived in the Last Year: 1     Unstable Housing in the Last Year: No       Family History   Problem Relation Age of Onset    Hypertension Mother     Stroke Mother     Aneurysm Mother     Hypertension Father     Hypertension Brother     Hypertension Maternal Grandfather     Heart disease Maternal Grandfather         MI    Cancer Maternal Grandfather     Cancer Paternal Grandmother     Breast cancer Neg Hx     Colon cancer Neg Hx     Ovarian cancer Neg Hx        Review of Systems  Review of Systems   Constitutional:  Negative for malaise/fatigue and weight loss.   HENT:  Negative for hearing loss and tinnitus.    Eyes:  Negative for blurred vision, double vision and discharge.   Respiratory:  Negative for shortness of breath and wheezing.    Cardiovascular:  Negative for chest pain, palpitations, orthopnea, claudication and leg swelling.   Gastrointestinal:  Negative for blood in stool, constipation, diarrhea, melena and vomiting.   Genitourinary:  Negative for dysuria, flank pain and hematuria.   Musculoskeletal:  Negative for back pain, joint pain and neck pain.   Skin:  Negative for itching and rash.   Neurological:  Positive for headaches. Negative for dizziness, loss of consciousness and weakness.   Endo/Heme/Allergies:  Negative for polydipsia.   Psychiatric/Behavioral:  Negative for depression. The patient is not  "nervous/anxious and does not have insomnia.      A complete review of systems was otherwise negative.    Physical Exam  /82   Pulse 81   Temp 98.1 °F (36.7 °C) (Oral)   Ht 5' 4" (1.626 m)   Wt 74.8 kg (164 lb 14.5 oz)   LMP 06/12/2018 (LMP Unknown) Comment: [facility] on 06/20/18  SpO2 96%   BMI 28.31 kg/m²   General appearance: alert, appears stated age, cooperative, and no distress  Head: Normocephalic, without obvious abnormality, atraumatic  Eyes: negative findings: lids and lashes normal and conjunctivae and sclerae normal  Ears: normal TM's and external ear canals both ears  Nose: Nares normal. Septum midline. Mucosa normal. No drainage or sinus tenderness.  Throat: lips, mucosa, and tongue normal; teeth and gums normal  Neck: no adenopathy, no carotid bruit, no JVD, supple, symmetrical, trachea midline, and thyroid not enlarged, symmetric, no tenderness/mass/nodules  Back: symmetric, no curvature. ROM normal. No CVA tenderness.  Lungs: clear to auscultation bilaterally  Heart: regular rate and rhythm, S1, S2 normal, no murmur, click, rub or gallop  Abdomen: soft, non-tender; bowel sounds normal; no masses,  no organomegaly  Extremities: extremities normal, atraumatic, no cyanosis or edema  Pulses: 2+ and symmetric  Skin: Skin color, texture, turgor normal. No rashes or lesions  Lymph nodes: Cervical, supraclavicular, and axillary nodes normal.  Neurologic: Grossly normal    Assessment/Plan  Routine medical exam  Age appropriate screening recommendations and HM were discussed and updated.  Discussed importance of heart healthy diet and exercise.  Routine labs as below.  Follow-up TBD pending labs.  -     CBC Auto Differential; Future; Expected date: 12/08/2023  -     Comprehensive Metabolic Panel; Future; Expected date: 12/08/2023  -     Lipid Panel; Future; Expected date: 12/08/2023  -     Hemoglobin A1C; Future; Expected date: 12/08/2023    Adjustment disorder with anxious mood  The current medical " regimen is effective;  continue present plan and medications.    RLS (restless legs syndrome)  The current medical regimen is effective;  continue present plan and medications.  Greatly improved over last several months.    Essential (primary) hypertension  The current medical regimen is effective;  continue present plan and medications.  BP at goal today.    Mixed hyperlipidemia  The current medical regimen is effective;  continue present plan and medications.  Statin therapy.  Repeat labs today.    Overweight (BMI 25.0-29.9)  The patient is asked to make an attempt to improve diet and exercise patterns to aid in medical management of this problem. We specifically discussed cutting calorie intake by 500-1000 calories per day for a goal of a 1-2 pound weight loss per week and recommendations for a mostly plant based diet with limited red meats/refined grains/processed foods/added sugars.  Down about 50 pounds! Keep up the great work!!    Gastroesophageal reflux disease without esophagitis  The current medical regimen is effective;  continue present plan and medications.  We discussed that she may be able to reduce PPI therapy given significant weight loss. Trial of every other day and reduce dosing as tolerated.    Tobacco dependency  Discussed importance of cessation.  Would make this primary focus of the new year given her success with weight loss.    Nausea  The current medical regimen is effective;  continue present plan and medications.  PRN use.  -     ondansetron (ZOFRAN-ODT) 8 MG TbDL; Take 1 tablet (8 mg total) by mouth every 8 (eight) hours as needed (nausea).  Dispense: 15 tablet; Refill: 0    Needs flu shot  Given today.  -     Influenza - Quadrivalent *Preferred* (6 months+) (PF)    Screen for colon cancer  Routine screening.  -     Ambulatory referral/consult to Endo Procedure ; Future; Expected date: 12/09/2023    Patient has verbalized understanding and is in agreement with plan of  care.    Follow up in about 6 months (around 6/8/2024) for routine follow-up.      Answers submitted by the patient for this visit:  Review of Systems Questionnaire (Submitted on 11/13/2023)  activity change: No  unexpected weight change: No  rhinorrhea: No  trouble swallowing: No  visual disturbance: No  chest tightness: No  polyuria: Yes  difficulty urinating: No  menstrual problem: No  joint swelling: No  arthralgias: No  confusion: No  dysphoric mood: No

## 2023-12-12 ENCOUNTER — TELEPHONE (OUTPATIENT)
Dept: ENDOSCOPY | Facility: HOSPITAL | Age: 48
End: 2023-12-12

## 2023-12-12 ENCOUNTER — CLINICAL SUPPORT (OUTPATIENT)
Dept: ENDOSCOPY | Facility: HOSPITAL | Age: 48
End: 2023-12-12
Payer: COMMERCIAL

## 2023-12-12 DIAGNOSIS — Z12.11 SCREEN FOR COLON CANCER: ICD-10-CM

## 2023-12-12 NOTE — PLAN OF CARE
Contacted patient to schedule a Colonoscopy and unable to schedule due to no availability on a Friday at this time. Patient requested a call back. New PAT appt made and Dept number provided to pt [phone].

## 2023-12-12 NOTE — TELEPHONE ENCOUNTER
Contacted patient to schedule a Colonoscopy and unable to schedule due to no availability on a Friday at this time. Patient requested a call back. New PAT appt made and Dept number provided to pt [phone].

## 2023-12-26 ENCOUNTER — TELEPHONE (OUTPATIENT)
Dept: ENDOSCOPY | Facility: HOSPITAL | Age: 48
End: 2023-12-26

## 2023-12-26 ENCOUNTER — CLINICAL SUPPORT (OUTPATIENT)
Dept: ENDOSCOPY | Facility: HOSPITAL | Age: 48
End: 2023-12-26
Payer: COMMERCIAL

## 2023-12-26 VITALS — WEIGHT: 164 LBS | HEIGHT: 64 IN | BODY MASS INDEX: 28 KG/M2

## 2023-12-26 DIAGNOSIS — Z12.11 SCREEN FOR COLON CANCER: ICD-10-CM

## 2023-12-26 RX ORDER — POLYETHYLENE GLYCOL 3350, SODIUM SULFATE ANHYDROUS, SODIUM BICARBONATE, SODIUM CHLORIDE, POTASSIUM CHLORIDE 236; 22.74; 6.74; 5.86; 2.97 G/4L; G/4L; G/4L; G/4L; G/4L
4 POWDER, FOR SOLUTION ORAL ONCE
Qty: 4000 ML | Refills: 0 | Status: SHIPPED | OUTPATIENT
Start: 2023-12-26 | End: 2023-12-26

## 2023-12-26 NOTE — PLAN OF CARE
Spoke to pt to schedule procedure(s) Colonoscopy       Physician to perform procedure(s) Dr. VINCE Hamilton  Date of Procedure (s) 4/19/24  Arrival Time 12:00 PM  Time of Procedure(s) 1:00 PM   Location of Procedure(s) [address]  Type of Rx Prep sent to patient: PEG  Instructions provided to patient via [facility]  Pt on [facility] to hold as per protocol    Patient was informed on the following information and verbalized understanding. Screening questionnaire reviewed with patient and complete. If procedure requires anesthesia, a responsible adult needs to be present to accompany the patient home, patient cannot drive after receiving anesthesia. Appointment details are tentative, especially check-in time. Patient will receive a prep-op call 7 days prior to confirm check-in time for procedure. If applicable the patient should contact their pharmacy to verify Rx for procedure prep is ready for pick-up. Patient was advised to call the scheduling department at [phone] if pharmacy states no Rx is available. Patient was advised to call the endoscopy scheduling department if any questions or concerns arise.      [facility]uling [facility]

## 2023-12-26 NOTE — TELEPHONE ENCOUNTER
Spoke to pt to schedule procedure(s) Colonoscopy       Physician to perform procedure(s) Dr. VINCE Hamilton  Date of Procedure (s) 4/19/24  Arrival Time 12:00 PM  Time of Procedure(s) 1:00 PM   Location of Procedure(s) [address]  Type of Rx Prep sent to patient: PEG  Instructions provided to patient via [facility]  Pt on [facility] to hold as per protocol    Patient was informed on the following information and verbalized understanding. Screening questionnaire reviewed with patient and complete. If procedure requires anesthesia, a responsible adult needs to be present to accompany the patient home, patient cannot drive after receiving anesthesia. Appointment details are tentative, especially check-in time. Patient will receive a prep-op call 7 days prior to confirm check-in time for procedure. If applicable the patient should contact their pharmacy to verify Rx for procedure prep is ready for pick-up. Patient was advised to call the scheduling department at [phone] if pharmacy states no Rx is available. Patient was advised to call the endoscopy scheduling department if any questions or concerns arise.      [facility]

## 2024-01-10 DIAGNOSIS — K29.00 ACUTE GASTRITIS WITHOUT HEMORRHAGE, UNSPECIFIED GASTRITIS TYPE: ICD-10-CM

## 2024-01-10 DIAGNOSIS — R11.0 NAUSEA: ICD-10-CM

## 2024-01-10 RX ORDER — ONDANSETRON 8 MG/1
8 TABLET, ORALLY DISINTEGRATING ORAL EVERY 8 HOURS PRN
Qty: 15 TABLET | Refills: 0 | Status: SHIPPED | OUTPATIENT
Start: 2024-01-10 | End: 2024-02-19

## 2024-01-10 RX ORDER — PANTOPRAZOLE SODIUM 40 MG/1
40 TABLET, DELAYED RELEASE ORAL 2 TIMES DAILY
Qty: 180 TABLET | Refills: 0 | Status: SHIPPED | OUTPATIENT
Start: 2024-01-10

## 2024-01-10 NOTE — TELEPHONE ENCOUNTER
Refill Routing Note   Medication(s) are not appropriate for processing by [facility] for the following reason(s):        Outside of protocol  Non-participating provider    [facility] action(s):  Route               Appointments  past 12m or future 3m with PCP    Date Provider   Last Visit   12/8/2023 Fani Huitron NP   Next Visit   1/10/2024 Fani Huitron NP   ED visits in past 90 days: 0        Note composed:3:43 PM 01/10/2024

## 2024-01-10 NOTE — TELEPHONE ENCOUNTER
Refill Routing Note   Medication(s) are not appropriate for processing by [facility] for the following reason(s):        Non-participating provider:     [facility] action(s):  Route      Medication Therapy Plan:         Appointments  past 12m or future 3m with PCP    Date Provider   Last Visit   12/8/2023 Fani Huitron NP   Next Visit   Visit date not found Fani Huitron NP   ED visits in past 90 days: 0        Note composed:4:22 PM 01/10/2024

## 2024-02-08 ENCOUNTER — PATIENT MESSAGE (OUTPATIENT)
Dept: INTERNAL MEDICINE | Facility: CLINIC | Age: 49
End: 2024-02-08
Payer: COMMERCIAL

## 2024-02-16 DIAGNOSIS — E78.5 HYPERLIPIDEMIA, UNSPECIFIED HYPERLIPIDEMIA TYPE: ICD-10-CM

## 2024-02-16 DIAGNOSIS — R11.0 NAUSEA: ICD-10-CM

## 2024-02-17 NOTE — TELEPHONE ENCOUNTER
Refill Routing Note   Medication(s) are not appropriate for processing by [facility] for the following reason(s):        Responsible provider unclear    [facility] action(s):  Defer               Appointments  past 12m or future 3m with PCP    Date Provider   Last Visit   6/10/2019 Reagan Holland MD   Next Visit   Visit date not found Reagan Holland MD   ED visits in past 90 days: 0        Note composed:6:35 PM 02/16/2024            03-Jun-2018

## 2024-02-19 ENCOUNTER — TELEPHONE (OUTPATIENT)
Dept: INTERNAL MEDICINE | Facility: CLINIC | Age: 49
End: 2024-02-19
Payer: COMMERCIAL

## 2024-02-19 RX ORDER — ATORVASTATIN CALCIUM 20 MG/1
TABLET, FILM COATED ORAL
Qty: 90 TABLET | Refills: 0 | Status: SHIPPED | OUTPATIENT
Start: 2024-02-19 | End: 2024-05-16

## 2024-02-19 RX ORDER — ONDANSETRON 8 MG/1
TABLET, ORALLY DISINTEGRATING ORAL
Qty: 15 TABLET | Refills: 0 | Status: SHIPPED | OUTPATIENT
Start: 2024-02-19 | End: 2024-03-11 | Stop reason: SDUPTHER

## 2024-02-19 NOTE — TELEPHONE ENCOUNTER
Please call patient, her 6 month follow-up will be de in June. We need to schedule this with Dr. Holland as it has been several years since her last visit with him. Please schedule, no labs prior.    Thanks!  Fani BUSCH

## 2024-02-19 NOTE — TELEPHONE ENCOUNTER
Is she going to continue to see us?  If so, she needs to see me to stay part of our panel.  It's been 4+ years

## 2024-02-19 NOTE — TELEPHONE ENCOUNTER
Contacted pt to schedule her six month follow-up with Dr. Holland in the month of June. Unsuccessful.

## 2024-03-11 DIAGNOSIS — R11.0 NAUSEA: ICD-10-CM

## 2024-03-11 RX ORDER — ONDANSETRON 8 MG/1
TABLET, ORALLY DISINTEGRATING ORAL
Qty: 15 TABLET | Refills: 0 | Status: SHIPPED | OUTPATIENT
Start: 2024-03-11 | End: 2024-03-27 | Stop reason: SDUPTHER

## 2024-03-11 NOTE — TELEPHONE ENCOUNTER
Refill Routing Note   Medication(s) are not appropriate for processing by [facility] for the following reason(s):        Outside of protocol  Non-participating provider    [facility] action(s):  Route               Appointments  past 12m or future 3m with PCP    Date Provider   Last Visit   12/8/2023 Fani Huitron NP   Next Visit   Visit date not found Fani Huitron NP   ED visits in past 90 days: 0        Note composed:11:44 AM 03/11/2024

## 2024-03-25 ENCOUNTER — TELEPHONE (OUTPATIENT)
Dept: ENDOSCOPY | Facility: HOSPITAL | Age: 49
End: 2024-03-25
Payer: COMMERCIAL

## 2024-03-25 NOTE — TELEPHONE ENCOUNTER
Telephoned pt to review GLP-1 instructions for upcoming colonoscopy on 4/19/24 with no answer.   Left voicemail with direct contact number for pt to return call.  Portal message also sent.

## 2024-03-27 DIAGNOSIS — R11.0 NAUSEA: ICD-10-CM

## 2024-03-27 NOTE — TELEPHONE ENCOUNTER
Refill Routing Note   Medication(s) are not appropriate for processing by [facility] for the following reason(s):        Non-participating provider  Outside of protocol    [facility] action(s):  Route               Appointments  past 12m or future 3m with PCP    Date Provider   Last Visit   12/8/2023 Fani Huitron NP   Next Visit   Visit date not found Fani Huitron NP   ED visits in past 90 days: 0        Note composed:1:26 PM 03/27/2024

## 2024-03-28 RX ORDER — ONDANSETRON 8 MG/1
TABLET, ORALLY DISINTEGRATING ORAL
Qty: 15 TABLET | Refills: 0 | Status: SHIPPED | OUTPATIENT
Start: 2024-03-28 | End: 2024-04-16 | Stop reason: SDUPTHER

## 2024-04-12 ENCOUNTER — TELEPHONE (OUTPATIENT)
Dept: ENDOSCOPY | Facility: HOSPITAL | Age: 49
End: 2024-04-12
Payer: COMMERCIAL

## 2024-04-12 NOTE — TELEPHONE ENCOUNTER
Contacted patient for pre-call for 4/19/24 colonoscopy.  Patient states that she needs to cancel this procedure.  Rescheduling was offered, but patient declined.  Patient was provided with contact information for future use.

## 2024-04-16 DIAGNOSIS — R11.0 NAUSEA: ICD-10-CM

## 2024-04-16 RX ORDER — ONDANSETRON 8 MG/1
TABLET, ORALLY DISINTEGRATING ORAL
Qty: 15 TABLET | Refills: 0 | Status: SHIPPED | OUTPATIENT
Start: 2024-04-16 | End: 2024-05-07 | Stop reason: SDUPTHER

## 2024-04-16 NOTE — TELEPHONE ENCOUNTER
Refill Routing Note   Medication(s) are not appropriate for processing by [facility] for the following reason(s):      - NO PCP LISTED IN EPIC; ROUTING TO FANI MCMULLEN AS LAST PRESCRIBING PROVIDER    [facility] action(s):  Route          Medication reconciliation completed: No     Appointments  past 12m or future 3m with PCP    Date Provider   Last Visit   12/8/2023 Fani Mcmullen NP   Next Visit   Visit date not found Fani Mcmullen NP   ED visits in past 90 days: 0        Note composed:11:34 AM 04/16/2024

## 2024-05-07 DIAGNOSIS — R11.0 NAUSEA: ICD-10-CM

## 2024-05-07 RX ORDER — ONDANSETRON 8 MG/1
TABLET, ORALLY DISINTEGRATING ORAL
Qty: 15 TABLET | Refills: 0 | Status: SHIPPED | OUTPATIENT
Start: 2024-05-07 | End: 2024-05-29

## 2024-05-07 NOTE — TELEPHONE ENCOUNTER
Refill Routing Note   Medication(s) are not appropriate for processing by [facility] for the following reason(s):        Non-participating provider  Responsible provider unclear    [facility] action(s):  Route             Appointments  past 12m or future 3m with PCP    Date Provider   Last Visit   12/8/2023 Fani Huitron NP   Next Visit   Visit date not found Fani Huitron NP   ED visits in past 90 days: 0        Note composed:10:11 AM 05/07/2024

## 2024-05-16 DIAGNOSIS — E78.5 HYPERLIPIDEMIA, UNSPECIFIED HYPERLIPIDEMIA TYPE: ICD-10-CM

## 2024-05-16 RX ORDER — ATORVASTATIN CALCIUM 20 MG/1
TABLET, FILM COATED ORAL
Qty: 90 TABLET | Refills: 1 | Status: SHIPPED | OUTPATIENT
Start: 2024-05-16

## 2024-05-16 NOTE — TELEPHONE ENCOUNTER
Refill Routing Note   Medication(s) are not appropriate for processing by [facility] for the following reason(s):        Non-participating provider    [facility] action(s):  Route               Appointments  past 12m or future 3m with PCP    Date Provider   Last Visit   12/8/2023 Fani Huitron NP   Next Visit   Visit date not found Fani Huitron NP   ED visits in past 90 days: 0        Note composed:4:21 PM 05/16/2024

## 2024-05-28 DIAGNOSIS — R11.0 NAUSEA: ICD-10-CM

## 2024-05-29 RX ORDER — ONDANSETRON 8 MG/1
TABLET, ORALLY DISINTEGRATING ORAL
Qty: 15 TABLET | Refills: 0 | Status: SHIPPED | OUTPATIENT
Start: 2024-05-29 | End: 2024-06-19 | Stop reason: SDUPTHER

## 2024-05-29 NOTE — TELEPHONE ENCOUNTER
Pt stated she been seeing Fani Huitron NP, over the years due to her being accessible. She is up to date with her labs and her routine physical. She believes Fani prescribed the ondansetron and would like a refill.

## 2024-05-29 NOTE — TELEPHONE ENCOUNTER
Refill Routing Note   Medication(s) are not appropriate for processing by [facility] for the following reason(s):        Outside of protocol  Non-participating provider    [facility] action(s):  Route               Appointments  past 12m or future 3m with PCP    Date Provider   Last Visit   12/8/2023 Fani Huitron NP   Next Visit   Visit date not found Fani Huitron NP   ED visits in past 90 days: 0        Note composed:7:46 AM 05/29/2024

## 2024-06-19 DIAGNOSIS — R11.0 NAUSEA: ICD-10-CM

## 2024-06-19 RX ORDER — ONDANSETRON 8 MG/1
TABLET, ORALLY DISINTEGRATING ORAL
Qty: 15 TABLET | Refills: 0 | Status: SHIPPED | OUTPATIENT
Start: 2024-06-19

## 2024-06-19 NOTE — TELEPHONE ENCOUNTER
Refill Routing Note   Medication(s) are not appropriate for processing by [facility] for the following reason(s):        Responsible provider unclear    [facility] action(s):  Route             Appointments  past 12m or future 3m with PCP    Date Provider   Last Visit   12/8/2023 Fani Huitron NP   Next Visit   Visit date not found Fani Huitron NP   ED visits in past 90 days: 0        Note composed:10:46 AM 06/19/2024

## 2024-07-03 NOTE — TELEPHONE ENCOUNTER
Patient is on IR schedule today Wednesday 7/3/24 for a R Tunneled dialysis catheter removal with local.     -Labs WNL for procedure.    -No NPO required.   -Procedural education reviewed with patient in detail including, but not limited to risks, benefits and alternatives, questions answered with understanding verbalized by her.and consent is in IR.     Please contact the IR department for procedural related questions.     Total time: 20 minutes    Thanks, Maribell [facility] Interventional Radiology CNP ([phone]) (phone [phone])        Please advise.

## 2024-07-18 DIAGNOSIS — K29.00 ACUTE GASTRITIS WITHOUT HEMORRHAGE, UNSPECIFIED GASTRITIS TYPE: ICD-10-CM

## 2024-07-18 DIAGNOSIS — R11.0 NAUSEA: ICD-10-CM

## 2024-07-18 RX ORDER — ONDANSETRON 8 MG/1
TABLET, ORALLY DISINTEGRATING ORAL
Qty: 15 TABLET | Refills: 0 | Status: SHIPPED | OUTPATIENT
Start: 2024-07-18

## 2024-07-18 NOTE — TELEPHONE ENCOUNTER
Refill Routing Note   Medication(s) are not appropriate for processing by [facility] for the following reason(s):        Patient not seen by provider within 15 months  Outside of protocol  ED/Hospital Visit since last OV with provider    [facility] action(s):  Route        Medication Therapy Plan: PPI > 40 mg daily is outside of [facility] protocol      Appointments  past 12m or future 3m with PCP    Date Provider   Last Visit   6/10/2019 Reagan Holland MD   Next Visit   Visit date not found Reagan Holland MD   ED visits in past 90 days: 0        Note composed:1:35 PM 07/18/2024

## 2024-07-18 NOTE — TELEPHONE ENCOUNTER
Refill Routing Note   Medication(s) are not appropriate for processing by [facility] for the following reason(s):      - NO PCP LISTED IN EPIC; ROUTING TO DR MCMULLEN AS LAST PRESCRIBING PROVIDER    [facility] action(s):  Route          Medication reconciliation completed: No     Appointments  past 12m or future 3m with PCP    Date Provider   Last Visit   12/8/2023 Fani Mcmullen NP   Next Visit   Visit date not found Fani Mcmullen NP   ED visits in past 90 days: 0        Note composed:11:09 AM 07/18/2024

## 2024-07-19 RX ORDER — PANTOPRAZOLE SODIUM 40 MG/1
40 TABLET, DELAYED RELEASE ORAL 2 TIMES DAILY
Qty: 180 TABLET | Refills: 0 | Status: SHIPPED | OUTPATIENT
Start: 2024-07-19

## 2024-08-02 ENCOUNTER — HOSPITAL ENCOUNTER (OUTPATIENT)
Dept: RADIOLOGY | Facility: HOSPITAL | Age: 49
Discharge: HOME | End: 2024-08-02
Attending: INTERNAL MEDICINE
Payer: COMMERCIAL

## 2024-08-02 DIAGNOSIS — Z12.31 ENCOUNTER FOR SCREENING MAMMOGRAM FOR BREAST CANCER: ICD-10-CM

## 2024-08-02 PROCEDURE — 77063 BREAST TOMOSYNTHESIS BI: CPT | Mod: TC,PO

## 2024-08-02 PROCEDURE — 77067 SCR MAMMO BI INCL CAD: CPT | Mod: 26,,, | Performed by: RADIOLOGY

## 2024-08-02 PROCEDURE — 77063 BREAST TOMOSYNTHESIS BI: CPT | Mod: 26,,, | Performed by: RADIOLOGY

## 2024-08-02 PROCEDURE — 77067 SCR MAMMO BI INCL CAD: CPT | Mod: TC,PO

## 2024-08-12 DIAGNOSIS — R11.0 NAUSEA: ICD-10-CM

## 2024-08-12 NOTE — TELEPHONE ENCOUNTER
Refill Routing Note   Medication(s) are not appropriate for processing by [facility] for the following reason(s):        Outside of protocol  Non-participating provider    [facility] action(s):  Route               Appointments  past 12m or future 3m with PCP    Date Provider   Last Visit   12/8/2023 Fani Huitron NP   Next Visit   Visit date not found Fani Huitron NP   ED visits in past 90 days: 0        Note composed:4:28 PM 08/12/2024

## 2024-08-13 RX ORDER — ONDANSETRON 8 MG/1
TABLET, ORALLY DISINTEGRATING ORAL
Qty: 15 TABLET | Refills: 0 | Status: SHIPPED | OUTPATIENT
Start: 2024-08-13

## 2024-09-05 DIAGNOSIS — R11.0 NAUSEA: ICD-10-CM

## 2024-09-05 RX ORDER — ONDANSETRON 8 MG/1
TABLET, ORALLY DISINTEGRATING ORAL
Qty: 15 TABLET | Refills: 0 | OUTPATIENT
Start: 2024-09-05

## 2024-09-05 NOTE — TELEPHONE ENCOUNTER
Provider Staff:  Please note Refusal of medication.     Action required for this patient.      Requested Prescriptions     Refused Prescriptions Disp Refills    ondansetron (ZOFRAN-ODT) 8 MG TbDL 15 tablet 0     Sig: DISSOLVE 1 TABLET (8MG) BY MOUTH EVERY 8 HOURS AS NEEDED FOR NAUSEA     Refused By: RONY FERNANDEZ     Reason for Refusal: Patient needs an appointment      (I haven't seen this pt in 5 yrs and Fani doesn't carry her own panel)       Thanks!  [facility]   Note composed: 09/05/2024 3:32 PM

## 2024-09-05 NOTE — TELEPHONE ENCOUNTER
Refill Routing Note   Medication(s) are not appropriate for processing by [facility] for the following reason(s):        Outside of protocol  Responsible provider unclear    [facility] action(s):  Route             Appointments  past 12m or future 3m with PCP    Date Provider   Last Visit   6/10/2019 Reagan Holland MD   Next Visit   Visit date not found Reagan Holland MD   ED visits in past 90 days: 0        Note composed:2:02 PM 09/05/2024

## 2024-10-14 DIAGNOSIS — K29.00 ACUTE GASTRITIS WITHOUT HEMORRHAGE, UNSPECIFIED GASTRITIS TYPE: ICD-10-CM

## 2024-10-14 NOTE — TELEPHONE ENCOUNTER
Refill Routing Note   Medication(s) are not appropriate for processing by [facility] for the following reason(s):        Non-participating provider    [facility] action(s):  Route               Appointments  past 12m or future 3m with PCP    Date Provider   Last Visit   12/8/2023 Fani Huitron NP   Next Visit   Visit date not found Fani Huitron NP   ED visits in past 90 days: 0        Note composed:2:25 PM 10/14/2024

## 2024-10-16 RX ORDER — PANTOPRAZOLE SODIUM 40 MG/1
TABLET, DELAYED RELEASE ORAL
Qty: 180 TABLET | Refills: 2 | Status: SHIPPED | OUTPATIENT
Start: 2024-10-16

## 2024-11-12 DIAGNOSIS — E78.5 HYPERLIPIDEMIA, UNSPECIFIED HYPERLIPIDEMIA TYPE: ICD-10-CM

## 2024-11-12 RX ORDER — ATORVASTATIN CALCIUM 20 MG/1
TABLET, FILM COATED ORAL
Qty: 90 TABLET | Refills: 1 | Status: SHIPPED | OUTPATIENT
Start: 2024-11-12

## 2024-11-12 NOTE — TELEPHONE ENCOUNTER
Refill Routing Note   Medication(s) are not appropriate for processing by [facility] for the following reason(s):        Responsible provider unclear    [facility] action(s):  Route        Medication Therapy Plan: no pcp listed on profile      Appointments  past 12m or future 3m with PCP    Date Provider   Last Visit   6/10/2019 Reagan Holland MD   Next Visit   Visit date not found Reagan Holland MD   ED visits in past 90 days: 0        Note composed:10:00 AM 11/12/2024

## 2024-12-16 DIAGNOSIS — I10 ESSENTIAL (PRIMARY) HYPERTENSION: Chronic | ICD-10-CM

## 2024-12-16 RX ORDER — AMLODIPINE BESYLATE 10 MG/1
TABLET ORAL
Qty: 30 TABLET | Refills: 0 | Status: SHIPPED | OUTPATIENT
Start: 2024-12-16

## 2025-01-15 DIAGNOSIS — I10 ESSENTIAL (PRIMARY) HYPERTENSION: Chronic | ICD-10-CM

## 2025-01-15 RX ORDER — AMLODIPINE BESYLATE 10 MG/1
TABLET ORAL
Qty: 30 TABLET | Refills: 0 | Status: SHIPPED | OUTPATIENT
Start: 2025-01-15

## 2025-01-15 NOTE — TELEPHONE ENCOUNTER
Refill Routing Note   Medication(s) are not appropriate for processing by [facility] for the following reason(s):        Non-participating provider    [facility] action(s):  Route               Appointments  past 12m or future 3m with PCP    Date Provider   Last Visit   12/8/2023 Fani Huitron NP   Next Visit   1/24/2025 Fani Huitron, FELICITAS   ED visits in past 90 days: 0        Note composed:12:34 PM 01/15/2025

## 2025-01-24 ENCOUNTER — E-VISIT (OUTPATIENT)
Dept: INTERNAL MEDICINE | Facility: CLINIC | Age: 50
End: 2025-01-24
Payer: COMMERCIAL

## 2025-01-24 ENCOUNTER — PATIENT MESSAGE (OUTPATIENT)
Dept: INTERNAL MEDICINE | Facility: CLINIC | Age: 50
End: 2025-01-24

## 2025-01-24 ENCOUNTER — LAB VISIT (OUTPATIENT)
Dept: LAB | Facility: HOSPITAL | Age: 50
End: 2025-01-24
Payer: COMMERCIAL

## 2025-01-24 DIAGNOSIS — R30.0 DYSURIA: Primary | ICD-10-CM

## 2025-01-24 DIAGNOSIS — R30.0 DYSURIA: ICD-10-CM

## 2025-01-24 LAB
BILIRUB UR QL STRIP: NEGATIVE
CLARITY UR REFRACT.AUTO: CLEAR
COLOR UR AUTO: YELLOW
GLUCOSE UR QL STRIP: NEGATIVE
HGB UR QL STRIP: NEGATIVE
KETONES UR QL STRIP: NEGATIVE
LEUKOCYTE ESTERASE UR QL STRIP: NEGATIVE
NITRITE UR QL STRIP: NEGATIVE
PH UR STRIP: 6 [PH] (ref 5–8)
PROT UR QL STRIP: NEGATIVE
SP GR UR STRIP: 1.03 (ref 1–1.03)
URN SPEC COLLECT METH UR: NORMAL

## 2025-01-24 PROCEDURE — 87086 URINE CULTURE/COLONY COUNT: CPT | Performed by: NURSE PRACTITIONER

## 2025-01-24 PROCEDURE — 99421 OL DIG E/M SVC 5-10 MIN: CPT | Mod: ,,, | Performed by: NURSE PRACTITIONER

## 2025-01-24 PROCEDURE — 81003 URINALYSIS AUTO W/O SCOPE: CPT | Performed by: NURSE PRACTITIONER

## 2025-01-24 NOTE — TELEPHONE ENCOUNTER
Pt rescheduled her appointment with Fani Huitron NP due to the ongoing traffic in regards to icy roads and snow. Pt is requesting to have an urine order placed for a possible UTI.

## 2025-01-24 NOTE — PROGRESS NOTES
Patient ID: Sherrill Ennis is a 49 y.o. female.    Chief Complaint: Urinary Tract Infection (Entered automatically based on patient selection in [employer].)    The patient initiated a request through [employer] on 1/24/2025 for evaluation and management with a chief complaint of Urinary Tract Infection (Entered automatically based on patient selection in [employer].)     I evaluated the questionnaire submission on 01/24/25.    [facility] Evisit Uti Questionnaire    1/24/2025 11:52 AM CST - Filed by Patient   Do you agree to participate in an E-Visit? Yes   If you have any of the following symptoms, please present to your local emergency room or call 911:  I acknowledge   Choose the state of your primary residence [place]   Do you have any of the following pregnancy-related conditions? None   What is the main issue you would like addressed today? Strong and frequent urination, back pain   What symptoms do you currently have? Change in urine appearance or smell   When did your symptoms first appear? 1/23/2024   List what you have done or taken to help your symptoms. None   Please indicate whether you have had the following symptoms during the past 24 hours     Urgent urination (a sudden and uncontrollable urge to urinate) Moderate   Frequent urination of small amounts of urine (going to the toilet very often) Moderate   Burning pain when urinating None   Incomplete bladder emptying (still feel like you need to urinate again after urination) Mild   Pain not associated with urination in the lower abdomen below the belly button) Mild   What does your urine look like? I am not sure   Blood seen in the urine None   Flank pain (pain in one or both sides of the lower back) Mild   Abnormal Vaginal Discharge (abnormal amount, color and/or odor) None   Discharge from the urethra (urinary opening) without urination Moderate   High body temperature/fever? None-<99.5   Please rate how much discomfort you have experience because  of the symptoms in the past 24 hours: Mild   Please indicate how the symptoms have interfered with your every day activities/work in the past 24 hours: None   Please indicate how these symptoms have interfered with your social activities (visiting people, meeting with friends, etc.) in the past 24 hours? None   Are you a diabetic? No   Please indicate whether you have the following at the time of completion of this questionnaire: None of the above   Provide any additional information you feel is important.    Please attach any relevant images or files (if you have performed a home test for UTI, please submit a photo of results)    Are you able to take your vital signs? Yes   Systolic Blood Pressure: 144   Diastolic Blood Pressure: 75   Weight: 173   Height: 64   Pulse: 70   Temperature: 98.3   Respiration rate:    Pulse Oxygen: 98         Encounter Diagnosis   Name Primary?    Dysuria Yes        Orders Placed This Encounter   Procedures    Urine Culture High Risk     Standing Status:   Future     Standing Expiration Date:   4/24/2026     Order Specific Question:   Send normal result to authorizing provider's In Basket if patient is active on MyChart:     Answer:   Yes    Urinalysis     Standing Status:   Future     Standing Expiration Date:   3/25/2026     Order Specific Question:   Collection Type     Answer:   Urine, Clean Catch            No follow-ups on file.      E-Visit Time Tracking:

## 2025-01-26 LAB
BACTERIA UR CULT: NORMAL
BACTERIA UR CULT: NORMAL

## 2025-02-04 ENCOUNTER — OFFICE VISIT (OUTPATIENT)
Dept: INTERNAL MEDICINE | Facility: CLINIC | Age: 50
End: 2025-02-04
Payer: COMMERCIAL

## 2025-02-04 VITALS
BODY MASS INDEX: 29.4 KG/M2 | HEART RATE: 79 BPM | HEIGHT: 64 IN | SYSTOLIC BLOOD PRESSURE: 124 MMHG | OXYGEN SATURATION: 99 % | WEIGHT: 172.19 LBS | DIASTOLIC BLOOD PRESSURE: 86 MMHG

## 2025-02-04 DIAGNOSIS — N76.0 VAGINOSIS: ICD-10-CM

## 2025-02-04 DIAGNOSIS — F17.200 TOBACCO DEPENDENCY: Chronic | ICD-10-CM

## 2025-02-04 DIAGNOSIS — Z00.00 ROUTINE MEDICAL EXAM: Primary | ICD-10-CM

## 2025-02-04 DIAGNOSIS — F43.22 ADJUSTMENT DISORDER WITH ANXIOUS MOOD: Chronic | ICD-10-CM

## 2025-02-04 DIAGNOSIS — I10 ESSENTIAL (PRIMARY) HYPERTENSION: Chronic | ICD-10-CM

## 2025-02-04 DIAGNOSIS — Z12.11 COLON CANCER SCREENING: ICD-10-CM

## 2025-02-04 DIAGNOSIS — S16.1XXA ACUTE STRAIN OF NECK MUSCLE, INITIAL ENCOUNTER: ICD-10-CM

## 2025-02-04 PROBLEM — E66.811 OBESITY, CLASS I, BMI 30-34.9: Chronic | Status: RESOLVED | Noted: 2018-05-10 | Resolved: 2025-02-04

## 2025-02-04 PROCEDURE — 3079F DIAST BP 80-89 MM HG: CPT | Mod: CPTII,S$GLB,, | Performed by: NURSE PRACTITIONER

## 2025-02-04 PROCEDURE — 1160F RVW MEDS BY RX/DR IN RCRD: CPT | Mod: CPTII,S$GLB,, | Performed by: NURSE PRACTITIONER

## 2025-02-04 PROCEDURE — 3074F SYST BP LT 130 MM HG: CPT | Mod: CPTII,S$GLB,, | Performed by: NURSE PRACTITIONER

## 2025-02-04 PROCEDURE — 1159F MED LIST DOCD IN RCRD: CPT | Mod: CPTII,S$GLB,, | Performed by: NURSE PRACTITIONER

## 2025-02-04 PROCEDURE — 3008F BODY MASS INDEX DOCD: CPT | Mod: CPTII,S$GLB,, | Performed by: NURSE PRACTITIONER

## 2025-02-04 PROCEDURE — 99999 PR PBB SHADOW E&M-EST. PATIENT-LVL IV: CPT | Mod: PBBFAC,,, | Performed by: NURSE PRACTITIONER

## 2025-02-04 PROCEDURE — 99396 PREV VISIT EST AGE 40-64: CPT | Mod: S$GLB,,, | Performed by: NURSE PRACTITIONER

## 2025-02-04 RX ORDER — METRONIDAZOLE 500 MG/1
500 TABLET ORAL EVERY 12 HOURS
Qty: 14 TABLET | Refills: 0 | Status: SHIPPED | OUTPATIENT
Start: 2025-02-04 | End: 2025-02-11

## 2025-02-04 RX ORDER — DICLOFENAC SODIUM 50 MG/1
50 TABLET, DELAYED RELEASE ORAL 2 TIMES DAILY
Qty: 60 TABLET | Refills: 1 | Status: SHIPPED | OUTPATIENT
Start: 2025-02-04 | End: 2025-04-05

## 2025-02-04 RX ORDER — TIZANIDINE 4 MG/1
4 TABLET ORAL NIGHTLY
Qty: 30 TABLET | Refills: 2 | Status: SHIPPED | OUTPATIENT
Start: 2025-02-04 | End: 2025-05-05

## 2025-02-04 RX ORDER — ESCITALOPRAM OXALATE 5 MG/1
5 TABLET ORAL DAILY
Qty: 90 TABLET | Refills: 1 | Status: SHIPPED | OUTPATIENT
Start: 2025-02-04 | End: 2025-08-03
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
## 2025-02-12 ENCOUNTER — PATIENT MESSAGE (OUTPATIENT)
Dept: INTERNAL MEDICINE | Facility: CLINIC | Age: 50
End: 2025-02-12
Payer: COMMERCIAL

## 2025-02-12 DIAGNOSIS — I10 ESSENTIAL (PRIMARY) HYPERTENSION: Chronic | ICD-10-CM

## 2025-02-12 RX ORDER — AMLODIPINE BESYLATE 10 MG/1
TABLET ORAL
Qty: 30 TABLET | Refills: 0 | Status: SHIPPED | OUTPATIENT
Start: 2025-02-12

## 2025-02-12 NOTE — TELEPHONE ENCOUNTER
Refill Routing Note   Medication(s) are not appropriate for processing by [facility] for the following reason(s):        Responsible provider unclear  Patient not seen by provider within 15 months    [facility] action(s):  Defer               Appointments  past 12m or future 3m with PCP    Date Provider   Last Visit   6/10/2019 Reagan Holland MD   Next Visit   Visit date not found Reagan Holland MD   ED visits in past 90 days: 0        Note composed:11:34 AM 02/12/2025

## 2025-02-21 ENCOUNTER — HOSPITAL ENCOUNTER (OUTPATIENT)
Dept: RADIOLOGY | Facility: HOSPITAL | Age: 50
Discharge: HOME | End: 2025-02-21
Attending: NURSE PRACTITIONER
Payer: COMMERCIAL

## 2025-02-21 DIAGNOSIS — S16.1XXA ACUTE STRAIN OF NECK MUSCLE, INITIAL ENCOUNTER: ICD-10-CM

## 2025-02-21 PROCEDURE — 72040 X-RAY EXAM NECK SPINE 2-3 VW: CPT | Mod: TC,FY,PO

## 2025-02-21 PROCEDURE — 72040 X-RAY EXAM NECK SPINE 2-3 VW: CPT | Mod: 26,,, | Performed by: RADIOLOGY

## 2025-02-24 ENCOUNTER — RESULTS FOLLOW-UP (OUTPATIENT)
Dept: INTERNAL MEDICINE | Facility: CLINIC | Age: 50
End: 2025-02-24

## 2025-02-25 ENCOUNTER — OFFICE VISIT (OUTPATIENT)
Dept: INTERNAL MEDICINE | Facility: CLINIC | Age: 50
End: 2025-02-25
Payer: COMMERCIAL

## 2025-02-25 DIAGNOSIS — F43.22 ADJUSTMENT DISORDER WITH ANXIOUS MOOD: Primary | Chronic | ICD-10-CM

## 2025-02-25 PROCEDURE — 98005 SYNCH AUDIO-VIDEO EST LOW 20: CPT | Mod: 95,,, | Performed by: NURSE PRACTITIONER

## 2025-02-25 PROCEDURE — 1159F MED LIST DOCD IN RCRD: CPT | Mod: CPTII,95,, | Performed by: NURSE PRACTITIONER

## 2025-02-25 PROCEDURE — 1160F RVW MEDS BY RX/DR IN RCRD: CPT | Mod: CPTII,95,, | Performed by: NURSE PRACTITIONER

## 2025-02-25 NOTE — PROGRESS NOTES
The patient location is: [facility]  The chief complaint leading to consultation is: anxiety follow-up    Visit type: audiovisual    Face to Face time with patient: 15 minutes  20 minutes of total time spent on the encounter, which includes face to face time and non-face to face time preparing to see the patient (eg, review of tests), Obtaining and/or reviewing separately obtained history, Documenting clinical information in the electronic or other health record, Independently interpreting results (not separately reported) and communicating results to the patient/family/caregiver, or Care coordination (not separately reported).         Each patient to whom he or she provides medical services by telemedicine is:  (1) informed of the relationship between the physician and patient and the respective role of any other health care provider with respect to management of the patient; and (2) notified that he or she may decline to receive medical services by telemedicine and may withdraw from such care at any time.    Notes:   History of Present Illness   Sherrill Ennis is a 49 y.o. woman with medical history as listed below who presents today for follow-up anxiety. She is doing well on 5 mg Lexapro, no complaints. She is not interested in a dose increase at this time. She denies SI or HI.      Past Medical History:   Diagnosis Date    Anxiety     Diverticulosis     GERD (gastroesophageal reflux disease)     Herpes simplex virus (HSV) infection     Hypertension     Iron deficiency anemia due to chronic blood loss 2018    Resolved after hysterectomy    Mental disorder     RLS (restless legs syndrome)        Past Surgical History:   Procedure Laterality Date    BLADDER REPAIR N/A 2018    Procedure: REPAIR, BLADDER;  Surgeon: NELIA Le MD;  Location: [facility] OR;  Service: Urology;  Laterality: N/A;     SECTION      x4    CHOLECYSTECTOMY  1995    CYSTOGRAM  2018    Procedure:  CYSTOGRAM;  Surgeon: NELIA Le MD;  Location: [facility] OR;  Service: Urology;;    CYSTOGRAM N/A 7/11/2018    Procedure: CYSTOGRAM;  Surgeon: NELIA Le MD;  Location: [facility] OR;  Service: Urology;  Laterality: N/A;  RN Phone Pre op 7-6-18.    CYSTOSCOPY W/ RETROGRADES Bilateral 6/26/2018    Procedure: CYSTOSCOPY, WITH RETROGRADE pyelLOGRAM;  Surgeon: NELIA Le MD;  Location: [facility] OR;  Service: Urology;  Laterality: Bilateral;    CYSTOSCOPY W/ RETROGRADES Bilateral 8/29/2018    Procedure: CYSTOSCOPY, WITH RETROGRADE PYELOGRAM;  Surgeon: NELIA Le MD;  Location: [facility] OR;  Service: Urology;  Laterality: Bilateral;  RN PRE OP 8/27/2018    HERNIA REPAIR      HYSTERECTOMY  06/20/2018    LAPAROSCOPIC SALPINGECTOMY Bilateral 6/20/2018    Procedure: SALPINGECTOMY, LAPAROSCOPIC;  Surgeon: Vince Garcia MD;  Location: [facility] OR;  Service: OB/GYN;  Laterality: Bilateral;    LAPAROSCOPIC SUPRACERVICAL HYSTERECTOMY N/A 6/20/2018    Procedure: [id number];  Surgeon: Vince Garcia MD;  Location: [facility] OR;  Service: OB/GYN;  Laterality: N/A;  1ST CASE  RN PRE OP 6/13/2018    LAPAROSCOPIC SURGICAL REMOVAL OF CYST OF OVARY Right 6/20/2018    Procedure: EXCISION, CYST, OVARY, LAPAROSCOPIC;  Surgeon: Vince Garcia MD;  Location: [facility] OR;  Service: OB/GYN;  Laterality: Right;    TUBAL LIGATION         Social History     Socioeconomic History    Marital status:    Tobacco Use    Smoking status: Former     Current packs/day: 0.50     Average packs/day: 0.5 packs/day for 15.0 years (7.5 ttl pk-yrs)     Types: Cigarettes     Passive exposure: Past    Smokeless tobacco: Never   Substance and Sexual Activity    Alcohol use: Yes    Drug use: No    Sexual activity: Yes     Partners: Male     Birth control/protection: See Surgical Hx     Comment:    Social History Narrative     since 2016    Together since 2013    He drives 18-wheelers    She is the  for a dental  office    Previously  and     Lives with her  and youngest daughter.     with three daughters from previous marriage also     Social Drivers of Health     Financial Resource Strain: Low Risk  (1/23/2025)    Overall Financial Resource Strain (CARDIA)     Difficulty of Paying Living Expenses: Not hard at all   Food Insecurity: No Food Insecurity (1/23/2025)    Hunger Vital Sign     Worried About Running Out of Food in the Last Year: Never true     Ran Out of Food in the Last Year: Never true   Transportation Needs: No Transportation Needs (11/13/2023)    PRAPARE - Transportation     Lack of Transportation (Medical): No     Lack of Transportation (Non-Medical): No   Physical Activity: Inactive (1/23/2025)    Exercise Vital Sign     Days of Exercise per Week: 0 days     Minutes of Exercise per Session: 0 min   Stress: No Stress Concern Present (1/23/2025)    [employer] - Occupational Stress Questionnaire     Feeling of Stress : Only a little   Housing Stability: Low Risk  (11/13/2023)    Housing Stability Vital Sign     Unable to Pay for Housing in the Last Year: No     Number of Places Lived in the Last Year: 1     Unstable Housing in the Last Year: No       Family History   Problem Relation Name Age of Onset    Hypertension Mother      Stroke Mother      Aneurysm Mother      Hypertension Father      Hypertension Brother      Hypertension Maternal Grandfather      Heart disease Maternal Grandfather          MI    Cancer Maternal Grandfather      Cancer Paternal Grandmother      Breast cancer Neg Hx      Colon cancer Neg Hx      Ovarian cancer Neg Hx         Review of Systems  Review of Systems   HENT:  Negative for hearing loss.    Eyes:  Negative for discharge.   Respiratory:  Negative for wheezing.    Cardiovascular:  Negative for chest pain and palpitations.   Gastrointestinal:  Negative for blood in stool, constipation, diarrhea and vomiting.    Genitourinary:  Negative for dysuria and hematuria.   Musculoskeletal:  Positive for neck pain.   Neurological:  Positive for headaches. Negative for weakness.   Endo/Heme/Allergies:  Negative for polydipsia.       A complete review of systems was otherwise negative.    Physical Exam  General appearance: alert, appears stated age, cooperative, and no distress  Lungs:  respirations are even and unlabored  Skin:  no visible rash or lesions  Neurologic: Grossly normal    Assessment/Plan  Adjustment disorder with anxious mood  The current medical regimen is effective;  continue present plan and medications.  Happy to increase dose in future if needed.        Answers submitted by the patient for this visit:  Review of Systems Questionnaire (Submitted on 2/25/2025)  activity change: No  unexpected weight change: No  neck pain: Yes  hearing loss: No  rhinorrhea: No  trouble swallowing: No  eye discharge: No  visual disturbance: No  chest tightness: No  wheezing: No  chest pain: No  palpitations: No  blood in stool: No  constipation: No  vomiting: No  diarrhea: No  polydipsia: No  polyuria: No  difficulty urinating: No  hematuria: No  menstrual problem: No  dysuria: No  joint swelling: No  arthralgias: No  headaches: Yes  weakness: No  confusion: No  dysphoric mood: No      Patient has verbalized understanding and is in agreement with plan of care.    Follow up if symptoms worsen or fail to improve.

## 2025-03-17 DIAGNOSIS — I10 ESSENTIAL (PRIMARY) HYPERTENSION: Chronic | ICD-10-CM

## 2025-03-17 NOTE — PLAN OF CARE
PA for Tretinoin completed again via Cover My Meds through [employer].  Key: [id number].  [employer] is reviewing your PA request and will respond within 24 hours for Medicaid or up to 72 hours for non-Medicaid plans, based on the required timeframe determined by state or federal regulations. To check for an update later, open this request from your dashboard.  Yessy Donovan LPN   Problem: Patient Care Overview  Goal: Plan of Care Review  Pt has hernandez urinary catheter in place. Clear yellow urine. YONNY drain in place. Sero sanguinous drainage noted. Lower abdominal burning and pulling that is constant that is not relieved with prn medication provided. Denies nausea/vomiting. Providing zofran to patient upon request for prevention of nausea due to hx of nausea with narcotics. Spouse at bedside. VSS. Temperature highest was 99.8 oral. Tolerated PO fluids. Ambulated with reports of increase of pain. Tachypnea noted. Lung sounds clear. Provided pt with IS to perform. Pt does independently. Incisional dressings are dry, clean, and intact. Abdomen is red, warm, and distended. Redness radiates to left flank region. Abdomen bowel sounds are normo-active, pending to pass flatus. Pt c/o constant pain that had her in tears upon ambulation and movement.

## 2025-03-18 ENCOUNTER — TELEPHONE (OUTPATIENT)
Dept: INTERNAL MEDICINE | Facility: CLINIC | Age: 50
End: 2025-03-18
Payer: COMMERCIAL

## 2025-03-18 RX ORDER — AMLODIPINE BESYLATE 10 MG/1
10 TABLET ORAL 2 TIMES DAILY
Qty: 90 TABLET | Refills: 1 | Status: SHIPPED | OUTPATIENT
Start: 2025-03-18

## 2025-03-18 RX ORDER — AMLODIPINE BESYLATE 10 MG/1
TABLET ORAL
Qty: 30 TABLET | Refills: 0 | OUTPATIENT
Start: 2025-03-18

## 2025-03-18 NOTE — TELEPHONE ENCOUNTER
Refill Routing Note   Medication(s) are not appropriate for processing by [facility] for the following reason(s):        Responsible provider unclear    [facility] action(s):  Defer               Appointments  past 12m or future 3m with PCP    Date Provider   Last Visit   6/10/2019 Reagan Holland MD   Next Visit   3/17/2025 Reagan Holland MD   ED visits in past 90 days: 0        Note composed:12:16 AM 03/18/2025

## 2025-03-18 NOTE — TELEPHONE ENCOUNTER
----- Message from [place] sent at 3/18/2025  9:15 AM CDT -----  Contact: [phone]@[email or web address]  Pharmacy is calling to clarify an RX. Yes RX name:  amLODIPine (NORVASC) 10 MG tabletWhat do they need to clarify:  directions Comments: Please call [facility] [phone]

## 2025-05-10 DIAGNOSIS — E78.5 HYPERLIPIDEMIA, UNSPECIFIED HYPERLIPIDEMIA TYPE: ICD-10-CM

## 2025-05-12 RX ORDER — ATORVASTATIN CALCIUM 20 MG/1
TABLET, FILM COATED ORAL
Qty: 90 TABLET | Refills: 0 | Status: SHIPPED | OUTPATIENT
Start: 2025-05-12

## 2025-05-12 NOTE — TELEPHONE ENCOUNTER
Refill Routing Note   Medication(s) are not appropriate for processing by [facility] for the following reason(s):        Responsible provider unclear  Non-participating provider    [facility] action(s):  Route             Appointments  past 12m or future 3m with PCP    Date Provider   Last Visit   2/25/2025 Fani Huitron NP   Next Visit   Visit date not found Fani Huitron NP   ED visits in past 90 days: 0        Note composed:10:02 AM 05/12/2025

## 2025-06-09 DIAGNOSIS — I10 ESSENTIAL (PRIMARY) HYPERTENSION: Chronic | ICD-10-CM

## 2025-06-09 RX ORDER — AMLODIPINE BESYLATE 10 MG/1
TABLET ORAL
Qty: 90 TABLET | Refills: 1 | Status: SHIPPED | OUTPATIENT
Start: 2025-06-09

## 2025-06-09 NOTE — TELEPHONE ENCOUNTER
Refill Routing Note   Medication(s) are not appropriate for processing by [facility] for the following reason(s):        Non-participating provider  Responsible provider unclear-no PCP    [facility] action(s):  Route        Medication Therapy Plan: no PCP in Epic      Appointments  past 12m or future 3m with PCP    Date Provider   Last Visit   2/25/2025 Fani Huitron NP   Next Visit   Visit date not found Fani Huitron NP   ED visits in past 90 days: 0        Note composed:2:43 PM 06/09/2025

## 2025-08-22 DIAGNOSIS — E78.5 HYPERLIPIDEMIA, UNSPECIFIED HYPERLIPIDEMIA TYPE: ICD-10-CM

## 2025-08-22 DIAGNOSIS — K29.00 ACUTE GASTRITIS WITHOUT HEMORRHAGE, UNSPECIFIED GASTRITIS TYPE: ICD-10-CM

## 2025-08-25 RX ORDER — PANTOPRAZOLE SODIUM 40 MG/1
40 TABLET, DELAYED RELEASE ORAL 2 TIMES DAILY
Qty: 90 TABLET | Refills: 2 | Status: SHIPPED | OUTPATIENT
Start: 2025-08-25

## 2025-08-25 RX ORDER — ATORVASTATIN CALCIUM 20 MG/1
TABLET, FILM COATED ORAL
Qty: 90 TABLET | Refills: 1 | Status: SHIPPED | OUTPATIENT
Start: 2025-08-25

## 2025-08-26 ENCOUNTER — HOSPITAL ENCOUNTER (OUTPATIENT)
Dept: RADIOLOGY | Facility: HOSPITAL | Age: 50
Discharge: HOME | End: 2025-08-26
Attending: INTERNAL MEDICINE
Payer: COMMERCIAL

## 2025-08-26 DIAGNOSIS — Z12.31 ENCOUNTER FOR SCREENING MAMMOGRAM FOR BREAST CANCER: ICD-10-CM

## 2025-08-26 PROCEDURE — 77063 BREAST TOMOSYNTHESIS BI: CPT | Mod: TC,PO

## (undated) DEVICE — WIRE GUIDE .035 150CM.

## (undated) DEVICE — SYS CLSR DERMABOND PRINEO 22CM

## (undated) DEVICE — PACK ENDOSCOPY GENERAL

## (undated) DEVICE — APPLICATOR CHLORAPREP ORN 26ML

## (undated) DEVICE — PACK LAPAROSCOPY/PELVISCOPY II

## (undated) DEVICE — SUT ETHILON 2-0 FSLX 30 BLK

## (undated) DEVICE — SYR 10CC LUER LOCK

## (undated) DEVICE — HOOK DISSECTING 5MM

## (undated) DEVICE — Device

## (undated) DEVICE — SUT EASE CROSSBOW CLSR SYS

## (undated) DEVICE — SODIUM BICARBONATE 50 POUNDS

## (undated) DEVICE — SEE MEDLINE ITEM 146417

## (undated) DEVICE — COVER OVERHEAD SURG LT BLUE

## (undated) DEVICE — DRAPE STERI LONG

## (undated) DEVICE — DRESSING ABSRBNT ISLAND 3.6X8

## (undated) DEVICE — SUT ETHIBOND 0 CR/CT-1 8-18

## (undated) DEVICE — SEE MEDLINE ITEM 157117

## (undated) DEVICE — GOWN B1 X-LG X-LONG

## (undated) DEVICE — CANISTER SUCTION 2 LTR

## (undated) DEVICE — DEVICE N-SEAL LAPROSCOPIC

## (undated) DEVICE — SUPPORT ULNA NERVE PROTECTOR

## (undated) DEVICE — SOL 9P NACL IRR PIC IL

## (undated) DEVICE — MAT QUICK 40X30 FLOOR FLUID LF

## (undated) DEVICE — GLOVE BIOGEL 7.5

## (undated) DEVICE — SYR ONLY LUER LOCK 20CC

## (undated) DEVICE — SUT STRATAFIX 4-0 30CM PS-2

## (undated) DEVICE — GLOVE SURG BIOGEL LATEX SZ 7.5

## (undated) DEVICE — BLANKET UPPER BODY 78.7X29.9IN

## (undated) DEVICE — SYRINGE SAFETY LOK 10CC 305559

## (undated) DEVICE — PAD PREP 50/CA

## (undated) DEVICE — CATH URTRL OPEN END STR TIP 5F

## (undated) DEVICE — BINDER ABDOMINAL 9 46-62

## (undated) DEVICE — SPONGE KITTNER 1/4X 5/8 L STRL

## (undated) DEVICE — TOWEL OR XRAY WHITE 17X26IN

## (undated) DEVICE — SEE MEDLINE ITEM 154981

## (undated) DEVICE — SUT 4/0 18IN COATED VICRYL

## (undated) DEVICE — GLOVE BIOGEL 7.0

## (undated) DEVICE — SUT CTD VICRYL 3-0 CR/SH

## (undated) DEVICE — SYR 50ML CATH TIP

## (undated) DEVICE — SOL IRR NACL .9% 3000ML

## (undated) DEVICE — ADAPTER DISP HAND SWITCH

## (undated) DEVICE — SUT VICRYL PLUS 3-0 SH 18IN

## (undated) DEVICE — SUT 1 48IN PDS II VIO MONO

## (undated) DEVICE — SEE MEDLINE ITEM 152467

## (undated) DEVICE — TRAY FOLEY 16FR INFECTION CONT

## (undated) DEVICE — SOL NACL 0.9% INJ PF/100 150

## (undated) DEVICE — SEE MEDLINE ITEM 152622

## (undated) DEVICE — KIT ANTIFOG

## (undated) DEVICE — GLOVE BIOGEL PI MICRO SZ 7

## (undated) DEVICE — NDL HYPO REG 25G X 1 1/2

## (undated) DEVICE — GLOVE SURGICAL LATEX SZ 7

## (undated) DEVICE — SEE MEDLINE ITEM 107746

## (undated) DEVICE — ELECTRODE REM PLYHSV RETURN 9

## (undated) DEVICE — NDL INSUF ULTRA VERESS 120MM

## (undated) DEVICE — BLADE SURG CARBON STEEL SZ11

## (undated) DEVICE — TUBING INSUFFLATION 10

## (undated) DEVICE — CLOSURE SKIN STERI STRIP 1/2X4

## (undated) DEVICE — DRESSING ADH ISLAND 2.5 X 3

## (undated) DEVICE — SEE L#95700

## (undated) DEVICE — UNDERGLOVES BIOGEL PI SIZE 7.5

## (undated) DEVICE — SUT ETHIBOND EXCEL 1 CTX 18

## (undated) DEVICE — SUT VICRYL 3-0 27 FS-2

## (undated) DEVICE — SOL NS 1000CC

## (undated) DEVICE — SPONGE LAP 18X18 PREWASHED

## (undated) DEVICE — UTERINE MANIPULATOR HUMI 6003

## (undated) DEVICE — GLOVE BIOGEL ECLIPSE SZ 7

## (undated) DEVICE — GAUZE SPONGE 4X4 12PLY

## (undated) DEVICE — SUT 1 36IN PDS II

## (undated) DEVICE — KIT FIBRIN SEALANT EVICEL 5 ML

## (undated) DEVICE — FORCEP BPLR ENDOSCOPIC 310MM

## (undated) DEVICE — SUT 2-0 VICRYL / SH (J417)

## (undated) DEVICE — CHLORAPREP W TINT 26ML APPL

## (undated) DEVICE — SEE MEDLINE ITEM 152740

## (undated) DEVICE — GLOVE, SURG,LATEX FREE SZ 7

## (undated) DEVICE — SEE MEDLINE ITEM 152742

## (undated) DEVICE — JELLY LUBRICANT STERILE 4 OZ

## (undated) DEVICE — COVER SNAP KAP 26IN

## (undated) DEVICE — CONTAINER SPECIMEN STRL 4OZ

## (undated) DEVICE — STAPLER SKIN PROXIMATE WIDE

## (undated) DEVICE — SPONGE GAUZE 16PLY 4X4

## (undated) DEVICE — LOOP LINA 200MM BIPOLAR

## (undated) DEVICE — IRRIGATOR ENDOSCOPY DISP.

## (undated) DEVICE — KIT CATH URTRL CONE TIP 5F

## (undated) DEVICE — TROCAR ENDOPATH XCEL 5X100MM

## (undated) DEVICE — TROCAR ENDOPATH XCEL 11MM 10CM

## (undated) DEVICE — SOL CLEARIFY VISUALIZATION LAP